# Patient Record
Sex: FEMALE | Race: WHITE | Employment: OTHER | URBAN - METROPOLITAN AREA
[De-identification: names, ages, dates, MRNs, and addresses within clinical notes are randomized per-mention and may not be internally consistent; named-entity substitution may affect disease eponyms.]

---

## 2017-05-08 ENCOUNTER — EMPLOYEE WELLNESS (OUTPATIENT)
Dept: OTHER | Age: 62
End: 2017-05-08

## 2017-05-08 LAB
CHOLESTEROL, TOTAL: 219 MG/DL (ref 0–199)
GLUCOSE BLD-MCNC: 123 MG/DL (ref 70–99)
HDLC SERPL-MCNC: 57 MG/DL (ref 40–60)
LDL CHOLESTEROL CALCULATED: 133 MG/DL
TRIGL SERPL-MCNC: 144 MG/DL (ref 0–150)

## 2017-05-31 ENCOUNTER — HOSPITAL ENCOUNTER (OUTPATIENT)
Dept: OTHER | Age: 62
Discharge: HOME | End: 2017-05-31
Attending: NURSE PRACTITIONER | Admitting: NURSE PRACTITIONER

## 2017-05-31 LAB
ALBUMIN SERPL-MCNC: 4 G/DL (ref 3.4–5)
ALP BLD-CCNC: 64 U/L (ref 40–129)
ALT SERPL-CCNC: 19 U/L (ref 10–40)
ANION GAP SERPL CALCULATED.3IONS-SCNC: 11 MMOL/L (ref 3–16)
AST SERPL-CCNC: 16 U/L (ref 15–37)
BASOPHILS ABSOLUTE: 0.1 K/UL (ref 0–0.2)
BASOPHILS RELATIVE PERCENT: 0.8 %
BILIRUB SERPL-MCNC: 0.4 MG/DL (ref 0–1)
BILIRUBIN DIRECT: <0.2 MG/DL (ref 0–0.3)
BILIRUBIN, INDIRECT: NORMAL MG/DL (ref 0–1)
BUN BLDV-MCNC: 14 MG/DL (ref 7–20)
CALCIUM SERPL-MCNC: 8.9 MG/DL (ref 8.3–10.6)
CHLORIDE BLD-SCNC: 102 MMOL/L (ref 99–110)
CHOLESTEROL, TOTAL: 199 MG/DL (ref 0–199)
CO2: 26 MMOL/L (ref 21–32)
CREAT SERPL-MCNC: <0.5 MG/DL (ref 0.6–1.2)
EOSINOPHILS ABSOLUTE: 0.2 K/UL (ref 0–0.6)
EOSINOPHILS RELATIVE PERCENT: 2.6 %
GFR AFRICAN AMERICAN: >60
GFR NON-AFRICAN AMERICAN: >60
GLUCOSE BLD-MCNC: 108 MG/DL (ref 70–99)
HCT VFR BLD CALC: 40.6 % (ref 36–48)
HDLC SERPL-MCNC: 58 MG/DL (ref 40–60)
HEMOGLOBIN: 13.5 G/DL (ref 12–16)
LDL CHOLESTEROL CALCULATED: 120 MG/DL
LYMPHOCYTES ABSOLUTE: 1.7 K/UL (ref 1–5.1)
LYMPHOCYTES RELATIVE PERCENT: 22.9 %
MCH RBC QN AUTO: 30.9 PG (ref 26–34)
MCHC RBC AUTO-ENTMCNC: 33.4 G/DL (ref 31–36)
MCV RBC AUTO: 92.5 FL (ref 80–100)
MONOCYTES ABSOLUTE: 0.6 K/UL (ref 0–1.3)
MONOCYTES RELATIVE PERCENT: 8.2 %
NEUTROPHILS ABSOLUTE: 4.9 K/UL (ref 1.7–7.7)
NEUTROPHILS RELATIVE PERCENT: 65.5 %
PDW BLD-RTO: 13.5 % (ref 12.4–15.4)
PLATELET # BLD: 266 K/UL (ref 135–450)
PMV BLD AUTO: 8.4 FL (ref 5–10.5)
POTASSIUM SERPL-SCNC: 4.3 MMOL/L (ref 3.5–5.1)
RBC # BLD: 4.39 M/UL (ref 4–5.2)
SODIUM BLD-SCNC: 139 MMOL/L (ref 136–145)
T4 FREE: 1.1 NG/DL (ref 0.9–1.8)
TOTAL PROTEIN: 7.1 G/DL (ref 6.4–8.2)
TRIGL SERPL-MCNC: 103 MG/DL (ref 0–150)
TSH SERPL DL<=0.05 MIU/L-ACNC: 1.67 UIU/ML (ref 0.27–4.2)
VLDLC SERPL CALC-MCNC: 21 MG/DL
WBC # BLD: 7.5 K/UL (ref 4–11)

## 2017-06-01 LAB
ESTIMATED AVERAGE GLUCOSE: 125.5 MG/DL
HBA1C MFR BLD: 6 %

## 2018-02-14 ENCOUNTER — OFFICE VISIT (OUTPATIENT)
Dept: ORTHOPEDIC SURGERY | Age: 63
End: 2018-02-14

## 2018-02-14 VITALS
WEIGHT: 280.8 LBS | HEART RATE: 88 BPM | SYSTOLIC BLOOD PRESSURE: 148 MMHG | BODY MASS INDEX: 46.78 KG/M2 | DIASTOLIC BLOOD PRESSURE: 89 MMHG | HEIGHT: 65 IN

## 2018-02-14 DIAGNOSIS — M25.551 RIGHT HIP PAIN: Primary | ICD-10-CM

## 2018-02-14 DIAGNOSIS — M25.851 RIGHT HIP IMPINGEMENT SYNDROME: ICD-10-CM

## 2018-02-14 PROCEDURE — 99203 OFFICE O/P NEW LOW 30 MIN: CPT | Performed by: ORTHOPAEDIC SURGERY

## 2018-02-14 RX ORDER — CELECOXIB 200 MG/1
200 CAPSULE ORAL DAILY
Qty: 60 CAPSULE | Refills: 0 | Status: SHIPPED | OUTPATIENT
Start: 2018-02-14 | End: 2018-02-15 | Stop reason: SDUPTHER

## 2018-02-14 NOTE — PROGRESS NOTES
ABLATION      JOINT REPLACEMENT      DAVID KNEE    MANDIBLE RECONSTRUCTION      SHOULDER SURGERY      RIGHT ROTATOR CUFF     allergies, social and family histories were reviewed and updated as appropriate. Review of Systems:  Relevant review of systems reviewed and available in the patient's chart    Vital Signs:  BP (!) 148/89   Pulse 88   Ht 5' 5\" (1.651 m)   Wt 280 lb 12.8 oz (127.4 kg)   BMI 46.73 kg/m²     General Exam:   Constitutional: Patient is adequately groomed with no evidence of malnutrition, class III obesity noted  Mental Status: The patient is oriented to time, place and person. The patient's mood and affect are appropriate. Lymphatic: The lymphatic examination bilaterally reveals all areas to be without enlargement or induration. Vascular: Examination reveals no swelling or calf tenderness. Peripheral pulses are palpable and 2+. Neurological: The patient has good coordination. There is no focal weakness or sensory deficit. Right Hip Examination:    Inspection:  Normal muscle contours and no significant limb length discrepancy. No gross atrophy in any particular myotome. Palpation:  Slight tenderness to the abductor region. No tenderness over the SI joint or ASIS. No tenderness to palpation at the knee joint. Range of Motion: hip flexion 90,  Hip abduction 35,  Hip internal rotation at 90° of flexion 15 with moderate discomfort, hip external rotation at 90° of flexion 35 with moderate discomfort. Knee range of motion shows functional range without pain. Ankle dorsiflexion and plantarflexion show functional range of motion. Strength: Hip flexion  4+/5, hip abduction 4+ / 5, hip adduction 5 minus/5. Knee flexion and extension 4+-5/5 without pain. DF/ PF ankle 4+-5/5    Special Tests: Log roll negative, Stinchfield's positive, Straight leg raise negative for radicular signs    Sensation to light touch grossly present and capillary refill less than 2 seconds.     Skin: understands and accepts this course of care.

## 2018-02-15 RX ORDER — CELECOXIB 200 MG/1
200 CAPSULE ORAL DAILY
Qty: 60 CAPSULE | Refills: 0 | Status: SHIPPED | OUTPATIENT
Start: 2018-02-15 | End: 2019-05-01 | Stop reason: SDUPTHER

## 2018-02-20 ENCOUNTER — HOSPITAL ENCOUNTER (OUTPATIENT)
Dept: PHYSICAL THERAPY | Age: 63
Discharge: HOME | End: 2018-02-28
Admitting: ORTHOPAEDIC SURGERY

## 2018-02-21 NOTE — PROGRESS NOTES
Physical Therapy  Initial Assessment  Date: 2018  Patient Name: Rupinder Yu  MRN: [id number]  : 1955     Treatment Diagnosis: pain, decreased R hip jt mobility, decreased R hip ROM, decreased R LE strength, impaired balance, abnormal gait    Outpatient fall risk assessment completed asking screening question if patient has fallen in the past 30 days:  [x] Yes  [] No    Based on screen for falls, patient demonstrates fall risk:  [] Yes  [x] No    Interventions based on fall risk status:  Updated Problem List within Medical History  [] Yes   [x] N/A    Asked family to assist with increased observation of the patient  [] Yes   [x] N/A    Patient kept in visible area when not closely supervised by therapist  [] Yes   [x] N/A    Repeatedly reinforce activity limits and safety needs with patient/family  [] Yes   [x] N/A    Increase frequency of rounding/monitoring patient  [] Yes   [x] N/A      Restrictions     Subjective   General  Chart Reviewed: Yes  Referring Practitioner: Kirby Loco  Referral Date : 18  Diagnosis: R hip pain (R hip impingement syndrome)  PT Visit Information  Onset Date: 18  PT Insurance Information: [employer]  Total # of Visits Approved: 12  Total # of Visits to Date: 1    Subjective: PMHx: OA foot, hyperlipidemia. Pt reports she started having R hip pain about month ago. Over the past month symptoms have worsened. She had an x-ray. The doctor thinks the labrum is inflamed. She will follow up with Dr. Pilo Rios if continues to have symptoms after PT. Will be starting Celebrex when it arrives in the mail. PLOF: difficulty with stairs, no limitations in standing or walking. CLOF: pain and difficulty with walking, grocery shopping, not confident on the stairs, standing after prolonged sitting, and sleeping. Symptoms are located at the anterior hip and travel back around the crest of her hip to her R glute.  Symptoms are described as constant dull ache and sharp pain with standing/walking. Pt denies N/T. Symptoms are exacerbated with standing and walking. Symptoms are alleviated with seated rest and a heating pad. Her primary goal is to be pain free and walk normally. Pain Screening  Patient Currently in Pain: Yes  Location: anterior to posterior R hip  Current: 2-3/10. Worst: 8/10. Least: 2/10.        Social/Functional History  Social/Functional History  Lives With: Spouse  Type of Home: House  Home Layout: One level  Home Access: Stairs to enter without rails  Entrance Stairs - Number of Steps: 3  Bathroom Shower/Tub: Tub/Shower unit  Bathroom Toilet: Standard  Homemaking Assistance: Needs assistance  Active : Yes  Type of occupation: RN  Leisure & Hobbies: golf, spend time with grandchildren  Objective     Observation/Palpation  Posture: Good (neutral hip in standing)  Palpation: Decreased STM and tenderness with palpation of TFL, glute med, piriformis  Body Mechanics: Decreased WB through R LE with sit to stand transfer, antalgic sit to stand transfer    PROM RLE (degrees)  RLE PROM: Exceptions  RLE General PROM: Decreased IR and ER  R Hip Flexion 0-125: 90  R Hip Extension 0-10: 5  PROM LLE (degrees)  LLE PROM: Exceptions  LLE General PROM: L hip ER and IR WNL  L Hip Flexion 0-125: 95  L Hip Extension 0-10: 10    Strength RLE  R Hip Flexion: 4/5  R Hip Extension: 4-/5  R Hip ABduction: 4-/5  R Knee Flexion: 4+/5  R Knee Extension: 4+/5  Strength LLE  Strength LLE: Exception  L Hip Flexion: 4+/5  L Hip Extension: 4+/5  L Hip ABduction: 4+/5  L Knee Flexion: 5/5  L Knee Extension: 5/5     Additional Measures  Flexibility: Decreased R hip flexor length  Special Tests: ANIKA R (+), FADIR R (+), Scour R (-)  Other: Lumbar AROM does not reproduce symptoms  Jt Mobility: hypomobile R hip jt distraction     Bed mobility  Comment: Pt independent with bed mobility, however requires extra time to complete     Ambulation  Ambulation?: Yes  Ambulation 1  Surface: level

## 2018-02-21 NOTE — PLAN OF CARE
Outpatient Physical Therapy     Phone: [phone] Fax: [phone]     To: Referring Practitioner: Madhavi Ho      Patient: Misa Velazquez   : 1955   MRN: [id number]  Evaluation Date: 2018      Diagnosis Information:  · Diagnosis: R hip pain (R hip impingement syndrome)   · Treatment Diagnosis: pain, decreased R hip jt mobility, decreased R hip ROM, decreased R LE strength, impaired balance, abnormal gait     Physical Therapy Certification/Re-Certification Form  Dear Dr. Violet Gutierrez  The following patient has been evaluated for physical therapy services. Please review the attached evaluation and/or summary of the patient's plan of care, and verify that you agree therapy should continue by signing the attached document and sending it back to our office. Plan of Care/Treatment to date:  [x] Therapeutic Exercise      [x] Modalities:  [x] Therapeutic Activity        [x] Ultrasound    [] Gait Training        [] Cervical Traction   [x] Neuromuscular Re-education      [x] Cold/hotpack    [x] Instruction in HEP        [] Lumbar Traction  [x] Manual Therapy        [x] Electrical Stimulation            [x] Aquatic Therapy- if needed        [] Iontophoresis        ? [] Lymphedema management  [] Women's Health     Other:  [] Vestibular Rehab        []    []  Needed     Frequency/Duration:  # Days per week: [] 1 day # Weeks: [] 1 week [] 5 weeks     [x] 2 days? [] 2 weeks [x] 6 weeks     [] 3 days   [] 3 weeks [] 7 weeks     [] 4 days   [] 4 weeks [] 8 weeks    Rehab Potential: [] Excellent [x] Good [] Fair  [] Poor     Electronically signed by:  Quique Interiano, PT, DPT [id number]    If you have any questions or concerns, please don't hesitate to call.   Thank you for your referral.      Physician Signature:________________________________Date:__________________  By signing above, therapists plan is approved by physician

## 2018-03-01 ENCOUNTER — HOSPITAL ENCOUNTER (OUTPATIENT)
Dept: OTHER | Age: 63
Discharge: HOME | End: 2018-03-31
Attending: ORTHOPAEDIC SURGERY | Admitting: ORTHOPAEDIC SURGERY

## 2018-03-20 VITALS — WEIGHT: 264 LBS | BODY MASS INDEX: 43.93 KG/M2

## 2018-04-01 ENCOUNTER — HOSPITAL ENCOUNTER (OUTPATIENT)
Dept: OTHER | Age: 63
Discharge: HOME | End: 2018-04-30
Attending: ORTHOPAEDIC SURGERY | Admitting: ORTHOPAEDIC SURGERY

## 2018-06-20 ENCOUNTER — HOSPITAL ENCOUNTER (OUTPATIENT)
Dept: OTHER | Age: 63
Discharge: HOME | End: 2018-06-20
Attending: NURSE PRACTITIONER | Admitting: NURSE PRACTITIONER

## 2018-06-20 LAB
ALBUMIN SERPL-MCNC: 3.8 G/DL (ref 3.4–5)
ALP BLD-CCNC: 79 U/L (ref 40–129)
ALT SERPL-CCNC: 19 U/L (ref 10–40)
ANION GAP SERPL CALCULATED.3IONS-SCNC: 13 MMOL/L (ref 3–16)
AST SERPL-CCNC: 13 U/L (ref 15–37)
BASOPHILS ABSOLUTE: 0 K/UL (ref 0–0.2)
BASOPHILS RELATIVE PERCENT: 0.5 %
BILIRUB SERPL-MCNC: 0.3 MG/DL (ref 0–1)
BILIRUBIN DIRECT: <0.2 MG/DL (ref 0–0.3)
BILIRUBIN, INDIRECT: ABNORMAL MG/DL (ref 0–1)
BUN BLDV-MCNC: 10 MG/DL (ref 7–20)
CALCIUM SERPL-MCNC: 9.1 MG/DL (ref 8.3–10.6)
CHLORIDE BLD-SCNC: 102 MMOL/L (ref 99–110)
CHOLESTEROL, TOTAL: 198 MG/DL (ref 0–199)
CO2: 25 MMOL/L (ref 21–32)
CREAT SERPL-MCNC: 0.6 MG/DL (ref 0.6–1.2)
EOSINOPHILS ABSOLUTE: 0.2 K/UL (ref 0–0.6)
EOSINOPHILS RELATIVE PERCENT: 2 %
ESTIMATED AVERAGE GLUCOSE: 131.2 MG/DL
GFR AFRICAN AMERICAN: >60
GFR NON-AFRICAN AMERICAN: >60
GLUCOSE BLD-MCNC: 113 MG/DL (ref 70–99)
HBA1C MFR BLD: 6.2 %
HCT VFR BLD CALC: 40.6 % (ref 36–48)
HDLC SERPL-MCNC: 53 MG/DL (ref 40–60)
HEMOGLOBIN: 14 G/DL (ref 12–16)
LDL CHOLESTEROL CALCULATED: 126 MG/DL
LYMPHOCYTES ABSOLUTE: 2.2 K/UL (ref 1–5.1)
LYMPHOCYTES RELATIVE PERCENT: 25 %
MCH RBC QN AUTO: 31.6 PG (ref 26–34)
MCHC RBC AUTO-ENTMCNC: 34.4 G/DL (ref 31–36)
MCV RBC AUTO: 92.1 FL (ref 80–100)
MONOCYTES ABSOLUTE: 0.7 K/UL (ref 0–1.3)
MONOCYTES RELATIVE PERCENT: 8.1 %
NEUTROPHILS ABSOLUTE: 5.7 K/UL (ref 1.7–7.7)
NEUTROPHILS RELATIVE PERCENT: 64.4 %
PDW BLD-RTO: 13.4 % (ref 12.4–15.4)
PLATELET # BLD: 271 K/UL (ref 135–450)
PMV BLD AUTO: 8.5 FL (ref 5–10.5)
POTASSIUM SERPL-SCNC: 4.4 MMOL/L (ref 3.5–5.1)
RBC # BLD: 4.41 M/UL (ref 4–5.2)
SODIUM BLD-SCNC: 140 MMOL/L (ref 136–145)
TOTAL PROTEIN: 6.8 G/DL (ref 6.4–8.2)
TRIGL SERPL-MCNC: 96 MG/DL (ref 0–150)
VLDLC SERPL CALC-MCNC: 19 MG/DL
WBC # BLD: 8.9 K/UL (ref 4–11)

## 2018-11-30 ENCOUNTER — HOSPITAL ENCOUNTER (OUTPATIENT)
Dept: WOMENS IMAGING | Age: 63
Discharge: HOME | End: 2018-11-30
Payer: COMMERCIAL

## 2018-11-30 DIAGNOSIS — Z12.31 ENCOUNTER FOR SCREENING MAMMOGRAM FOR BREAST CANCER: ICD-10-CM

## 2018-11-30 PROCEDURE — 77063 BREAST TOMOSYNTHESIS BI: CPT

## 2018-12-27 ENCOUNTER — HOSPITAL ENCOUNTER (OUTPATIENT)
Age: 63
Discharge: HOME | End: 2018-12-27
Payer: COMMERCIAL

## 2018-12-27 ENCOUNTER — HOSPITAL ENCOUNTER (OUTPATIENT)
Dept: GENERAL RADIOLOGY | Age: 63
Discharge: HOME | End: 2018-12-27
Payer: COMMERCIAL

## 2018-12-27 DIAGNOSIS — J20.8 ACUTE BRONCHITIS DUE TO OTHER SPECIFIED ORGANISMS: ICD-10-CM

## 2018-12-27 LAB
ALBUMIN SERPL-MCNC: 4.1 G/DL (ref 3.4–5)
ALP BLD-CCNC: 72 U/L (ref 40–129)
ALT SERPL-CCNC: 21 U/L (ref 10–40)
ANION GAP SERPL CALCULATED.3IONS-SCNC: 14 MMOL/L (ref 3–16)
AST SERPL-CCNC: 13 U/L (ref 15–37)
BASOPHILS ABSOLUTE: 0 K/UL (ref 0–0.2)
BASOPHILS RELATIVE PERCENT: 0.4 %
BILIRUB SERPL-MCNC: <0.2 MG/DL (ref 0–1)
BILIRUBIN DIRECT: <0.2 MG/DL (ref 0–0.3)
BILIRUBIN, INDIRECT: ABNORMAL MG/DL (ref 0–1)
BUN BLDV-MCNC: 14 MG/DL (ref 7–20)
CALCIUM SERPL-MCNC: 9.1 MG/DL (ref 8.3–10.6)
CHLORIDE BLD-SCNC: 101 MMOL/L (ref 99–110)
CO2: 25 MMOL/L (ref 21–32)
CREAT SERPL-MCNC: 0.6 MG/DL (ref 0.6–1.2)
EOSINOPHILS ABSOLUTE: 0.1 K/UL (ref 0–0.6)
EOSINOPHILS RELATIVE PERCENT: 0.7 %
GFR AFRICAN AMERICAN: >60
GFR NON-AFRICAN AMERICAN: >60
GLUCOSE BLD-MCNC: 113 MG/DL (ref 70–99)
HCT VFR BLD CALC: 41.3 % (ref 36–48)
HEMOGLOBIN: 14 G/DL (ref 12–16)
LYMPHOCYTES ABSOLUTE: 3.5 K/UL (ref 1–5.1)
LYMPHOCYTES RELATIVE PERCENT: 29.6 %
MCH RBC QN AUTO: 31.2 PG (ref 26–34)
MCHC RBC AUTO-ENTMCNC: 33.8 G/DL (ref 31–36)
MCV RBC AUTO: 92.2 FL (ref 80–100)
MONOCYTES ABSOLUTE: 0.9 K/UL (ref 0–1.3)
MONOCYTES RELATIVE PERCENT: 7.4 %
NEUTROPHILS ABSOLUTE: 7.3 K/UL (ref 1.7–7.7)
NEUTROPHILS RELATIVE PERCENT: 61.9 %
PDW BLD-RTO: 13.2 % (ref 12.4–15.4)
PLATELET # BLD: 310 K/UL (ref 135–450)
PMV BLD AUTO: 8.1 FL (ref 5–10.5)
POTASSIUM SERPL-SCNC: 3.7 MMOL/L (ref 3.5–5.1)
RBC # BLD: 4.48 M/UL (ref 4–5.2)
SODIUM BLD-SCNC: 140 MMOL/L (ref 136–145)
TOTAL PROTEIN: 7 G/DL (ref 6.4–8.2)
WBC # BLD: 11.7 K/UL (ref 4–11)

## 2018-12-27 PROCEDURE — 86738 MYCOPLASMA ANTIBODY: CPT

## 2018-12-27 PROCEDURE — 86632 CHLAMYDIA IGM ANTIBODY: CPT

## 2018-12-27 PROCEDURE — 36415 COLL VENOUS BLD VENIPUNCTURE: CPT

## 2018-12-27 PROCEDURE — 86713 LEGIONELLA ANTIBODY: CPT

## 2018-12-27 PROCEDURE — 86631 CHLAMYDIA ANTIBODY: CPT

## 2018-12-27 PROCEDURE — 80048 BASIC METABOLIC PNL TOTAL CA: CPT

## 2018-12-27 PROCEDURE — 80076 HEPATIC FUNCTION PANEL: CPT

## 2018-12-27 PROCEDURE — 85025 COMPLETE CBC W/AUTO DIFF WBC: CPT

## 2018-12-27 PROCEDURE — 71046 X-RAY EXAM CHEST 2 VIEWS: CPT

## 2018-12-28 LAB
CHLAMYDIA PNEUMONIAE IGG ANTIBODY: ABNORMAL
CHLAMYDIA PNEUMONIAE IGM ANTIBODY: ABNORMAL
CHLAMYDIA PSITTACI IGG ANTIBODY: ABNORMAL
CHLAMYDIA PSITTACI IGM ANTIBODY: ABNORMAL
CHLAMYDIA TRACHOMATIS IGG ANTIBODY: ABNORMAL
CHLAMYDIA TRACHOMATIS IGM ANTIBODY: ABNORMAL

## 2018-12-29 LAB
Lab: NORMAL
MISCELLANEOUS LAB TEST ORDER: NORMAL
MISCELLANEOUS LAB TEST RESULT: ABNORMAL

## 2019-01-23 ENCOUNTER — OFFICE VISIT (OUTPATIENT)
Dept: PULMONOLOGY | Age: 64
End: 2019-01-23
Payer: COMMERCIAL

## 2019-01-23 VITALS — DIASTOLIC BLOOD PRESSURE: 102 MMHG | HEART RATE: 84 BPM | SYSTOLIC BLOOD PRESSURE: 197 MMHG

## 2019-01-23 DIAGNOSIS — R06.02 SOB (SHORTNESS OF BREATH): ICD-10-CM

## 2019-01-23 DIAGNOSIS — J44.9 COPD, SEVERITY TO BE DETERMINED (HCC): ICD-10-CM

## 2019-01-23 DIAGNOSIS — J45.40 MODERATE PERSISTENT ASTHMA, UNCOMPLICATED: ICD-10-CM

## 2019-01-23 DIAGNOSIS — Z91.09 ENVIRONMENTAL ALLERGIES: ICD-10-CM

## 2019-01-23 DIAGNOSIS — Z87.891 FORMER CIGARETTE SMOKER: Primary | ICD-10-CM

## 2019-01-23 PROCEDURE — 99204 OFFICE O/P NEW MOD 45 MIN: CPT | Performed by: INTERNAL MEDICINE

## 2019-01-23 RX ORDER — FLUTICASONE FUROATE AND VILANTEROL 200; 25 UG/1; UG/1
1 POWDER RESPIRATORY (INHALATION) DAILY
Qty: 1 EACH | Refills: 5 | Status: SHIPPED | OUTPATIENT
Start: 2019-01-23 | End: 2019-05-15 | Stop reason: SDUPTHER

## 2019-01-23 ASSESSMENT — ENCOUNTER SYMPTOMS
NAUSEA: 0
ABDOMINAL PAIN: 0
DIARRHEA: 0
SINUS PRESSURE: 0
CONSTIPATION: 0

## 2019-02-05 ENCOUNTER — HOSPITAL ENCOUNTER (OUTPATIENT)
Dept: CT IMAGING | Age: 64
Discharge: HOME | End: 2019-02-05
Payer: COMMERCIAL

## 2019-02-05 DIAGNOSIS — Z87.891 FORMER CIGARETTE SMOKER: ICD-10-CM

## 2019-02-05 PROCEDURE — G0297 LDCT FOR LUNG CA SCREEN: HCPCS

## 2019-02-06 ENCOUNTER — HOSPITAL ENCOUNTER (OUTPATIENT)
Dept: PULMONOLOGY | Age: 64
Discharge: HOME | End: 2019-02-06
Payer: COMMERCIAL

## 2019-02-06 ENCOUNTER — TELEPHONE (OUTPATIENT)
Dept: CASE MANAGEMENT | Age: 64
End: 2019-02-06

## 2019-02-06 VITALS — OXYGEN SATURATION: 94 %

## 2019-02-06 DIAGNOSIS — J45.40 MODERATE PERSISTENT ASTHMA, UNCOMPLICATED: ICD-10-CM

## 2019-02-06 DIAGNOSIS — J44.9 COPD, SEVERITY TO BE DETERMINED (HCC): ICD-10-CM

## 2019-02-06 PROCEDURE — 94010 BREATHING CAPACITY TEST: CPT

## 2019-02-06 PROCEDURE — 94729 DIFFUSING CAPACITY: CPT

## 2019-02-06 PROCEDURE — 6370000000 HC RX 637 (ALT 250 FOR IP): Performed by: INTERNAL MEDICINE

## 2019-02-06 PROCEDURE — 94060 EVALUATION OF WHEEZING: CPT

## 2019-02-06 PROCEDURE — 94726 PLETHYSMOGRAPHY LUNG VOLUMES: CPT

## 2019-02-06 PROCEDURE — 94760 N-INVAS EAR/PLS OXIMETRY 1: CPT

## 2019-02-06 PROCEDURE — 94664 DEMO&/EVAL PT USE INHALER: CPT

## 2019-02-06 RX ORDER — ALBUTEROL SULFATE 90 UG/1
4 AEROSOL, METERED RESPIRATORY (INHALATION) ONCE
Status: COMPLETED | OUTPATIENT
Start: 2019-02-06 | End: 2019-02-06

## 2019-02-06 RX ADMIN — Medication 4 PUFF: at 08:42

## 2019-02-08 ENCOUNTER — TELEPHONE (OUTPATIENT)
Dept: PULMONOLOGY | Age: 64
End: 2019-02-08

## 2019-02-08 NOTE — PROCEDURES
Pulmonary Function Testing      Patient name:  Genet Fallon     [address] [place] [address] Unit #:   [id number]   Date of test:  2/6/2019  Date of interpretation:   2/7/2019    Ms. Genet Fallon is a 58y.o. year-old former smoker. The spirometry data were acceptable and reproducible. Spirometry:  Flow volume loops were obstructed. The FEV-1/FVC ratio was decreased. The FEV-1 was 1.5 liters (59% of predicted), which was moderately decreased. The FVC was 2.15 liters (65% of predicted), which was decreased. Response to inhaled bronchodilators (albuterol) was not significant. Lung volumes:  Lung volumes were tested by plethysmography. The total lung capacity was 4.45 liters (88% of predicted), which was normal. The residual volume was 2.24 liters (114% of predicted), which was increased. The ratio of residual volume to total lung capacity (RV/TLC) was 50, which was incraesed. Diffusion capacity was found to be mildly decreased. Interpretation:  Moderate obstructive airway disease.     Comments:

## 2019-02-11 ENCOUNTER — TELEPHONE (OUTPATIENT)
Dept: PULMONOLOGY | Age: 64
End: 2019-02-11

## 2019-02-12 ENCOUNTER — TELEPHONE (OUTPATIENT)
Dept: PULMONOLOGY | Age: 64
End: 2019-02-12

## 2019-02-25 ENCOUNTER — OFFICE VISIT (OUTPATIENT)
Dept: PULMONOLOGY | Age: 64
End: 2019-02-25
Payer: COMMERCIAL

## 2019-02-25 ENCOUNTER — HOSPITAL ENCOUNTER (OUTPATIENT)
Age: 64
Discharge: HOME | End: 2019-02-25
Payer: COMMERCIAL

## 2019-02-25 VITALS — DIASTOLIC BLOOD PRESSURE: 94 MMHG | SYSTOLIC BLOOD PRESSURE: 168 MMHG | HEART RATE: 99 BPM

## 2019-02-25 DIAGNOSIS — R06.02 SOB (SHORTNESS OF BREATH): Primary | ICD-10-CM

## 2019-02-25 DIAGNOSIS — Z91.09 ENVIRONMENTAL ALLERGIES: ICD-10-CM

## 2019-02-25 DIAGNOSIS — J44.9 COPD, MODERATE (HCC): ICD-10-CM

## 2019-02-25 LAB
ANION GAP SERPL CALCULATED.3IONS-SCNC: 12 MMOL/L (ref 3–16)
BASOPHILS ABSOLUTE: 0.1 K/UL (ref 0–0.2)
BASOPHILS RELATIVE PERCENT: 0.7 %
BUN BLDV-MCNC: 10 MG/DL (ref 7–20)
CALCIUM SERPL-MCNC: 9.1 MG/DL (ref 8.3–10.6)
CHLORIDE BLD-SCNC: 105 MMOL/L (ref 99–110)
CO2: 27 MMOL/L (ref 21–32)
CREAT SERPL-MCNC: 0.7 MG/DL (ref 0.6–1.2)
EOSINOPHILS ABSOLUTE: 0.2 K/UL (ref 0–0.6)
EOSINOPHILS RELATIVE PERCENT: 1.8 %
GFR AFRICAN AMERICAN: >60
GFR NON-AFRICAN AMERICAN: >60
GLUCOSE BLD-MCNC: 121 MG/DL (ref 70–99)
HCT VFR BLD CALC: 41.3 % (ref 36–48)
HEMOGLOBIN: 13.8 G/DL (ref 12–16)
LYMPHOCYTES ABSOLUTE: 2.1 K/UL (ref 1–5.1)
LYMPHOCYTES RELATIVE PERCENT: 20.8 %
MCH RBC QN AUTO: 31 PG (ref 26–34)
MCHC RBC AUTO-ENTMCNC: 33.3 G/DL (ref 31–36)
MCV RBC AUTO: 93 FL (ref 80–100)
MONOCYTES ABSOLUTE: 1 K/UL (ref 0–1.3)
MONOCYTES RELATIVE PERCENT: 10.3 %
NEUTROPHILS ABSOLUTE: 6.6 K/UL (ref 1.7–7.7)
NEUTROPHILS RELATIVE PERCENT: 66.4 %
PDW BLD-RTO: 13.3 % (ref 12.4–15.4)
PLATELET # BLD: 275 K/UL (ref 135–450)
PMV BLD AUTO: 8.2 FL (ref 5–10.5)
POTASSIUM SERPL-SCNC: 4.3 MMOL/L (ref 3.5–5.1)
PRO-BNP: 144 PG/ML (ref 0–124)
RBC # BLD: 4.44 M/UL (ref 4–5.2)
SODIUM BLD-SCNC: 144 MMOL/L (ref 136–145)
WBC # BLD: 10 K/UL (ref 4–11)

## 2019-02-25 PROCEDURE — 80048 BASIC METABOLIC PNL TOTAL CA: CPT

## 2019-02-25 PROCEDURE — 99213 OFFICE O/P EST LOW 20 MIN: CPT | Performed by: INTERNAL MEDICINE

## 2019-02-25 PROCEDURE — 85025 COMPLETE CBC W/AUTO DIFF WBC: CPT

## 2019-02-25 PROCEDURE — 83880 ASSAY OF NATRIURETIC PEPTIDE: CPT

## 2019-02-25 PROCEDURE — 36415 COLL VENOUS BLD VENIPUNCTURE: CPT

## 2019-02-25 RX ORDER — LISINOPRIL 10 MG/1
10 TABLET ORAL DAILY
Status: ON HOLD | COMMUNITY
End: 2019-12-11 | Stop reason: ALTCHOICE

## 2019-02-25 RX ORDER — ALBUTEROL SULFATE 90 UG/1
2 AEROSOL, METERED RESPIRATORY (INHALATION) EVERY 6 HOURS PRN
Qty: 1 INHALER | Refills: 11 | Status: SHIPPED | OUTPATIENT
Start: 2019-02-25 | End: 2022-04-11

## 2019-02-25 ASSESSMENT — ENCOUNTER SYMPTOMS
NAUSEA: 0
CONSTIPATION: 0
SINUS PRESSURE: 0
ABDOMINAL PAIN: 0
DIARRHEA: 0

## 2019-03-13 ENCOUNTER — OFFICE VISIT (OUTPATIENT)
Dept: CARDIOLOGY CLINIC | Age: 64
End: 2019-03-13
Payer: COMMERCIAL

## 2019-03-13 VITALS
DIASTOLIC BLOOD PRESSURE: 80 MMHG | SYSTOLIC BLOOD PRESSURE: 126 MMHG | WEIGHT: 287 LBS | OXYGEN SATURATION: 98 % | HEART RATE: 103 BPM | BODY MASS INDEX: 47.82 KG/M2 | HEIGHT: 65 IN

## 2019-03-13 DIAGNOSIS — I10 ESSENTIAL HYPERTENSION: ICD-10-CM

## 2019-03-13 DIAGNOSIS — E78.5 DYSLIPIDEMIA: ICD-10-CM

## 2019-03-13 DIAGNOSIS — I25.10 CORONARY ARTERY CALCIFICATION SEEN ON CT SCAN: ICD-10-CM

## 2019-03-13 DIAGNOSIS — R06.02 SOB (SHORTNESS OF BREATH): Primary | ICD-10-CM

## 2019-03-13 PROCEDURE — 93000 ELECTROCARDIOGRAM COMPLETE: CPT | Performed by: INTERNAL MEDICINE

## 2019-03-13 PROCEDURE — 99203 OFFICE O/P NEW LOW 30 MIN: CPT | Performed by: INTERNAL MEDICINE

## 2019-03-13 RX ORDER — HYDROCHLOROTHIAZIDE 25 MG/1
25 TABLET ORAL DAILY PRN
COMMUNITY

## 2019-03-13 RX ORDER — ATORVASTATIN CALCIUM 20 MG/1
20 TABLET, FILM COATED ORAL DAILY
Qty: 90 TABLET | Refills: 3 | Status: SHIPPED | OUTPATIENT
Start: 2019-03-13 | End: 2020-10-30 | Stop reason: SDUPTHER

## 2019-03-20 ENCOUNTER — TELEPHONE (OUTPATIENT)
Dept: ORTHOPEDIC SURGERY | Age: 64
End: 2019-03-20

## 2019-03-20 ENCOUNTER — OFFICE VISIT (OUTPATIENT)
Dept: ORTHOPEDIC SURGERY | Age: 64
End: 2019-03-20
Payer: COMMERCIAL

## 2019-03-20 VITALS
HEIGHT: 65 IN | HEART RATE: 80 BPM | DIASTOLIC BLOOD PRESSURE: 91 MMHG | BODY MASS INDEX: 47.98 KG/M2 | WEIGHT: 288 LBS | SYSTOLIC BLOOD PRESSURE: 150 MMHG

## 2019-03-20 DIAGNOSIS — M25.851 RIGHT HIP IMPINGEMENT SYNDROME: ICD-10-CM

## 2019-03-20 DIAGNOSIS — M25.852 LEFT HIP IMPINGEMENT SYNDROME: ICD-10-CM

## 2019-03-20 DIAGNOSIS — M25.552 HIP PAIN, LEFT: Primary | ICD-10-CM

## 2019-03-20 PROCEDURE — 99213 OFFICE O/P EST LOW 20 MIN: CPT | Performed by: ORTHOPAEDIC SURGERY

## 2019-03-20 RX ORDER — IBUPROFEN 200 MG
200 TABLET ORAL EVERY 6 HOURS PRN
COMMUNITY
End: 2022-08-08

## 2019-04-03 ENCOUNTER — HOSPITAL ENCOUNTER (OUTPATIENT)
Dept: NON INVASIVE DIAGNOSTICS | Age: 64
Discharge: HOME | End: 2019-04-03
Payer: COMMERCIAL

## 2019-04-03 ENCOUNTER — TELEPHONE (OUTPATIENT)
Dept: CARDIOLOGY CLINIC | Age: 64
End: 2019-04-03

## 2019-04-03 DIAGNOSIS — R06.02 SOB (SHORTNESS OF BREATH): ICD-10-CM

## 2019-04-03 DIAGNOSIS — I25.10 CORONARY ARTERY CALCIFICATION SEEN ON CT SCAN: ICD-10-CM

## 2019-04-03 LAB
LEFT VENTRICULAR EJECTION FRACTION HIGH VALUE: 55 %
LEFT VENTRICULAR EJECTION FRACTION MODE: NORMAL
LV EF: 55 %
LV EF: 56 %
LVEF MODALITY: NORMAL
LVEF MODALITY: NORMAL

## 2019-04-03 PROCEDURE — A9502 TC99M TETROFOSMIN: HCPCS | Performed by: INTERNAL MEDICINE

## 2019-04-03 PROCEDURE — 6360000002 HC RX W HCPCS: Performed by: INTERNAL MEDICINE

## 2019-04-03 PROCEDURE — 3430000000 HC RX DIAGNOSTIC RADIOPHARMACEUTICAL: Performed by: INTERNAL MEDICINE

## 2019-04-03 PROCEDURE — 78452 HT MUSCLE IMAGE SPECT MULT: CPT | Performed by: INTERNAL MEDICINE

## 2019-04-03 PROCEDURE — 93306 TTE W/DOPPLER COMPLETE: CPT

## 2019-04-03 PROCEDURE — 93017 CV STRESS TEST TRACING ONLY: CPT | Performed by: INTERNAL MEDICINE

## 2019-04-03 RX ADMIN — TETROFOSMIN 30 MILLICURIE: 0.23 INJECTION, POWDER, LYOPHILIZED, FOR SOLUTION INTRAVENOUS at 10:06

## 2019-04-03 RX ADMIN — TETROFOSMIN 10 MILLICURIE: 0.23 INJECTION, POWDER, LYOPHILIZED, FOR SOLUTION INTRAVENOUS at 08:15

## 2019-04-03 RX ADMIN — REGADENOSON 0.4 MG: 0.08 INJECTION, SOLUTION INTRAVENOUS at 09:25

## 2019-04-03 NOTE — TELEPHONE ENCOUNTER
Sangeetha Banks MD  [facility]             Strength of the heart muscle is normal. Olivia Peaches is a little thickening as well as dilation/enlargement.  This is often seen with high blood pressure over time.  No significant valvular abnormalities noted.  We need to focus on blood pressure control. Replied to patient via [email or web address]. Notified through [address]

## 2019-04-03 NOTE — PROGRESS NOTES
Instructed on Lexiscan Stress Test Procedure including possible side effects/ adverse reactions. Patient verbalizes  understanding and denies having any questions. See [address] [place] [address] Cardiology.   Ping Wayne RN

## 2019-04-18 ENCOUNTER — TELEPHONE (OUTPATIENT)
Dept: ORTHOPEDIC SURGERY | Age: 64
End: 2019-04-18

## 2019-04-18 NOTE — TELEPHONE ENCOUNTER
Patient states that ZANA wanted her to have a MRI done of her hip- she states she was tod she would recive a call to schedule but has not received any casll as of yet    She states that ZANA also reccommended PT, she wants to know at this point if she can just forget the MRI and just focus on PT, wants to know if ZANA thinks this is ok    Pls call patient at [phone]

## 2019-04-21 PROBLEM — E78.5 DYSLIPIDEMIA: Status: ACTIVE | Noted: 2019-04-21

## 2019-04-21 PROBLEM — I10 ESSENTIAL HYPERTENSION: Status: ACTIVE | Noted: 2019-04-21

## 2019-04-22 NOTE — PROGRESS NOTES
[address]    2019    Marisabel Weems (:  1955) is a 58 y.o. female who is here for follow up on her history of coronary artery calcification seen on CT scan, shortness of breath, and to review recent diagnostic testing. Referring Provider: Saima Romero MD    HISTORY: Ms. Laurel Gann has a history of shortness of breath, started about one year ago, occurs with heavier exertion or walking longer distances, resolves with rest.  She was seen by Dr. Santy White in 2019 for chronic cough and shortness of breath. Recent PFT showed moderate obstructive airway disease. CT lung screening showed no suspicious nodule or mass; she also had mild multivessel coronary calcification. She smoked off and on up to 1 PPD for 40 years, but quit smoking in 2018. 4/3/19 MPI showed normal perfusion. 4/3/19 Echo showed normal EF, mild CLVH, no significant valvular abnormalities. Today, she states she has palpitations (skipped beats) on occasion. PVCs were noted on her recent stress test.  She still has shortness of breath and is now on inhalers which has helped her breathing \"a little bit. \"  She denies exertional chest pain or dizziness. She has mild ankle edema. Her BP is under better control. She has not been able to exercise due to pain in her hip. She is scheduled for an MRI tomorrow. She questions whether she should start low dose ASA. Patient is compliant with medications for SOB and CAD and is tolerating them well without side effects. REVIEW OF SYSTEMS:  A complete review of systems was reviewed and is negative except as noted in the history of present illness. Prior to Visit Medications    Medication Sig Taking?  Authorizing Provider   aspirin 81 MG tablet Take 81 mg by mouth daily Yes Historical Provider, MD   ibuprofen (ADVIL;MOTRIN) 200 MG tablet Take 200 mg by mouth every 6 hours as needed for Pain Yes Historical Provider, MD   hydrochlorothiazide (HYDRODIURIL) 25 MG tablet Take 25 mg by mouth daily Yes Historical Provider, MD   atorvastatin (LIPITOR) 20 MG tablet Take 1 tablet by mouth daily Yes Jeanette Rubinstein, MD   lisinopril (PRINIVIL;ZESTRIL) 10 MG tablet Take 10 mg by mouth daily Yes Historical Provider, MD   albuterol sulfate  (90 Base) MCG/ACT inhaler Inhale 2 puffs into the lungs every 6 hours as needed for Wheezing Yes Christian Persaud MD   Fluticasone Furoate-Vilanterol (BREO ELLIPTA) 200-25 MCG/INH AEPB Inhale 1 puff into the lungs daily Yes Christian Persaud MD   celecoxib (CELEBREX) 200 MG capsule Take 1 capsule by mouth daily  Patient taking differently: Take 200 mg by mouth as needed  Yes Kellie Painting MD   cetirizine (ZYRTEC) 10 MG tablet Take 10 mg by mouth daily.  Yes Historical Provider, MD        Allergies   Allergen Reactions    Arthrotec [Diclofenac-Misoprostol]      HANDS ITCH       Past Medical History:   Diagnosis Date    HTN (hypertension)     Hyperlipidemia     Osteoarthritis, foot, localized 2014       Past Surgical History:   Procedure Laterality Date    ANKLE FRACTURE SURGERY      ENDOMETRIAL ABLATION      MANDIBLE RECONSTRUCTION      SHOULDER SURGERY      RIGHT ROTATOR CUFF    TOTAL KNEE ARTHROPLASTY Bilateral        Social History     Tobacco Use    Smoking status: Former Smoker     Packs/day: 1.00     Years: 40.00     Pack years: 40.00     Types: Cigarettes     Last attempt to quit: 10/1/2018     Years since quittin.5    Smokeless tobacco: Never Used   Substance Use Topics    Alcohol use: Yes     Comment: SOCIAL        Family History   Problem Relation Age of Onset    Hypertension Mother     Cancer Father     Heart Failure Paternal Grandmother        PHYSICAL EXAMINATION:  Vitals:    19 1516 19 1523 19 1536   BP: 94/62 104/62 118/72   Site: Right Upper Arm Left Upper Arm    Position: Sitting Standing    Cuff Size: Large Adult Large Adult    Pulse: 83 94    SpO2: 96% a estimated ejection fraction of 55%. No regional wall   motion abnormalities are noted.   - E/e\"=7.6   -Mild mitral and tricuspid regurgitation.   -Estimated pulmonary artery systolic pressure is normal at 28 mmHg assuming   a right atrial pressure of 3 mmHg. Lexiscan myoview 4/3/19:   Summary    Normal myocardial perfusion.    Normal LV size and systolic function.    PVCs       ECG 3/12/19: SR, 94 bpm (normal)     PFT 2/7/19:  Spirometry:  Flow volume loops were obstructed. The FEV-1/FVC ratio was   decreased. The FEV-1 was 1.5 liters (59% of predicted), which was   moderately decreased. The FVC was 2.15 liters (65% of predicted),   which was decreased. Response to inhaled bronchodilators   (albuterol) was not significant. Lung volumes:  Lung volumes were tested by plethysmography. The total lung   capacity was 4.45 liters (88% of predicted), which was normal.   The residual volume was 2.24 liters (114% of predicted), which   was increased. The ratio of residual volume to total lung   capacity (RV/TLC) was 50, which was incraesed. Diffusion capacity was found to be mildly decreased.       Interpretation:  Moderate obstructive airway disease. CT lung screening 2/5/19:  No suspicious nodule or mass.       Sub 5 mm middle lobe nodule is most consistent with a benign parenchymal   lymph node given morphology and location.       Also had --Mild multivessel coronary calcifications on screening CT. Labs:   2/25/19: pro-  6/20/18 TC- 198, TG- 96, HDL- 53, LDL- 126.   Hgb A1C 6.2      ASSESSMENT/PLAN:   1. SOB (shortness of breath)  ~ short of breath with exertion for the past year  ~ PFT showed moderate obstructive air way disease, now taking Breo and prn inhaler, but no significant improvement in shortness of breath  ~ ECG 3/12/19 -- SR without ischemic changes.    ~ Echo 4/3/19 -- normal EF, no significant valve abnormalities  ~ MPI 4/3/19 -- normal perfusion  ~ shortness of breath is only mildly improved with use of inhaler. ~ etiology is likely a combination of deconditioning and lung disease. Plan > no further testing needed. Encourage regular exercising/walking when pain in hip improves    2. Coronary artery calcification seen on CT scan  ~ mild multivessel coronary calcification on screening lung CT scan  ~ no exertional chest pain but has BOSCH  ~ MPI 4/3/19 -- normal perfusion    Plan > risk factor modification including BP and lipid management, increase activity, healthy diet. 3. Essential hypertension  ~ started on Lisinopril 10 mg daily by PCP  ~ Blood pressure 118/72, pulse 94, height 5' 5\" (1.651 m), weight 289 lb 3.2 oz (131.2 kg), SpO2 97 %, not currently breastfeeding. Plan > stable, no change in medications. 4. Dyslipidemia  ~ 6/2018 lipids showed LDL of 126. Goal LDL < 100 given coronary artery calcification  ~ started on Atorvastatin 20 mg 3/13/19. Plan >  Check fasting lipids and liver enzymes in two months, follow heart healthy diet. Plan :  1.  Okay to add low dose aspirin  2. LDL < 100. Recheck fasting lipids in mid May and call our office with resuls. 3.  Try to exercise regularly when able to  4. Good BP, lipid, blood sugar control  5. Follow up in one year or sooner if needed     Scribe's attestation: This note was scribed in the presence of Thomas Lamas M.D. by Erna Dominguez RN    Physician Attestation: The scribe's documentation has been prepared under my direction and personally reviewed by me in its entirety. I confirm that the note above accurately reflects all work, treatment, procedures, and medical decision making performed by me. An  electronic signature was used to authenticate this note. Salena Tim MD, [facility] [place], [address]

## 2019-04-24 ENCOUNTER — OFFICE VISIT (OUTPATIENT)
Dept: CARDIOLOGY CLINIC | Age: 64
End: 2019-04-24
Payer: COMMERCIAL

## 2019-04-24 VITALS
HEIGHT: 65 IN | DIASTOLIC BLOOD PRESSURE: 72 MMHG | HEART RATE: 94 BPM | BODY MASS INDEX: 48.18 KG/M2 | SYSTOLIC BLOOD PRESSURE: 118 MMHG | WEIGHT: 289.2 LBS | OXYGEN SATURATION: 97 %

## 2019-04-24 DIAGNOSIS — R06.02 SOB (SHORTNESS OF BREATH): Primary | ICD-10-CM

## 2019-04-24 DIAGNOSIS — I10 ESSENTIAL HYPERTENSION: ICD-10-CM

## 2019-04-24 DIAGNOSIS — I25.10 CORONARY ARTERY CALCIFICATION SEEN ON CT SCAN: ICD-10-CM

## 2019-04-24 DIAGNOSIS — E78.5 DYSLIPIDEMIA: ICD-10-CM

## 2019-04-24 PROCEDURE — 99214 OFFICE O/P EST MOD 30 MIN: CPT | Performed by: INTERNAL MEDICINE

## 2019-04-24 NOTE — PATIENT INSTRUCTIONS
1.  Okay to add low dose aspirin  2. LDL < 100. Recheck fasting lipids in mid May and call our office with resuls. 3.  Try to exercise regularly when able to  4. Good BP, lipid, blood sugar control  5.   Follow up in one year or sooner if needed

## 2019-04-24 NOTE — LETTER
[facility]  [address]  [address] [address]  Phone: [phone]  Fax: [phone]    Miguel Gallagher MD        2019     Ana Hendrix, [address]    Patient: Yaz Gallo  MR Number: [id number]  YOB: 1955  Date of Visit: 2019    Dear Dr. Tito Torres Diatte:    Below are the relevant portions of my assessment and plan of care. [address]    2019    Yaz Gallo (:  1955) is a 58 y.o. female who is here for follow up on her history of coronary artery calcification seen on CT scan, shortness of breath, and to review recent diagnostic testing. Referring Provider: Ana Hendrix MD    HISTORY: Ms. Reggie Graham has a history of shortness of breath, started about one year ago, occurs with heavier exertion or walking longer distances, resolves with rest.  She was seen by Dr. Anthony Caballero in 2019 for chronic cough and shortness of breath. Recent PFT showed moderate obstructive airway disease. CT lung screening showed no suspicious nodule or mass; she also had mild multivessel coronary calcification. She smoked off and on up to 1 PPD for 40 years, but quit smoking in 2018. 4/3/19 MPI showed normal perfusion. 4/3/19 Echo showed normal EF, mild CLVH, no significant valvular abnormalities. Today, she states she has palpitations (skipped beats) on occasion. PVCs were noted on her recent stress test.  She still has shortness of breath and is now on inhalers which has helped her breathing \"a little bit. \"  She denies exertional chest pain or dizziness. She has mild ankle edema. Her BP is under better control. She has not been able to exercise due to pain in her hip. She is scheduled for an MRI tomorrow. She questions whether she should start low dose ASA.   Patient is compliant with medications for SOB and CAD and is tolerating them well without side effects. REVIEW OF SYSTEMS:  A complete review of systems was reviewed and is negative except as noted in the history of present illness. Prior to Visit Medications    Medication Sig Taking? Authorizing Provider   aspirin 81 MG tablet Take 81 mg by mouth daily Yes Historical Provider, MD   ibuprofen (ADVIL;MOTRIN) 200 MG tablet Take 200 mg by mouth every 6 hours as needed for Pain Yes Historical Provider, MD   hydrochlorothiazide (HYDRODIURIL) 25 MG tablet Take 25 mg by mouth daily Yes Historical Provider, MD   atorvastatin (LIPITOR) 20 MG tablet Take 1 tablet by mouth daily Yes Tereza Enriquez MD   lisinopril (PRINIVIL;ZESTRIL) 10 MG tablet Take 10 mg by mouth daily Yes Historical Provider, MD   albuterol sulfate  (90 Base) MCG/ACT inhaler Inhale 2 puffs into the lungs every 6 hours as needed for Wheezing Yes Jimmy Martinez MD   Fluticasone Furoate-Vilanterol (BREO ELLIPTA) 200-25 MCG/INH AEPB Inhale 1 puff into the lungs daily Yes Jimmy Martinez MD   celecoxib (CELEBREX) 200 MG capsule Take 1 capsule by mouth daily  Patient taking differently: Take 200 mg by mouth as needed  Yes Ariadna Paris MD   cetirizine (ZYRTEC) 10 MG tablet Take 10 mg by mouth daily.  Yes Historical Provider, MD        Allergies   Allergen Reactions    Arthrotec [Diclofenac-Misoprostol]      HANDS ITCH       Past Medical History:   Diagnosis Date    HTN (hypertension)     Hyperlipidemia     Osteoarthritis, foot, localized 8/25/2014       Past Surgical History:   Procedure Laterality Date    ANKLE FRACTURE SURGERY      ENDOMETRIAL ABLATION      MANDIBLE RECONSTRUCTION      SHOULDER SURGERY      RIGHT ROTATOR CUFF    TOTAL KNEE ARTHROPLASTY Bilateral        Social History     Tobacco Use    Smoking status: Former Smoker     Packs/day: 1.00     Years: 40.00     Pack years: 40.00     Types: Cigarettes     Last attempt to quit: 10/1/2018 Years since quittin.5    Smokeless tobacco: Never Used   Substance Use Topics    Alcohol use: Yes     Comment: SOCIAL        Family History   Problem Relation Age of Onset    Hypertension Mother     Cancer Father     Heart Failure Paternal Grandmother        PHYSICAL EXAMINATION:  Vitals:    19 1516 19 1523 19 1536   BP: 94/62 104/62 118/72   Site: Right Upper Arm Left Upper Arm    Position: Sitting Standing    Cuff Size: Large Adult Large Adult    Pulse: 83 94    SpO2: 96% 97%    Weight: 289 lb 3.2 oz (131.2 kg)     Height: 5' 5\" (1.651 m)       Estimated body mass index is 48.13 kg/m² as calculated from the following:    Height as of this encounter: 5' 5\" (1.651 m). Weight as of this encounter: 289 lb 3.2 oz (131.2 kg). General Appearance: No apparent distress, obese  Eyes:  · Conjunctiva clear  · Pupils equal, round, reactive to light  ENT:  · External Ears and Nose unremarkable  · Oral mucosa is moist  Respiratory:  · Normal excursion and expansion without use of accessory muscles  · Resp Auscultation: Normal breath sounds without dullness  Cardiovascular:  · JVD is normal  · The carotid upstroke is normal in amplitude and contour without delay or bruit  · Apical impulse is not displaced  · Normal S1, S2. No S3. No Murmur  · Trace lower leg and ankle edema  · Pedal Pulses: 2+ and equal   Abdomen:  · No masses or tenderness  · Liver/Spleen: No Abnormalities Noted  Musculoskeletal:  · Fingers without clubbing or cyanosis  · Normal Gait  Skin:  · No rash  · Normal skin turgor   Neurologic/Psychiatric:  · Alert and oriented in all spheres  · Normal mood and affect  · Memory and mentation intact    I have reviewed all pertinent lab results and diagnostic testing.         Lab Results   Component Value Date    CHOL 198 2018    TRIG 96 2018    HDL 53 2018    HDL 51 2011    LDLCALC 126 2018     Lab Results   Component Value Date     2019 K 4.3 02/25/2019     02/25/2019    CO2 27 02/25/2019    GLUCOSE 121 02/25/2019    BUN 10 02/25/2019    CREATININE 0.7 02/25/2019    CALCIUM 9.1 02/25/2019    GFR >60 05/20/2013    GFRAA >60 02/25/2019    GFRAA >60 05/20/2013     Lab Results   Component Value Date    ALT 21 12/27/2018    AST 13 12/27/2018     Echo 4/3/19:   Summary   -Left ventricular cavity size is mildly dilated. There is mild concentric   left ventricular hypertrophy. Overall left ventricular systolic function   appears normal with a estimated ejection fraction of 55%. No regional wall   motion abnormalities are noted.   - E/e\"=7.6   -Mild mitral and tricuspid regurgitation.   -Estimated pulmonary artery systolic pressure is normal at 28 mmHg assuming   a right atrial pressure of 3 mmHg. Lexiscan myoview 4/3/19:   Summary    Normal myocardial perfusion.    Normal LV size and systolic function.    PVCs       ECG 3/12/19: SR, 94 bpm (normal)     PFT 2/7/19:  Spirometry:  Flow volume loops were obstructed. The FEV-1/FVC ratio was   decreased. The FEV-1 was 1.5 liters (59% of predicted), which was   moderately decreased. The FVC was 2.15 liters (65% of predicted),   which was decreased. Response to inhaled bronchodilators   (albuterol) was not significant. Lung volumes:  Lung volumes were tested by plethysmography. The total lung   capacity was 4.45 liters (88% of predicted), which was normal.   The residual volume was 2.24 liters (114% of predicted), which   was increased. The ratio of residual volume to total lung   capacity (RV/TLC) was 50, which was incraesed. Diffusion capacity was found to be mildly decreased.       Interpretation:  Moderate obstructive airway disease. CT lung screening 2/5/19:  No suspicious nodule or mass.       Sub 5 mm middle lobe nodule is most consistent with a benign parenchymal   lymph node given morphology and location.       Also had --Mild multivessel coronary calcifications on screening CT. Labs:   2/25/19: pro-  6/20/18 TC- 198, TG- 96, HDL- 53, LDL- 126. Hgb A1C 6.2      ASSESSMENT/PLAN:   1. SOB (shortness of breath)  ~ short of breath with exertion for the past year  ~ PFT showed moderate obstructive air way disease, now taking Breo and prn inhaler, but no significant improvement in shortness of breath  ~ ECG 3/12/19 -- SR without ischemic changes.    ~ Echo 4/3/19 -- normal EF, no significant valve abnormalities  ~ MPI 4/3/19 -- normal perfusion  ~ shortness of breath is only mildly improved with use of inhaler. ~ etiology is likely a combination of deconditioning and lung disease. Plan > no further testing needed. Encourage regular exercising/walking when pain in hip improves    2. Coronary artery calcification seen on CT scan  ~ mild multivessel coronary calcification on screening lung CT scan  ~ no exertional chest pain but has BOSCH  ~ MPI 4/3/19 -- normal perfusion    Plan > risk factor modification including BP and lipid management, increase activity, healthy diet. 3. Essential hypertension  ~ started on Lisinopril 10 mg daily by PCP  ~ Blood pressure 118/72, pulse 94, height 5' 5\" (1.651 m), weight 289 lb 3.2 oz (131.2 kg), SpO2 97 %, not currently breastfeeding. Plan > stable, no change in medications. 4. Dyslipidemia  ~ 6/2018 lipids showed LDL of 126. Goal LDL < 100 given coronary artery calcification  ~ started on Atorvastatin 20 mg 3/13/19. Plan >  Check fasting lipids and liver enzymes in two months, follow heart healthy diet. Plan :  1.  Okay to add low dose aspirin  2. LDL < 100. Recheck fasting lipids in mid May and call our office with resuls. 3.  Try to exercise regularly when able to  4. Good BP, lipid, blood sugar control  5. Follow up in one year or sooner if needed     Scribe's attestation:   This note was scribed in the presence of Rosa Syed M.D. by Marlene Dodson RN

## 2019-04-26 ENCOUNTER — HOSPITAL ENCOUNTER (OUTPATIENT)
Dept: MRI IMAGING | Age: 64
Discharge: HOME | End: 2019-04-26
Payer: COMMERCIAL

## 2019-04-26 ENCOUNTER — TELEPHONE (OUTPATIENT)
Dept: ORTHOPEDIC SURGERY | Age: 64
End: 2019-04-26

## 2019-04-26 DIAGNOSIS — M25.852 LEFT HIP IMPINGEMENT SYNDROME: Primary | ICD-10-CM

## 2019-04-26 DIAGNOSIS — M25.852 LEFT HIP IMPINGEMENT SYNDROME: ICD-10-CM

## 2019-04-26 DIAGNOSIS — S76.312A PARTIAL HAMSTRING TEAR, LEFT, INITIAL ENCOUNTER: ICD-10-CM

## 2019-04-26 PROCEDURE — 73721 MRI JNT OF LWR EXTRE W/O DYE: CPT

## 2019-04-26 NOTE — TELEPHONE ENCOUNTER
MRI shows no labral tear or significant hip joint impingement or damage. There is some partial tearing of her hamstring muscle at its attachment near the hip which could cause some of her symptoms. I would recommend referral to Dr. Jennifer Morales to consider PRP or other injection treatments which may help with healing and reduce her pain.

## 2019-05-01 ENCOUNTER — OFFICE VISIT (OUTPATIENT)
Dept: ORTHOPEDIC SURGERY | Age: 64
End: 2019-05-01
Payer: COMMERCIAL

## 2019-05-01 DIAGNOSIS — M54.16 LEFT LUMBAR RADICULITIS: Primary | ICD-10-CM

## 2019-05-01 DIAGNOSIS — M51.36 DDD (DEGENERATIVE DISC DISEASE), LUMBAR: ICD-10-CM

## 2019-05-01 DIAGNOSIS — M47.817 LUMBOSACRAL SPONDYLOSIS WITHOUT MYELOPATHY: ICD-10-CM

## 2019-05-01 PROCEDURE — 99243 OFF/OP CNSLTJ NEW/EST LOW 30: CPT | Performed by: INTERNAL MEDICINE

## 2019-05-01 RX ORDER — CELECOXIB 200 MG/1
200 CAPSULE ORAL DAILY
Qty: 30 CAPSULE | Refills: 1 | Status: SHIPPED | OUTPATIENT
Start: 2019-05-01 | End: 2019-11-25 | Stop reason: SDUPTHER

## 2019-05-01 NOTE — PROGRESS NOTES
Chief Complaint:   Chief Complaint   Patient presents with    Hip Pain     NEW REFERRAL L HIP PAIN          History of Present Illness:       Patient is a 58 y.o. female presents with the above complaint. The symptoms began 1 monthsago and started without an injury. The patient describes a sharp, stabbing pain that does not radiate. The symptoms are intermittent  and are are worsening since the onset. She is seen in consultation at the request of Dr. Antolin White for consideration of biologic orthopedic injection. Her workup to date has included MRI evaluation which is outlined below in detail. She has a chiropractic appointment scheduled for next week and x-rays of her lumbar spine were performed within the past few days which are unavailable for review. Majority of her pain localizes to the left groin and she does have associated back pain over the same timeframe    The symptoms of back pain  do show a neurogenic claudication pattern and is worsened by standing and improved with sitting. There is not new onset weakness or progressive weakness of the lower extremities that has developed. The patient denies new onset bowel or bladder dysfunction. There is not a history of previous spinal trauma. There is no  lower limb pain. Pain levels 2-10  She is using Motrin intermittently for symptom control with mild improvement. Despite this symptoms remain problematic    The patient has no history or autoimmune disease, inflammatory arthropathy or crystal arthropathy.      Past Medical History:        Past Medical History:   Diagnosis Date    HTN (hypertension)     Hyperlipidemia     Osteoarthritis, foot, localized 8/25/2014         Past Surgical History:   Procedure Laterality Date    ANKLE FRACTURE SURGERY      ENDOMETRIAL ABLATION      MANDIBLE RECONSTRUCTION      SHOULDER SURGERY      RIGHT ROTATOR CUFF    TOTAL KNEE ARTHROPLASTY Bilateral          Present Medications:         Current Outpatient Medications   Medication Sig Dispense Refill    celecoxib (CELEBREX) 200 MG capsule Take 1 capsule by mouth daily 30 capsule 1    aspirin 81 MG tablet Take 81 mg by mouth daily      ibuprofen (ADVIL;MOTRIN) 200 MG tablet Take 200 mg by mouth every 6 hours as needed for Pain      hydrochlorothiazide (HYDRODIURIL) 25 MG tablet Take 25 mg by mouth daily      atorvastatin (LIPITOR) 20 MG tablet Take 1 tablet by mouth daily 90 tablet 3    lisinopril (PRINIVIL;ZESTRIL) 10 MG tablet Take 10 mg by mouth daily      albuterol sulfate  (90 Base) MCG/ACT inhaler Inhale 2 puffs into the lungs every 6 hours as needed for Wheezing 1 Inhaler 11    Fluticasone Furoate-Vilanterol (BREO ELLIPTA) 200-25 MCG/INH AEPB Inhale 1 puff into the lungs daily 1 each 5    cetirizine (ZYRTEC) 10 MG tablet Take 10 mg by mouth daily. No current facility-administered medications for this visit. Allergies:         Allergies   Allergen Reactions    Arthrotec [Diclofenac-Misoprostol]      HANDS ITCH        Social History:         Social History     Socioeconomic History    Marital status:      Spouse name: Not on file    Number of children: Not on file    Years of education: Not on file    Highest education level: Not on file   Occupational History    Occupation: RN   Social Needs    Financial resource strain: Not on file    Food insecurity:     Worry: Not on file     Inability: Not on file   [employer] needs:     Medical: Not on file     Non-medical: Not on file   Tobacco Use    Smoking status: Former Smoker     Packs/day: 1.00     Years: 40.00     Pack years: 40.00     Types: Cigarettes     Last attempt to quit: 10/1/2018     Years since quittin.5    Smokeless tobacco: Never Used   Substance and Sexual Activity    Alcohol use: Yes     Comment: SOCIAL    Drug use: No    Sexual activity: Yes     Partners: Male   Lifestyle    Physical activity:     Days per week: Not on file Minutes per session: Not on file    Stress: Not on file   Relationships    Social connections:     Talks on phone: Not on file     Gets together: Not on file     Attends Buddhism service: Not on file     Active member of club or organization: Not on file     Attends meetings of clubs or organizations: Not on file     Relationship status: Not on file    Intimate partner violence:     Fear of current or ex partner: Not on file     Emotionally abused: Not on file     Physically abused: Not on file     Forced sexual activity: Not on file   Other Topics Concern    Not on file   Social History Narrative    Not on file        Review of Symptoms:    Pertinent items are noted in HPI    Review of systems reviewed from Patient History Form dated on today's date and   available in the patient's chart under the Media tab. Vital Signs: There were no vitals filed for this visit. General Exam:     Constitutional: Patient is adequately groomed with no evidence of malnutrition  Mental Status: The patient is oriented to time, place and person. The patient's mood and affect are appropriate. Vascular: Examination reveals no swelling or calf tenderness. Peripheral pulses are palpable and 2+. Lymphatics: no lymphadenopathy of the inguinal region or lower extremity      Physical Exam: lower back   : Obese body habitus   Primary Exam:    Inspection:  No deformity atrophy or patient will curvature      Palpation:  No focal tenderness or trigger point tenderness      Range of Motion:  80/5      Strength:  Normal lower extremity      Special Tests:  Negative SLR      Skin: There are no rashes, ulcerations or lesions. Gait: Nonantalgic      Reflex intact lower     Additional Comments:        Additional Examinations:           Right Lower Extremity: Examination of the right hip does not show any tenderness, deformity or injury. Range of motion is unremarkable. There is no gross instability.   There are no rashes, ulcerations or lesions. Strength and tone are normal.    Left Lower Extremity: Examination of the left hip does not show any tenderness, deformity or injury. Range of motion is unremarkable. There is no gross instability. There are no rashes, ulcerations or lesions. Strength and tone are normal.    Left Hamstring flexibility normal range 90/90 position without pain; there is no pain with resisted isometric knee flexion, negligible tenderness over the ischial tuberosity dissimilar to her pain complaint      Office Imaging Results/Procedures PerformedToday:           Office Procedures:   No orders of the defined types were placed in this encounter. Other Outside Imaging and Testing Personally Reviewed:    Mri Hip Left Wo Contrast    Result Date: 4/26/2019  EXAMINATION: MRI OF THE LEFT HIP WITHOUT CONTRAST, 4/26/2019 7:53 am TECHNIQUE: Multiplanar multisequence MRI of the hip was performed without the administration of intravenous contrast. COMPARISON: None. HISTORY: ORDERING SYSTEM PROVIDED HISTORY: Left hip impingement syndrome TECHNOLOGIST PROVIDED HISTORY: Ordering Physician Provided Reason for Exam: Left hip pain x 1 month. No known injury. FINDINGS: Examination is limited by patient body habitus. BONE MARROW:  Bone marrow is normal in signal without evidence of fracture, marrow contusion or marrow occupying lesion. HIP JOINT:  The cartilage overlying the acetabulum and femoral head is intact. There is no evidence for femoral-acetabular impingement syndrome. There is no joint effusion. No intraarticular loose body is evident. LABRUM:  To the extent that it is visualized, the acetabular labrum is normal.  There is no evidence for a labral tear, chondrolabral separation or paralabral cyst. BURSAE: No evidence for iliopsoas or trochanteric bursitis. SCIATIC NERVE:  The course of the sciatic nerve is normal and there is no abnormal mass seen impinging on it.  MUSCLES / TENDONS: Partial thickness tears noted within the left hamstring origin. Otherwise, muscles and tendons of the left hip are unremarkable. INTRAPELVIC CONTENTS / SOFT TISSUES: Limited images of the intrapelvic contents demonstrate no acute abnormality. No convincing evidence for labral tear, chondromalacia, or cortical marrow edema. Specifically, there is no abnormal marrow signal within the acetabulum or femoral head/neck junction. Partial-thickness tearing of the hamstring origin. Assessment   Impression: . Encounter Diagnoses   Name Primary?  Left lumbar radiculitis Yes    DDD (degenerative disc disease), lumbar     Lumbosacral spondylosis without myelopathy               Plan: Activity modification-lumbar disc protocol  MRI evaluation lumbar spine if symptoms show no appreciable change or improvement with brief trial of chiropractic care  Review x-rays lumbar spine once available to us  Resume Celebrex discontinue Motrin GI precaution    Overall believe her presentation is consistent with radicular pain proceed as outlined above    Review plain x-rays that were ordered at outside institution she will provide for us to review  The nature of the finding, probable diagnosis and likely treatment was thoroughly discussed with the patient. The options, risks, complications, alternative treatment as well as some of the differential diagnosis was discussed. The patient was thoroughly informed and all questions were answered. the patient indicated understanding and satisfaction with the discussion. Orders:      No orders of the defined types were placed in this encounter. Disclaimer: \"This note was dictated with voice recognition software. Though review and correction are routine, we apologize for any errors. \"

## 2019-05-15 ENCOUNTER — OFFICE VISIT (OUTPATIENT)
Dept: PULMONOLOGY | Age: 64
End: 2019-05-15
Payer: COMMERCIAL

## 2019-05-15 VITALS
DIASTOLIC BLOOD PRESSURE: 78 MMHG | BODY MASS INDEX: 48.09 KG/M2 | SYSTOLIC BLOOD PRESSURE: 131 MMHG | HEART RATE: 87 BPM | WEIGHT: 289 LBS

## 2019-05-15 DIAGNOSIS — J44.9 COPD, MODERATE (HCC): ICD-10-CM

## 2019-05-15 DIAGNOSIS — Z91.09 ENVIRONMENTAL ALLERGIES: ICD-10-CM

## 2019-05-15 DIAGNOSIS — R05.8 COUGH PRESENT FOR GREATER THAN 3 WEEKS: Primary | ICD-10-CM

## 2019-05-15 PROCEDURE — 99214 OFFICE O/P EST MOD 30 MIN: CPT | Performed by: NURSE PRACTITIONER

## 2019-05-15 RX ORDER — FLUTICASONE FUROATE AND VILANTEROL 200; 25 UG/1; UG/1
1 POWDER RESPIRATORY (INHALATION) DAILY
Qty: 1 EACH | Refills: 5 | Status: SHIPPED | OUTPATIENT
Start: 2019-05-15 | End: 2019-06-26 | Stop reason: ALTCHOICE

## 2019-05-15 RX ORDER — PREDNISONE 10 MG/1
TABLET ORAL
Qty: 30 TABLET | Refills: 0 | Status: SHIPPED | OUTPATIENT
Start: 2019-05-15 | End: 2019-06-26 | Stop reason: SDUPTHER

## 2019-05-15 ASSESSMENT — ENCOUNTER SYMPTOMS
ABDOMINAL PAIN: 0
CONSTIPATION: 0
SHORTNESS OF BREATH: 1
COUGH: 1
COLOR CHANGE: 0

## 2019-05-15 NOTE — PROGRESS NOTES
[facility]    Subjective:   CHIEF COMPLAINT / HPI: Cough, moderate COPD   The patient is 58 y.o. female who presents today for a routine follow up visit related to the above mentioned issues. There is a PMH significant for COPD/asthma, obesity and environmental allergies. She was last evaluated in the office in February of this year. At that time symptoms were relatively stable. Presently she feels her cough is about the same to worse. Cough is nonproductive. She does not feel congestion in her chest but occasionally in her head. It does not seem to be exacerbated with the environment or with eating. She is on Lisinopril but cough predates this medication. She has been compliant with Breo, Zyrtec, Albuterol but doesn't use LUANNE much. She isn't sure the [facility] helps with breathlessness which she notices when she walks to her car. She denies fevers or chills. She does not require supplemental O2.         Past Medical History:   Diagnosis Date    HTN (hypertension)     Hyperlipidemia     Osteoarthritis, foot, localized 2014     Social History:    Social History     Tobacco Use   Smoking Status Former Smoker    Packs/day: 1.00    Years: 40.00    Pack years: 40.00    Types: Cigarettes    Last attempt to quit: 10/1/2018    Years since quittin.6   Smokeless Tobacco Never Used     Current Medications:     Current Outpatient Medications on File Prior to Visit   Medication Sig Dispense Refill    celecoxib (CELEBREX) 200 MG capsule Take 1 capsule by mouth daily 30 capsule 1    aspirin 81 MG tablet Take 81 mg by mouth daily      ibuprofen (ADVIL;MOTRIN) 200 MG tablet Take 200 mg by mouth every 6 hours as needed for Pain      hydrochlorothiazide (HYDRODIURIL) 25 MG tablet Take 25 mg by mouth daily      atorvastatin (LIPITOR) 20 MG tablet Take 1 tablet by mouth daily 90 tablet 3    lisinopril (PRINIVIL;ZESTRIL) 10 MG tablet Take 10 mg by mouth daily      albuterol sulfate place, and time. Skin: Skin is warm and dry. No erythema. Psychiatric: She has a normal mood and affect. Thought content normal.   Vitals reviewed. DATA:      Radiology Review:  Pertinent images / reports were reviewed as a part of this visit. LDCT done 2019 reveals the following:  No suspicious nodule or mass. Sub 5 mm middle lobe nodule is most consistent with a benign parenchymal lymph node given morphology and location. Last PFTs done 2019: Moderate obstructive airway disease. Assessment / Plan:   1. Cough present for greater than 3 weeks  - Cough persists despite treatment for asthma / COPD  - She is concerned because her father  of lung cancer  - CT chest with small nodule RML, given persistent symptoms will check CT Chest W Contrast; Future  - RESPIRATORY ALLERGEN PROFILE; Future  - BASIC METABOLIC PANEL; to assess kidney function prior to contrast exposure    2. COPD, moderate ([address]  - Fixed airway defect on PFT done in February  - Feels Steve Guzmán helps some but hasn't taken away breathlessness, refill  - Increase LUANNE  - She is diminished on exam, add Prednisone taper  - I think symptoms are somewhat multifactorial, will assess for triggers as noted above  - Denies s/s of GERD    3. Environmental allergies  - Normal CBC at last check    Return in about 1 month (around 2019). RTC sooner if symptoms worsen acutely.     Adriana Galeas MSN APRN-ACNP CCRN

## 2019-05-20 ENCOUNTER — HOSPITAL ENCOUNTER (OUTPATIENT)
Age: 64
Discharge: HOME | End: 2019-05-20
Payer: COMMERCIAL

## 2019-05-20 DIAGNOSIS — R05.8 COUGH PRESENT FOR GREATER THAN 3 WEEKS: ICD-10-CM

## 2019-05-20 LAB
ANION GAP SERPL CALCULATED.3IONS-SCNC: 12 MMOL/L (ref 3–16)
BUN BLDV-MCNC: 16 MG/DL (ref 7–20)
CALCIUM SERPL-MCNC: 9 MG/DL (ref 8.3–10.6)
CHLORIDE BLD-SCNC: 103 MMOL/L (ref 99–110)
CO2: 25 MMOL/L (ref 21–32)
CREAT SERPL-MCNC: 0.7 MG/DL (ref 0.6–1.2)
GFR AFRICAN AMERICAN: >60
GFR NON-AFRICAN AMERICAN: >60
GLUCOSE BLD-MCNC: 145 MG/DL (ref 70–99)
POTASSIUM SERPL-SCNC: 4.1 MMOL/L (ref 3.5–5.1)
SODIUM BLD-SCNC: 140 MMOL/L (ref 136–145)

## 2019-05-20 PROCEDURE — 80048 BASIC METABOLIC PNL TOTAL CA: CPT

## 2019-05-20 PROCEDURE — 82785 ASSAY OF IGE: CPT

## 2019-05-20 PROCEDURE — 86003 ALLG SPEC IGE CRUDE XTRC EA: CPT

## 2019-05-20 PROCEDURE — 36415 COLL VENOUS BLD VENIPUNCTURE: CPT

## 2019-05-21 LAB
2000687N OAK TREE IGE: <0.1 KU/L
ALLERGEN ASPERGILLUS ALTERNATA IGE: <0.1 KU/L
ALLERGEN ASPERGILLUS FUMIGATUS IGE: <0.1 KU/L
ALLERGEN BERMUDA GRASS IGE: <0.1 KU/L
ALLERGEN BIRCH IGE: <0.1 KU/L
ALLERGEN CAT DANDER IGE: <0.1 KU/L
ALLERGEN COMMON SHORT RAGWEED IGE: <0.1 KU/L
ALLERGEN COTTONWOOD: <0.1 KU/L
ALLERGEN COW MILK IGE: <0.1 KU/L
ALLERGEN DOG DANDER IGE: <0.1 KU/L
ALLERGEN ELM IGE: <0.1 KU/L
ALLERGEN FUNGI/MOLD M.RACEMOSUS IGE: <0.1 KU/L
ALLERGEN GERMAN COCKROACH IGE: <0.1 KU/L
ALLERGEN HORMODENDRUM HORDEI IGE: <0.1 KU/L
ALLERGEN MAPLE/BOX ELDER IGE: <0.1 KU/L
ALLERGEN MITE DUST FARINAE IGE: <0.1 KU/L
ALLERGEN MITE DUST PTERONYSSINUS IGE: <0.1 KU/L
ALLERGEN MOUNTAIN CEDAR: <0.1 KU/L
ALLERGEN MOUSE EPITHELIA IGE: <0.1 KU/L
ALLERGEN PEANUT (F13) IGE: <0.1 KU/L
ALLERGEN PECAN TREE IGE: <0.1 KU/L
ALLERGEN PENICILLIUM NOTATUM: <0.1 KU/L
ALLERGEN ROUGH PIGWEED (W14) IGE: <0.1 KU/L
ALLERGEN RUSSIAN THISTLE IGE: <0.1 KU/L
ALLERGEN SEE NOTE: NORMAL
ALLERGEN SHEEP SORREL (W18) IGE: <0.1 KU/L
ALLERGEN TIMOTHY GRASS: <0.1 KU/L
ALLERGEN TREE SYCAMORE: <0.1 KU/L
ALLERGEN WALNUT TREE IGE: <0.1 KU/L
ALLERGEN WHITE MULBERRY TREE, IGE: <0.1 KU/L
ALLERGEN, TREE, WHITE ASH IGE: <0.1 KU/L
IGE: 38 KU/L

## 2019-05-22 ENCOUNTER — TELEPHONE (OUTPATIENT)
Dept: PULMONOLOGY | Age: 64
End: 2019-05-22

## 2019-05-22 ENCOUNTER — EMPLOYEE WELLNESS (OUTPATIENT)
Dept: OTHER | Age: 64
End: 2019-05-22

## 2019-05-22 LAB
CHOLESTEROL, TOTAL: 162 MG/DL (ref 0–199)
GLUCOSE BLD-MCNC: 103 MG/DL (ref 70–99)
HDLC SERPL-MCNC: 60 MG/DL (ref 40–60)
LDL CHOLESTEROL CALCULATED: 82 MG/DL
TRIGL SERPL-MCNC: 98 MG/DL (ref 0–150)

## 2019-05-28 VITALS — BODY MASS INDEX: 48.59 KG/M2 | WEIGHT: 292 LBS

## 2019-05-30 ENCOUNTER — HOSPITAL ENCOUNTER (OUTPATIENT)
Dept: CT IMAGING | Age: 64
Discharge: HOME | End: 2019-05-30
Payer: COMMERCIAL

## 2019-05-30 DIAGNOSIS — R05.8 COUGH PRESENT FOR GREATER THAN 3 WEEKS: ICD-10-CM

## 2019-05-30 PROCEDURE — 71260 CT THORAX DX C+: CPT

## 2019-05-30 PROCEDURE — 6360000004 HC RX CONTRAST MEDICATION: Performed by: NURSE PRACTITIONER

## 2019-05-30 RX ADMIN — IOPAMIDOL 75 ML: 755 INJECTION, SOLUTION INTRAVENOUS at 07:00

## 2019-05-30 NOTE — RESULT ENCOUNTER NOTE
Argenis - She has a small area of inflammation to the right lower lobe. Call and check on her. If cough no better could try a burst of Doxycycline if not allergic (not according to EMR). Will plan f/u CT in 6 months. She is seeing Constantine Hong on 6/18.

## 2019-06-03 ENCOUNTER — TELEPHONE (OUTPATIENT)
Dept: PULMONOLOGY | Age: 64
End: 2019-06-03

## 2019-06-03 NOTE — TELEPHONE ENCOUNTER
Pt called in stating that she was feeling much better while taking the prednisone since her last office visit 5/15/19, but her cough has since returned. Pt wanted Bridgette Waters to know.      Pt # [phone]

## 2019-06-11 ENCOUNTER — TELEPHONE (OUTPATIENT)
Dept: PULMONOLOGY | Age: 64
End: 2019-06-11

## 2019-06-11 ENCOUNTER — TELEPHONE (OUTPATIENT)
Dept: ORTHOPEDIC SURGERY | Age: 64
End: 2019-06-11

## 2019-06-11 DIAGNOSIS — R05.8 COUGH PRESENT FOR GREATER THAN 3 WEEKS: Primary | ICD-10-CM

## 2019-06-11 DIAGNOSIS — M54.16 LEFT LUMBAR RADICULITIS: Primary | ICD-10-CM

## 2019-06-11 DIAGNOSIS — R06.02 SHORTNESS OF BREATH: ICD-10-CM

## 2019-06-11 NOTE — TELEPHONE ENCOUNTER
Pt informed, called in RX, order placed for CXR and appt made for pt to see Soham Villa. Pt will obtain CXR day of her appt.  Will arrive 1 hour prior to appointment time to get this done

## 2019-06-11 NOTE — TELEPHONE ENCOUNTER
Pt did not hear back from anyone when she called 6-3-19. States she is now coughing during the day which she was doing prior to steroids and now notices she is more short of breath just even sitting. You mentioned she should come back in but no appts are available this week. Pt feels the Albuterol inhaler is no help with her shortness of breath.

## 2019-06-20 ENCOUNTER — HOSPITAL ENCOUNTER (OUTPATIENT)
Age: 64
Discharge: HOME | End: 2019-06-20
Payer: COMMERCIAL

## 2019-06-20 ENCOUNTER — HOSPITAL ENCOUNTER (OUTPATIENT)
Dept: GENERAL RADIOLOGY | Age: 64
Discharge: HOME | End: 2019-06-20
Payer: COMMERCIAL

## 2019-06-20 DIAGNOSIS — R06.02 SHORTNESS OF BREATH: ICD-10-CM

## 2019-06-20 DIAGNOSIS — R05.8 COUGH PRESENT FOR GREATER THAN 3 WEEKS: ICD-10-CM

## 2019-06-20 PROCEDURE — 71046 X-RAY EXAM CHEST 2 VIEWS: CPT

## 2019-06-26 ENCOUNTER — OFFICE VISIT (OUTPATIENT)
Dept: PULMONOLOGY | Age: 64
End: 2019-06-26
Payer: COMMERCIAL

## 2019-06-26 ENCOUNTER — HOSPITAL ENCOUNTER (OUTPATIENT)
Dept: MRI IMAGING | Age: 64
Discharge: HOME | End: 2019-06-26
Payer: COMMERCIAL

## 2019-06-26 VITALS
DIASTOLIC BLOOD PRESSURE: 90 MMHG | OXYGEN SATURATION: 96 % | HEIGHT: 65 IN | WEIGHT: 293 LBS | BODY MASS INDEX: 48.82 KG/M2 | HEART RATE: 93 BPM | SYSTOLIC BLOOD PRESSURE: 143 MMHG | RESPIRATION RATE: 18 BRPM

## 2019-06-26 DIAGNOSIS — R06.02 SHORTNESS OF BREATH: Primary | ICD-10-CM

## 2019-06-26 DIAGNOSIS — M54.16 LEFT LUMBAR RADICULITIS: ICD-10-CM

## 2019-06-26 DIAGNOSIS — J44.9 COPD, MODERATE (HCC): ICD-10-CM

## 2019-06-26 DIAGNOSIS — R05.9 COUGH: ICD-10-CM

## 2019-06-26 PROCEDURE — 99214 OFFICE O/P EST MOD 30 MIN: CPT | Performed by: NURSE PRACTITIONER

## 2019-06-26 PROCEDURE — 72148 MRI LUMBAR SPINE W/O DYE: CPT

## 2019-06-26 RX ORDER — ARFORMOTEROL TARTRATE 15 UG/2ML
1 SOLUTION RESPIRATORY (INHALATION) 2 TIMES DAILY
Qty: 120 ML | Refills: 3 | Status: SHIPPED | OUTPATIENT
Start: 2019-06-26 | End: 2019-06-27

## 2019-06-26 RX ORDER — BUDESONIDE 0.5 MG/2ML
500 INHALANT ORAL 2 TIMES DAILY
Qty: 60 AMPULE | Refills: 3 | Status: SHIPPED | OUTPATIENT
Start: 2019-06-26 | End: 2019-06-27

## 2019-06-26 RX ORDER — DOXYCYCLINE HYCLATE 100 MG
100 TABLET ORAL 2 TIMES DAILY
Qty: 14 TABLET | Refills: 1 | Status: SHIPPED | OUTPATIENT
Start: 2019-06-26 | End: 2019-07-03

## 2019-06-26 RX ORDER — PREDNISONE 10 MG/1
TABLET ORAL
Qty: 30 TABLET | Refills: 1 | Status: SHIPPED | OUTPATIENT
Start: 2019-06-26 | End: 2019-09-10 | Stop reason: ALTCHOICE

## 2019-06-26 ASSESSMENT — ENCOUNTER SYMPTOMS
COLOR CHANGE: 0
ABDOMINAL PAIN: 0
CONSTIPATION: 0
BACK PAIN: 1
COUGH: 1
SHORTNESS OF BREATH: 1

## 2019-06-26 NOTE — PROGRESS NOTES
[facility]    Subjective:   CHIEF COMPLAINT / HPI: Cough, moderate COPD   The patient is 58 y.o. female who presents today for a routine follow up visit related to the above mentioned issues. There is a PMH significant for COPD/asthma, obesity and environmental allergies. Since her last office visit with me on 5/15 she has required repeat abx and Prednisone burst. CT chest imaging obtained in the interval did show right lower lobe GGO. She had f/u CXR on her way down to her appointment today   Presently she feels about the same as her last visit. She notes good days and bad days but feels like the bad days outweigh the good. She reports Prednisone controls the cough but this returns when she stops Prednisone. She is on Lisinopril but cough predates this medication. She has been compliant with Breo and Zyrtec. She is using Albuterol BID and PRN. She doesn't know if her inhalers are helping her. She feels like the struggles to do her job because of breathing. She has also developed back pain with MRI planned for this evening. She denies fevers or chills. She does not require supplemental O2.         Past Medical History:   Diagnosis Date    HTN (hypertension)     Hyperlipidemia     Osteoarthritis, foot, localized 2014     Social History:    Social History     Tobacco Use   Smoking Status Former Smoker    Packs/day: 1.00    Years: 40.00    Pack years: 40.00    Types: Cigarettes    Last attempt to quit: 10/1/2018    Years since quittin.7   Smokeless Tobacco Never Used     Current Medications:     Current Outpatient Medications on File Prior to Visit   Medication Sig Dispense Refill    celecoxib (CELEBREX) 200 MG capsule Take 1 capsule by mouth daily 30 capsule 1    aspirin 81 MG tablet Take 81 mg by mouth daily      ibuprofen (ADVIL;MOTRIN) 200 MG tablet Take 200 mg by mouth every 6 hours as needed for Pain      hydrochlorothiazide (HYDRODIURIL) 25 MG tablet Take 25 mg by mouth daily      atorvastatin (LIPITOR) 20 MG tablet Take 1 tablet by mouth daily 90 tablet 3    lisinopril (PRINIVIL;ZESTRIL) 10 MG tablet Take 10 mg by mouth daily      albuterol sulfate  (90 Base) MCG/ACT inhaler Inhale 2 puffs into the lungs every 6 hours as needed for Wheezing 1 Inhaler 11    cetirizine (ZYRTEC) 10 MG tablet Take 10 mg by mouth daily. No current facility-administered medications on file prior to visit. Review of Systems   Constitutional: Negative for chills and fever. HENT: Negative for congestion and postnasal drip. Respiratory: Positive for cough and shortness of breath. Cardiovascular: Negative for chest pain and leg swelling. Gastrointestinal: Negative for abdominal pain and constipation. Musculoskeletal: Positive for back pain. Negative for arthralgias and joint swelling. Skin: Negative for color change and pallor. Allergic/Immunologic: Negative for environmental allergies and food allergies. Psychiatric/Behavioral: Negative for agitation and confusion. Objective:       VITALS:  BP (!) 143/90   Pulse 93   Resp 18   Ht 5' 5\" (1.651 m)   Wt 296 lb (134.3 kg)   SpO2 96%   BMI 49.26 kg/m²      Physical Exam   Constitutional: She is oriented to person, place, and time. She appears well-developed and well-nourished. No distress. HENT:   Head: Normocephalic. Mouth/Throat: No oropharyngeal exudate. Eyes: Pupils are equal, round, and reactive to light. Conjunctivae are normal. Right eye exhibits no discharge. Left eye exhibits no discharge. Neck: Normal range of motion. Neck supple. No tracheal deviation present. Cardiovascular: Normal rate, regular rhythm and intact distal pulses. Exam reveals no friction rub. Pulmonary/Chest: Effort normal. No accessory muscle usage or stridor. No tachypnea. No respiratory distress. She has decreased breath sounds. She has no wheezes. She has no rales.  She exhibits no tenderness and no crepitus. Abdominal: Soft. Bowel sounds are normal. She exhibits no distension. There is no tenderness. Musculoskeletal: Normal range of motion. She exhibits no edema. Lymphadenopathy:     She has no cervical adenopathy. Neurological: She is alert and oriented to person, place, and time. Skin: Skin is warm and dry. No erythema. Psychiatric: She has a normal mood and affect. Thought content normal.   Vitals reviewed. DATA:      Radiology Review:  Pertinent images / reports were reviewed as a part of this visit. CT chest done May 2019:  Solitary ground-glass nodule in the right lower lobe, average diameter of 8 mm. Follow-up CT chest in 6-12 months is recommended to evaluate for persistence. This lesion may be infectious/inflammatory. LDCT done Feb 2019 reveals the following:  No suspicious nodule or mass. Sub 5 mm middle lobe nodule is most consistent with a benign parenchymal lymph node given morphology and location. Last PFTs done Feb 2019: Moderate obstructive airway disease. Assessment / Plan:   1. Shortness of breath  - This is multifactorial considering OLD, obesity, deconditioning and back pain    2. Cough  - This is chronic  - Respiratory allergen profile and recent CBC were OK, there was a mild chronic stable anemia  - She is clear on exam, I would like to avoid chronic steroids but did provide her an RX to have on had in case of another flare  - If symptoms remain worse, could consider short term f/u CT imaging CXR done on her way down to her appointment today is clear    3. COPD, moderate ([facility] [address]  - I think this contributes to breathlessness  - Breo doesn't seem to be helping as much, stop in favor of nebs listed below  - Arformoterol Tartrate (BROVANA) 15 MCG/2ML NEBU; Take 1 ampule by nebulization 2 times daily  Dispense: 120 mL; Refill: 3  - budesonide (PULMICORT) 0.5 MG/2ML nebulizer suspension; Take 2 mLs by nebulization 2 times daily  Dispense: 60 ampule;  Refill: 3  - Nebulizers (COMPRESSOR/NEBULIZER) MISC; 1 Device by Does not apply route 2 times daily  Dispense: 1 each; Refill: 3  - She could also benefit from improved activity tolerance, will consider pulmonary rehab once she gets clearance from Ortho  - She is struggling to pass CPR certification because she cannot do chest compressions because of breathlessness, note provided to employer    Return in about 3 months (around 9/26/2019). RTC sooner if symptoms worsen acutely.     Kelly iMner MSN APRN-ACNP CCRN

## 2019-06-27 ENCOUNTER — TELEPHONE (OUTPATIENT)
Dept: PULMONOLOGY | Age: 64
End: 2019-06-27

## 2019-06-27 DIAGNOSIS — J44.9 COPD, MODERATE (HCC): Primary | ICD-10-CM

## 2019-06-27 RX ORDER — FORMOTEROL FUMARATE 20 UG/2ML
20 SOLUTION RESPIRATORY (INHALATION) 2 TIMES DAILY
Qty: 360 ML | Refills: 1 | Status: SHIPPED | OUTPATIENT
Start: 2019-06-27 | End: 2019-06-27

## 2019-06-27 RX ORDER — BUDESONIDE AND FORMOTEROL FUMARATE DIHYDRATE 160; 4.5 UG/1; UG/1
2 AEROSOL RESPIRATORY (INHALATION) 2 TIMES DAILY
Qty: 3 INHALER | Refills: 1 | OUTPATIENT
Start: 2019-06-27 | End: 2019-09-10 | Stop reason: SDUPTHER

## 2019-06-27 RX ORDER — ALBUTEROL SULFATE 2.5 MG/3ML
2.5 SOLUTION RESPIRATORY (INHALATION) EVERY 6 HOURS PRN
Qty: 120 EACH | Refills: 3 | Status: ON HOLD | OUTPATIENT
Start: 2019-06-27 | End: 2019-12-11

## 2019-06-27 NOTE — TELEPHONE ENCOUNTER
Patient called stated the pharmacy informed her that they can't do nebulizer's there and wants to know if we can print a prescription for her so she can take elsewhere.   [phone]

## 2019-06-27 NOTE — TELEPHONE ENCOUNTER
I spoke with Dana Petersen from [facility] to instruct her to cancel the budesonide / Lois Massey / Annie Thao due to cost - Jared changed the nebulizer medication to Symbicort 160-4.5 and albuterol in the nebulizer - pt informed

## 2019-06-27 NOTE — TELEPHONE ENCOUNTER
Manan Bustillos with [facility] called in stating that the perforomist is also too expensive for Pt, is there something else she can try?     Pharm # [phone]

## 2019-06-27 NOTE — TELEPHONE ENCOUNTER
Tasneem with [address] called in stating the Koki Ziggy is too expensive for Pt, Pt requesting a different script.       Pharm [id number]  Pt # [phone]

## 2019-07-02 ENCOUNTER — OFFICE VISIT (OUTPATIENT)
Dept: ORTHOPEDIC SURGERY | Age: 64
End: 2019-07-02
Payer: COMMERCIAL

## 2019-07-02 DIAGNOSIS — M54.17 LUMBOSACRAL RADICULITIS: ICD-10-CM

## 2019-07-02 DIAGNOSIS — M51.26 DISC DISPLACEMENT, LUMBAR: ICD-10-CM

## 2019-07-02 DIAGNOSIS — M51.36 DDD (DEGENERATIVE DISC DISEASE), LUMBAR: ICD-10-CM

## 2019-07-02 DIAGNOSIS — M48.061 DEGENERATIVE LUMBAR SPINAL STENOSIS: Primary | ICD-10-CM

## 2019-07-02 PROCEDURE — 99214 OFFICE O/P EST MOD 30 MIN: CPT | Performed by: INTERNAL MEDICINE

## 2019-07-03 ENCOUNTER — TELEPHONE (OUTPATIENT)
Dept: ORTHOPEDIC SURGERY | Age: 64
End: 2019-07-03

## 2019-07-05 ENCOUNTER — APPOINTMENT (OUTPATIENT)
Dept: GENERAL RADIOLOGY | Age: 64
End: 2019-07-05
Payer: COMMERCIAL

## 2019-07-05 ENCOUNTER — HOSPITAL ENCOUNTER (EMERGENCY)
Age: 64
Discharge: HOME | End: 2019-07-05
Payer: COMMERCIAL

## 2019-07-05 ENCOUNTER — APPOINTMENT (OUTPATIENT)
Dept: CT IMAGING | Age: 64
End: 2019-07-05
Payer: COMMERCIAL

## 2019-07-05 ENCOUNTER — NURSE TRIAGE (OUTPATIENT)
Dept: OTHER | Facility: CLINIC | Age: 64
End: 2019-07-05

## 2019-07-05 VITALS
TEMPERATURE: 97.7 F | DIASTOLIC BLOOD PRESSURE: 94 MMHG | SYSTOLIC BLOOD PRESSURE: 164 MMHG | HEART RATE: 80 BPM | WEIGHT: 293 LBS | HEIGHT: 65 IN | OXYGEN SATURATION: 98 % | RESPIRATION RATE: 18 BRPM | BODY MASS INDEX: 48.82 KG/M2

## 2019-07-05 DIAGNOSIS — S09.90XA CLOSED HEAD INJURY, INITIAL ENCOUNTER: Primary | ICD-10-CM

## 2019-07-05 DIAGNOSIS — S20.229A CONTUSION OF BACK, UNSPECIFIED LATERALITY, INITIAL ENCOUNTER: ICD-10-CM

## 2019-07-05 DIAGNOSIS — S46.911A STRAIN OF RIGHT SHOULDER, INITIAL ENCOUNTER: ICD-10-CM
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
## 2019-07-05 DIAGNOSIS — S16.1XXA STRAIN OF NECK MUSCLE, INITIAL ENCOUNTER: ICD-10-CM

## 2019-07-05 PROCEDURE — 6370000000 HC RX 637 (ALT 250 FOR IP): Performed by: PHYSICIAN ASSISTANT

## 2019-07-05 PROCEDURE — 99284 EMERGENCY DEPT VISIT MOD MDM: CPT

## 2019-07-05 PROCEDURE — 73030 X-RAY EXAM OF SHOULDER: CPT

## 2019-07-05 PROCEDURE — 72125 CT NECK SPINE W/O DYE: CPT

## 2019-07-05 PROCEDURE — 72128 CT CHEST SPINE W/O DYE: CPT

## 2019-07-05 PROCEDURE — 72131 CT LUMBAR SPINE W/O DYE: CPT

## 2019-07-05 PROCEDURE — 73502 X-RAY EXAM HIP UNI 2-3 VIEWS: CPT

## 2019-07-05 PROCEDURE — 70450 CT HEAD/BRAIN W/O DYE: CPT

## 2019-07-05 RX ORDER — HYDROCODONE BITARTRATE AND ACETAMINOPHEN 5; 325 MG/1; MG/1
1 TABLET ORAL EVERY 6 HOURS PRN
Qty: 7 TABLET | Refills: 0 | Status: SHIPPED | OUTPATIENT
Start: 2019-07-05 | End: 2019-07-08

## 2019-07-05 RX ORDER — ACETAMINOPHEN 500 MG
500 TABLET ORAL ONCE
Status: COMPLETED | OUTPATIENT
Start: 2019-07-05 | End: 2019-07-05

## 2019-07-05 RX ORDER — CYCLOBENZAPRINE HCL 10 MG
10 TABLET ORAL 3 TIMES DAILY PRN
Qty: 20 TABLET | Refills: 0 | Status: SHIPPED | OUTPATIENT
Start: 2019-07-05 | End: 2019-11-25 | Stop reason: ALTCHOICE

## 2019-07-05 RX ADMIN — ACETAMINOPHEN 500 MG: 500 TABLET ORAL at 18:33

## 2019-07-05 ASSESSMENT — ENCOUNTER SYMPTOMS
STRIDOR: 0
DIARRHEA: 0
ABDOMINAL PAIN: 0
COUGH: 0
SHORTNESS OF BREATH: 0
WHEEZING: 0
CONSTIPATION: 0
ABDOMINAL DISTENTION: 0
BACK PAIN: 1
NAUSEA: 0
COLOR CHANGE: 0
VOMITING: 0

## 2019-07-05 ASSESSMENT — PAIN SCALES - GENERAL
PAINLEVEL_OUTOF10: 5
PAINLEVEL_OUTOF10: 6

## 2019-07-05 NOTE — ED PROVIDER NOTES
MCG/ACT AERO Inhale 2 puffs into the lungs 2 times daily, Disp-3 Inhaler, R-1Phone In      albuterol (PROVENTIL) (2.5 MG/3ML) 0.083% nebulizer solution Take 3 mLs by nebulization every 6 hours as needed for Wheezing Dx: COPD   ICD-10: J44.9, Disp-120 each, R-3Phone In      predniSONE (DELTASONE) 10 MG tablet Take 4 tabs by mouth for 3 days, then 3 tabs for 3 days, then 2 tabs for 3 days, then 1 tab for 3 days, then stop., Disp-30 tablet, R-1Normal      Nebulizers (COMPRESSOR/NEBULIZER) MISC 2 TIMES DAILY Starting Wed 6/26/2019, Disp-1 each, R-3, Normal      celecoxib (CELEBREX) 200 MG capsule Take 1 capsule by mouth daily, Disp-30 capsule, R-1Normal      aspirin 81 MG tablet Take 81 mg by mouth dailyHistorical Med      ibuprofen (ADVIL;MOTRIN) 200 MG tablet Take 200 mg by mouth every 6 hours as needed for PainHistorical Med      hydrochlorothiazide (HYDRODIURIL) 25 MG tablet Take 25 mg by mouth dailyHistorical Med      atorvastatin (LIPITOR) 20 MG tablet Take 1 tablet by mouth daily, Disp-90 tablet, R-3Normal      lisinopril (PRINIVIL;ZESTRIL) 10 MG tablet Take 10 mg by mouth dailyHistorical Med      albuterol sulfate  (90 Base) MCG/ACT inhaler Inhale 2 puffs into the lungs every 6 hours as needed for Wheezing, Disp-1 Inhaler, R-11Normal      cetirizine (ZYRTEC) 10 MG tablet Take 10 mg by mouth daily. Historical Med               Review of Systems:  Review of Systems   Constitutional: Negative for chills and fever. HENT: Negative. Eyes: Negative for visual disturbance. Respiratory: Negative for cough, shortness of breath, wheezing and stridor. Cardiovascular: Negative for chest pain, palpitations and leg swelling. Gastrointestinal: Negative for abdominal distention, abdominal pain, constipation, diarrhea, nausea and vomiting. Genitourinary: Negative. Musculoskeletal: Positive for back pain, myalgias and neck pain. Negative for neck stiffness.    Skin: Negative for color change, pallor, rash and Guera Maldonado PA-C  07/05/19 1950

## 2019-07-05 NOTE — ED NOTES
Pt reports that she would like to hold on taking Tylenol for now to await return call from family. Call light reinforced.       Cielo Canas RN  07/05/19 3147

## 2019-07-05 NOTE — TELEPHONE ENCOUNTER
Reason for Disposition  Jennifer Chiang Caller has already spoken with another triager or PCP AND has further questions AND triager able to answer questions. Protocols used: NO CONTACT OR DUPLICATE CONTACT CALL-ADULT-    Patient's location of employment: [employer]  Location of injury: back and posterior head  Time of injury: 1630  Last 4 of patient's SSN: [id number]  Location recommended for treatment: Patient currently in ED now, recommended follow-up care at [facility], or [address]    Patient reports she was at work today about [phone] and fell because of water on the floor, with injury to back and posterior head. Patient reports she is currently in the ED being evaluated for head and back injury. Writer instructed patient to continue all follow-up care based on the ED physician's recommendation at the occupational health facility in [place] or at the [facility].

## 2019-07-16 NOTE — PROGRESS NOTES
Results for the following test have been finalized and entered into the patients chart
1 capsule by mouth daily 30 capsule 1    aspirin 81 MG tablet Take 81 mg by mouth daily      ibuprofen (ADVIL;MOTRIN) 200 MG tablet Take 200 mg by mouth every 6 hours as needed for Pain      hydrochlorothiazide (HYDRODIURIL) 25 MG tablet Take 25 mg by mouth daily      atorvastatin (LIPITOR) 20 MG tablet Take 1 tablet by mouth daily 90 tablet 3    lisinopril (PRINIVIL;ZESTRIL) 10 MG tablet Take 10 mg by mouth daily      albuterol sulfate  (90 Base) MCG/ACT inhaler Inhale 2 puffs into the lungs every 6 hours as needed for Wheezing 1 Inhaler 11    cetirizine (ZYRTEC) 10 MG tablet Take 10 mg by mouth daily. No current facility-administered medications for this visit. Allergies: Allergies   Allergen Reactions    Arthrotec [Diclofenac-Misoprostol]      HANDS ITCH           Review of Systems:    Pertinent items are noted in HPI        Vital Signs: There were no vitals filed for this visit. General Exam:         Physical Exam: lower back      Primary Exam:    Inspection: No deformity atrophy appreciable effusion      Palpation: No focal tenderness      Range of Motion: 85/7      Strength: Normal lower extremity      Special Tests: Negative SLR      Skin: There are no rashes, ulcerations or lesions. Gait: Nonantalgic     Neurovascular - non focal and intact       Additional Comments:        Additional Examinations:                   Office Imaging Results/Procedures PerformedToday:           Office Procedures:   No orders of the defined types were placed in this encounter. Other Outside Imaging and Testing Personally Reviewed:    Mri Lumbar Spine Wo Contrast    Result Date: 6/27/2019  EXAMINATION: MRI OF THE LUMBAR SPINE WITHOUT CONTRAST, 6/26/2019 8:21 pm TECHNIQUE: Multiplanar multisequence MRI of the lumbar spine was performed without the administration of intravenous contrast. COMPARISON: None HISTORY: ORDERING SYSTEM PROVIDED HISTORY: Left lumbar radiculitis.

## 2019-07-19 ENCOUNTER — TELEPHONE (OUTPATIENT)
Dept: ORTHOPEDIC SURGERY | Age: 64
End: 2019-07-19

## 2019-07-25 ENCOUNTER — HOSPITAL ENCOUNTER (OUTPATIENT)
Dept: PHYSICAL THERAPY | Age: 64
Setting detail: THERAPIES SERIES
Discharge: HOME | End: 2019-07-25
Payer: COMMERCIAL

## 2019-07-25 PROCEDURE — 97110 THERAPEUTIC EXERCISES: CPT

## 2019-07-25 PROCEDURE — 97530 THERAPEUTIC ACTIVITIES: CPT

## 2019-07-25 PROCEDURE — 97161 PT EVAL LOW COMPLEX 20 MIN: CPT

## 2019-07-25 PROCEDURE — 97140 MANUAL THERAPY 1/> REGIONS: CPT

## 2019-07-25 ASSESSMENT — PAIN DESCRIPTION - LOCATION: LOCATION: BACK;NECK;SHOULDER

## 2019-07-25 ASSESSMENT — PAIN SCALES - GENERAL: PAINLEVEL_OUTOF10: 8

## 2019-07-25 ASSESSMENT — PAIN DESCRIPTION - PAIN TYPE: TYPE: ACUTE PAIN

## 2019-07-25 ASSESSMENT — PAIN DESCRIPTION - FREQUENCY: FREQUENCY: CONTINUOUS

## 2019-07-25 ASSESSMENT — PAIN DESCRIPTION - DESCRIPTORS: DESCRIPTORS: ACHING;CONSTANT

## 2019-07-25 NOTE — FLOWSHEET NOTE
normal function with   [] LE / lumbar: self care, mobility, lifting and ambulation. [] UE / Cervical: self care, reaching, carrying, lifting, house/yardwork, driving, computer work. Modalities:  [x] (20245) Vasopneumatic compression: Utilized vasopneumatic compression to decrease edema / swelling for the purpose of improving mobility and quad tone / recruitment which will allow for increased overall function including but not limited to self-care, transfers, ambulation, and ascending / descending stairs. Modalities:    Start time:  1800  End time:  2256 7/25. CP in supine with grey bolster to neck x 10 mins     Charges:  Timed Code Treatment Minutes: 15   Total Treatment Minutes: 65     [x] EVAL - LOW (75416) x 1  [] EVAL - MOD (46914)  [] EVAL - HIGH (72605)  [] RE-EVAL (20584)  [x] TE (36163) x  1    [] Ionto  [] NMR (27700) x      [] Vaso  [x] Manual (20301) x 1      [] Ultrasound  [x] TA x  1    [] Mech Traction (13825)  [] Gait Training x     [] ES (un) (15025):   [] Aquatic therapy x   [] Other:   [] Group:     GOALS: Long term goals  Time Frame for Long term goals : upon discharge  Long term goal 1: Pt will be IND with cervical/shoulder debility protocol with reference to written instructions, to achieve maximal level of functional mobility and independence  Long term goal 2: Pt will reduce pain to 2-3/10 at worst in neck/thoracic spine in order to help restore ability to perform all basic ADL's without limitations and 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     []

## 2019-07-25 NOTE — PROGRESS NOTES
Physical Therapy  Initial Assessment  Date: 2019  Patient Name: Marquita Peterson  MRN: [id number]  : 1955     Treatment Diagnosis: Impaired cervical ROM, strength, stability, decreased posture, increased muscle guarding     Restrictions    Outpatient fall risk assessment completed asking screening question if patient has fallen in the past 30 days:  [x] Yes  [] No    Based on screen for falls, patient demonstrates fall risk:  [] Yes  [x] No    Interventions based on fall risk status:  Updated Problem List within Medical History  [] Yes   [x] N/A    Asked family to assist with increased observation of the patient  [] Yes   [x] N/A    Patient kept in visible area when not closely supervised by therapist  [] Yes   [x] N/A    Repeatedly reinforce activity limits and safety needs with patient/family  [] Yes   [x] N/A    Increase frequency of rounding/monitoring patient  [] Yes   [x] N/A          Subjective  General  Chart Reviewed: Yes  Patient assessed for rehabilitation services?: Yes  Additional Pertinent Hx: Degenerative lumbar stenosis, Lumbosacral radiculopathy, HTN, Hyperlipidemia, Lumbar DDD, bilateral foot OA, COPD, Asthma, Obesity, B TKR   Family / Caregiver Present: Yes  Referring Practitioner: KIRA Horowitz  Referral Date : 19  Diagnosis: Neck/right arm/thorax strain S16.1xxA, S46.911A, S20.229A  PT Visit Information  Onset Date: 19  PT Insurance Information: [facility]  Total # of Visits Approved: 9  Plan of Care/Certification Expiration Date: 19  Subjective  Subjective: CLOF: Pt referred to PT after suffering a fall at work, slipped and fell on water on 19 and hit her head, no loss of consciousness. Immediately c/o acute exacerbation of low back, mid back pain, with acute right shoulder, right posterior low back/hip, occipital head, and slight neck pain. Pt works at [employer]. Prior to fall, pt was seen by orthopedist for lumbar pain of chronic nature.  Was having

## 2019-07-30 ENCOUNTER — HOSPITAL ENCOUNTER (OUTPATIENT)
Dept: PHYSICAL THERAPY | Age: 64
Setting detail: THERAPIES SERIES
Discharge: HOME | End: 2019-07-30
Payer: COMMERCIAL

## 2019-07-30 PROCEDURE — 97110 THERAPEUTIC EXERCISES: CPT

## 2019-07-30 PROCEDURE — 97140 MANUAL THERAPY 1/> REGIONS: CPT

## 2019-07-31 ENCOUNTER — HOSPITAL ENCOUNTER (OUTPATIENT)
Dept: INTERVENTIONAL RADIOLOGY/VASCULAR | Age: 64
Discharge: HOME | End: 2019-07-31
Payer: COMMERCIAL

## 2019-07-31 DIAGNOSIS — M48.061 BILATERAL STENOSIS OF LATERAL RECESS OF LUMBAR SPINE: ICD-10-CM

## 2019-07-31 PROCEDURE — 62323 NJX INTERLAMINAR LMBR/SAC: CPT

## 2019-07-31 PROCEDURE — 6360000004 HC RX CONTRAST MEDICATION: Performed by: RADIOLOGY

## 2019-07-31 PROCEDURE — 6360000002 HC RX W HCPCS

## 2019-07-31 PROCEDURE — 2709999900 HC NON-CHARGEABLE SUPPLY

## 2019-07-31 PROCEDURE — 2500000003 HC RX 250 WO HCPCS

## 2019-07-31 RX ADMIN — IOHEXOL 2 ML: 180 INJECTION INTRAVENOUS at 13:16

## 2019-08-01 ENCOUNTER — HOSPITAL ENCOUNTER (OUTPATIENT)
Dept: PHYSICAL THERAPY | Age: 64
Setting detail: THERAPIES SERIES
Discharge: HOME | End: 2019-08-01
Payer: COMMERCIAL

## 2019-08-01 PROCEDURE — 97140 MANUAL THERAPY 1/> REGIONS: CPT

## 2019-08-01 PROCEDURE — 97110 THERAPEUTIC EXERCISES: CPT

## 2019-08-01 NOTE — FLOWSHEET NOTE
Manual Intervention ([phone])  Start time: 1245  End time:  1300       Gentle cervical distraction, SOR, ROM, STM cervical paraspinals  15'                                             Pt. Education:  -patient educated on diagnosis, prognosis and expectations for rehab  -all patient questions were answered    HEP instruction:  -pt provided with written and illustrated instructions for HEP (see scanned image in )  7/25  Cervical AROM flex/ext, rotation B, gentle supine chin tucks, scap squeezes/shoulder rolls     Therapeutic Exercise and NMR EXR  [] (62940) Provided verbal/tactile cueing for activities related to strengthening, flexibility, endurance, ROM for improvements in  [] LE / Lumbar: LE, proximal hip, and core control with self care, mobility, lifting, ambulation. [] UE / Cervical: cervical, postural, scapular, scapulothoracic and UE control with self care, reaching, carrying, lifting, house/yardwork, driving, computer work.  [] (40588) Provided verbal/tactile cueing for activities related to improving balance, coordination, kinesthetic sense, posture, motor skill, proprioception to assist with   [] LE / lumbar: LE, proximal hip, and core control in self care, mobility, lifting, ambulation and eccentric single leg control. [] UE / cervical: cervical, scapular, scapulothoracic and UE control with self care, reaching, carrying, lifting, house/yardwork, driving, computer work.   [] (88582) Therapist is in constant attendance of 2 or more patients providing skilled therapy interventions, but not providing any significant amount of measurable one-on-one time to either patient, for improvements in  [] LE / lumbar: LE, proximal hip, and core control in self care, mobility, lifting, ambulation and eccentric single leg control.    [] UE / cervical: cervical, scapular, scapulothoracic and UE control with self care, reaching, carrying, lifting, house/yardwork,

## 2019-08-02 ENCOUNTER — HOSPITAL ENCOUNTER (OUTPATIENT)
Dept: PHYSICAL THERAPY | Age: 64
Setting detail: THERAPIES SERIES
Discharge: HOME | End: 2019-08-02
Payer: COMMERCIAL

## 2019-08-02 PROCEDURE — 97110 THERAPEUTIC EXERCISES: CPT

## 2019-08-02 PROCEDURE — 97140 MANUAL THERAPY 1/> REGIONS: CPT

## 2019-08-02 NOTE — FLOWSHEET NOTE
[facility] - Outpatient Physical Therapy    Physical Therapy Daily Treatment/Progress Note  Date:  2019    Patient Name:  Mack Vasquez    :  1955  MRN: [id number]  Medical/Treatment Diagnosis Information:  · Diagnosis: Neck/right arm/thorax strain S16.1xxA, S46.911A, S20.229A  · Treatment Diagnosis: Impaired cervical ROM, strength, stability, decreased posture, increased muscle guarding   Insurance/Certification information:  PT Insurance Information: [facility]  Physician Information:  Referring Practitioner: KIRA Peña  Plan of care signed (Y/N):     Date of Patient follow up with Physician:     Progress Note: [x]  Yes  []  No  Next due by: WEEKLY ([facility] -[phone])      Latex Allergy:  [x]NO      []YES  Preferred Language for Healthcare:   [x]English       []other:    Visit # Insurance Allowable Date Range (if applicable)    9   to 19  Case Wash Harris  Case # [id number]     Pain level: 2-3/10 NECK/RIGHT SHOULDER, CONSTANT      SUBJECTIVE:   No new c/o from [address] [place] [address] Avenue:    Cervical Rotation AROM    R: 46*   L:  40*    RESTRICTIONS/PRECAUTIONS:  Also has history of B TKR, hip issues, and  chronic low back pain (set to have epidural)     Exercises/Interventions:     Therapeutic Exercises (83947)  Start time:  2:35   End time:  2:52 Resistance / level Sets/sec Reps Notes   UBE 2 mins fwd    When tolerated    Pulleys   Flex 2 mins      RTC TB, Mid Row   High Row Green  X 10  X 10  With cues for form   Cervical AROM Rotation        Supine Chin Tucks with towel  CT with rotation   3\"  10  X 10     HEP discussion        W wall slides  Chin Tucks with towel on wall     5\"  X 10   X 10                   Therapeutic Activities (60911)  Start time:   End time:                                    Neuromuscular Re-ed (30838)  Start time:  End time:                                                 Manual Intervention (.97.60)  Start time: without difficulty----GOAL MET   Long term goal 4: Pt will improve neck disability index score to <=25% disability in order to resume all normal daily functional and work-related activities ---PROGRESSING      Progression Towards Functional goals:  [x] Patient is progressing as expected towards functional goals listed. [] Progression is slowed due to complexities listed. [] Progression has been slowed due to co-morbidities. [] Plan just implemented, too soon to assess goals progression  [] Other:     Persisting Functional Limitations/Impairments:  [x]Sleeping []Sitting               []Standing []Transfers        []Walking []Kneeling               []Stairs []Squatting / bending   []ADLs [x]Reaching  [x]Lifting  [x]Housework  [x]Driving [x]Job related tasks  []Sports/Recreation []Other:        ASSESSMENT:  Pt is showing steady progress with skilled PT interventions. Has only received 3 skilled PT treatments, but has shown a subjective improvement in ROM and a reduction in pain. Patient is progressing as expected, and will benefit from continuing with POC in order to help optimize return to PLOF      Treatment/Activity Tolerance:  [] Patient able to complete tx [] Patient limited by fatigue  [x] Patient limited by pain  [] Patient limited by other medical complications  [] Other:     Prognosis: [x] Good [] Fair  [] Poor    Patient Requires Follow-up: [x] Yes  [] No    PLAN: See eval. PT 3x / week for 3 weeks.    [x] Continue per plan of care [] Alter current plan (see comments)  [] Plan of care initiated [] Hold pending MD visit [] Discharge    Electronically signed by: Latricia Johnson PT

## 2019-08-05 ENCOUNTER — HOSPITAL ENCOUNTER (OUTPATIENT)
Dept: PHYSICAL THERAPY | Age: 64
Setting detail: THERAPIES SERIES
Discharge: HOME | End: 2019-08-05
Payer: COMMERCIAL

## 2019-08-05 PROCEDURE — 97112 NEUROMUSCULAR REEDUCATION: CPT

## 2019-08-05 PROCEDURE — 97110 THERAPEUTIC EXERCISES: CPT

## 2019-08-05 NOTE — FLOWSHEET NOTE
[facility] - Outpatient Physical Therapy    Physical Therapy Daily Treatment/Progress Note  Date:  2019    Patient Name:  Padma Bonilla    :  1955  MRN: [id number]  Medical/Treatment Diagnosis Information:  · Diagnosis: Neck/right arm/thorax strain S16.1xxA, S41.647I, S20.229A  · Treatment Diagnosis: Impaired cervical ROM, strength, stability, decreased posture, increased muscle guarding   Insurance/Certification information:  PT Insurance Information: [facility]  Physician Information:  Referring Practitioner: KIRA Cruz  Plan of care signed (Y/N):      Date of Patient follow up with Physician: 19    Progress Note: [x]  Yes  []  No  Next due by: WEEKLY (FAX TO [facility] -[phone])      Latex Allergy:  [x]NO      []YES  Preferred Language for Healthcare:   [x]English       []other:    Visit # Insurance Allowable Date Range (if applicable)    9   to 19  Case Poncho Puri  Case # [id number]     Pain level:  3-4/10 NECK/RIGHT SHOULDER, CONSTANT      SUBJECTIVE:       Pt reports she just came from work. Has been working about 4 hours, 3 days a week. 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 Low back is still bothering her as well - going to see another MD next week.        OBJECTIVE:    Cervical Rotation AROM    R: 46*   L:  40*    RESTRICTIONS/PRECAUTIONS:  Also has history of B TKR, hip issues, and  chronic low back pain (set to have epidural)     Exercises/Interventions:     Therapeutic Exercises (60369)  Start time:  4:32   End time: 4:40  Resistance / level Sets/sec Reps Notes   UBE 3 mins fwd    When tolerated    Pulleys        Mid Row   High Row  LPD Green  Green  Green  X 10  X 10 With cues for form   Cervical AROM Rotation        Supine Chin Tucks with towel  CT with rotation      HEP discussion        W wall slides  Chin Tucks with towel on wall     5\"    X 10 Therapeutic Activities (03418)  Start time:   End time:                                    Neuromuscular Re-ed (55135)  Start time: 4:41  End time: 4:50       UT and LS stretches  20 sec x2 each     1/2 foam roll along spine with PPT, scap retract and alt GHJ raises  x1 x10    1/2 foam roll along spine with PPT, scap retract and GHJ abduction  x1 x10                         Manual Intervention (75335)  Start time: 4:54  End time:  5:01       Gentle cervical distraction, SOR, ROM, STM cervical paraspinals    10'                                             Pt. Education:  -patient educated on diagnosis, prognosis and expectations for rehab  -all patient questions were answered    HEP instruction:  -pt provided with written and illustrated instructions for HEP (see scanned image in )  7/25  Cervical AROM flex/ext, rotation B, gentle supine chin tucks, scap squeezes/shoulder rolls     Therapeutic Exercise and NMR EXR  [x] (01374) Provided verbal/tactile cueing for activities related to strengthening, flexibility, endurance, ROM for improvements in  [] LE / Lumbar: LE, proximal hip, and core control with self care, mobility, lifting, ambulation. [x] UE / Cervical: cervical, postural, scapular, scapulothoracic and UE control with self care, reaching, carrying, lifting, house/yardwork, driving, computer work.  [] (14009) Provided verbal/tactile cueing for activities related to improving balance, coordination, kinesthetic sense, posture, motor skill, proprioception to assist with   [] LE / lumbar: LE, proximal hip, and core control in self care, mobility, lifting, ambulation and eccentric single leg control.    [] UE / cervical: cervical, scapular, scapulothoracic and UE control with self care, reaching, carrying, lifting, house/yardwork, driving, computer work.   [] (14287) Therapist is in constant attendance of 2 or more patients providing skilled therapy interventions, but not providing any significant

## 2019-08-07 ENCOUNTER — APPOINTMENT (OUTPATIENT)
Dept: PHYSICAL THERAPY | Age: 64
End: 2019-08-07
Payer: COMMERCIAL

## 2019-08-08 ENCOUNTER — HOSPITAL ENCOUNTER (OUTPATIENT)
Dept: PHYSICAL THERAPY | Age: 64
Setting detail: THERAPIES SERIES
Discharge: HOME | End: 2019-08-08
Payer: COMMERCIAL

## 2019-08-08 ENCOUNTER — OFFICE VISIT (OUTPATIENT)
Dept: ORTHOPEDIC SURGERY | Age: 64
End: 2019-08-08
Payer: COMMERCIAL

## 2019-08-08 DIAGNOSIS — M25.551 CHRONIC HIP PAIN, BILATERAL: ICD-10-CM

## 2019-08-08 DIAGNOSIS — I73.9 CLAUDICATION IN PERIPHERAL VASCULAR DISEASE (HCC): Primary | ICD-10-CM

## 2019-08-08 DIAGNOSIS — M25.552 CHRONIC HIP PAIN, BILATERAL: ICD-10-CM

## 2019-08-08 DIAGNOSIS — G89.29 CHRONIC HIP PAIN, BILATERAL: ICD-10-CM

## 2019-08-08 PROCEDURE — 97110 THERAPEUTIC EXERCISES: CPT

## 2019-08-08 PROCEDURE — 99214 OFFICE O/P EST MOD 30 MIN: CPT | Performed by: INTERNAL MEDICINE

## 2019-08-08 PROCEDURE — 97140 MANUAL THERAPY 1/> REGIONS: CPT

## 2019-08-08 NOTE — PROGRESS NOTES
Take 1 capsule by mouth daily 30 capsule 1    aspirin 81 MG tablet Take 81 mg by mouth daily      ibuprofen (ADVIL;MOTRIN) 200 MG tablet Take 200 mg by mouth every 6 hours as needed for Pain      hydrochlorothiazide (HYDRODIURIL) 25 MG tablet Take 25 mg by mouth daily      atorvastatin (LIPITOR) 20 MG tablet Take 1 tablet by mouth daily 90 tablet 3    lisinopril (PRINIVIL;ZESTRIL) 10 MG tablet Take 10 mg by mouth daily      albuterol sulfate  (90 Base) MCG/ACT inhaler Inhale 2 puffs into the lungs every 6 hours as needed for Wheezing 1 Inhaler 11    cetirizine (ZYRTEC) 10 MG tablet Take 10 mg by mouth daily. No current facility-administered medications for this visit. Allergies: Allergies   Allergen Reactions    Arthrotec [Diclofenac-Misoprostol]      HANDS ITCH           Review of Systems:    Pertinent items are noted in HPI      Vital Signs: There were no vitals filed for this visit. General Exam:         Physical Exam: bilateral hip      Primary Exam:    Inspection: No deformity atrophy or effusion      Palpation: No focal tenderness      Range of Motion: Full range and symmetric without pain      Strength: Normal with SLR      Special Tests: Negative      Skin: There are no rashes, ulcerations or lesions. Gait: Nonantalgic      Neurovascular - non focal and intact       Additional Comments:        Additional Examinations:           Lower Back: Examination of the lower back does not show any tenderness, deformity or injury. Range of motion is unremarkable. There is no gross instability. There are no rashes, ulcerations or lesions.   Strength and tone are normal.         Office Imaging Results/Procedures PerformedToday:        EXAMINATION:   MRI OF THE LUMBAR SPINE WITHOUT CONTRAST, 6/26/2019 8:21 pm       TECHNIQUE:   Multiplanar multisequence MRI of the lumbar spine was performed without the   administration of intravenous contrast.       COMPARISON:   None

## 2019-08-08 NOTE — FLOWSHEET NOTE
cervical, scapular, scapulothoracic and UE control with self care, reaching, carrying, lifting, house/yardwork, driving, computer work.   [] (24353) Therapist is in constant attendance of 2 or more patients providing skilled therapy interventions, but not providing any significant amount of measurable one-on-one time to either patient, for improvements in  [] LE / lumbar: LE, proximal hip, and core control in self care, mobility, lifting, ambulation and eccentric single leg control. [] UE / cervical: cervical, scapular, scapulothoracic and UE control with self care, reaching, carrying, lifting, house/yardwork, driving, computer work. NMR and Therapeutic Activities:    [] (92702 or 82662) Provided verbal/tactile cueing for activities related to improving balance, coordination, kinesthetic sense, posture, motor skill, proprioception and motor activation to allow for proper function of   [] LE: / Lumbar core, proximal hip and LE with self care and ADLs  [x] UE / Cervical: cervical, postural, scapular, scapulothoracic and UE control with self care, carrying, lifting, driving, computer work.   [] (72707) Gait Re-education- Provided training and instruction to the patient for proper LE, core and proximal hip recruitment and positioning and eccentric body weight control with ambulation re-education including up and down stairs     Home Management Training / Self Care:  [] (69380) Home Management Training / Self-care: ADLs and compensatory training, meal preparation, safety procedures and instruction in use of adaptive equipment, including bathing, grooming, dressing, personal hygiene, basic household cleaning and chores.      Home Exercise Program:    [] (75738) Reviewed/Progressed HEP activities related to strengthening, flexibility, endurance, ROM of   [] LE / Lumbar: core, proximal hip and LE for functional self-care, mobility, lifting and ambulation/stair navigation   [] UE / Cervical: cervical, postural,

## 2019-08-09 ENCOUNTER — NURSE TRIAGE (OUTPATIENT)
Dept: OTHER | Facility: CLINIC | Age: 64
End: 2019-08-09

## 2019-08-09 ENCOUNTER — HOSPITAL ENCOUNTER (OUTPATIENT)
Dept: PHYSICAL THERAPY | Age: 64
Setting detail: THERAPIES SERIES
Discharge: HOME | End: 2019-08-09
Payer: COMMERCIAL

## 2019-08-09 PROCEDURE — 97140 MANUAL THERAPY 1/> REGIONS: CPT

## 2019-08-09 PROCEDURE — 97110 THERAPEUTIC EXERCISES: CPT

## 2019-08-12 ENCOUNTER — HOSPITAL ENCOUNTER (OUTPATIENT)
Dept: PHYSICAL THERAPY | Age: 64
Setting detail: THERAPIES SERIES
Discharge: HOME | End: 2019-08-12
Payer: COMMERCIAL

## 2019-08-12 PROCEDURE — 97112 NEUROMUSCULAR REEDUCATION: CPT

## 2019-08-12 PROCEDURE — 97110 THERAPEUTIC EXERCISES: CPT

## 2019-08-12 NOTE — FLOWSHEET NOTE
[facility] - Outpatient Physical Therapy    Physical Therapy Daily Treatment/Progress Note  Date:  2019    Patient Name:  Conchita Mariano  \"Ana Luisa\"  :  1955  MRN: [id number]  Medical/Treatment Diagnosis Information:  · Diagnosis: Neck/right arm/thorax strain S16.1xxA, F32.178W, S20.229A  · Treatment Diagnosis: Impaired cervical ROM, strength, stability, decreased posture, increased muscle guarding   Insurance/Certification information:  PT Insurance Information: [facility]  Physician Information:  Referring Practitioner: KIRA Griffin   Plan of care signed (Y/N):      Date of Patient follow up with Physician: 19    Progress Note: []  Yes  [x]  No  Next due by: 19 WEEKLY (FAX TO [facility] -[phone])      Latex Allergy:  [x]NO      []YES  Preferred Language for Healthcare:   [x]English       []other:    Visit # Insurance Allowable Date Range (if applicable)    9   to 19  Case Saúl Holly  Case # [id number]     Pain level: 4 /10 NECK/RIGHT SHOULDER, CONSTANT  - 5/10 low back     SUBJECTIVE:  Pt states all she's done this morning is shower. Fatigue in her R arm due to using hair dryer. Seeing independent MD for her low back on Wednesday and her neck MD tomorrow. Has been moving her head more to help loosen her neck.  States she is still having 6-7/10 pain at the worst.     OBJECTIVE:    :   STM: trigger points in R UT near insertion  cerv AROM: flexion 30 deg, ext 25 deg, SBing 30 deg, rotation R 30, rotation L 45 deg    GHJ strength: 5/5 B  : Cervical Rotation AROM    R: 46*   L:  40*    RESTRICTIONS/PRECAUTIONS:  Also has history of B TKR, hip issues, and  chronic low back pain (set to have epidural)     Exercises/Interventions:     Therapeutic Exercises (96677)  Start time: 10:04 am   End time: 10:12 am Resistance / level Sets/sec Reps Notes   UBE Forward and retro  2 mins each way  When tolerated    Pulleys        Mid Row   High

## 2019-08-15 ENCOUNTER — APPOINTMENT (OUTPATIENT)
Dept: PHYSICAL THERAPY | Age: 64
End: 2019-08-15
Payer: COMMERCIAL

## 2019-08-16 ENCOUNTER — HOSPITAL ENCOUNTER (OUTPATIENT)
Dept: PHYSICAL THERAPY | Age: 64
Setting detail: THERAPIES SERIES
Discharge: HOME | End: 2019-08-16
Payer: COMMERCIAL

## 2019-08-16 PROCEDURE — 97110 THERAPEUTIC EXERCISES: CPT

## 2019-08-16 PROCEDURE — 97140 MANUAL THERAPY 1/> REGIONS: CPT

## 2019-08-19 ENCOUNTER — TELEPHONE (OUTPATIENT)
Dept: ORTHOPEDIC SURGERY | Age: 64
End: 2019-08-19

## 2019-08-19 DIAGNOSIS — I73.9 CLAUDICATION IN PERIPHERAL VASCULAR DISEASE (HCC): Primary | ICD-10-CM

## 2019-08-20 ENCOUNTER — HOSPITAL ENCOUNTER (OUTPATIENT)
Age: 64
Discharge: HOME | End: 2019-08-20
Payer: COMMERCIAL

## 2019-08-20 DIAGNOSIS — I73.9 CLAUDICATION IN PERIPHERAL VASCULAR DISEASE (HCC): ICD-10-CM

## 2019-08-20 LAB
ANION GAP SERPL CALCULATED.3IONS-SCNC: 16 MMOL/L (ref 3–16)
BUN BLDV-MCNC: 24 MG/DL (ref 7–20)
CALCIUM SERPL-MCNC: 9.9 MG/DL (ref 8.3–10.6)
CHLORIDE BLD-SCNC: 102 MMOL/L (ref 99–110)
CO2: 25 MMOL/L (ref 21–32)
CREAT SERPL-MCNC: 1.1 MG/DL (ref 0.6–1.2)
GFR AFRICAN AMERICAN: >60
GFR NON-AFRICAN AMERICAN: 50
GLUCOSE BLD-MCNC: 178 MG/DL (ref 70–99)
POTASSIUM SERPL-SCNC: 3.4 MMOL/L (ref 3.5–5.1)
SODIUM BLD-SCNC: 143 MMOL/L (ref 136–145)

## 2019-08-20 PROCEDURE — 80048 BASIC METABOLIC PNL TOTAL CA: CPT

## 2019-08-20 PROCEDURE — 36415 COLL VENOUS BLD VENIPUNCTURE: CPT

## 2019-08-21 ENCOUNTER — HOSPITAL ENCOUNTER (OUTPATIENT)
Dept: CT IMAGING | Age: 64
Discharge: HOME | End: 2019-08-21
Payer: COMMERCIAL

## 2019-08-21 DIAGNOSIS — M25.551 CHRONIC HIP PAIN, BILATERAL: ICD-10-CM

## 2019-08-21 DIAGNOSIS — I73.9 CLAUDICATION IN PERIPHERAL VASCULAR DISEASE (HCC): ICD-10-CM

## 2019-08-21 DIAGNOSIS — G89.29 CHRONIC HIP PAIN, BILATERAL: ICD-10-CM

## 2019-08-21 DIAGNOSIS — M25.552 CHRONIC HIP PAIN, BILATERAL: ICD-10-CM

## 2019-08-21 PROCEDURE — 75635 CT ANGIO ABDOMINAL ARTERIES: CPT

## 2019-08-21 PROCEDURE — 6360000004 HC RX CONTRAST MEDICATION: Performed by: INTERNAL MEDICINE

## 2019-08-21 RX ADMIN — IOPAMIDOL 100 ML: 755 INJECTION, SOLUTION INTRAVENOUS at 09:06

## 2019-08-29 ENCOUNTER — OFFICE VISIT (OUTPATIENT)
Dept: ORTHOPEDIC SURGERY | Age: 64
End: 2019-08-29
Payer: COMMERCIAL

## 2019-08-29 DIAGNOSIS — M54.17 LUMBOSACRAL RADICULITIS: ICD-10-CM

## 2019-08-29 DIAGNOSIS — M51.26 DISC DISPLACEMENT, LUMBAR: ICD-10-CM

## 2019-08-29 DIAGNOSIS — M47.817 LUMBOSACRAL SPONDYLOSIS WITHOUT MYELOPATHY: ICD-10-CM

## 2019-08-29 DIAGNOSIS — M48.061 LUMBAR FORAMINAL STENOSIS: Primary | ICD-10-CM

## 2019-08-29 PROCEDURE — 99214 OFFICE O/P EST MOD 30 MIN: CPT | Performed by: INTERNAL MEDICINE

## 2019-08-29 RX ORDER — GABAPENTIN 300 MG/1
CAPSULE ORAL
Qty: 60 CAPSULE | Refills: 0 | Status: SHIPPED | OUTPATIENT
Start: 2019-08-29 | End: 2019-11-25 | Stop reason: SINTOL

## 2019-08-29 NOTE — LETTER
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 If you have any questions or concerns, please don't hesitate to call.     Sincerely,      Aguila Polk MD.    Radha Lopez MD

## 2019-08-29 NOTE — PROGRESS NOTES
Does not apply route 2 times daily 1 each 3    celecoxib (CELEBREX) 200 MG capsule Take 1 capsule by mouth daily 30 capsule 1    aspirin 81 MG tablet Take 81 mg by mouth daily      ibuprofen (ADVIL;MOTRIN) 200 MG tablet Take 200 mg by mouth every 6 hours as needed for Pain      hydrochlorothiazide (HYDRODIURIL) 25 MG tablet Take 25 mg by mouth daily      atorvastatin (LIPITOR) 20 MG tablet Take 1 tablet by mouth daily 90 tablet 3    lisinopril (PRINIVIL;ZESTRIL) 10 MG tablet Take 10 mg by mouth daily      albuterol sulfate  (90 Base) MCG/ACT inhaler Inhale 2 puffs into the lungs every 6 hours as needed for Wheezing 1 Inhaler 11    cetirizine (ZYRTEC) 10 MG tablet Take 10 mg by mouth daily. No current facility-administered medications for this visit. Allergies: Allergies   Allergen Reactions    Arthrotec [Diclofenac-Misoprostol]      HANDS ITCH           Review of Systems:    Pertinent items are noted in HPI        Vital Signs: There were no vitals filed for this visit. General Exam:     Constitutional: Patient is adequately groomed with no evidence of malnutrition    Physical Exam: lower back      Primary Exam:    Inspection: No deformity atrophy appreciable curvature      Palpation: No focal trigger point tenderness      Range of Motion: 80-80 5/7      Strength: Normal lower extremity      Special Tests: Negative SLR      Skin: There are no rashes, ulcerations or lesions. Gait: Antalgic    Neurovascular - non focal and intact       Additional Comments:        Additional Examinations:           Bilateral hips: notenderness, deformity or injury. Range of motion is unremarkable without pain there is no gross instability. There are no rashes, ulcerations or lesions. Stinchfield test negative for reproduction of her typical pain.            Office Imaging Results/Procedures PerformedToday:         EXAMINATION:   CTA OF THE AORTA WITH LOWER EXTREMITY RUNOFF 8/21/2019 8:44 am       TECHNIQUE:   CTA of the abdomen and pelvis and bilateral lower extremities was performed   after the administration of intravenous contrast.   Multiplanar reformatted   images are provided for review.  MIP images are provided for review. Dose   modulation, iterative reconstruction, and/or weight based adjustment of the   mA/kV was utilized to reduce the radiation dose to as low as reasonably   achievable. 3D reconstructed images were performed on a separate workstation   and provided for review.       COMPARISON:   None.       HISTORY:   ORDERING SYSTEM PROVIDED HISTORY: Claudication in peripheral vascular disease   ([facility] [address]   TECHNOLOGIST PROVIDED HISTORY:   Reason for Exam: Claudication in peripheral vascular disease; Chronic hip   pain, bilateral   Acuity: Unknown   Type of Exam: Unknown   Relevant Medical/Surgical History: htn       FINDINGS:       Nonvascular       Lower Chest:  The lung bases are clear.  The base of the heart is normal.       Organs: Hepatic steatosis with no focal abnormality.  The gallbladder is   absent.  The biliary ducts, pancreas and spleen are normal.  1.8 cm left   adrenal adenoma.  The right adrenal gland and bilateral kidneys are normal.     Organs: Hepatic steatosis with no focal abnormality.  The gallbladder is   absent.  The biliary ducts, pancreas and spleen are normal.  1.8 cm left   adrenal adenoma.  The right adrenal gland and bilateral kidneys are normal.       GI/Bowel: The stomach, duodenum and small bowel are normal. A normal appendix   is visualized. The colon is normal.       Pelvis: The bladder is unremarkable.  The uterus and adnexa are normal.       Peritoneum/Retroperitoneum: No free air or fluid.  No lymphadenopathy.       Bones/Soft Tissues: Degenerative disc disease is prominent at L1-thru L3-4.           VASCULAR       The abdominal aorta is normal in caliber.  Minimal atherosclerotic   calcification distally.  No aneurysm or dissection.  No significant stenosis. The celiac artery, SMA and branching vessels are patent.  Renal arteries are   patent bilaterally.  The CONSTANTIN is patent.       On the right, the common, internal and external iliac arteries are patent. The femoral, profunda femoral and superficial femoral arteries are patent. Limited evaluation of the popliteal artery due to beam hardening artifact   from knee prosthesis.  Below the level of artifact, a normal appearing   trifurcation is demonstrated with a normal three-vessel runoff through the   foot.       On the left, common, internal and external iliac arteries are patent.  The   femoral, profunda femoral and superficial femoral arteries are patent. Again, left knee prosthesis contributes to significant artifact, and the   popliteal artery is not well evaluated.  Below the level of artifact, there   is a normal trifurcation and three-vessel runoff to the foot.           Impression   1. Normal CTA of the aorta and bilateral lower extremity runoff. 2. Limited evaluation of the popliteal arteries bilaterally due to beam   hardening artifact from knee prostheses. 3. Incidental CT findings include steatosis of the liver, a left adrenal   adenoma and degenerative changes in the lumbar spine.         EXAMINATION:   MRI OF THE LUMBAR SPINE WITHOUT CONTRAST, 6/26/2019 8:21 pm       TECHNIQUE:   Multiplanar multisequence MRI of the lumbar spine was performed without the   administration of intravenous contrast.       COMPARISON:   None       HISTORY:   ORDERING SYSTEM PROVIDED HISTORY: Left lumbar radiculitis. TECHNOLOGIST PROVIDED HISTORY:   Reason for exam:->Pain   Ordering Physician Provided Reason for Exam: R/O herniated disc  Stenosis   Lt. Radicular groin pain low back pain.    Acuity: Acute   Type of Exam: Unknown       FINDINGS:   BONES/ALIGNMENT: There is normal alignment of the lumbar spine.  There is no   acute fracture or listhesis.  Bone marrow signal intensity is normal. Tatiana Reina   is

## 2019-08-30 ENCOUNTER — TELEPHONE (OUTPATIENT)
Dept: ORTHOPEDIC SURGERY | Age: 64
End: 2019-08-30

## 2019-09-09 ENCOUNTER — HOSPITAL ENCOUNTER (OUTPATIENT)
Dept: PHYSICAL THERAPY | Age: 64
Setting detail: THERAPIES SERIES
Discharge: HOME | End: 2019-09-09
Payer: COMMERCIAL

## 2019-09-09 PROCEDURE — 97110 THERAPEUTIC EXERCISES: CPT

## 2019-09-09 PROCEDURE — 97112 NEUROMUSCULAR REEDUCATION: CPT

## 2019-09-09 NOTE — FLOWSHEET NOTE
[facility] - Outpatient Physical Therapy    Physical Therapy Daily Treatment/Progress Note  Date:  2019    Patient Name:  Fina Correia  \"Ana Luisa\"  :  1955  MRN: [id number]  Medical/Treatment Diagnosis Information:  · Diagnosis: Neck/right arm/thorax strain S16.1xxA, S46.911A, S20.229A  · Treatment Diagnosis: Impaired cervical ROM, strength, stability, decreased posture, increased muscle guarding   Insurance/Certification information:  PT Insurance Information: [facility]  Physician Information:  Referring Practitioner: KIRA Diaz/ Shimon Damico MD  Plan of care signed (Y/N):  Y, , N sent     Date of Patient follow up with Physician: 19    Progress Note: [x]  Yes  []  No  Next due by:  WEEKLY (FAX TO [facility] -[phone])      Latex Allergy:  [x]NO      []YES  Preferred Language for Healthcare:   [x]English       []other:    Visit # Insurance Allowable Date Range (if applicable)    +  9 + 8  to 19  : Neftaly Salamanca  Case # [id number]    New C9 dates: 8/10/19-10/5/19     Pain level: 3/10 NECK/RIGHT SHOULDER, CONSTANT      SUBJECTIVE:  Pt states she is still having the same pain as before. Turning her head and looking down are still difficult. Reaching behind her back is limited. Still working 8 hours shifts x 3 per week.        OBJECTIVE:    : NDI score 44  cerv AROM: flexion 40 deg, ext 30 deg, SBing R 20 deg, SBing L 30 deg, R rot 30 deg, L rot 50 deg    : patient 8 mins late   :   STM: trigger points in R UT near insertion  cerv AROM: flexion 30 deg, ext 25 deg, SBing 30 deg B, rotation R 30, rotation L 45 deg    GHJ strength: 5/5 B  : Cervical Rotation AROM    R: 46*   L:  40*    RESTRICTIONS/PRECAUTIONS:  Also has history of B TKR, hip issues, and  chronic low back pain (set to have epidural)     Exercises/Interventions:     Therapeutic Exercises (06879)  Start time: 7:33 am   End time: 7:41 am Resistance / level Sets/sec extensibility and allowing for proper ROM for normal function with   [] LE / lumbar: self care, mobility, lifting and ambulation. [] UE / Cervical: self care, reaching, carrying, lifting, house/yardwork, driving, computer work. Modalities:  [] (26860) Vasopneumatic compression: Utilized vasopneumatic compression to decrease edema / swelling for the purpose of improving mobility and quad tone / recruitment which will allow for increased overall function including but not limited to self-care, transfers, ambulation, and ascending / descending stairs. Modalities:    Start time: 8:03 am    End time: 8:13 am  9/9: CP to cerv and R shoulder  8/12: MPH to R UT in sitting x 10 mins   8/9, 8/8, 8/2, 7/30, 7/25.   CP in supine to neck and right scapula with grey bolster to neck x 10 mins     Charges:  Timed Code Treatment Minutes: 30   Total Treatment Minutes: 30     [] EVAL - LOW (49803) x  [] EVAL - MOD (85581)  [] EVAL - HIGH (26137)  [] RE-EVAL (64651)  [x] TE (28201) x  1    [] Ionto  [x] NMR (91347) x  1               [] Vaso  [] Manual (51318) x              [] Ultrasound  [] TA x      [] Mech Traction (82229)  [] Gait Training x     [] ES (un) ([id number]):   [] Aquatic therapy x   [] Other:   [] Group:     GOALS: Long term goals  Time Frame for Long term goals : upon discharge  Long term goal 1: Pt will be IND with cervical/shoulder debility protocol with reference to written instructions, to achieve maximal level of functional mobility and independence--MET  Long term goal 2: Pt will reduce pain to 2-3/10 at worst in neck/thoracic spine in order to help restore ability to perform all basic ADL's without limitations and 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 [] Progression is slowed due to complexities listed. [x] Progression has been slowed due to co-morbidities. [] Plan just implemented, too soon to assess goals progression  [] Other:     Persisting Functional Limitations/Impairments:  [x]Sleeping []Sitting               []Standing []Transfers        []Walking []Kneeling               []Stairs []Squatting / bending   []ADLs [x]Reaching  [x]Lifting  [x]Housework  [x]Driving [x]Job related tasks  []Sports/Recreation []Other:        ASSESSMENT:  Pt is a 59 y/o female who presents for second episode of therapy following a fall at work. She continues to demo limited cerv AROM, hypomobility in lumb spine, increased pain with neck movement and impaired functional mobility. She has been working 8 hour shifts and continues to be sore afterwards. Since last visit, she has improved cerv AROM in some directions but lost about 5 deg into R side bending. Pt would benefit from continued skilled therapy to meet all goals, restore cerv and thor spine mobility and strength, and return to work full time without limitations.      Treatment/Activity Tolerance:  [x] Patient able to complete tx  [] Patient limited by fatigue  [] Patient limited by pain  [] Patient limited by other medical complications  [] Other:     Prognosis: [x] Good [] Fair  [] Poor    Patient Requires Follow-up: [x] Yes  [] No    PLAN: See eval. PT 2x / week for 4 weeks  [x] Continue per plan of care [] Alter current plan (see comments)  [] Plan of care initiated [] Hold pending MD visit [] Discharge    Electronically signed by: Laxmi Naidu, PT, DPT

## 2019-09-10 ENCOUNTER — OFFICE VISIT (OUTPATIENT)
Dept: PULMONOLOGY | Age: 64
End: 2019-09-10
Payer: COMMERCIAL

## 2019-09-10 VITALS
BODY MASS INDEX: 49.42 KG/M2 | WEIGHT: 293 LBS | DIASTOLIC BLOOD PRESSURE: 85 MMHG | HEART RATE: 51 BPM | SYSTOLIC BLOOD PRESSURE: 133 MMHG | OXYGEN SATURATION: 97 %

## 2019-09-10 DIAGNOSIS — K21.9 GASTROESOPHAGEAL REFLUX DISEASE WITHOUT ESOPHAGITIS: ICD-10-CM

## 2019-09-10 DIAGNOSIS — J44.9 COPD, MODERATE (HCC): ICD-10-CM

## 2019-09-10 DIAGNOSIS — R06.02 SOB (SHORTNESS OF BREATH): Primary | ICD-10-CM

## 2019-09-10 DIAGNOSIS — J45.40 MODERATE PERSISTENT ASTHMA, UNCOMPLICATED: ICD-10-CM

## 2019-09-10 PROCEDURE — 99214 OFFICE O/P EST MOD 30 MIN: CPT | Performed by: INTERNAL MEDICINE

## 2019-09-10 RX ORDER — BUDESONIDE AND FORMOTEROL FUMARATE DIHYDRATE 160; 4.5 UG/1; UG/1
2 AEROSOL RESPIRATORY (INHALATION) 2 TIMES DAILY
Qty: 3 INHALER | Refills: 5 | Status: SHIPPED | OUTPATIENT
Start: 2019-09-10 | End: 2020-03-03 | Stop reason: SDUPTHER

## 2019-09-10 ASSESSMENT — ENCOUNTER SYMPTOMS
ABDOMINAL PAIN: 0
CONSTIPATION: 0
DIARRHEA: 0
NAUSEA: 0
SINUS PRESSURE: 0

## 2019-09-10 NOTE — PROGRESS NOTES
profile in the past.    Review of Systems  Review of Systems   Constitutional: Negative for fatigue and fever. HENT: Negative for congestion and sinus pressure. Eyes: Negative for visual disturbance. Cardiovascular: Negative for chest pain and palpitations. Gastrointestinal: Negative for abdominal pain, constipation, diarrhea and nausea. Genitourinary: Negative for difficulty urinating. Musculoskeletal: Negative for arthralgias. Skin: Negative for rash. Neurological: Negative for dizziness and light-headedness. Hematological: Does not bruise/bleed easily. Psychiatric/Behavioral: Negative for behavioral problems. History  I have reviewed past medical, surgical, social and family history. This is documented elsewhere in themedical record. Physical Exam:  Physical Exam   Constitutional: She appears well-developed and well-nourished. No distress. HENT:   Head: Normocephalic and atraumatic. Eyes:   Nasal mucosa, teeth and gums and oropharynx are clear. Neck: Neck supple. No JVD present. No tracheal deviation present. No thyromegaly (There are no other neck masses noted.) present. Cardiovascular: Normal rate, regular rhythm, normal heart sounds and intact distal pulses. No murmur heard. Pulmonary/Chest: Effort normal and breath sounds normal. No respiratory distress. She has no wheezes. She has no rales. She exhibits no tenderness. The chest is symmetric in. There is no dullness to percussion or tactile fremitus noted. Abdominal: Soft. Bowel sounds are normal. She exhibits no distension. Mass: There is no hepatosplenomegaly. There is no tenderness. Musculoskeletal: She exhibits no edema (There is no clubbingor cyanosis of the digits) or tenderness (Muscle strength is normal and there is no obvious atrophy). Lymphadenopathy:     She has no cervical adenopathy. She has no axillary adenopathy. No inguinal adenopathy noted on the right or left side.    Skin: Skin is warm Pulse: 51   SpO2: 97%   Weight: 297 lb (134.7 kg)     Body mass index is 49.42 kg/m².      Wt Readings from Last 3 Encounters:   09/10/19 297 lb (134.7 kg)   19 296 lb (134.3 kg)   19 296 lb (134.3 kg)     BP Readings from Last 3 Encounters:   09/10/19 133/85   19 (!) 164/94   19 (!) 143/90        Social History     Tobacco Use   Smoking Status Former Smoker    Packs/day: 1.00    Years: 40.00    Pack years: 40.00    Types: Cigarettes    Last attempt to quit: 10/1/2018    Years since quittin.9   Smokeless Tobacco Never Used

## 2019-09-11 ENCOUNTER — NURSE TRIAGE (OUTPATIENT)
Dept: OTHER | Facility: CLINIC | Age: 64
End: 2019-09-11

## 2019-09-11 NOTE — TELEPHONE ENCOUNTER
Reason for Disposition   General information question, no triage required and triager able to answer question    Protocols used: INFORMATION ONLY CALL-ADULT-AH    Caller has health insurance questions. Number given, then call transferred to [facility].  ([phone])   Asking how to get a document to prove prescription medication coverage.

## 2019-09-12 ENCOUNTER — HOSPITAL ENCOUNTER (OUTPATIENT)
Dept: PHYSICAL THERAPY | Age: 64
Setting detail: THERAPIES SERIES
Discharge: HOME | End: 2019-09-12
Payer: COMMERCIAL

## 2019-09-12 PROCEDURE — 97110 THERAPEUTIC EXERCISES: CPT

## 2019-09-12 PROCEDURE — 97140 MANUAL THERAPY 1/> REGIONS: CPT

## 2019-09-12 NOTE — FLOWSHEET NOTE
[facility] - Outpatient Physical Therapy    Physical Therapy Daily Treatment/Progress Note  Date:  2019    Patient Name:  Diana Acevedo  \"Ana Luisa\"  :  1955  MRN: [id number]  Medical/Treatment Diagnosis Information:  · Diagnosis: Neck/right arm/thorax strain S16.1xxA, P83.585L, S20.229A  · Treatment Diagnosis: Impaired cervical ROM, strength, stability, decreased posture, increased muscle guarding   Insurance/Certification information:  PT Insurance Information: [facility]  Physician Information:  Referring Practitioner: KIRA Horowitz/ Amilcar Tejada MD  Plan of care signed (Y/N):  Y, , N sent     Date of Patient follow up with Physician: 19    Progress Note: []  Yes  [x]  No  Next due by:  WEEKLY (FAX TO [facility] [phone])      Latex Allergy:  [x]NO      []YES  Preferred Language for Healthcare:   [x]English       []other:    Visit # Insurance Allowable Date Range (if applicable)    +  9 + 8  to 19  : Radames Whitney  Case # [id number]    New C9 dates: 8/10/19-10/5/19     Pain level: 3/10 NECK/RIGHT SHOULDER, CONSTANT      SUBJECTIVE:  Pt states she had a bad night and didn't sleep well. 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 Pulmonary MD told her she may have GERD and started her on Prilosec.         OBJECTIVE:    : NDI score 44  cerv AROM: flexion 40 deg, ext 30 deg, SBing R 20 deg, SBing L 30 deg, R rot 30 deg, L rot 50 deg    : patient 8 mins late   :   STM: trigger points in R UT near insertion  cerv AROM: flexion 30 deg, ext 25 deg, SBing 30 deg B, rotation R 30, rotation L 45 deg    GHJ strength: 5/5 B  : Cervical Rotation AROM    R: 46*   L:  40*    RESTRICTIONS/PRECAUTIONS:  Also has history of B TKR, hip issues, and  chronic low back pain (set to have epidural)     Exercises/Interventions:     Therapeutic Exercises (43515)  Start time: 9:32 am   End time: 9:48

## 2019-09-16 ENCOUNTER — HOSPITAL ENCOUNTER (OUTPATIENT)
Dept: PHYSICAL THERAPY | Age: 64
Setting detail: THERAPIES SERIES
Discharge: HOME | End: 2019-09-16
Payer: COMMERCIAL

## 2019-09-16 PROCEDURE — 97110 THERAPEUTIC EXERCISES: CPT

## 2019-09-16 PROCEDURE — 97140 MANUAL THERAPY 1/> REGIONS: CPT

## 2019-09-16 PROCEDURE — 97161 PT EVAL LOW COMPLEX 20 MIN: CPT

## 2019-09-16 ASSESSMENT — PAIN SCALES - GENERAL: PAINLEVEL_OUTOF10: 5

## 2019-09-16 ASSESSMENT — PAIN DESCRIPTION - PAIN TYPE: TYPE: CHRONIC PAIN

## 2019-09-16 ASSESSMENT — PAIN DESCRIPTION - DESCRIPTORS: DESCRIPTORS: SHARP;BURNING;STABBING

## 2019-09-16 ASSESSMENT — PAIN DESCRIPTION - PROGRESSION: CLINICAL_PROGRESSION: GRADUALLY WORSENING

## 2019-09-16 ASSESSMENT — PAIN DESCRIPTION - ORIENTATION: ORIENTATION: LOWER;LEFT;RIGHT

## 2019-09-16 ASSESSMENT — PAIN DESCRIPTION - LOCATION: LOCATION: BACK

## 2019-09-16 ASSESSMENT — PAIN - FUNCTIONAL ASSESSMENT: PAIN_FUNCTIONAL_ASSESSMENT: PREVENTS OR INTERFERES SOME ACTIVE ACTIVITIES AND ADLS

## 2019-09-16 ASSESSMENT — PAIN DESCRIPTION - FREQUENCY: FREQUENCY: INTERMITTENT

## 2019-09-16 NOTE — FLOWSHEET NOTE
[facility] - Outpatient Physical Therapy    Physical Therapy Daily Treatment/Progress Note  Date:  2019    Patient Name:  Patsy Byers  \"Ana Luisa\"  :  1955  MRN: [id number]  Medical/Treatment Diagnosis Information:  · Diagnosis: Neck/right arm/thorax strain S16.1xxA, X70.520F, S20.229A  · Treatment Diagnosis: Impaired cervical ROM, strength, stability, decreased posture, increased muscle guarding   Insurance/Certification information:  PT Insurance Information: [facility]  Physician Information:  Referring Practitioner: KIRA Duncan/ Finn Ochoa MD  Plan of care signed (Y/N):  Y, , N sent     Date of Patient follow up with Physician: 19    Progress Note: []  Yes  [x]  No  Next due by:  WEEKLY (FAX TO [facility] [phone])      Latex Allergy:  [x]NO      []YES  Preferred Language for Healthcare:   [x]English       []other:    Visit # Insurance Allowable Date Range (if applicable)    + 3/8 9 + 8 7/9 to 19  : Enedelia Reid  Case # [id number]    New C9 dates: 8/10/19-10/5/19     Pain level: 2/10 NECK/RIGHT SHOULDER, CONSTANT      SUBJECTIVE:  Pt states her neck is about the same.         OBJECTIVE:    : NDI score 44  cerv AROM: flexion 40 deg, ext 30 deg, SBing R 20 deg, SBing L 30 deg, R rot 30 deg, L rot 50 deg  : patient 8 mins late   :   STM: trigger points in R UT near insertion  cerv AROM: flexion 30 deg, ext 25 deg, SBing 30 deg B, rotation R 30, rotation L 45 deg    GHJ strength: 5/5 B  : Cervical Rotation AROM    R: 46*   L:  40*    RESTRICTIONS/PRECAUTIONS:  Also has history of B TKR, hip issues, and  chronic low back pain (set to have epidural)     Exercises/Interventions:     Therapeutic Exercises (92836)  Start time: 3:35pm  End time: 3:43pm Resistance / level Sets/sec Reps Notes   UBE Forward and retro  2 mins each way  When tolerated    Pulleys        Mid Row   High Row  LPD  ER/IR  GHJ ext   B ER with scap retract With cues for form   Cervical AROM Rotation        Supine Chin Tucks with towel  CT with rotation      HEP discussion        W wall slides  Chin Tucks with nerf ball on wall     scap pull    Chin Tucks with shoulder flex/abd (on wall)     Mid rows     LPD    Thor ext sitting in chair    x10    Mid rows with 1/2 foam roll along spine and CT Blue tband   x10    Y lift off   x10           Therapeutic Activities (03629)  Start time:   End time:                                    Neuromuscular Re-ed (89178)  Start time: 3:43 pm  End time: 3:50 pm        UT and LS stretches     1/2 foam roll along spine with PPT, scap retract and alt GHJ raises     1/2 foam roll along spine with PPT, scap retract and GHJ abduction    IR stretch with SOS     ER stretch on the wall    Self mobs to upper thor spine with foam roll    Thor Ext over foam roll in chair    cerv AROM with nerf ball behind head     1/2 foam roll along spine with CT and scap retract    x10    Thor ext over foam roll on wall   10 sec x5           theracane to R UT        Manual Intervention (51694)  Start time: 3:50 pm  End time: 4:00 pm         SOR,  STM cervical paraspinals, PROM cerv spine all directions       Trigger point release to R UT and manual cerv traction      attempted prone PA mobs to cerv spine, but cannot tolerate the position for long periods       Sitting with arms over bolster - generalized PA mobs  through thor spine grade 2-3 and DTM to cerv/thor parapsinals    X 10 mins                       Pt. Education:  9/12: researched wedge pillows for pt - she plans to purchase one that is 4-5 inches tall to help with GERD and protect her spinal alignment   9/9: Reassessed goals, discussed progress made and areas remaining for improvement, issued outcome measure   8/16: Reassessed goals, discussed progress made and areas remaining for improvement, issued outcome measure  7/25:  -patient educated on diagnosis, prognosis and expectations for rehab  -all patient coordination, kinesthetic sense, posture, motor skill, proprioception and motor activation to allow for proper function of   [] LE: / Lumbar core, proximal hip and LE with self care and ADLs  [] UE / Cervical: cervical, postural, scapular, scapulothoracic and UE control with self care, carrying, lifting, driving, computer work.   [] (42767) Gait Re-education- Provided training and instruction to the patient for proper LE, core and proximal hip recruitment and positioning and eccentric body weight control with ambulation re-education including up and down stairs     Home Management Training / Self Care:  [] (14421) Home Management Training / Self-care: ADLs and compensatory training, meal preparation, safety procedures and instruction in use of adaptive equipment, including bathing, grooming, dressing, personal hygiene, basic household cleaning and chores.      Home Exercise Program:    [] (65003) Reviewed/Progressed HEP activities related to strengthening, flexibility, endurance, ROM of   [] LE / Lumbar: core, proximal hip and LE for functional self-care, mobility, lifting and ambulation/stair navigation   [] UE / Cervical: cervical, postural, scapular, scapulothoracic and UE control with self care, reaching, carrying, lifting, house/yardwork, driving, computer work  [] (10448)Reviewed/Progressed HEP activities related to improving balance, coordination, kinesthetic sense, posture, motor skill, proprioception of   [] LE: core, proximal hip and LE for self care, mobility, lifting, and ambulation/stair navigation    [] UE / Cervical: cervical, postural,  scapular, scapulothoracic and UE control with self care, reaching, carrying, lifting, house/yardwork, driving, computer work    Manual Treatments:  PROM / STM / Oscillations-Mobs:  G-I, II, III, IV (PA's, Inf., Post.)  [x] (37534) Provided manual therapy to mobilize LE, proximal hip and/or LS spine soft tissue/joints for the purpose of modulating pain, promoting

## 2019-09-16 NOTE — FLOWSHEET NOTE
Scapular Retraction   x    Marching   Push Downs       Cariocas   Punching x    Jog    Rowing     Multifidi walkouts with paddle   Elbow Flex/Ext       Shldr Flex/Ext x      Shldr aBd/aDd x   LE Exercises:  Shldr Horiz aBd/aDd x   HR/TR x Shldr IR/ER x   Marches x Arm Circles    Squats  x PNF Diagonals    Hamstring Curls x Wall Push Ups x   Hip Flexion (SLR) x Figure 8's    Hip aBduction (SLR) x Bilateral Pull Downs    Hip Extension (SLR) x      Hip aDduction (SLR)      Hip Circles x Functional:    Hip IR/Er  Step up forward    TrA Set   Step up lateral     Pelvic Tilts  x Step down     Fig 8's   Lunges Forward    LE PNF  Lunges Retro      Lunges Lateral     Balance:   Lower ab curl with noodle     SLS  x      Tandem Stance x      NBOS eyes open  Seated:     NBOS eyes closed x Ankle pumps     Hand to Opposite Knee  Ankle Circles     Fwd Step ups to SLS  Knee Flex/Ext    Lateral Step ups to SLS  Hip aBd/aDd    Stop/Go Gait   Bicycle       Ankle DF/PF      Ankle Inv/Ev    Stretching:       Gastroc/Soleus      Hamstring   Noodle:     Knee Flex Stretch  Leg Press    Piriformis   Noodle Hang at [facility]    Hip Flexor  Noodle Hang Deep Water    SKTC  Noodle Bicycle     DKTC       ITB      Quad  Deep Water:    Mid Back  x Jog    UT  Jumping Jacks    Post Shoulder  Heel to Toe    Ladder Pull  Hand to Opposite Knee    Pec Stretch  Rocking Horse      [employer] Skier          Cervical:   Other:     AROM Flexion      AROM Extension      AROM Side Bending      AROM Rotation      Chin Tucks      Chin Nods        Aquatic Abbreviation Key  B= Belt DB= Dumbells T= Theratube   H= Hydrotone N= Noodles W= Weights   P= Paddles S= Speedo equipment K= Kickboard       Pt. Education:  9/16: pt educated on diagnosis, prognosis and expectations for rehab, all pt questions were answered, Gave pt tour of pool, explained how to use lockers, what to wear, that it would be in group setting, and showed pt aquatic equipment.     HEP extensibility and allowing for proper ROM for normal function with   [x] LE / lumbar: self care, mobility, lifting and ambulation. [] UE / Cervical: self care, reaching, carrying, lifting, house/yardwork, driving, computer work. Modalities:  [] (07310) Vasopneumatic compression: Utilized vasopneumatic compression to decrease edema / swelling for the purpose of improving mobility and quad tone / recruitment which will allow for increased overall function including but not limited to self-care, transfers, ambulation, and ascending / descending stairs. Modalities:      Charges:  Timed Code Treatment Minutes: 40   Total Treatment Minutes: 55     [x] EVAL - LOW (43239) x1   [] EVAL - MOD (49562)  [] EVAL - HIGH (58625)  [] RE-EVAL (07580)  [x] TE (58263) x 2     [] Ionto  [] NMR (97954) x      [] Vaso  [x] Manual (05605) x 1     [] Ultrasound  [] TA x       [] Mech Traction (19414)  [] Gait Training x     [] ES (un) (48689):   [] Aquatic therapy x   [] Other:   [] Group:     GOALS:  Long term goals  Time Frame for Long term goals : 5 weeks:  Long term goal 1: Pt will improve 30 sec STS by 3 reps to demo improved LE endurance. Long term goal 2: Pt will increase R hip strength to 4+/5 to allow pt to walk and climb stairs without difficulty. Long term goal 3: Pt will increase lumb SBing by 5 deg B to improve ability to transfer in bed and in her car with less difficulty. Long term goal 4: Pt will report back pain at 2/10 or less over a 24 hour period to progress towards pain free ADLs.    Overall Progression Towards Functional goals/ Treatment Progress Update:  [] Patient is progressing as expected towards functional goals listed. [] Progression is slowed due to complexities/Impairments listed. [] Progression has been slowed due to co-morbidities.   [x] Plan just implemented, too soon to assess goals progression <30days   [] Goals require adjustment due to lack of progress  [] Patient is not progressing as expected and requires additional follow up with physician  [] Other    Persisting Functional Limitations/Impairments:  [x]Sleeping [x]Sitting               [x]Standing [x]Transfers        [x]Walking []Kneeling               [x]Stairs [x]Squatting / bending   [x]ADLs []Reaching  []Lifting  [x]Housework  []Driving [x]Job related tasks  []Sports/Recreation []Other:        ASSESSMENT:  See eval  Treatment/Activity Tolerance:  [x] Patient able to complete tx  [] Patient limited by fatique  [] Patient limited by pain  [] Patient limited by other medical complications  [] Other:     Prognosis: [x] Good [] Fair  [] Poor    Patient Requires Follow-up: [x] Yes  [] No         PLAN: See eval. PT 2x / week for 5 weeks. [] Continue per plan of care [] Alter current plan (see comments)  [x] Plan of care initiated [] Hold pending MD visit [] Discharge    Electronically signed by: Melanie Levine PT, DPT    Note: If patient does not return for scheduled/ recommended follow up visits, his note will serve as a discharge from care along with most recent update on progress.

## 2019-09-16 NOTE — PROGRESS NOTES
Physical Therapy  Initial Assessment  Date: 2019  Patient Name: Nicole Pond  MRN: [id number]  : 1955     Treatment Diagnosis: decreased lumbar AROM into SBing, decreased R hip strength, reduced LE endurance, pelvic malalignment, and increased pain with transfers and prolonged weight bearing    Restrictions   None    Subjective   General  Patient assessed for rehabilitation services?: Yes  Additional Pertinent Hx: HTN, hyperlidiedmia, OA foot , ankle fx sx, R RTC sx, B TKAs  Response To Previous Treatment: Not applicable  Referring Practitioner: Juli Santacruz MD  Referral Date : 19  Diagnosis: lumb stenosis, spondylosis, radiculitis, HNP  PT Visit Information  Onset Date: 19  PT Insurance Information: [employer] (30 v per year)  Subjective  Subjective: Pt started to feel hip pain in April, progressively got worse. She fell at work on  and back pain started to increase. CLOF: Back pain with standing transfers, walking any distance, getting in and out the car and bed are difficult. Painful with cooking, cleaning and laundry. Pt is retiring at the end of the month. Bending to  objects and reaching while bent over are very painful as well.   PLOF: back pain on and off and always got better, nothing significant Goals: decrease back pain, walk for recreation, back to golf  Pain Screening  Patient Currently in Pain: Yes  Pain Assessment  Pain Assessment: 0-10  Pain Level: 5  Pain Type: Chronic pain  Pain Location: Back  Pain Orientation: Lower;Left;Right  Pain Descriptors: Sharp;Burning;Stabbing  Pain Frequency: Intermittent  Clinical Progression: Gradually worsening  Functional Pain Assessment: Prevents or interferes some active activities and ADLs  Vital Signs  Patient Currently in Pain: Yes    Vision/Hearing  Vision  Vision: Within Functional Limits  Hearing  Hearing: Within functional limits    Orientation  Orientation  Overall Orientation Status: Within

## 2019-09-17 ENCOUNTER — TELEPHONE (OUTPATIENT)
Dept: ORTHOPEDIC SURGERY | Age: 64
End: 2019-09-17

## 2019-09-17 NOTE — TELEPHONE ENCOUNTER
If the medicine was helping her symptoms then continuing 1 tab/day would be reasonable if no side effects. If she'd rather discontinue she can take 1 tab qod for 3 doses then off.     Thanks

## 2019-09-17 NOTE — TELEPHONE ENCOUNTER
Patient called stating that she has been taking Neurontin x 2 weeks 1 a day. She increased her dose to BID today and is \"feeling funny. \"  She is also experiencing lower extremity swelling. Patient stated that she is going to wean herself off the medication. Please advise.

## 2019-09-19 LAB
HPV COMMENT: NORMAL
HPV TYPE 16: NOT DETECTED
HPV TYPE 18: NOT DETECTED
HPVOH (OTHER TYPES): NOT DETECTED

## 2019-09-20 ENCOUNTER — HOSPITAL ENCOUNTER (OUTPATIENT)
Dept: GENERAL RADIOLOGY | Age: 64
Discharge: HOME | End: 2019-09-20
Payer: COMMERCIAL

## 2019-09-20 ENCOUNTER — HOSPITAL ENCOUNTER (OUTPATIENT)
Dept: PHYSICAL THERAPY | Age: 64
Setting detail: THERAPIES SERIES
Discharge: HOME | End: 2019-09-20
Payer: COMMERCIAL

## 2019-09-20 DIAGNOSIS — M81.0 OSTEOPOROSIS WITHOUT CURRENT PATHOLOGICAL FRACTURE, UNSPECIFIED OSTEOPOROSIS TYPE: ICD-10-CM

## 2019-09-20 PROCEDURE — 97112 NEUROMUSCULAR REEDUCATION: CPT

## 2019-09-20 PROCEDURE — 77080 DXA BONE DENSITY AXIAL: CPT

## 2019-09-20 PROCEDURE — 97110 THERAPEUTIC EXERCISES: CPT

## 2019-09-20 PROCEDURE — 97140 MANUAL THERAPY 1/> REGIONS: CPT

## 2019-09-20 NOTE — FLOWSHEET NOTE
[facility] - Outpatient Physical Therapy    Physical Therapy Daily Treatment/Progress Note  Date:  2019    Patient Name:  Faraz Jackman  \"Ana Luisa\"  :  1955  MRN: [id number]  Medical/Treatment Diagnosis Information:  · Diagnosis: Neck/right arm/thorax strain S16.1xxA, S46.911A, S20.229A  · Treatment Diagnosis: Impaired cervical ROM, strength, stability, decreased posture, increased muscle guarding   Insurance/Certification information:  PT Insurance Information: [facility]  Physician Information:  Referring Practitioner: KIRA Peña/ Ryan Kennedy MD  Plan of care signed (Y/N):  Y, , N sent     Date of Patient follow up with Physician: 19    Progress Note: []  Yes  [x]  No  Next due by:  WEEKLY (FAX TO [facility] [phone])      Latex Allergy:  [x]NO      []YES  Preferred Language for Healthcare:   [x]English       []other:    Visit # Insurance Allowable Date Range (if applicable)    +  9 + 8  to 19  : Barbara Walker  Case # [id number]    New C9 dates: 8/10/19-10/5/19     Pain level: 4/10 NECK/RIGHT SHOULDER, CONSTANT      SUBJECTIVE:  Pt states she has been moving papers around today as she is retiring next week.      OBJECTIVE:    : NDI score 44  cerv AROM: flexion 40 deg, ext 30 deg, SBing R 20 deg, SBing L 30 deg, R rot 30 deg, L rot 50 deg  : patient 8 mins late   :   STM: trigger points in R UT near insertion  cerv AROM: flexion 30 deg, ext 25 deg, SBing 30 deg B, rotation R 30, rotation L 45 deg    GHJ strength: 5/5 B  : Cervical Rotation AROM    R: 46*   L:  40*    RESTRICTIONS/PRECAUTIONS:  Also has history of B TKR, hip issues, and  chronic low back pain (set to have epidural)     Exercises/Interventions:     Therapeutic Exercises (59289)  Start time: 3:12pm  End time: 3:17 pm Resistance / level Sets/sec Reps Notes   UBE Forward and retro  2 mins each way  When tolerated    Pulleys        Mid Row   High

## 2019-09-23 ENCOUNTER — HOSPITAL ENCOUNTER (OUTPATIENT)
Dept: PHYSICAL THERAPY | Age: 64
Setting detail: THERAPIES SERIES
Discharge: HOME | End: 2019-09-23
Payer: COMMERCIAL

## 2019-09-23 PROCEDURE — 97110 THERAPEUTIC EXERCISES: CPT

## 2019-09-23 PROCEDURE — 97012 MECHANICAL TRACTION THERAPY: CPT

## 2019-09-23 PROCEDURE — 97140 MANUAL THERAPY 1/> REGIONS: CPT

## 2019-09-23 NOTE — FLOWSHEET NOTE
[facility] - Outpatient Physical Therapy    Physical Therapy Daily Treatment/Progress Note  Date:  2019    Patient Name:  Jim Dumas  \"Ana Luisa\"  :  1955  MRN: [id number]  Medical/Treatment Diagnosis Information:  · Diagnosis: Neck/right arm/thorax strain S16.1xxA, J37.097Q, S20.229A  · Treatment Diagnosis: Impaired cervical ROM, strength, stability, decreased posture, increased muscle guarding   Insurance/Certification information:  PT Insurance Information: [facility]  Physician Information:  Referring Practitioner: KIRA Santos/ Ellen De La O MD  Plan of care signed (Y/N):  Y, , N sent     Date of Patient follow up with Physician: 19    Progress Note: [x]  Yes  []  No  Next due by:  WEEKLY (FAX TO [facility] [phone])      Latex Allergy:  [x]NO      []YES  Preferred Language for Healthcare:   [x]English       []other:    Visit # Insurance Allowable Date Range (if applicable)    +  9 + 8  to 19  : Kaleb Sandoval  Case # [id number]    New C9 dates: 8/10/19-10/5/19     Pain level: 3-4/10 NECK/RIGHT SHOULDER, CONSTANT      SUBJECTIVE:  Pt states she is still cleaning out her office and is using her arms a lot. Had a busy weekend. States she has improved about 60% since beginning therapy.      OBJECTIVE:    : NDI score 44  cerv AROM: flexion 40 deg, ext 30 deg, SBing R 20 deg, SBing L 30 deg, R rot 30 deg, L rot 50 deg  : patient 8 mins late   :   STM: trigger points in R UT near insertion  cerv AROM: flexion 30 deg, ext 25 deg, SBing 30 deg B, rotation R 30, rotation L 45 deg    GHJ strength: 5/5 B  : Cervical Rotation AROM    R: 46*   L:  40*    RESTRICTIONS/PRECAUTIONS:  Also has history of B TKR, hip issues, and  chronic low back pain (set to have epidural)     Exercises/Interventions:     Therapeutic Exercises (39614)  Start time: 3:03pm  End time: 3:15pm Resistance / level Sets/sec Reps Notes   UBE Forward and retro  2 mins improvement, issued outcome measure   9/12: researched wedge pillows for pt - she plans to purchase one that is 4-5 inches tall to help with GERD and protect her spinal alignment   9/9: Reassessed goals, discussed progress made and areas remaining for improvement, issued outcome measure   8/16: Reassessed goals, discussed progress made and areas remaining for improvement, issued outcome measure  7/25:  -patient educated on diagnosis, prognosis and expectations for rehab  -all patient questions were answered    HEP instruction:  8/8: added tband therex to HEP and issued green and blue bands, added IR and ER stretches, also issued general LE mobility exercises for the pool for her hip and back    -pt provided with written and illustrated instructions for HEP (see scanned image in )  7/25  Cervical AROM flex/ext, rotation B, gentle supine chin tucks, scap squeezes/shoulder rolls     Therapeutic Exercise and NMR EXR  [x] (30442) Provided verbal/tactile cueing for activities related to strengthening, flexibility, endurance, ROM for improvements in  [] LE / Lumbar: LE, proximal hip, and core control with self care, mobility, lifting, ambulation. [x] UE / Cervical: cervical, postural, scapular, scapulothoracic and UE control with self care, reaching, carrying, lifting, house/yardwork, driving, computer work.  [] (31211) Provided verbal/tactile cueing for activities related to improving balance, coordination, kinesthetic sense, posture, motor skill, proprioception to assist with   [] LE / lumbar: LE, proximal hip, and core control in self care, mobility, lifting, ambulation and eccentric single leg control.    [] UE / cervical: cervical, scapular, scapulothoracic and UE control with self care, reaching, carrying, lifting, house/yardwork, driving, computer work.   [] (84063) Therapist is in constant attendance of 2 or more patients providing skilled therapy interventions, but not providing any significant amount and increase spinal mobility and stability so she can perform her daily tasks without pain or limitations.    Treatment/Activity Tolerance:  [x] Patient able to complete tx  [] Patient limited by fatigue  [] Patient limited by pain  [] Patient limited by other medical complications  [x] Other: see PN    Prognosis: [x] Good [] Fair  [] Poor    Patient Requires Follow-up: [x] Yes  [] No    PLAN: See elia. PT 2x / week for 4 weeks  [x] Continue per plan of care [] Alter current plan (see comments)  [] Plan of care initiated [] Hold pending MD visit [] Discharge    Electronically signed by: Jose Eduardo Godwin, PT, DPT

## 2019-09-23 NOTE — FLOWSHEET NOTE
upslip       Ball/belt traction with LTR      Assessed pelvic alignment, R leg pull to correct R upslip                                AquaticTherapy Dates of Service:   Aquatic Visits Exercises/Activities:  38097 and / or 51535   Transfers:          % Immersion:            Ambulation:   UE Exercises:       Forwards  x Shoulder Shrugs      Lateral   x Shoulder Circles      Retro   x Scapular Retraction   x    Marching   Push Downs       [place]   Punching x    Jog    Rowing     Multifidi walkouts with paddle   Elbow Flex/Ext       Shldr Flex/Ext x      Shldr aBd/aDd x   LE Exercises:  Shldr Horiz aBd/aDd x   HR/TR x Shldr IR/ER x   Marches x Arm Circles    Squats  x PNF Diagonals    Hamstring Curls x Wall Push Ups x   Hip Flexion (SLR) x Figure 8's    Hip aBduction (SLR) x Bilateral Pull Downs    Hip Extension (SLR) x      Hip aDduction (SLR)      Hip Circles x Functional:    Hip IR/Er  Step up forward    TrA Set   Step up lateral     Pelvic Tilts  x Step down     Fig 8's   Lunges Forward    LE PNF  Lunges Retro      Lunges Lateral     Balance:   Lower ab curl with noodle     SLS  x      Tandem Stance x      NBOS eyes open  Seated:     NBOS eyes closed x Ankle pumps     Hand to Opposite Knee  Ankle Circles     Fwd Step ups to SLS  Knee Flex/Ext    Lateral Step ups to SLS  Hip aBd/aDd    Stop/Go Gait   Bicycle       Ankle DF/PF      Ankle Inv/Ev    Stretching:       Gastroc/Soleus      Hamstring   Noodle:     Knee Flex Stretch  Leg Press    Piriformis   Noodle Hang at [employer]    Hip Flexor  Noodle Hang Deep Water    SKTC  Noodle Bicycle     DKTC       ITB      Quad  Deep Water:    Mid Back  x Jog    UT  Jumping Jacks    Post Shoulder  Heel to Toe    Ladder Pull  Hand to Opposite Knee    Pec Stretch  Rocking Horse      [employer] Skier          Cervical:   Other:     AROM Flexion      AROM Extension      AROM Side Bending      AROM Rotation      Chin Tucks      Chin Nods        Aquatic Abbreviation Key  B= Belt DB= postural,  scapular, scapulothoracic and UE control with self care, reaching, carrying, lifting, house/yardwork, driving, computer work    Manual Treatments:  PROM / STM / Oscillations-Mobs:  G-I, II, III, IV (PA's, Inf., Post.)  [] (81854) Provided manual therapy to mobilize LE, proximal hip and/or LS spine soft tissue/joints for the purpose of modulating pain, promoting relaxation,  increasing ROM, reducing/eliminating soft tissue swelling/inflammation/restriction, improving soft tissue extensibility and allowing for proper ROM for normal function with   [] LE / lumbar: self care, mobility, lifting and ambulation. [] UE / Cervical: self care, reaching, carrying, lifting, house/yardwork, driving, computer work. Modalities:  [] (54099) Vasopneumatic compression: Utilized vasopneumatic compression to decrease edema / swelling for the purpose of improving mobility and quad tone / recruitment which will allow for increased overall function including but not limited to self-care, transfers, ambulation, and ascending / descending stairs. Modalities:    9/23: Pt was set up on lumbar traction in supine with bolsters under B knees with parameters of 65/50 lbs with on/off time of 30/10, for a total time of 10 minutes. Pt was given panic button as well as instructed how to use it if experiencing pain as well as given bell to call for needs. Charges:  Timed Code Treatment Minutes: 20   Total Treatment Minutes: 30     [] EVAL - LOW (06060)    [] EVAL - MOD (98704)  [] EVAL - HIGH (82041)  [] RE-EVAL (20662)  [x] TE (16947) x 1     [] Ionto  [] NMR (53360) x      [] Vaso  [] Manual (51751) x        [] Ultrasound  [] TA x       [x] Mech Traction (25002) x1  [] Gait Training x     [] ES (un) (38349):   [] Aquatic therapy x   [] Other:   [] Group:     GOALS:  Long term goals  Time Frame for Long term goals : 5 weeks:  Long term goal 1: Pt will improve 30 sec STS by 3 reps to demo improved LE endurance.   Long term

## 2019-09-25 ENCOUNTER — HOSPITAL ENCOUNTER (OUTPATIENT)
Age: 64
Discharge: HOME | End: 2019-09-25
Payer: COMMERCIAL

## 2019-09-25 LAB
A/G RATIO: 1.3 (ref 1.1–2.2)
ALBUMIN SERPL-MCNC: 3.9 G/DL (ref 3.4–5)
ALP BLD-CCNC: 80 U/L (ref 40–129)
ALT SERPL-CCNC: 19 U/L (ref 10–40)
ANION GAP SERPL CALCULATED.3IONS-SCNC: 14 MMOL/L (ref 3–16)
AST SERPL-CCNC: 14 U/L (ref 15–37)
BILIRUB SERPL-MCNC: 0.4 MG/DL (ref 0–1)
BUN BLDV-MCNC: 16 MG/DL (ref 7–20)
CALCIUM SERPL-MCNC: 9.2 MG/DL (ref 8.3–10.6)
CHLORIDE BLD-SCNC: 103 MMOL/L (ref 99–110)
CO2: 25 MMOL/L (ref 21–32)
CREAT SERPL-MCNC: 0.8 MG/DL (ref 0.6–1.2)
GFR AFRICAN AMERICAN: >60
GFR NON-AFRICAN AMERICAN: >60
GLOBULIN: 3 G/DL
GLUCOSE BLD-MCNC: 172 MG/DL (ref 70–99)
POTASSIUM SERPL-SCNC: 3.9 MMOL/L (ref 3.5–5.1)
SODIUM BLD-SCNC: 142 MMOL/L (ref 136–145)
T4 FREE: 1.1 NG/DL (ref 0.9–1.8)
TOTAL PROTEIN: 6.9 G/DL (ref 6.4–8.2)
TSH SERPL DL<=0.05 MIU/L-ACNC: 2.72 UIU/ML (ref 0.27–4.2)

## 2019-09-25 PROCEDURE — 84443 ASSAY THYROID STIM HORMONE: CPT

## 2019-09-25 PROCEDURE — 80053 COMPREHEN METABOLIC PANEL: CPT

## 2019-09-25 PROCEDURE — 36415 COLL VENOUS BLD VENIPUNCTURE: CPT

## 2019-09-25 PROCEDURE — 84439 ASSAY OF FREE THYROXINE: CPT

## 2019-09-26 ENCOUNTER — HOSPITAL ENCOUNTER (OUTPATIENT)
Dept: PHYSICAL THERAPY | Age: 64
Setting detail: THERAPIES SERIES
Discharge: HOME | End: 2019-09-26
Payer: COMMERCIAL

## 2019-09-26 PROCEDURE — 97110 THERAPEUTIC EXERCISES: CPT

## 2019-09-26 PROCEDURE — 97140 MANUAL THERAPY 1/> REGIONS: CPT

## 2019-09-26 PROCEDURE — 97012 MECHANICAL TRACTION THERAPY: CPT

## 2019-09-26 NOTE — FLOWSHEET NOTE
made and areas remaining for improvement, issued outcome measure   9/12: researched wedge pillows for pt - she plans to purchase one that is 4-5 inches tall to help with GERD and protect her spinal alignment   9/9: Reassessed goals, discussed progress made and areas remaining for improvement, issued outcome measure   8/16: Reassessed goals, discussed progress made and areas remaining for improvement, issued outcome measure  7/25:  -patient educated on diagnosis, prognosis and expectations for rehab  -all patient questions were answered    HEP instruction:  8/8: added tband therex to HEP and issued green and blue bands, added IR and ER stretches, also issued general LE mobility exercises for the pool for her hip and back    -pt provided with written and illustrated instructions for HEP (see scanned image in )  7/25  Cervical AROM flex/ext, rotation B, gentle supine chin tucks, scap squeezes/shoulder rolls     Therapeutic Exercise and NMR EXR  [x] (74732) Provided verbal/tactile cueing for activities related to strengthening, flexibility, endurance, ROM for improvements in  [] LE / Lumbar: LE, proximal hip, and core control with self care, mobility, lifting, ambulation. [x] UE / Cervical: cervical, postural, scapular, scapulothoracic and UE control with self care, reaching, carrying, lifting, house/yardwork, driving, computer work.  [] (19038) Provided verbal/tactile cueing for activities related to improving balance, coordination, kinesthetic sense, posture, motor skill, proprioception to assist with   [] LE / lumbar: LE, proximal hip, and core control in self care, mobility, lifting, ambulation and eccentric single leg control.    [] UE / cervical: cervical, scapular, scapulothoracic and UE control with self care, reaching, carrying, lifting, house/yardwork, driving, computer work.   [] (35833) Therapist is in constant attendance of 2 or more patients providing skilled therapy interventions, but not providing any significant amount of measurable one-on-one time to either patient, for improvements in  [] LE / lumbar: LE, proximal hip, and core control in self care, mobility, lifting, ambulation and eccentric single leg control. [] UE / cervical: cervical, scapular, scapulothoracic and UE control with self care, reaching, carrying, lifting, house/yardwork, driving, computer work. NMR and Therapeutic Activities:    [] (95039 or 58990) Provided verbal/tactile cueing for activities related to improving balance, coordination, kinesthetic sense, posture, motor skill, proprioception and motor activation to allow for proper function of   [] LE: / Lumbar core, proximal hip and LE with self care and ADLs  [] UE / Cervical: cervical, postural, scapular, scapulothoracic and UE control with self care, carrying, lifting, driving, computer work.   [] (84629) Gait Re-education- Provided training and instruction to the patient for proper LE, core and proximal hip recruitment and positioning and eccentric body weight control with ambulation re-education including up and down stairs     Home Management Training / Self Care:  [] (89374) Home Management Training / Self-care: ADLs and compensatory training, meal preparation, safety procedures and instruction in use of adaptive equipment, including bathing, grooming, dressing, personal hygiene, basic household cleaning and chores.      Home Exercise Program:    [] (57763) Reviewed/Progressed HEP activities related to strengthening, flexibility, endurance, ROM of   [] LE / Lumbar: core, proximal hip and LE for functional self-care, mobility, lifting and ambulation/stair navigation   [] UE / Cervical: cervical, postural, scapular, scapulothoracic and UE control with self care, reaching, carrying, lifting, house/yardwork, driving, computer work  [] (43429)Reviewed/Progressed HEP activities related to improving balance, coordination, kinesthetic sense, posture, motor skill,

## 2019-09-26 NOTE — FLOWSHEET NOTE
mobility, lifting, ambulation and eccentric single leg control. [] UE / cervical: cervical, scapular, scapulothoracic and UE control with self care, reaching, carrying, lifting, house/yardwork, driving, computer work.   [] (71634) Therapist is in constant attendance of 2 or more patients providing skilled therapy interventions, but not providing any significant amount of measurable one-on-one time to either patient, for improvements in  [] LE / lumbar: LE, proximal hip, and core control in self care, mobility, lifting, ambulation and eccentric single leg control. [] UE / cervical: cervical, scapular, scapulothoracic and UE control with self care, reaching, carrying, lifting, house/yardwork, driving, computer work.      NMR and Therapeutic Activities:    [] (09961 or 17905) Provided verbal/tactile cueing for activities related to improving balance, coordination, kinesthetic sense, posture, motor skill, proprioception and motor activation to allow for proper function of   [] LE: / Lumbar core, proximal hip and LE with self care and ADLs  [] UE / Cervical: cervical, postural, scapular, scapulothoracic and UE control with self care, carrying, lifting, driving, computer work.   [] (00032) Gait Re-education- Provided training and instruction to the patient for proper LE, core and proximal hip recruitment and positioning and eccentric body weight control with ambulation re-education including up and down stairs     Home Exercise Program:    [] (78005) Reviewed/Progressed HEP activities related to strengthening, flexibility, endurance, ROM of   [] LE / Lumbar: core, proximal hip and LE for functional self-care, mobility, lifting and ambulation/stair navigation   [] UE / Cervical: cervical, postural, scapular, scapulothoracic and UE control with self care, reaching, carrying, lifting, house/yardwork, driving, computer work  [] (41226)Reviewed/Progressed HEP activities related to improving balance, coordination, kinesthetic sense, posture, motor skill, proprioception of   [] LE: core, proximal hip and LE for self care, mobility, lifting, and ambulation/stair navigation    [] UE / Cervical: cervical, postural,  scapular, scapulothoracic and UE control with self care, reaching, carrying, lifting, house/yardwork, driving, computer work    Manual Treatments:  PROM / STM / Oscillations-Mobs:  G-I, II, III, IV (PA's, Inf., Post.)  [] (89468) Provided manual therapy to mobilize LE, proximal hip and/or LS spine soft tissue/joints for the purpose of modulating pain, promoting relaxation,  increasing ROM, reducing/eliminating soft tissue swelling/inflammation/restriction, improving soft tissue extensibility and allowing for proper ROM for normal function with   [] LE / lumbar: self care, mobility, lifting and ambulation. [] UE / Cervical: self care, reaching, carrying, lifting, house/yardwork, driving, computer work. Modalities:  [] (19621) Vasopneumatic compression: Utilized vasopneumatic compression to decrease edema / swelling for the purpose of improving mobility and quad tone / recruitment which will allow for increased overall function including but not limited to self-care, transfers, ambulation, and ascending / descending stairs. Modalities:    9/23: Pt was set up on lumbar traction in supine with bolsters under B knees with parameters of 65/50 lbs with on/off time of 30/10, for a total time of 10 minutes. Pt was given panic button as well as instructed how to use it if experiencing pain as well as given bell to call for needs.      Charges:  Timed Code Treatment Minutes: 24   Total Treatment Minutes: 36     [] EVAL - LOW (17501)    [] EVAL - MOD (52384)   [] EVAL - HIGH (21376)  [] RE-EVAL (64930)  [x] TE (13557) x 2     [] Ionto  [] NMR (79667) x      [] Vaso  [] Manual (99987) x        [] Ultrasound  [] TA x       [x] Mech Traction (15132) x1  [] Gait Training x     [] ES (un) (05291):   [] Aquatic therapy

## 2019-09-30 ENCOUNTER — HOSPITAL ENCOUNTER (OUTPATIENT)
Dept: PHYSICAL THERAPY | Age: 64
Setting detail: THERAPIES SERIES
Discharge: HOME | End: 2019-09-30
Payer: COMMERCIAL

## 2019-09-30 PROCEDURE — 97112 NEUROMUSCULAR REEDUCATION: CPT

## 2019-09-30 PROCEDURE — 97012 MECHANICAL TRACTION THERAPY: CPT

## 2019-09-30 PROCEDURE — 97110 THERAPEUTIC EXERCISES: CPT

## 2019-09-30 PROCEDURE — 97140 MANUAL THERAPY 1/> REGIONS: CPT

## 2019-10-03 ENCOUNTER — HOSPITAL ENCOUNTER (OUTPATIENT)
Dept: PHYSICAL THERAPY | Age: 64
Setting detail: THERAPIES SERIES
Discharge: HOME | End: 2019-10-03
Payer: COMMERCIAL

## 2019-10-03 ENCOUNTER — APPOINTMENT (OUTPATIENT)
Dept: PHYSICAL THERAPY | Age: 64
End: 2019-10-03
Payer: COMMERCIAL

## 2019-10-03 PROCEDURE — 97110 THERAPEUTIC EXERCISES: CPT

## 2019-10-03 PROCEDURE — 97150 GROUP THERAPEUTIC PROCEDURES: CPT

## 2019-10-03 PROCEDURE — 97113 AQUATIC THERAPY/EXERCISES: CPT

## 2019-10-03 PROCEDURE — 97140 MANUAL THERAPY 1/> REGIONS: CPT

## 2019-10-07 ENCOUNTER — HOSPITAL ENCOUNTER (OUTPATIENT)
Dept: PHYSICAL THERAPY | Age: 64
Setting detail: THERAPIES SERIES
Discharge: HOME | End: 2019-10-07
Payer: COMMERCIAL

## 2019-10-07 ENCOUNTER — APPOINTMENT (OUTPATIENT)
Dept: PHYSICAL THERAPY | Age: 64
End: 2019-10-07
Payer: COMMERCIAL

## 2019-10-07 PROCEDURE — 97110 THERAPEUTIC EXERCISES: CPT

## 2019-10-10 ENCOUNTER — APPOINTMENT (OUTPATIENT)
Dept: PHYSICAL THERAPY | Age: 64
End: 2019-10-10
Payer: COMMERCIAL

## 2019-10-10 ENCOUNTER — HOSPITAL ENCOUNTER (OUTPATIENT)
Dept: PHYSICAL THERAPY | Age: 64
Setting detail: THERAPIES SERIES
Discharge: HOME | End: 2019-10-10
Payer: COMMERCIAL

## 2019-10-10 PROCEDURE — 97150 GROUP THERAPEUTIC PROCEDURES: CPT

## 2019-10-10 PROCEDURE — 97113 AQUATIC THERAPY/EXERCISES: CPT

## 2019-10-10 PROCEDURE — 97140 MANUAL THERAPY 1/> REGIONS: CPT

## 2019-10-10 PROCEDURE — 97112 NEUROMUSCULAR REEDUCATION: CPT

## 2019-10-14 ENCOUNTER — APPOINTMENT (OUTPATIENT)
Dept: PHYSICAL THERAPY | Age: 64
End: 2019-10-14
Payer: COMMERCIAL

## 2019-10-15 ENCOUNTER — HOSPITAL ENCOUNTER (OUTPATIENT)
Dept: PHYSICAL THERAPY | Age: 64
Setting detail: THERAPIES SERIES
Discharge: HOME | End: 2019-10-15
Payer: COMMERCIAL

## 2019-10-15 PROCEDURE — 97110 THERAPEUTIC EXERCISES: CPT

## 2019-10-15 PROCEDURE — 97112 NEUROMUSCULAR REEDUCATION: CPT

## 2019-10-17 ENCOUNTER — APPOINTMENT (OUTPATIENT)
Dept: PHYSICAL THERAPY | Age: 64
End: 2019-10-17
Payer: COMMERCIAL

## 2019-10-18 ENCOUNTER — HOSPITAL ENCOUNTER (OUTPATIENT)
Dept: PHYSICAL THERAPY | Age: 64
Setting detail: THERAPIES SERIES
Discharge: HOME | End: 2019-10-18
Payer: COMMERCIAL

## 2019-10-18 PROCEDURE — 97112 NEUROMUSCULAR REEDUCATION: CPT

## 2019-10-18 PROCEDURE — 97140 MANUAL THERAPY 1/> REGIONS: CPT

## 2019-10-18 PROCEDURE — 97110 THERAPEUTIC EXERCISES: CPT

## 2019-10-21 ENCOUNTER — HOSPITAL ENCOUNTER (OUTPATIENT)
Dept: PHYSICAL THERAPY | Age: 64
Setting detail: THERAPIES SERIES
Discharge: HOME | End: 2019-10-21
Payer: COMMERCIAL

## 2019-10-21 PROCEDURE — 97140 MANUAL THERAPY 1/> REGIONS: CPT

## 2019-10-21 PROCEDURE — 97112 NEUROMUSCULAR REEDUCATION: CPT

## 2019-10-22 ENCOUNTER — HOSPITAL ENCOUNTER (OUTPATIENT)
Dept: PHYSICAL THERAPY | Age: 64
Setting detail: THERAPIES SERIES
Discharge: HOME | End: 2019-10-22
Payer: COMMERCIAL

## 2019-10-22 PROCEDURE — 97150 GROUP THERAPEUTIC PROCEDURES: CPT

## 2019-10-22 PROCEDURE — 97113 AQUATIC THERAPY/EXERCISES: CPT

## 2019-10-24 ENCOUNTER — APPOINTMENT (OUTPATIENT)
Dept: PHYSICAL THERAPY | Age: 64
End: 2019-10-24
Payer: COMMERCIAL

## 2019-10-25 ENCOUNTER — HOSPITAL ENCOUNTER (OUTPATIENT)
Dept: PHYSICAL THERAPY | Age: 64
Setting detail: THERAPIES SERIES
Discharge: HOME | End: 2019-10-25
Payer: COMMERCIAL

## 2019-10-25 PROCEDURE — 97110 THERAPEUTIC EXERCISES: CPT

## 2019-10-25 PROCEDURE — 97140 MANUAL THERAPY 1/> REGIONS: CPT

## 2019-10-25 PROCEDURE — 97112 NEUROMUSCULAR REEDUCATION: CPT

## 2019-10-28 ENCOUNTER — HOSPITAL ENCOUNTER (OUTPATIENT)
Dept: PHYSICAL THERAPY | Age: 64
Setting detail: THERAPIES SERIES
Discharge: HOME | End: 2019-10-28
Payer: COMMERCIAL

## 2019-10-28 ENCOUNTER — TELEPHONE (OUTPATIENT)
Dept: ORTHOPEDIC SURGERY | Age: 64
End: 2019-10-28

## 2019-10-28 PROCEDURE — 97140 MANUAL THERAPY 1/> REGIONS: CPT

## 2019-10-28 PROCEDURE — 97110 THERAPEUTIC EXERCISES: CPT

## 2019-10-29 ENCOUNTER — HOSPITAL ENCOUNTER (OUTPATIENT)
Dept: PHYSICAL THERAPY | Age: 64
Setting detail: THERAPIES SERIES
Discharge: HOME | End: 2019-10-29
Payer: COMMERCIAL

## 2019-10-29 PROCEDURE — 97150 GROUP THERAPEUTIC PROCEDURES: CPT

## 2019-10-29 PROCEDURE — 97113 AQUATIC THERAPY/EXERCISES: CPT

## 2019-11-01 ENCOUNTER — HOSPITAL ENCOUNTER (OUTPATIENT)
Dept: PHYSICAL THERAPY | Age: 64
Setting detail: THERAPIES SERIES
Discharge: HOME | End: 2019-11-01
Payer: COMMERCIAL

## 2019-11-01 ENCOUNTER — APPOINTMENT (OUTPATIENT)
Dept: PHYSICAL THERAPY | Age: 64
End: 2019-11-01
Payer: COMMERCIAL

## 2019-11-01 PROCEDURE — 97112 NEUROMUSCULAR REEDUCATION: CPT

## 2019-11-01 PROCEDURE — 97140 MANUAL THERAPY 1/> REGIONS: CPT

## 2019-11-01 PROCEDURE — 97110 THERAPEUTIC EXERCISES: CPT

## 2019-11-06 ENCOUNTER — HOSPITAL ENCOUNTER (OUTPATIENT)
Dept: INTERVENTIONAL RADIOLOGY/VASCULAR | Age: 64
Discharge: HOME | End: 2019-11-06
Payer: COMMERCIAL

## 2019-11-06 DIAGNOSIS — M51.36 ANNULAR TEAR OF LUMBAR DISC: ICD-10-CM

## 2019-11-06 PROCEDURE — 6360000002 HC RX W HCPCS

## 2019-11-06 PROCEDURE — 2500000003 HC RX 250 WO HCPCS

## 2019-11-06 PROCEDURE — 2709999900 HC NON-CHARGEABLE SUPPLY

## 2019-11-06 PROCEDURE — 64484 NJX AA&/STRD TFRM EPI L/S EA: CPT

## 2019-11-06 PROCEDURE — 64483 NJX AA&/STRD TFRM EPI L/S 1: CPT

## 2019-11-08 ENCOUNTER — APPOINTMENT (OUTPATIENT)
Dept: PHYSICAL THERAPY | Age: 64
End: 2019-11-08
Payer: COMMERCIAL

## 2019-11-11 ENCOUNTER — APPOINTMENT (OUTPATIENT)
Dept: PHYSICAL THERAPY | Age: 64
End: 2019-11-11
Payer: COMMERCIAL

## 2019-11-12 ENCOUNTER — HOSPITAL ENCOUNTER (OUTPATIENT)
Dept: PHYSICAL THERAPY | Age: 64
Setting detail: THERAPIES SERIES
Discharge: HOME | End: 2019-11-12
Payer: COMMERCIAL

## 2019-11-12 PROCEDURE — 97112 NEUROMUSCULAR REEDUCATION: CPT

## 2019-11-12 PROCEDURE — 97530 THERAPEUTIC ACTIVITIES: CPT

## 2019-11-12 PROCEDURE — 97110 THERAPEUTIC EXERCISES: CPT

## 2019-11-12 PROCEDURE — 97140 MANUAL THERAPY 1/> REGIONS: CPT

## 2019-11-14 ENCOUNTER — APPOINTMENT (OUTPATIENT)
Dept: PHYSICAL THERAPY | Age: 64
End: 2019-11-14
Payer: COMMERCIAL

## 2019-11-14 ENCOUNTER — HOSPITAL ENCOUNTER (OUTPATIENT)
Dept: PHYSICAL THERAPY | Age: 64
Setting detail: THERAPIES SERIES
Discharge: HOME | End: 2019-11-14
Payer: COMMERCIAL

## 2019-11-14 PROCEDURE — 97110 THERAPEUTIC EXERCISES: CPT

## 2019-11-14 PROCEDURE — 97140 MANUAL THERAPY 1/> REGIONS: CPT

## 2019-11-15 ENCOUNTER — APPOINTMENT (OUTPATIENT)
Dept: PHYSICAL THERAPY | Age: 64
End: 2019-11-15
Payer: COMMERCIAL

## 2019-11-18 ENCOUNTER — HOSPITAL ENCOUNTER (OUTPATIENT)
Dept: PHYSICAL THERAPY | Age: 64
Setting detail: THERAPIES SERIES
Discharge: HOME | End: 2019-11-18
Payer: COMMERCIAL

## 2019-11-18 PROCEDURE — 97140 MANUAL THERAPY 1/> REGIONS: CPT

## 2019-11-18 PROCEDURE — 97110 THERAPEUTIC EXERCISES: CPT

## 2019-11-19 ENCOUNTER — HOSPITAL ENCOUNTER (OUTPATIENT)
Dept: PHYSICAL THERAPY | Age: 64
Setting detail: THERAPIES SERIES
Discharge: HOME | End: 2019-11-19
Payer: COMMERCIAL

## 2019-11-19 PROCEDURE — 97113 AQUATIC THERAPY/EXERCISES: CPT

## 2019-11-19 PROCEDURE — 97150 GROUP THERAPEUTIC PROCEDURES: CPT

## 2019-11-20 ENCOUNTER — APPOINTMENT (OUTPATIENT)
Dept: PHYSICAL THERAPY | Age: 64
End: 2019-11-20
Payer: COMMERCIAL

## 2019-11-21 ENCOUNTER — HOSPITAL ENCOUNTER (OUTPATIENT)
Dept: PHYSICAL THERAPY | Age: 64
Setting detail: THERAPIES SERIES
Discharge: HOME | End: 2019-11-21
Payer: COMMERCIAL

## 2019-11-21 PROCEDURE — 97110 THERAPEUTIC EXERCISES: CPT

## 2019-11-22 ENCOUNTER — HOSPITAL ENCOUNTER (OUTPATIENT)
Dept: PHYSICAL THERAPY | Age: 64
Setting detail: THERAPIES SERIES
Discharge: HOME | End: 2019-11-22
Payer: COMMERCIAL

## 2019-11-22 PROCEDURE — 97140 MANUAL THERAPY 1/> REGIONS: CPT

## 2019-11-22 PROCEDURE — 97110 THERAPEUTIC EXERCISES: CPT

## 2019-11-25 ENCOUNTER — OFFICE VISIT (OUTPATIENT)
Dept: ORTHOPEDIC SURGERY | Age: 64
End: 2019-11-25
Payer: MEDICARE

## 2019-11-25 VITALS — RESPIRATION RATE: 17 BRPM | BODY MASS INDEX: 48.82 KG/M2 | HEIGHT: 65 IN | WEIGHT: 293 LBS

## 2019-11-25 DIAGNOSIS — M48.061 LUMBAR FORAMINAL STENOSIS: Primary | ICD-10-CM

## 2019-11-25 DIAGNOSIS — M48.061 DEGENERATIVE LUMBAR SPINAL STENOSIS: ICD-10-CM

## 2019-11-25 DIAGNOSIS — M54.50 CHRONIC MIDLINE LOW BACK PAIN WITHOUT SCIATICA: ICD-10-CM

## 2019-11-25 DIAGNOSIS — G89.29 CHRONIC MIDLINE LOW BACK PAIN WITHOUT SCIATICA: ICD-10-CM

## 2019-11-25 DIAGNOSIS — M47.817 LUMBOSACRAL SPONDYLOSIS WITHOUT MYELOPATHY: ICD-10-CM

## 2019-11-25 PROCEDURE — G8598 ASA/ANTIPLAT THER USED: HCPCS | Performed by: INTERNAL MEDICINE

## 2019-11-25 PROCEDURE — 1090F PRES/ABSN URINE INCON ASSESS: CPT | Performed by: INTERNAL MEDICINE

## 2019-11-25 PROCEDURE — G8417 CALC BMI ABV UP PARAM F/U: HCPCS | Performed by: INTERNAL MEDICINE

## 2019-11-25 PROCEDURE — 1123F ACP DISCUSS/DSCN MKR DOCD: CPT | Performed by: INTERNAL MEDICINE

## 2019-11-25 PROCEDURE — 1036F TOBACCO NON-USER: CPT | Performed by: INTERNAL MEDICINE

## 2019-11-25 PROCEDURE — 99213 OFFICE O/P EST LOW 20 MIN: CPT | Performed by: INTERNAL MEDICINE

## 2019-11-25 PROCEDURE — 3017F COLORECTAL CA SCREEN DOC REV: CPT | Performed by: INTERNAL MEDICINE

## 2019-11-25 PROCEDURE — G8399 PT W/DXA RESULTS DOCUMENT: HCPCS | Performed by: INTERNAL MEDICINE

## 2019-11-25 PROCEDURE — 4040F PNEUMOC VAC/ADMIN/RCVD: CPT | Performed by: INTERNAL MEDICINE

## 2019-11-25 PROCEDURE — G8427 DOCREV CUR MEDS BY ELIG CLIN: HCPCS | Performed by: INTERNAL MEDICINE

## 2019-11-25 PROCEDURE — G8484 FLU IMMUNIZE NO ADMIN: HCPCS | Performed by: INTERNAL MEDICINE

## 2019-11-25 RX ORDER — TIZANIDINE 4 MG/1
4 TABLET ORAL 3 TIMES DAILY PRN
Qty: 90 TABLET | Refills: 1 | Status: SHIPPED | OUTPATIENT
Start: 2019-11-25 | End: 2019-12-17 | Stop reason: SDUPTHER

## 2019-11-25 RX ORDER — CELECOXIB 200 MG/1
200 CAPSULE ORAL DAILY
Qty: 90 CAPSULE | Refills: 1 | Status: SHIPPED | OUTPATIENT
Start: 2019-11-25 | End: 2019-12-17 | Stop reason: SDUPTHER

## 2019-11-29 ENCOUNTER — OFFICE VISIT (OUTPATIENT)
Dept: ORTHOPEDIC SURGERY | Age: 64
End: 2019-11-29
Payer: MEDICARE

## 2019-11-29 VITALS
SYSTOLIC BLOOD PRESSURE: 130 MMHG | DIASTOLIC BLOOD PRESSURE: 87 MMHG | WEIGHT: 293 LBS | HEIGHT: 65 IN | HEART RATE: 76 BPM | BODY MASS INDEX: 48.82 KG/M2

## 2019-11-29 DIAGNOSIS — M47.817 LUMBOSACRAL SPONDYLOSIS WITHOUT MYELOPATHY: Primary | ICD-10-CM

## 2019-11-29 DIAGNOSIS — M48.061 LUMBAR FORAMINAL STENOSIS: ICD-10-CM

## 2019-11-29 DIAGNOSIS — M48.061 DEGENERATIVE LUMBAR SPINAL STENOSIS: ICD-10-CM

## 2019-11-29 PROCEDURE — 3017F COLORECTAL CA SCREEN DOC REV: CPT | Performed by: PHYSICAL MEDICINE & REHABILITATION

## 2019-11-29 PROCEDURE — G8598 ASA/ANTIPLAT THER USED: HCPCS | Performed by: PHYSICAL MEDICINE & REHABILITATION

## 2019-11-29 PROCEDURE — G8427 DOCREV CUR MEDS BY ELIG CLIN: HCPCS | Performed by: PHYSICAL MEDICINE & REHABILITATION

## 2019-11-29 PROCEDURE — 4040F PNEUMOC VAC/ADMIN/RCVD: CPT | Performed by: PHYSICAL MEDICINE & REHABILITATION

## 2019-11-29 PROCEDURE — 1036F TOBACCO NON-USER: CPT | Performed by: PHYSICAL MEDICINE & REHABILITATION

## 2019-11-29 PROCEDURE — 1123F ACP DISCUSS/DSCN MKR DOCD: CPT | Performed by: PHYSICAL MEDICINE & REHABILITATION

## 2019-11-29 PROCEDURE — G8417 CALC BMI ABV UP PARAM F/U: HCPCS | Performed by: PHYSICAL MEDICINE & REHABILITATION

## 2019-11-29 PROCEDURE — G8484 FLU IMMUNIZE NO ADMIN: HCPCS | Performed by: PHYSICAL MEDICINE & REHABILITATION

## 2019-11-29 PROCEDURE — G8399 PT W/DXA RESULTS DOCUMENT: HCPCS | Performed by: PHYSICAL MEDICINE & REHABILITATION

## 2019-11-29 PROCEDURE — 99204 OFFICE O/P NEW MOD 45 MIN: CPT | Performed by: PHYSICAL MEDICINE & REHABILITATION

## 2019-11-29 PROCEDURE — 1090F PRES/ABSN URINE INCON ASSESS: CPT | Performed by: PHYSICAL MEDICINE & REHABILITATION

## 2019-11-30 ENCOUNTER — ANESTHESIA EVENT (OUTPATIENT)
Dept: ENDOSCOPY | Age: 64
End: 2019-11-30
Payer: COMMERCIAL

## 2019-12-11 ENCOUNTER — HOSPITAL ENCOUNTER (OUTPATIENT)
Age: 64
Setting detail: OUTPATIENT SURGERY
Discharge: HOME | End: 2019-12-11
Attending: INTERNAL MEDICINE | Admitting: INTERNAL MEDICINE
Payer: COMMERCIAL

## 2019-12-11 ENCOUNTER — ANESTHESIA (OUTPATIENT)
Dept: ENDOSCOPY | Age: 64
End: 2019-12-11
Payer: COMMERCIAL

## 2019-12-11 VITALS
DIASTOLIC BLOOD PRESSURE: 83 MMHG | RESPIRATION RATE: 22 BRPM | OXYGEN SATURATION: 98 % | SYSTOLIC BLOOD PRESSURE: 151 MMHG

## 2019-12-11 VITALS
DIASTOLIC BLOOD PRESSURE: 80 MMHG | BODY MASS INDEX: 49.42 KG/M2 | HEIGHT: 65 IN | SYSTOLIC BLOOD PRESSURE: 147 MMHG | OXYGEN SATURATION: 98 % | TEMPERATURE: 99 F | RESPIRATION RATE: 18 BRPM | HEART RATE: 78 BPM

## 2019-12-11 PROCEDURE — 3700000000 HC ANESTHESIA ATTENDED CARE: Performed by: INTERNAL MEDICINE

## 2019-12-11 PROCEDURE — 2580000003 HC RX 258: Performed by: ANESTHESIOLOGY

## 2019-12-11 PROCEDURE — 6360000002 HC RX W HCPCS: Performed by: FAMILY MEDICINE

## 2019-12-11 PROCEDURE — 6360000002 HC RX W HCPCS: Performed by: NURSE ANESTHETIST, CERTIFIED REGISTERED

## 2019-12-11 PROCEDURE — 7100000011 HC PHASE II RECOVERY - ADDTL 15 MIN: Performed by: INTERNAL MEDICINE

## 2019-12-11 PROCEDURE — 2709999900 HC NON-CHARGEABLE SUPPLY: Performed by: INTERNAL MEDICINE

## 2019-12-11 PROCEDURE — 2500000003 HC RX 250 WO HCPCS: Performed by: NURSE ANESTHETIST, CERTIFIED REGISTERED

## 2019-12-11 PROCEDURE — 3609010600 HC COLONOSCOPY POLYPECTOMY SNARE/COLD BIOPSY: Performed by: INTERNAL MEDICINE

## 2019-12-11 PROCEDURE — 3700000001 HC ADD 15 MINUTES (ANESTHESIA): Performed by: INTERNAL MEDICINE

## 2019-12-11 PROCEDURE — 7100000010 HC PHASE II RECOVERY - FIRST 15 MIN: Performed by: INTERNAL MEDICINE

## 2019-12-11 RX ORDER — SODIUM CHLORIDE 0.9 % (FLUSH) 0.9 %
10 SYRINGE (ML) INJECTION PRN
Status: DISCONTINUED | OUTPATIENT
Start: 2019-12-11 | End: 2019-12-11 | Stop reason: HOSPADM

## 2019-12-11 RX ORDER — LIDOCAINE HYDROCHLORIDE 20 MG/ML
INJECTION, SOLUTION INFILTRATION; PERINEURAL PRN
Status: DISCONTINUED | OUTPATIENT
Start: 2019-12-11 | End: 2019-12-11 | Stop reason: SDUPTHER

## 2019-12-11 RX ORDER — PROPOFOL 10 MG/ML
INJECTION, EMULSION INTRAVENOUS PRN
Status: DISCONTINUED | OUTPATIENT
Start: 2019-12-11 | End: 2019-12-11 | Stop reason: SDUPTHER

## 2019-12-11 RX ORDER — SODIUM CHLORIDE 0.9 % (FLUSH) 0.9 %
10 SYRINGE (ML) INJECTION EVERY 12 HOURS SCHEDULED
Status: DISCONTINUED | OUTPATIENT
Start: 2019-12-11 | End: 2019-12-11 | Stop reason: HOSPADM

## 2019-12-11 RX ORDER — SODIUM CHLORIDE 9 MG/ML
INJECTION, SOLUTION INTRAVENOUS CONTINUOUS
Status: DISCONTINUED | OUTPATIENT
Start: 2019-12-11 | End: 2019-12-11 | Stop reason: HOSPADM

## 2019-12-11 RX ORDER — LOSARTAN POTASSIUM 50 MG/1
50 TABLET ORAL DAILY
COMMUNITY
End: 2021-10-04

## 2019-12-11 RX ORDER — ONDANSETRON 2 MG/ML
4 INJECTION INTRAMUSCULAR; INTRAVENOUS EVERY 6 HOURS PRN
Status: DISCONTINUED | OUTPATIENT
Start: 2019-12-11 | End: 2019-12-11 | Stop reason: HOSPADM

## 2019-12-11 RX ADMIN — PROPOFOL 80 MG: 10 INJECTION, EMULSION INTRAVENOUS at 09:03

## 2019-12-11 RX ADMIN — SODIUM CHLORIDE: 9 INJECTION, SOLUTION INTRAVENOUS at 08:43

## 2019-12-11 RX ADMIN — ONDANSETRON 4 MG: 2 INJECTION INTRAMUSCULAR; INTRAVENOUS at 08:47

## 2019-12-11 RX ADMIN — PROPOFOL 50 MG: 10 INJECTION, EMULSION INTRAVENOUS at 09:12

## 2019-12-11 RX ADMIN — PROPOFOL 70 MG: 10 INJECTION, EMULSION INTRAVENOUS at 08:58

## 2019-12-11 RX ADMIN — PROPOFOL 50 MG: 10 INJECTION, EMULSION INTRAVENOUS at 09:10

## 2019-12-11 RX ADMIN — LIDOCAINE HYDROCHLORIDE 60 MG: 20 INJECTION, SOLUTION INFILTRATION; PERINEURAL at 08:58

## 2019-12-11 RX ADMIN — PROPOFOL 20 MG: 10 INJECTION, EMULSION INTRAVENOUS at 09:05

## 2019-12-11 RX ADMIN — PROPOFOL 30 MG: 10 INJECTION, EMULSION INTRAVENOUS at 09:00

## 2019-12-11 ASSESSMENT — PAIN SCALES - GENERAL
PAINLEVEL_OUTOF10: 0

## 2019-12-11 ASSESSMENT — PAIN - FUNCTIONAL ASSESSMENT: PAIN_FUNCTIONAL_ASSESSMENT: 0-10

## 2019-12-11 ASSESSMENT — ENCOUNTER SYMPTOMS: SHORTNESS OF BREATH: 1

## 2019-12-12 ENCOUNTER — HOSPITAL ENCOUNTER (OUTPATIENT)
Dept: WOMENS IMAGING | Age: 64
Discharge: HOME | End: 2019-12-12
Payer: MEDICARE

## 2019-12-12 DIAGNOSIS — Z12.31 ENCOUNTER FOR SCREENING MAMMOGRAM FOR BREAST CANCER: ICD-10-CM

## 2019-12-12 PROCEDURE — 77063 BREAST TOMOSYNTHESIS BI: CPT

## 2019-12-17 ENCOUNTER — TELEPHONE (OUTPATIENT)
Dept: ORTHOPEDIC SURGERY | Age: 64
End: 2019-12-17

## 2019-12-17 RX ORDER — TIZANIDINE 4 MG/1
4 TABLET ORAL 3 TIMES DAILY PRN
Qty: 90 TABLET | Refills: 1 | Status: SHIPPED | OUTPATIENT
Start: 2019-12-17 | End: 2022-01-05 | Stop reason: SDUPTHER

## 2019-12-17 RX ORDER — CELECOXIB 200 MG/1
200 CAPSULE ORAL DAILY
Qty: 90 CAPSULE | Refills: 1 | Status: SHIPPED | OUTPATIENT
Start: 2019-12-17 | End: 2021-12-29 | Stop reason: SDUPTHER

## 2019-12-18 ENCOUNTER — HOSPITAL ENCOUNTER (OUTPATIENT)
Age: 64
Setting detail: OUTPATIENT SURGERY
Discharge: HOME | End: 2019-12-18
Attending: PHYSICAL MEDICINE & REHABILITATION | Admitting: PHYSICAL MEDICINE & REHABILITATION
Payer: MEDICARE

## 2019-12-18 ENCOUNTER — APPOINTMENT (OUTPATIENT)
Dept: GENERAL RADIOLOGY | Age: 64
End: 2019-12-18
Attending: PHYSICAL MEDICINE & REHABILITATION
Payer: MEDICARE

## 2019-12-18 VITALS
WEIGHT: 290 LBS | OXYGEN SATURATION: 97 % | BODY MASS INDEX: 48.32 KG/M2 | TEMPERATURE: 96.5 F | DIASTOLIC BLOOD PRESSURE: 89 MMHG | SYSTOLIC BLOOD PRESSURE: 162 MMHG | HEIGHT: 65 IN | RESPIRATION RATE: 16 BRPM | HEART RATE: 82 BPM

## 2019-12-18 PROCEDURE — 2709999900 HC NON-CHARGEABLE SUPPLY: Performed by: PHYSICAL MEDICINE & REHABILITATION

## 2019-12-18 PROCEDURE — 2500000003 HC RX 250 WO HCPCS: Performed by: PHYSICAL MEDICINE & REHABILITATION

## 2019-12-18 PROCEDURE — 3209999900 FLUORO FOR SURGICAL PROCEDURES

## 2019-12-18 PROCEDURE — 3610000058 HC PAIN LEVEL 5 BASE (NON-OR): Performed by: PHYSICAL MEDICINE & REHABILITATION

## 2019-12-18 RX ORDER — OMEPRAZOLE 10 MG/1
20 CAPSULE, DELAYED RELEASE ORAL DAILY
COMMUNITY
End: 2021-07-27

## 2019-12-18 RX ORDER — BUPIVACAINE HYDROCHLORIDE 5 MG/ML
INJECTION, SOLUTION EPIDURAL; INTRACAUDAL
Status: COMPLETED | OUTPATIENT
Start: 2019-12-18 | End: 2019-12-18

## 2019-12-18 RX ORDER — LIDOCAINE HYDROCHLORIDE 10 MG/ML
INJECTION, SOLUTION EPIDURAL; INFILTRATION; INTRACAUDAL; PERINEURAL
Status: COMPLETED | OUTPATIENT
Start: 2019-12-18 | End: 2019-12-18

## 2019-12-18 ASSESSMENT — PAIN SCALES - GENERAL: PAINLEVEL_OUTOF10: 2

## 2019-12-18 ASSESSMENT — PAIN - FUNCTIONAL ASSESSMENT: PAIN_FUNCTIONAL_ASSESSMENT: 0-10

## 2019-12-18 ASSESSMENT — PAIN DESCRIPTION - DESCRIPTORS: DESCRIPTORS: CONSTANT;ACHING;STABBING

## 2019-12-27 ENCOUNTER — OFFICE VISIT (OUTPATIENT)
Dept: ORTHOPEDIC SURGERY | Age: 64
End: 2019-12-27
Payer: COMMERCIAL

## 2019-12-27 VITALS
HEART RATE: 76 BPM | WEIGHT: 289.9 LBS | DIASTOLIC BLOOD PRESSURE: 85 MMHG | BODY MASS INDEX: 48.3 KG/M2 | SYSTOLIC BLOOD PRESSURE: 135 MMHG | HEIGHT: 65 IN

## 2019-12-27 DIAGNOSIS — M54.16 LUMBAR RADICULOPATHY: ICD-10-CM

## 2019-12-27 DIAGNOSIS — M48.061 LUMBAR FORAMINAL STENOSIS: ICD-10-CM

## 2019-12-27 DIAGNOSIS — M48.061 DEGENERATIVE LUMBAR SPINAL STENOSIS: Primary | ICD-10-CM

## 2019-12-27 PROCEDURE — G8598 ASA/ANTIPLAT THER USED: HCPCS | Performed by: PHYSICAL MEDICINE & REHABILITATION

## 2019-12-27 PROCEDURE — 3017F COLORECTAL CA SCREEN DOC REV: CPT | Performed by: PHYSICAL MEDICINE & REHABILITATION

## 2019-12-27 PROCEDURE — G8427 DOCREV CUR MEDS BY ELIG CLIN: HCPCS | Performed by: PHYSICAL MEDICINE & REHABILITATION

## 2019-12-27 PROCEDURE — 99214 OFFICE O/P EST MOD 30 MIN: CPT | Performed by: PHYSICAL MEDICINE & REHABILITATION

## 2019-12-27 PROCEDURE — G8417 CALC BMI ABV UP PARAM F/U: HCPCS | Performed by: PHYSICAL MEDICINE & REHABILITATION

## 2019-12-27 PROCEDURE — 1123F ACP DISCUSS/DSCN MKR DOCD: CPT | Performed by: PHYSICAL MEDICINE & REHABILITATION

## 2019-12-27 PROCEDURE — G8484 FLU IMMUNIZE NO ADMIN: HCPCS | Performed by: PHYSICAL MEDICINE & REHABILITATION

## 2019-12-27 PROCEDURE — 1036F TOBACCO NON-USER: CPT | Performed by: PHYSICAL MEDICINE & REHABILITATION

## 2019-12-27 PROCEDURE — 4040F PNEUMOC VAC/ADMIN/RCVD: CPT | Performed by: PHYSICAL MEDICINE & REHABILITATION

## 2019-12-27 PROCEDURE — 1090F PRES/ABSN URINE INCON ASSESS: CPT | Performed by: PHYSICAL MEDICINE & REHABILITATION

## 2019-12-27 PROCEDURE — G8399 PT W/DXA RESULTS DOCUMENT: HCPCS | Performed by: PHYSICAL MEDICINE & REHABILITATION

## 2019-12-31 ENCOUNTER — TELEPHONE (OUTPATIENT)
Dept: PULMONOLOGY | Age: 64
End: 2019-12-31

## 2020-01-02 ENCOUNTER — TELEPHONE (OUTPATIENT)
Dept: ORTHOPEDIC SURGERY | Age: 65
End: 2020-01-02

## 2020-01-02 NOTE — TELEPHONE ENCOUNTER
Patient needs to cancel injection due to illness, she has upper respiratory infection and is on antibiotics and steroids. Patient would like a call back.

## 2020-01-06 ENCOUNTER — TELEPHONE (OUTPATIENT)
Dept: ORTHOPEDIC SURGERY | Age: 65
End: 2020-01-06

## 2020-02-21 ENCOUNTER — TELEPHONE (OUTPATIENT)
Dept: PULMONOLOGY | Age: 65
End: 2020-02-21

## 2020-02-21 NOTE — TELEPHONE ENCOUNTER
Has 6 month appt 3-3-20. Script written 6-26-19 fpr Doxy 100 mg bid x 7 days with 1 refill along with Pred Taper. Ok to refill?

## 2020-02-21 NOTE — TELEPHONE ENCOUNTER
Patient called stated that she has had a cough that is progressing over the past few days. She stated it is a dry cough. She stated before she has always had antibiotic and steroids on hand, however she has used those up and is requesting more, because it is coming up on weekend and she knows she gets worse on the weekends.     [phone]

## 2020-03-03 ENCOUNTER — OFFICE VISIT (OUTPATIENT)
Dept: PULMONOLOGY | Age: 65
End: 2020-03-03
Payer: MEDICARE

## 2020-03-03 VITALS — DIASTOLIC BLOOD PRESSURE: 87 MMHG | OXYGEN SATURATION: 96 % | HEART RATE: 78 BPM | SYSTOLIC BLOOD PRESSURE: 165 MMHG

## 2020-03-03 PROCEDURE — G8926 SPIRO NO PERF OR DOC: HCPCS | Performed by: INTERNAL MEDICINE

## 2020-03-03 PROCEDURE — 3017F COLORECTAL CA SCREEN DOC REV: CPT | Performed by: INTERNAL MEDICINE

## 2020-03-03 PROCEDURE — 4040F PNEUMOC VAC/ADMIN/RCVD: CPT | Performed by: INTERNAL MEDICINE

## 2020-03-03 PROCEDURE — 3023F SPIROM DOC REV: CPT | Performed by: INTERNAL MEDICINE

## 2020-03-03 PROCEDURE — G8427 DOCREV CUR MEDS BY ELIG CLIN: HCPCS | Performed by: INTERNAL MEDICINE

## 2020-03-03 PROCEDURE — G8399 PT W/DXA RESULTS DOCUMENT: HCPCS | Performed by: INTERNAL MEDICINE

## 2020-03-03 PROCEDURE — 1036F TOBACCO NON-USER: CPT | Performed by: INTERNAL MEDICINE

## 2020-03-03 PROCEDURE — G8484 FLU IMMUNIZE NO ADMIN: HCPCS | Performed by: INTERNAL MEDICINE

## 2020-03-03 PROCEDURE — G8417 CALC BMI ABV UP PARAM F/U: HCPCS | Performed by: INTERNAL MEDICINE

## 2020-03-03 PROCEDURE — 99214 OFFICE O/P EST MOD 30 MIN: CPT | Performed by: INTERNAL MEDICINE

## 2020-03-03 PROCEDURE — 1090F PRES/ABSN URINE INCON ASSESS: CPT | Performed by: INTERNAL MEDICINE

## 2020-03-03 PROCEDURE — 1123F ACP DISCUSS/DSCN MKR DOCD: CPT | Performed by: INTERNAL MEDICINE

## 2020-03-03 RX ORDER — DOXYCYCLINE HYCLATE 100 MG/1
100 CAPSULE ORAL DAILY
Qty: 10 CAPSULE | Refills: 3 | Status: SHIPPED | OUTPATIENT
Start: 2020-03-03 | End: 2021-03-10 | Stop reason: SDUPTHER

## 2020-03-03 RX ORDER — PREDNISONE 10 MG/1
TABLET ORAL
Qty: 30 TABLET | Refills: 3 | Status: SHIPPED | OUTPATIENT
Start: 2020-03-03 | End: 2021-03-10 | Stop reason: SDUPTHER

## 2020-03-03 RX ORDER — BUDESONIDE AND FORMOTEROL FUMARATE DIHYDRATE 160; 4.5 UG/1; UG/1
2 AEROSOL RESPIRATORY (INHALATION) 2 TIMES DAILY
Qty: 3 INHALER | Refills: 5 | Status: SHIPPED | OUTPATIENT
Start: 2020-03-03 | End: 2021-03-10 | Stop reason: SDUPTHER

## 2020-03-03 ASSESSMENT — ENCOUNTER SYMPTOMS
ABDOMINAL PAIN: 0
SINUS PRESSURE: 0
DIARRHEA: 0
CONSTIPATION: 0
NAUSEA: 0

## 2020-03-03 NOTE — PROGRESS NOTES
prednisone. She had one around [facility] as well. Review of Systems  Review of Systems   Constitutional: Negative for fatigue and fever. HENT: Negative for congestion and sinus pressure. Eyes: Negative for visual disturbance. Cardiovascular: Negative for chest pain and palpitations. Gastrointestinal: Negative for abdominal pain, constipation, diarrhea and nausea. Genitourinary: Negative for difficulty urinating. Musculoskeletal: Negative for arthralgias. Skin: Negative for rash. Neurological: Negative for dizziness and light-headedness. Hematological: Does not bruise/bleed easily. Psychiatric/Behavioral: Negative for behavioral problems. History  I have reviewed past medical, surgical, social and family history. This is documented elsewhere in themedical record. Physical Exam:  Physical Exam  Constitutional:       General: She is not in acute distress. Appearance: She is well-developed. HENT:      Head: Normocephalic and atraumatic. Eyes:      Comments: Nasal mucosa, teeth and gums and oropharynx are clear. Neck:      Musculoskeletal: Neck supple. Thyroid: No thyromegaly (There are no other neck masses noted. ). Vascular: No JVD. Trachea: No tracheal deviation. Cardiovascular:      Rate and Rhythm: Normal rate and regular rhythm. Heart sounds: Normal heart sounds. No murmur. Pulmonary:      Effort: Pulmonary effort is normal. No respiratory distress. Breath sounds: Normal breath sounds. No wheezing or rales. Chest:      Chest wall: No tenderness. Abdominal:      General: Bowel sounds are normal. There is no distension. Palpations: Abdomen is soft. Mass: There is no hepatosplenomegaly. Tenderness: There is no abdominal tenderness. Musculoskeletal:         General: No tenderness (Muscle strength is normal and there is no obvious atrophy). Lymphadenopathy:      Cervical: No cervical adenopathy.       Lower Body: No right inguinal adenopathy. No left inguinal adenopathy. Skin:     General: Skin is warm and dry. Findings: No rash. Psychiatric:      Comments: Oriented to person place and time         Allergies   Allergen Reactions    Arthrotec [Diclofenac-Misoprostol]      HANDS ITCH     Prior to Visit Medications    Medication Sig Taking? Authorizing Provider   omeprazole (PRILOSEC) 10 MG delayed release capsule Take 10 mg by mouth daily  Historical Provider, MD   celecoxib (CELEBREX) 200 MG capsule Take 1 capsule by mouth daily  Levar Luna MD   tiZANidine (ZANAFLEX) 4 MG tablet Take 1 tablet by mouth 3 times daily as needed (Muscle tightness/spasm)  Levar Luna MD   losartan (COZAAR) 50 MG tablet Take 50 mg by mouth daily  Historical Provider, MD   budesonide-formoterol (SYMBICORT) 160-4.5 MCG/ACT AERO Inhale 2 puffs into the lungs 2 times daily  Summer Bravo MD   Nebulizers (COMPRESSOR/NEBULIZER) MISC 1 Device by Does not apply route 2 times daily  WINSTON Hunt - CNP   aspirin 81 MG tablet Take 81 mg by mouth daily  Historical Provider, MD   ibuprofen (ADVIL;MOTRIN) 200 MG tablet Take 200 mg by mouth every 6 hours as needed for Pain  Historical Provider, MD   hydrochlorothiazide (HYDRODIURIL) 25 MG tablet Take 25 mg by mouth daily  Historical Provider, MD   atorvastatin (LIPITOR) 20 MG tablet Take 1 tablet by mouth daily  Rj Grady MD   albuterol sulfate  (90 Base) MCG/ACT inhaler Inhale 2 puffs into the lungs every 6 hours as needed for Wheezing  Summer Bravo MD   cetirizine (ZYRTEC) 10 MG tablet Take 10 mg by mouth daily. Historical Provider, MD       Vitals:    03/03/20 1348   BP: (!) 174/94   Pulse: 78   SpO2: 96%     There is no height or weight on file to calculate BMI.      Wt Readings from Last 3 Encounters:   12/27/19 289 lb 14.5 oz (131.5 kg)   12/18/19 290 lb (131.5 kg)   11/29/19 296 lb 15.4 oz (134.7 kg)     BP Readings from Last 3 Encounters: 20 (!) 174/94   19 135/85   19 (!) 162/89        Social History     Tobacco Use   Smoking Status Former Smoker    Packs/day: 1.00    Years: 40.00    Pack years: 40.00    Types: Cigarettes    Last attempt to quit: 10/1/2018    Years since quittin.4   Smokeless Tobacco Never Used

## 2020-03-31 ENCOUNTER — TELEPHONE (OUTPATIENT)
Dept: PULMONOLOGY | Age: 65
End: 2020-03-31

## 2020-03-31 RX ORDER — ALBUTEROL SULFATE 2.5 MG/3ML
2.5 SOLUTION RESPIRATORY (INHALATION) EVERY 6 HOURS PRN
COMMUNITY

## 2020-03-31 NOTE — TELEPHONE ENCOUNTER
Pt is in need of a new nebulizer.  Could you please put in an addendum in her last office note 3-3-20 that she uses and benefits from a nebulizer for her COPD

## 2020-05-14 ENCOUNTER — OFFICE VISIT (OUTPATIENT)
Dept: ORTHOPEDIC SURGERY | Age: 65
End: 2020-05-14
Payer: MEDICARE

## 2020-05-14 VITALS — BODY MASS INDEX: 48.15 KG/M2 | HEIGHT: 65 IN | WEIGHT: 289 LBS | TEMPERATURE: 97.9 F

## 2020-05-14 PROCEDURE — G8417 CALC BMI ABV UP PARAM F/U: HCPCS | Performed by: ORTHOPAEDIC SURGERY

## 2020-05-14 PROCEDURE — 3017F COLORECTAL CA SCREEN DOC REV: CPT | Performed by: ORTHOPAEDIC SURGERY

## 2020-05-14 PROCEDURE — 1036F TOBACCO NON-USER: CPT | Performed by: ORTHOPAEDIC SURGERY

## 2020-05-14 PROCEDURE — 99204 OFFICE O/P NEW MOD 45 MIN: CPT | Performed by: ORTHOPAEDIC SURGERY

## 2020-05-14 PROCEDURE — 1090F PRES/ABSN URINE INCON ASSESS: CPT | Performed by: ORTHOPAEDIC SURGERY

## 2020-05-14 PROCEDURE — 1123F ACP DISCUSS/DSCN MKR DOCD: CPT | Performed by: ORTHOPAEDIC SURGERY

## 2020-05-14 PROCEDURE — 4040F PNEUMOC VAC/ADMIN/RCVD: CPT | Performed by: ORTHOPAEDIC SURGERY

## 2020-05-14 PROCEDURE — G8399 PT W/DXA RESULTS DOCUMENT: HCPCS | Performed by: ORTHOPAEDIC SURGERY

## 2020-05-14 PROCEDURE — G8427 DOCREV CUR MEDS BY ELIG CLIN: HCPCS | Performed by: ORTHOPAEDIC SURGERY

## 2020-05-14 NOTE — PROGRESS NOTES
Present Illness:  Brooks Hernandez is a 71 y.o. female seen for the first time for right shoulder pain in 2019 she fell at work injuring her shoulder she had a previous rotator cuff repair in 1996 pain is significant she has been doing physical therapy since her injury with no improvement in her function    Chief complaint presents in the office today: Right shoulder pain    Location right shoulder  Severity severe/moderate  Duration over 10 months  Associated sign/symptoms pain, loss of motion, loss of strength, weakness, painful range of motion    I have reviewed and discussed the below Pain assessment findings with the patient. Pain Assessment  Location of Pain: Shoulder  Location Modifiers: Right  Severity of Pain: 7  Quality of Pain: Dull, Aching  Duration of Pain: Persistent  Frequency of Pain: Constant  Aggravating Factors: Bending, Stretching, Straightening  Limiting Behavior: Yes  Relieving Factors: Rest  Result of Injury: No  Work-Related Injury: No  Are there other pain locations you wish to document?: No    Medical History  Patient's medications, allergies, past medical, surgical, social and family histories were reviewed and updated as appropriate.     Past Medical History:   Diagnosis Date    CAD (coronary artery disease)     COPD [facility]ch[facility] obstructive pulmonary disease) ([facility] [address]     HTN (hypertension)     Hyperlipidemia     Osteoarthritis, foot, localized 8/25/2014     Family History   Problem Relation Age of Onset    Hypertension Mother     Cancer Father     Colon Cancer Father     Heart Failure Paternal Grandmother      Social History     Socioeconomic History    Marital status:      Spouse name: None    Number of children: None    Years of education: None    Highest education level: None   Occupational History    Occupation: RN   Social Needs    Financial resource strain: None    Food insecurity     Worry: None     Inability: None    Transportation needs     Medical: None obvious deformity, no erythema, no abrasions or lacerations, no obvious muscle atrophy. Palpation:  Lateral deltoid moderate pain to palpation  AC joint mild pain to palpation  Mild pain Anterior to palpation  Mild pain Posterior to palpation  Moderate trapezial pain to palpation  Range of Motion:  Abduction --150 degrees  Flexion-- 180 degrees  Extension-- between 45-60 degrees  Latera/external rotation --close to 90 degrees  Medial/ internal rotation -- between 70-90 degrees    Strength: Right shoulder strength:   internal rotation against resistance is 5/5  external rotation against resistance is 3/5  and supraspinatus isolation against resistance is 4/5, Shoulder shrug is 5 over 5 , cervical spine strength is excellent, flexion extension at the elbow is 5 over 5, wrist and hand strength is equal bilaterally with supination pronation and flexion and extension  no winging no muscle atrophy. Special Tests:  Palpation demonstrates no swelling no effusion moderate pain. There is near full active and passive range of motion. Strength is weak and painful with internal rotation against resistance external rotation against resistance supraspinatus isolation against resistance. Shoulder shrug strength is 5 over 5 equal bilaterally. Radial ulnar and median nerve function is intact. Capillary refill is brisk. Sensation is intact from neck down to the fingers. Elbow motion finger and wrist motion is full equal bilaterally. Deep tendon reflexes of the Brachial radialis, biceps, triceps are all +2/4 equal bilaterally. Cervical spine range of motion is full without pain negative Spurling's test.  Load-and-shift test is negative. Crank test is negative. Apprehension and relocation are negative. Anterior and posterior glide are equal bilaterally. Negative sulcus sign. No signs of any significant multidirectional instability. There is no scapular winging.   There is no muscle atrophy of the latissimus dorsi, the deltoid, the periscapular musculature, the trapezius musculature or the pectoralis musculature. Positive Neer's test, positive Hdz test, positive pain with crossarm elevation. Gait: normal gait     Reflex: Deep tendon reflexes of the biceps, triceps, brachioradialis +2/4 equal bilaterally. Lower extremity reflexes:  +2/4 and equal bilaterally for patella and Achilles. Contralateral Shoulder exam: Palpation demonstrates no swelling no effusion no pain. There is full active and passive range of motion bilaterally. Strength is excellent with internal rotation against resistance external rotation against resistance supraspinatus isolation against resistance. Shoulder shrug strength is 5 over 5 equal bilaterally. Radial ulnar and median nerve function is intact. Capillary refill is brisk. Elbow motion finger and wrist motion is full equal bilaterally. Deep tendon reflexes of the Brachial radialis, biceps, triceps are all +2/4 equal bilaterally. Cervical spine range of motion is full without pain negative Spurling's test.  Load-and-shift test is negative. Crank test is negative. Apprehension and relocation are negative. Anterior and posterior glide are equal bilaterally. Negative sulcus sign. No signs of any significant multidirectional instability. There is no scapular winging. There is no muscle atrophy of the latissimus dorsi, the deltoid, the periscapular musculature, the trapezius musculature or the pectoralis musculature. Negative Neer's test, negative Hdz test, no pain with crossarm elevation. Abduction --150 degrees  Flexion-- 180 degrees  Extension-- between 45-60 degrees  Latera/external rotation --close to 90 degrees  Medial/ internal rotation -- between 70-90 degree    CERVICAL SPINE  EXAMINATION:  · Inspection: Local inspection shows no step-off or bruising. Cervical alignment is normal. No instability is noted.   · Palpation and Percussion: No evidence of Ordered today to rule out recurrent rotator cuff tear  Labs:None at this time. Xr Shoulder Right (min 2 Views)    Result Date: 5/14/2020  Radiology exam is complete. No Radiologist dictation. Please follow up with ordering provider. Past Surgical History:   Procedure Laterality Date    ANKLE FRACTURE SURGERY      CHOLECYSTECTOMY      COLONOSCOPY N/A 12/11/2019    COLONOSCOPY POLYPECTOMY SNARE/COLD BIOPSY performed by Lisa Rangel MD at [place] Bilateral 12/18/2019    BILATERAL L3, L4 AND L5 DORSAL RAMUS MEDIAL BRANCH BLOCK WITH FLUOROSCOPY (27878, 40020) performed by Sanjana Bowers MD at [address] [place] [address] CUFF    TOTAL KNEE ARTHROPLASTY Bilateral    .    Office Procedures:  Orders Placed This Encounter   Procedures    XR SHOULDER RIGHT (MIN 2 VIEWS)     Standing Status:   Future     Number of Occurrences:   1     Standing Expiration Date:   5/14/2021       Previous Treatments: Physical therapy, ice, rest, she did have a shoulder arthroscopy in 1996 with a rotator cuff repair, X-ray, anti-inflammatories,    Differential Diagnoses: Impingement, AC joint osteoarthritis, Rotator cuff tear, Labral tear, Instability, loose body, long head of bicep injury, glenohumeral osteoarthritis, AC joint separation, SLAP tear, Posterior labral tear, Anterior Labral tear, neck pathology, brachioplexis injury, muscle injury, neck radiculopathy, bone tumor, fracture or stress fracture. Diagnosis right shoulder full-thickness rotator cuff tear      Plan (Medical Decision Making):    I discussed the diagnosis and the treatment options with Valdemar Saravia today. In Summary:  The various treatment options were outlined and discussed with Valdemar Saravia including:  Conservative care options: physical therapy, ice, medications, bracing, and activity modification.  The indications for therapeutic

## 2020-05-21 ENCOUNTER — OFFICE VISIT (OUTPATIENT)
Dept: ORTHOPEDIC SURGERY | Age: 65
End: 2020-05-21
Payer: MEDICARE

## 2020-05-21 VITALS — BODY MASS INDEX: 48.15 KG/M2 | HEIGHT: 65 IN | TEMPERATURE: 97.5 F | WEIGHT: 289 LBS

## 2020-05-21 PROBLEM — M25.511 CHRONIC RIGHT SHOULDER PAIN: Status: ACTIVE | Noted: 2020-05-21

## 2020-05-21 PROBLEM — M25.511 ACUTE PAIN OF RIGHT SHOULDER: Status: ACTIVE | Noted: 2020-05-21

## 2020-05-21 PROBLEM — S46.011A TRAUMATIC INCOMPLETE TEAR OF RIGHT ROTATOR CUFF: Status: ACTIVE | Noted: 2020-05-21

## 2020-05-21 PROBLEM — G89.29 CHRONIC RIGHT SHOULDER PAIN: Status: ACTIVE | Noted: 2020-05-21

## 2020-05-21 PROCEDURE — G8417 CALC BMI ABV UP PARAM F/U: HCPCS | Performed by: ORTHOPAEDIC SURGERY

## 2020-05-21 PROCEDURE — 3017F COLORECTAL CA SCREEN DOC REV: CPT | Performed by: ORTHOPAEDIC SURGERY

## 2020-05-21 PROCEDURE — 99214 OFFICE O/P EST MOD 30 MIN: CPT | Performed by: ORTHOPAEDIC SURGERY

## 2020-05-21 PROCEDURE — 1036F TOBACCO NON-USER: CPT | Performed by: ORTHOPAEDIC SURGERY

## 2020-05-21 PROCEDURE — 4040F PNEUMOC VAC/ADMIN/RCVD: CPT | Performed by: ORTHOPAEDIC SURGERY

## 2020-05-21 PROCEDURE — G8399 PT W/DXA RESULTS DOCUMENT: HCPCS | Performed by: ORTHOPAEDIC SURGERY

## 2020-05-21 PROCEDURE — G8427 DOCREV CUR MEDS BY ELIG CLIN: HCPCS | Performed by: ORTHOPAEDIC SURGERY

## 2020-05-21 PROCEDURE — 1090F PRES/ABSN URINE INCON ASSESS: CPT | Performed by: ORTHOPAEDIC SURGERY

## 2020-05-21 PROCEDURE — 1123F ACP DISCUSS/DSCN MKR DOCD: CPT | Performed by: ORTHOPAEDIC SURGERY

## 2020-05-21 NOTE — PROGRESS NOTES
4/5  external rotation against resistance is 3/5  and supraspinatus isolation against resistance is 3/5, Shoulder shrug is 5 over 5 , cervical spine strength is excellent, flexion extension at the elbow is 5 over 5, wrist and hand strength is equal bilaterally with supination pronation and flexion and extension  no winging no muscle atrophy. Special Tests:  Palpation demonstrates no swelling no effusion moderate to severe pain. There is limited active and passive range of motion. Strength is weak and painful with internal rotation against resistance external rotation against resistance supraspinatus isolation against resistance. Shoulder shrug strength is 5 over 5 equal bilaterally. Radial ulnar and median nerve function is intact. Capillary refill is brisk. Sensation is intact from neck down to the fingers. Elbow motion finger and wrist motion is full equal bilaterally. Deep tendon reflexes of the Brachial radialis, biceps, triceps are all +2/4 equal bilaterally. Cervical spine range of motion is full without pain negative Spurling's test.  Load-and-shift test is negative. Crank test is negative. Apprehension and relocation are negative. Anterior and posterior glide are equal bilaterally. Negative sulcus sign. No signs of any significant multidirectional instability. There is no scapular winging. There is no muscle atrophy of the latissimus dorsi, the deltoid, the periscapular musculature, the trapezius musculature or the pectoralis musculature. Positive Neer's test, positive Hdz test, positive pain with crossarm elevation. Gait: normal gait     Reflex: Deep tendon reflexes of the biceps, triceps, brachioradialis +2/4 equal bilaterally. Lower extremity reflexes:  +2/4 and equal bilaterally for patella and Achilles. Contralateral Shoulder exam: Palpation demonstrates no swelling no effusion no pain. There is full active and passive range of motion bilaterally.   Strength is significant glenohumeral arthritis, no significant A.C. Joint arthritis, good joint space maintenance,    Impression no significant bony abnormality  MRI: MRI demonstrates more than 50% tear of the supraspinatus with some fatty infiltrate old subacromial decompression she has some moderate changes inside the labrum and the subacromial space  Labs:None at this time. Xr Shoulder Right (min 2 Views)    Result Date: 2020  Radiology exam is complete. No Radiologist dictation. Please follow up with ordering provider. Mri Upper Extremity W Jt Wo Contrast    Result Date: 2020  [facility]: [facility] #: [id number] #: [id number] Procedure: MR Right Shoulder joint w/o Contrast ; Reason for Exam: Pain In Right Shoulder This document is confidential medical information. Unauthorized disclosure or use of this information is prohibited by law. If you are not the intended recipient of this document, please advise us by calling immediately [phone]. [facility], [facility] PrPending [facility] to [facility]  Patient Name: Elian Sethi Case ID: [id number] Patient : 1955 Referring Physician: Robert Trinh MD Exam Date: 2020 Exam Description: MR Right Shoulder joint w/o Contrast HISTORY:  Pain, fall. Previous surgery. TECHNICAL FACTORS:  Long- and short-axis fat- and water-weighted images were performed. COMPARISON:  None. FINDINGS:  AC arthropathy is mild. Subacromial decompression. Moderate to severe regions of interstitial tendinosis infraspinatus and supraspinatus tendons. Diffuse interstitial microtearing. Ill defined partial thickness tear articular surface fibers supraspinatus critical zone approximately 9 mm. Moderate peritendinobursitis. Pseudocysts and marrow reaction greater tuberosity. Biceps arcuate segment is tendinopathic. Glenohumeral arthropathy is mild. No soft tissue mass. Fatty atrophy teres minor muscle. CONCLUSION: 1.  Moderate to severely

## 2020-06-02 ENCOUNTER — OFFICE VISIT (OUTPATIENT)
Dept: PRIMARY CARE CLINIC | Age: 65
End: 2020-06-02

## 2020-06-02 RX ORDER — AMLODIPINE BESYLATE 5 MG/1
5 TABLET ORAL NIGHTLY
COMMUNITY
End: 2021-10-22 | Stop reason: SDUPTHER

## 2020-06-02 NOTE — PROGRESS NOTES
Name_______________________________________Printed:____________________  Date and time of [id number]_______________________Rdaooir Time:__1000 MASC______________   1. The instructions given regarding when and if a patient needs to stop oral intake prior to surgery varies. Follow the specific instructions you were given                  _X__Nothing to eat or to drink after Midnight the night before.                             ____Endoscopy patient follow your DRS instructions-generally you will be doing a part of the prep after Midnight                   ____Carbo loading or ERAS instructions will be given to select patients-if you have been given those instructions -please do the following                           The evening before your surgery after dinner before midnight drink 40 ounces of gatorade. If you are diabetic use sugar free. The morning of surgery drink 40 ounces of water. This needs to be finished 3 hours prior to your surgery start time. 2. Take the following pills with a small sip of water on the morning of surgery___SYMBICORT, BRING RESCUE INHALER, NEBS IF NEEDED  ________________________________________________                  Do not take blood pressure medications ending in pril or sartan the milad prior to surgery or the morning of surgery_   3. Aspirin, Ibuprofen, Advil, Naproxen, Vitamin E and other Anti-inflammatory products and supplements should be stopped for 5 -7days before surgery or as directed by your physician. 4. Check with your Doctor regarding stopping Plavix, Coumadin,Eliquis, Lovenox,Effient,Pradaxa,Xarelto, Fragmin or other blood thinners and follow their instructions. 5. Do not smoke, and do not drink any alcoholic beverages 24 hours prior to surgery. This includes NA Beer. Refrain from the usage of any recreational drugs. 6. You may brush your teeth and gargle the morning of surgery. DO NOT SWALLOW WATER   7.  You MUST make arrangements for a responsible adult to stay on site while you are here and take you home after your surgery. You will not be allowed to leave alone or drive yourself home. It is strongly suggested someone stay with you the first 24 hrs. Your surgery will be cancelled if you do not have a ride home. 8. A parent/legal guardian must accompany a child scheduled for surgery and plan to stay at the hospital until the child is discharged. Please do not bring other children with you. 9. Please wear simple, loose fitting clothing to the hospital.  Juaquin Ring not bring valuables (money, credit cards, checkbooks, etc.) Do not wear any makeup (including no eye makeup) or nail polish on your fingers or toes. 10. DO NOT wear any jewelry or piercings on day of surgery. All body piercing jewelry must be removed. 11. If you have ___dentures, they will be removed before going to the OR; we will provide you a container. If you wear ___contact lenses or ___glasses, they will be removed; please bring a case for them. 12. Please see your family doctor/pediatrician for a history & physical and/or concerning medications. Bring any test results/reports from your physician's office. PCP__________________Phone___________H&P Appt. Date________             13 If you  have a Living Will and Durable Power of  for Healthcare, please bring in a copy. 15. Notify your Surgeon if you develop any illness between now and surgery  time, cough, cold, fever, sore throat, nausea, vomiting, etc.  Please notify your surgeon if you experience dizziness, shortness of breath or blurred vision between now & the time of your surgery             15. DO NOT shave your operative site 96 hours prior to surgery. For face & neck surgery, men may use an electric razor 48 hours prior to surgery. 16. Shower the night before or morning of surgery using an antibacterial soap or as you have been instructed.              17. To provide excellent

## 2020-06-04 LAB
SARS-COV-2: NOT DETECTED
SOURCE: NORMAL

## 2020-06-04 RX ORDER — OXYCODONE HYDROCHLORIDE 10 MG/1
10 TABLET ORAL EVERY 8 HOURS PRN
Qty: 21 TABLET | Refills: 0 | Status: SHIPPED | OUTPATIENT
Start: 2020-06-09 | End: 2020-06-16

## 2020-06-04 RX ORDER — MELOXICAM 15 MG/1
15 TABLET ORAL DAILY
Qty: 90 TABLET | Refills: 3 | Status: SHIPPED | OUTPATIENT
Start: 2020-06-09 | End: 2020-10-30

## 2020-06-04 NOTE — RESULT ENCOUNTER NOTE
Please contact patient with their testing results: Your test for COVID-19, also known as novel coronavirus, came back negative. No virus was detected from the sample collected. Until your symptoms are fully resolved, you may still be contagious. We recommend that you remain isolated for 7 days minimum or 72 hours after your symptoms have completely resolved, whichever is longer. Continually monitor symptoms. Contact a medical provider if symptoms are worsening. If you have any additional questions, contact your PCP.     For additional information, please visit the Centers for Disease Control and Prevention   [email or web address]

## 2020-06-09 ENCOUNTER — ANESTHESIA (OUTPATIENT)
Dept: OPERATING ROOM | Age: 65
End: 2020-06-09
Payer: MEDICARE

## 2020-06-09 ENCOUNTER — ANESTHESIA EVENT (OUTPATIENT)
Dept: OPERATING ROOM | Age: 65
End: 2020-06-09
Payer: MEDICARE

## 2020-06-09 ENCOUNTER — HOSPITAL ENCOUNTER (OUTPATIENT)
Age: 65
Setting detail: OUTPATIENT SURGERY
Discharge: HOME | End: 2020-06-09
Attending: ORTHOPAEDIC SURGERY | Admitting: ORTHOPAEDIC SURGERY
Payer: MEDICARE

## 2020-06-09 VITALS
RESPIRATION RATE: 7 BRPM | DIASTOLIC BLOOD PRESSURE: 61 MMHG | TEMPERATURE: 97.2 F | SYSTOLIC BLOOD PRESSURE: 126 MMHG | OXYGEN SATURATION: 96 %

## 2020-06-09 VITALS
OXYGEN SATURATION: 96 % | SYSTOLIC BLOOD PRESSURE: 149 MMHG | TEMPERATURE: 97.4 F | DIASTOLIC BLOOD PRESSURE: 78 MMHG | WEIGHT: 278 LBS | HEART RATE: 96 BPM | RESPIRATION RATE: 16 BRPM | BODY MASS INDEX: 46.32 KG/M2 | HEIGHT: 65 IN

## 2020-06-09 PROCEDURE — 6370000000 HC RX 637 (ALT 250 FOR IP): Performed by: ANESTHESIOLOGY

## 2020-06-09 PROCEDURE — 7100000001 HC PACU RECOVERY - ADDTL 15 MIN: Performed by: ORTHOPAEDIC SURGERY

## 2020-06-09 PROCEDURE — 2720000010 HC SURG SUPPLY STERILE: Performed by: ORTHOPAEDIC SURGERY

## 2020-06-09 PROCEDURE — 3700000001 HC ADD 15 MINUTES (ANESTHESIA): Performed by: ORTHOPAEDIC SURGERY

## 2020-06-09 PROCEDURE — 2709999900 HC NON-CHARGEABLE SUPPLY: Performed by: ORTHOPAEDIC SURGERY

## 2020-06-09 PROCEDURE — 7100000011 HC PHASE II RECOVERY - ADDTL 15 MIN: Performed by: ORTHOPAEDIC SURGERY

## 2020-06-09 PROCEDURE — 6360000002 HC RX W HCPCS: Performed by: NURSE ANESTHETIST, CERTIFIED REGISTERED

## 2020-06-09 PROCEDURE — 3600000004 HC SURGERY LEVEL 4 BASE: Performed by: ORTHOPAEDIC SURGERY

## 2020-06-09 PROCEDURE — 3700000000 HC ANESTHESIA ATTENDED CARE: Performed by: ORTHOPAEDIC SURGERY

## 2020-06-09 PROCEDURE — 2580000003 HC RX 258: Performed by: ORTHOPAEDIC SURGERY

## 2020-06-09 PROCEDURE — 6360000002 HC RX W HCPCS: Performed by: ORTHOPAEDIC SURGERY

## 2020-06-09 PROCEDURE — 2500000003 HC RX 250 WO HCPCS: Performed by: ORTHOPAEDIC SURGERY

## 2020-06-09 PROCEDURE — 2500000003 HC RX 250 WO HCPCS: Performed by: NURSE ANESTHETIST, CERTIFIED REGISTERED

## 2020-06-09 PROCEDURE — 7100000010 HC PHASE II RECOVERY - FIRST 15 MIN: Performed by: ORTHOPAEDIC SURGERY

## 2020-06-09 PROCEDURE — 3600000014 HC SURGERY LEVEL 4 ADDTL 15MIN: Performed by: ORTHOPAEDIC SURGERY

## 2020-06-09 PROCEDURE — 64415 NJX AA&/STRD BRCH PLXS IMG: CPT | Performed by: ANESTHESIOLOGY

## 2020-06-09 PROCEDURE — C1713 ANCHOR/SCREW BN/BN,TIS/BN: HCPCS | Performed by: ORTHOPAEDIC SURGERY

## 2020-06-09 PROCEDURE — C1762 CONN TISS, HUMAN(INC FASCIA): HCPCS | Performed by: ORTHOPAEDIC SURGERY

## 2020-06-09 PROCEDURE — 7100000000 HC PACU RECOVERY - FIRST 15 MIN: Performed by: ORTHOPAEDIC SURGERY

## 2020-06-09 DEVICE — IMPLANTABLE DEVICE
Type: IMPLANTABLE DEVICE | Site: SHOULDER | Status: FUNCTIONAL
Brand: BIOINDUCTIVE IMPLANT WITH ARTHROSCOPIC DELIVERY SYSTEM - MEDIUM

## 2020-06-09 DEVICE — Z INACTIVE USE 2600835 ANCHOR TEND 8 FOR REGENETEN BIOINDUCTIVE IMPL SYS: Type: IMPLANTABLE DEVICE | Site: SHOULDER | Status: FUNCTIONAL

## 2020-06-09 DEVICE — BONE ANCHORS 3 WITH ARTHROSCOPIC DELIVERY SYSTEM ADVANCED
Type: IMPLANTABLE DEVICE | Site: SHOULDER | Status: FUNCTIONAL
Brand: BONE ANCHORS WITH ARTHROSCOPIC DELIVERY SYSTEM - ADVANCED

## 2020-06-09 RX ORDER — SUCCINYLCHOLINE/SOD CL,ISO/PF 200MG/10ML
SYRINGE (ML) INTRAVENOUS PRN
Status: DISCONTINUED | OUTPATIENT
Start: 2020-06-09 | End: 2020-06-09 | Stop reason: SDUPTHER

## 2020-06-09 RX ORDER — OXYCODONE HYDROCHLORIDE AND ACETAMINOPHEN 5; 325 MG/1; MG/1
1 TABLET ORAL ONCE
Status: COMPLETED | OUTPATIENT
Start: 2020-06-09 | End: 2020-06-09

## 2020-06-09 RX ORDER — ONDANSETRON 2 MG/ML
INJECTION INTRAMUSCULAR; INTRAVENOUS PRN
Status: DISCONTINUED | OUTPATIENT
Start: 2020-06-09 | End: 2020-06-09 | Stop reason: SDUPTHER

## 2020-06-09 RX ORDER — FENTANYL CITRATE 50 UG/ML
INJECTION, SOLUTION INTRAMUSCULAR; INTRAVENOUS PRN
Status: DISCONTINUED | OUTPATIENT
Start: 2020-06-09 | End: 2020-06-09 | Stop reason: SDUPTHER

## 2020-06-09 RX ORDER — OXYCODONE HYDROCHLORIDE AND ACETAMINOPHEN 5; 325 MG/1; MG/1
TABLET ORAL
Status: DISCONTINUED
Start: 2020-06-09 | End: 2020-06-09 | Stop reason: HOSPADM

## 2020-06-09 RX ORDER — PROPOFOL 10 MG/ML
INJECTION, EMULSION INTRAVENOUS PRN
Status: DISCONTINUED | OUTPATIENT
Start: 2020-06-09 | End: 2020-06-09 | Stop reason: SDUPTHER

## 2020-06-09 RX ORDER — SENNA AND DOCUSATE SODIUM 50; 8.6 MG/1; MG/1
1 TABLET, FILM COATED ORAL 2 TIMES DAILY
Status: DISCONTINUED | OUTPATIENT
Start: 2020-06-09 | End: 2020-06-09 | Stop reason: HOSPADM

## 2020-06-09 RX ORDER — LIDOCAINE HYDROCHLORIDE 20 MG/ML
INJECTION, SOLUTION EPIDURAL; INFILTRATION; INTRACAUDAL; PERINEURAL PRN
Status: DISCONTINUED | OUTPATIENT
Start: 2020-06-09 | End: 2020-06-09 | Stop reason: SDUPTHER

## 2020-06-09 RX ORDER — ONDANSETRON 2 MG/ML
4 INJECTION INTRAMUSCULAR; INTRAVENOUS EVERY 6 HOURS PRN
Status: DISCONTINUED | OUTPATIENT
Start: 2020-06-09 | End: 2020-06-09 | Stop reason: HOSPADM

## 2020-06-09 RX ORDER — DEXAMETHASONE SODIUM PHOSPHATE 4 MG/ML
INJECTION, SOLUTION INTRA-ARTICULAR; INTRALESIONAL; INTRAMUSCULAR; INTRAVENOUS; SOFT TISSUE PRN
Status: DISCONTINUED | OUTPATIENT
Start: 2020-06-09 | End: 2020-06-09 | Stop reason: SDUPTHER

## 2020-06-09 RX ORDER — ACETAMINOPHEN 325 MG/1
650 TABLET ORAL EVERY 6 HOURS
Status: DISCONTINUED | OUTPATIENT
Start: 2020-06-09 | End: 2020-06-09 | Stop reason: HOSPADM

## 2020-06-09 RX ORDER — PROMETHAZINE HYDROCHLORIDE 25 MG/1
12.5 TABLET ORAL EVERY 6 HOURS PRN
Status: DISCONTINUED | OUTPATIENT
Start: 2020-06-09 | End: 2020-06-09 | Stop reason: HOSPADM

## 2020-06-09 RX ORDER — SODIUM CHLORIDE, SODIUM LACTATE, POTASSIUM CHLORIDE, CALCIUM CHLORIDE 600; 310; 30; 20 MG/100ML; MG/100ML; MG/100ML; MG/100ML
INJECTION, SOLUTION INTRAVENOUS CONTINUOUS
Status: DISCONTINUED | OUTPATIENT
Start: 2020-06-09 | End: 2020-06-09 | Stop reason: HOSPADM

## 2020-06-09 RX ADMIN — FENTANYL CITRATE 50 MCG: 50 INJECTION, SOLUTION INTRAMUSCULAR; INTRAVENOUS at 11:48

## 2020-06-09 RX ADMIN — LIDOCAINE HYDROCHLORIDE 40 MG: 20 INJECTION, SOLUTION EPIDURAL; INFILTRATION; INTRACAUDAL; PERINEURAL at 11:46

## 2020-06-09 RX ADMIN — FENTANYL CITRATE 50 MCG: 50 INJECTION, SOLUTION INTRAMUSCULAR; INTRAVENOUS at 11:46

## 2020-06-09 RX ADMIN — PHENYLEPHRINE HYDROCHLORIDE 100 MCG: 10 INJECTION INTRAVENOUS at 12:35

## 2020-06-09 RX ADMIN — Medication 3 G: at 11:38

## 2020-06-09 RX ADMIN — PROPOFOL 100 MG: 10 INJECTION, EMULSION INTRAVENOUS at 11:57

## 2020-06-09 RX ADMIN — ONDANSETRON 4 MG: 2 INJECTION INTRAMUSCULAR; INTRAVENOUS at 12:04

## 2020-06-09 RX ADMIN — OXYCODONE HYDROCHLORIDE AND ACETAMINOPHEN 1 TABLET: 5; 325 TABLET ORAL at 14:37

## 2020-06-09 RX ADMIN — Medication 160 MG: at 11:55

## 2020-06-09 RX ADMIN — DEXAMETHASONE SODIUM PHOSPHATE 8 MG: 4 INJECTION, SOLUTION INTRAMUSCULAR; INTRAVENOUS at 12:04

## 2020-06-09 RX ADMIN — SODIUM CHLORIDE, POTASSIUM CHLORIDE, SODIUM LACTATE AND CALCIUM CHLORIDE: 600; 310; 30; 20 INJECTION, SOLUTION INTRAVENOUS at 11:08

## 2020-06-09 RX ADMIN — PROPOFOL 200 MG: 10 INJECTION, EMULSION INTRAVENOUS at 11:55

## 2020-06-09 RX ADMIN — SODIUM CHLORIDE, POTASSIUM CHLORIDE, SODIUM LACTATE AND CALCIUM CHLORIDE: 600; 310; 30; 20 INJECTION, SOLUTION INTRAVENOUS at 11:40

## 2020-06-09 RX ADMIN — LIDOCAINE HYDROCHLORIDE 60 MG: 20 INJECTION, SOLUTION EPIDURAL; INFILTRATION; INTRACAUDAL; PERINEURAL at 11:48

## 2020-06-09 ASSESSMENT — PULMONARY FUNCTION TESTS
PIF_VALUE: 24
PIF_VALUE: 23
PIF_VALUE: 1
PIF_VALUE: 23
PIF_VALUE: 3
PIF_VALUE: 22
PIF_VALUE: 24
PIF_VALUE: 27
PIF_VALUE: 2
PIF_VALUE: 2
PIF_VALUE: 4
PIF_VALUE: 0
PIF_VALUE: 23
PIF_VALUE: 22
PIF_VALUE: 21
PIF_VALUE: 23
PIF_VALUE: 0
PIF_VALUE: 21
PIF_VALUE: 22
PIF_VALUE: 22
PIF_VALUE: 21
PIF_VALUE: 21
PIF_VALUE: 23
PIF_VALUE: 24
PIF_VALUE: 23
PIF_VALUE: 3
PIF_VALUE: 4
PIF_VALUE: 20
PIF_VALUE: 0
PIF_VALUE: 21
PIF_VALUE: 24
PIF_VALUE: 23
PIF_VALUE: 23
PIF_VALUE: 24
PIF_VALUE: 23
PIF_VALUE: 23
PIF_VALUE: 4
PIF_VALUE: 11
PIF_VALUE: 23
PIF_VALUE: 3
PIF_VALUE: 23
PIF_VALUE: 23
PIF_VALUE: 2
PIF_VALUE: 28
PIF_VALUE: 0
PIF_VALUE: 26
PIF_VALUE: 3
PIF_VALUE: 9
PIF_VALUE: 21
PIF_VALUE: 21
PIF_VALUE: 3
PIF_VALUE: 20
PIF_VALUE: 23
PIF_VALUE: 22
PIF_VALUE: 32
PIF_VALUE: 5
PIF_VALUE: 23
PIF_VALUE: 25
PIF_VALUE: 23
PIF_VALUE: 22
PIF_VALUE: 2
PIF_VALUE: 20
PIF_VALUE: 23
PIF_VALUE: 22
PIF_VALUE: 23
PIF_VALUE: 21
PIF_VALUE: 21
PIF_VALUE: 22
PIF_VALUE: 23
PIF_VALUE: 1
PIF_VALUE: 24
PIF_VALUE: 23
PIF_VALUE: 23
PIF_VALUE: 4
PIF_VALUE: 4
PIF_VALUE: 23
PIF_VALUE: 23
PIF_VALUE: 2
PIF_VALUE: 2
PIF_VALUE: 21
PIF_VALUE: 23
PIF_VALUE: 2
PIF_VALUE: 22
PIF_VALUE: 20
PIF_VALUE: 1

## 2020-06-09 ASSESSMENT — PAIN SCALES - GENERAL
PAINLEVEL_OUTOF10: 2
PAINLEVEL_OUTOF10: 3
PAINLEVEL_OUTOF10: 0
PAINLEVEL_OUTOF10: 3

## 2020-06-09 ASSESSMENT — PAIN - FUNCTIONAL ASSESSMENT
PAIN_FUNCTIONAL_ASSESSMENT: 0-10
PAIN_FUNCTIONAL_ASSESSMENT: PREVENTS OR INTERFERES SOME ACTIVE ACTIVITIES AND ADLS

## 2020-06-09 ASSESSMENT — PAIN DESCRIPTION - PAIN TYPE
TYPE: SURGICAL PAIN
TYPE: SURGICAL PAIN

## 2020-06-09 ASSESSMENT — PAIN DESCRIPTION - DESCRIPTORS: DESCRIPTORS: ACHING

## 2020-06-09 ASSESSMENT — ENCOUNTER SYMPTOMS: SHORTNESS OF BREATH: 1

## 2020-06-09 NOTE — ANESTHESIA PROCEDURE NOTES
Peripheral Block    Patient location during procedure: pre-op  Start time: 6/9/2020 11:29 AM  End time: 6/9/2020 11:31 AM  Staffing  Anesthesiologist: Freddy Anthony MD  Performed: anesthesiologist   Preanesthetic Checklist  Completed: patient identified, site marked, surgical consent, pre-op evaluation, timeout performed, IV checked, risks and benefits discussed, monitors and equipment checked, anesthesia consent given, oxygen available and patient being monitored  Peripheral Block  Patient position: sitting  Prep: ChloraPrep  Patient monitoring: cardiac monitor, continuous pulse ox, frequent blood pressure checks and IV access  Block type: Brachial plexus  Laterality: right  Injection technique: single-shot  Procedures: ultrasound guided  Local infiltration: lidocaine  Infiltration strength: 1 %  Dose: 3 mL  Interscalene  Provider prep: mask and sterile gloves  Local infiltration: lidocaine  Needle  Needle gauge: 21 G  Needle length: 10 cm  Needle localization: ultrasound guidance  Assessment  Injection assessment: negative aspiration for heme, no paresthesia on injection and local visualized surrounding nerve on ultrasound  Paresthesia pain: none  Slow fractionated injection: yes  Hemodynamics: stable  Additional Notes  R IS NB w US to 0.4 mA.   30 ml 0.5% bupi          Reason for block: post-op pain management and at surgeon's request

## 2020-06-09 NOTE — PROGRESS NOTES
Taking sips - less nausea. Denies need for pain med. Just \"sleepy\". Meets criteria to transfer to phase 2 of recovery care.  Will continue to monitor

## 2020-06-09 NOTE — ANESTHESIA PRE PROCEDURE
oxyCODONE HCl (OXY-IR) 10 MG immediate release tablet Take 1 tablet by mouth every 8 hours as needed for Pain for up to 7 days. 6/9/20 6/16/20  Noemí Tiwari DO   celecoxib (CELEBREX) 200 MG capsule Take 1 capsule by mouth daily 12/17/19   Yee Olson MD   Nebulizers (COMPRESSOR/NEBULIZER) MISC 1 Device by Does not apply route 2 times daily 6/26/19   WINSTON Avendano - CNP   aspirin 81 MG tablet Take 81 mg by mouth daily    Historical Provider, MD   ibuprofen (ADVIL;MOTRIN) 200 MG tablet Take 200 mg by mouth every 6 hours as needed for Pain    Historical Provider, MD       Current medications:    Current Facility-Administered Medications   Medication Dose Route Frequency Provider Last Rate Last Dose    ceFAZolin (ANCEF) 3 g in dextrose 5 % 100 mL IVPB  3 g Intravenous On Call to [facility],         acetaminophen (TYLENOL) tablet 650 mg  650 mg Oral Q6H Noemí Tiwari, DO        sennosides-docusate sodium (SENOKOT-S) 8.6-50 MG tablet 1 tablet  1 tablet Oral BID Noemí Tiwari, DO        magnesium hydroxide (MILK OF MAGNESIA) 400 MG/5ML suspension 30 mL  30 mL Oral Daily PRN Noemí Tiwari, DO        promethazine (PHENERGAN) tablet 12.5 mg  12.5 mg Oral Q6H PRN Noemí Tiwari,         Or    ondansetron [facility]) injection 4 mg  4 mg Intravenous Q6H PRN Noemí Tiwari, DO           Allergies:     Allergies   Allergen Reactions    Arthrotec [Diclofenac-Misoprostol]      HANDS ITCH       Problem List:    Patient Active Problem List   Diagnosis Code    Peroneal tendinitis M76.70    Transient synovitis, left ankle and foot M67.372    Osteoarthritis, foot, localized M19.079    SOB (shortness of breath) R06.02    COPD, moderate (HCC) J44.9    Moderate persistent asthma, uncomplicated M34.33    Environmental allergies Z91.09    Coronary artery calcification seen on CT scan I25.10    Essential hypertension I10    Dyslipidemia E78.5    Gastroesophageal reflux disease without esophagitis K21.9    Traumatic incomplete tear of right rotator cuff S46.011A    Chronic right shoulder pain M25.511, G89.29    Acute pain of right shoulder M25.511       Past Medical History:        Diagnosis Date    CAD (coronary artery disease)     mild MULTI VESSELper VO     COPD (ch[facility] obstructive [facility]) ([facility] [address]     HTN (hypertension)     Hyperlipidemia     Osteoarthritis, foot, localized 2014    Spinal stenosis        Past Surgical History:        Procedure Laterality Date    ANKLE FRACTURE SURGERY      CHOLECYSTECTOMY      COLONOSCOPY N/A 2019    COLONOSCOPY POLYPECTOMY SNARE/COLD BIOPSY performed [facility] Hollie Hinojosa [facility] at [facility] 2019    BILATERAL L3, L4 AND L5 DORSAL RAMUS MEDIAL BRANCH BLOCK WITH FLUOROSCOPY (60596, 25286) performed by Dennis Lopez MD at [place] [address]    TOTAL KNEE ARTHROPLASTY Bilateral        Social History:    Social History     Tobacco Use    Smoking status: Former Smoker     Packs/day: 1.00     Years: 40.00     Pack years: 40.00     Types: Cigarettes     Last attempt to quit: 10/1/2018     Years since quittin.6    Smokeless tobacco: Never Used   Substance Use Topics    Alcohol use: Yes     Comment: 3 DRINKS A MONTH                                Counseling given: Not Answered      Vital Signs (Current):   Vitals:    20 1420 20 1043   Weight: 281 lb (127.5 kg) 278 lb (126.1 kg)   Height: 5' 5\" (1.651 m) 5' 5\" (1.651 m)                                              BP Readings from Last 3 Encounters:   20 (!) 165/87   19 135/85   19 (!) 162/89       NPO Status: Time of last liquid consumption:                         Time of last solid consumption:                         Date of last liquid consumption: 20                        Date of last solid GI/Hepatic/Renal:   (+) GERD:,           Endo/Other:                     Abdominal:   (+) obese,         Vascular:                                        Anesthesia Plan      general     ASA 3       Induction: intravenous. Anesthetic plan and risks discussed with patient. Plan discussed with CRNA.                   Margot Gar MD   6/9/2020

## 2020-06-09 NOTE — PROGRESS NOTES
Dozing . Sips of liquid- slight nausea. Denies need for antiemetic or pain med at this time. Able to wiggle fingers easily.

## 2020-06-09 NOTE — OP NOTE
see there was moderate bursitis, synovitis, a subacromial spur and distal clavicle spur. Under direct visualization I put a 18-gauge spinal needle laterally through the deltoid into the subacromial space, liking this location I made my portal with a sharp scalpel and a blunt trocar. At this point I entered the subacromial space with a shaver and I removed all the bursa tissue synovial tissue and tissue lining the subacromial joint space and also the bone spur and around the distal clavicle. I used the bipolar to remove all soft tissue from the undersurface bone spur and the distal clavicle spur. I removed the bipolar tissue ablator and entered with the 5.5 mm  barrel bur and proceeded to perform a generous subacromial decompression through the lateral portal and through the posterior portal.      Next I went ahead and addressed the distal clavicle with a shaver and a bipolar through the lateral portal and then resecting 5 mm to the level of the subacromial decompression. I also used the shaver the bipolar and the 5.5 mm barrel bur to undermine the distal clavicle to make sure there was no impingement on the rotator cuff tissue. I now removed the 5.5 mm barrel bur from the lateral portal and I made an anterior portal with a blunt trocar and entered with a shaver then a bipolar and I finished off the distal clavicle to the capsule with a 5.5 mm barrel bur. The subacromial space was visualized through the anterior portal lateral portal and the posterior portal into bleeding tissue was cauterized any loose debris was removed with the shaver once all this was performed I removed all instruments from the subacromial space. Following the preparation of the subacromial space a rotator cuff augmentation was performed. Through a separate lateral portal made with a sharp scalpel and a blunt trocar I inserted the medium sized Regeneten augmentation graft.   Once the graft was deployed and

## 2020-06-09 NOTE — PROGRESS NOTES
Received from or drowsy,nasal cannula,right shoulder dressing dry and intact,sling in place,circulation good,ice placed,vss.

## 2020-06-15 ENCOUNTER — OFFICE VISIT (OUTPATIENT)
Dept: ORTHOPEDIC SURGERY | Age: 65
End: 2020-06-15

## 2020-06-15 VITALS — BODY MASS INDEX: 46.32 KG/M2 | WEIGHT: 278 LBS | HEIGHT: 65 IN

## 2020-06-15 PROCEDURE — 99024 POSTOP FOLLOW-UP VISIT: CPT | Performed by: ORTHOPAEDIC SURGERY

## 2020-07-17 ENCOUNTER — TELEPHONE (OUTPATIENT)
Dept: PULMONOLOGY | Age: 65
End: 2020-07-17

## 2020-07-17 RX ORDER — BUDESONIDE AND FORMOTEROL FUMARATE DIHYDRATE 160; 4.5 UG/1; UG/1
2 AEROSOL RESPIRATORY (INHALATION) 2 TIMES DAILY
Qty: 3 INHALER | Refills: 3 | Status: SHIPPED | OUTPATIENT
Start: 2020-07-17 | End: 2020-09-09 | Stop reason: SDUPTHER

## 2020-07-17 NOTE — TELEPHONE ENCOUNTER
Pt called in stating she has changed to Medicare now and her prescription coverage is through [facility]. Pt asking if we can send a 90-day script to [address] [place] [address] of the generic Symbicort. Pt stated this is cheaper for her.      Pt # [phone]

## 2020-07-20 ENCOUNTER — OFFICE VISIT (OUTPATIENT)
Dept: ORTHOPEDIC SURGERY | Age: 65
End: 2020-07-20

## 2020-07-20 PROCEDURE — 99024 POSTOP FOLLOW-UP VISIT: CPT | Performed by: ORTHOPAEDIC SURGERY

## 2020-07-20 NOTE — PROGRESS NOTES
7/20/20  History of Present of Illness:  S/P Shoulder Arthroscopy   The patient returns today for right shoulder evaluation 6 weeks after 6/9/2020 shoulder arthroscopy. Examination:  Inspection reveals warm, dry, intact skin. There is no adenopathy. The distal neurovascular exam is grossly intact. Examination of the contralateral shoulder reveals no atrophy or deformity. The skin is warm and dry. Range of motion is within normal limits. There is no focal tenderness with palpation. Provocative SLAP, biceps tension, apprehension AC joint or rotator cuff tests are negative. Strength is graded 5/5 in all muscle groups. The distal neurovascular exam is grossly intact. Cervical spine: The skin is warm and dry. There is no swelling, warmth, or erythema. Range of motion is within normal limits. There is no paraspinal or spinous process tenderness. Spurling's sign is negative and did not produce shoulder pain. The distal neurovascular exam is grossly intact. Diagnostic Test Findings:    No orders of the defined types were placed in this encounter. Treatment Plan:  She looks good she has good range of motion good strength she said she did a little too much last week but now it is starting to settle down again we will have her continue in therapy and I will see her back in a few weeks      Yanni Crow. RAJNI Hill. [facility] and [facility]  Sports Fellowship Trained  Board Certified  Rohm and Raya Team Physician    Disclaimer: \"This note was dictated with voice recognition software. Though review and correction are routine, we apologize for any errors. \"

## 2020-08-24 ENCOUNTER — OFFICE VISIT (OUTPATIENT)
Dept: ORTHOPEDIC SURGERY | Age: 65
End: 2020-08-24

## 2020-08-24 VITALS — BODY MASS INDEX: 46.32 KG/M2 | HEIGHT: 65 IN | WEIGHT: 278 LBS

## 2020-08-24 PROCEDURE — 99024 POSTOP FOLLOW-UP VISIT: CPT | Performed by: ORTHOPAEDIC SURGERY

## 2020-08-24 NOTE — PROGRESS NOTES
8/24/20  History of Present of Illness:  S/P Rotator Cuff Repair  The patient returns today for right shoulder evaluation 11 weeks after 6/9/2020 rotator cuff repair    Examination:  Inspection reveals warm, dry, intact skin. There is no adenopathy. The distal neurovascular exam is grossly intact. Examination of the contralateral shoulder reveals no atrophy or deformity. The skin is warm and dry. Range of motion is within normal limits. There is no focal tenderness with palpation. Provocative SLAP, biceps tension, apprehension AC joint or rotator cuff tests are negative. Strength is graded 5/5 in all muscle groups outside of the rotator cuff. Rotator cuff strength is strong. The distal neurovascular exam is grossly intact. Cervical spine: The skin is warm and dry. There is no swelling, warmth, or erythema. Range of motion is within normal limits. There is no paraspinal or spinous process tenderness. Spurling's sign is negative and did not produce shoulder pain. The distal neurovascular exam is grossly intact. Diagnostic Test Findings:    No orders of the defined types were placed in this encounter. Treatment Plan:  Continue to work on range of motion and strength follow-up in 8 weeks      Guerrero Posadas. RAJNI Hill. [address] [place] [address] 20 and Sports Medicine  Sports Fellowship Trained  Board Certified  Alli and Dorota Team Physician    Disclaimer: \"This note was dictated with voice recognition software. Though review and correction are routine, we apologize for any errors. \"

## 2020-09-09 ENCOUNTER — OFFICE VISIT (OUTPATIENT)
Dept: PULMONOLOGY | Age: 65
End: 2020-09-09
Payer: MEDICARE

## 2020-09-09 VITALS — OXYGEN SATURATION: 98 % | DIASTOLIC BLOOD PRESSURE: 84 MMHG | HEART RATE: 91 BPM | SYSTOLIC BLOOD PRESSURE: 138 MMHG

## 2020-09-09 PROCEDURE — G0008 ADMIN INFLUENZA VIRUS VAC: HCPCS | Performed by: INTERNAL MEDICINE

## 2020-09-09 PROCEDURE — 99214 OFFICE O/P EST MOD 30 MIN: CPT | Performed by: INTERNAL MEDICINE

## 2020-09-09 PROCEDURE — 90653 IIV ADJUVANT VACCINE IM: CPT | Performed by: INTERNAL MEDICINE

## 2020-09-09 RX ORDER — BUDESONIDE AND FORMOTEROL FUMARATE DIHYDRATE 160; 4.5 UG/1; UG/1
2 AEROSOL RESPIRATORY (INHALATION) 2 TIMES DAILY
Qty: 1 INHALER | Refills: 6 | Status: SHIPPED | OUTPATIENT
Start: 2020-09-09 | End: 2020-10-30

## 2020-09-09 NOTE — PROGRESS NOTES
[facility]  Consult Note  Iveth Aguiar MD       Dolphus Loss   YOB: 1955    Date of Visit:  9/9/2020    Assessment/Plan:  1. SOB (shortness of breath)  Multifactorial  · COPD  · Asthma  · Obesity  · Deconditioning    2. COPD, moderate  PFT 2/19:  Spirometry:  Flow volume loops were obstructed. The FEV-1/FVC ratio was   decreased. The FEV-1 was 1.5 liters (59% of predicted), which was   moderately decreased. The FVC was 2.15 liters (65% of predicted),   which was decreased. Response to inhaled bronchodilators   (albuterol) was not significant. Lung volumes:  Lung volumes were tested by plethysmography. The total lung   capacity was 4.45 liters (88% of predicted), which was normal.   The residual volume was 2.24 liters (114% of predicted), which   was increased. The ratio of residual volume to total lung   capacity (RV/TLC) was 50, which was incraesed. Diffusion capacity was found to be mildly decreased.       Interpretation:  Moderate obstructive airway disease. Symbicort  Albuterol    The patient would benefit from the use of a nebulizer at home. Give her a script for Doxy and Pred to hold onto at home. If she does this a lot, consider MWF azithromycin    3. Moderate persistent asthma, uncomplicated    Did not have methacholine because PFT was abnormal    4. GERD  Try Prilosec or similar nightly for the next month  Nothing to eat several hours before bed  Sleep with the head of the bed elevated. 6 months      Chief Complaint   Patient presents with    Shortness of Breath     6 month       HPI  The patient presents with a chief complaint of moderate shortness of breath related to moderate COPD of many years duration. He has mild associated cough. Exertion is a modifying factor. She has not had recent flare up.  No Chest pain, Nausea or vomiting reported      Review of Systems  No Chest pain, Nausea or vomiting reported    History  I have reviewed past medical, surgical, social and family history. This is documented elsewhere in themedical record. Physical Exam:  Well developed, well nourished  Alert and oriented  Sclera is clear  No cervical adenopathy  No JVD. Chest examination is clear. Cardiac examination reveals regular rate and rhythm without murmur, gallop or rub. The abdomen is soft, nontender and nondistended. There is no clubbing, cyanosis or edema of the extremities. There is no obvious skin rash. No focal neuro deficicts  Normal mood and affect      Allergies   Allergen Reactions    Arthrotec [Diclofenac-Misoprostol]      HANDS ITCH     Prior to Visit Medications    Medication Sig Taking? Authorizing Provider   budesonide-formoterol (SYMBICORT) 160-4.5 MCG/ACT AERO Inhale 2 puffs into the lungs 2 times daily Yes Shauna Keenan MD   Lido-Menthol-Methyl Sal-Camph (CBD KINGS EX) Apply topically  Historical Provider, MD   medical marijuana Take by mouth as needed.   Historical Provider, MD   meloxicam (MOBIC) 15 MG tablet Take 1 tablet by mouth daily  Estela Forrest DO   amLODIPine (NORVASC) 5 MG tablet Take 5 mg by mouth nightly  Historical Provider, MD   albuterol (PROVENTIL) (2.5 MG/3ML) 0.083% nebulizer solution Take 2.5 mg by nebulization every 6 hours as needed for Wheezing  Historical Provider, MD   budesonide-formoterol (SYMBICORT) 160-4.5 MCG/ACT AERO Inhale 2 puffs into the lungs 2 times daily  Shauna Keenan MD   omeprazole (PRILOSEC) 10 MG delayed release capsule Take 20 mg by mouth daily   Historical Provider, MD   celecoxib (CELEBREX) 200 MG capsule Take 1 capsule by mouth daily  Elsie Cornejo MD   tiZANidine (ZANAFLEX) 4 MG tablet Take 1 tablet by mouth 3 times daily as needed (Muscle tightness/spasm)  Elsie Cornejo MD   losartan (COZAAR) 50 MG tablet Take 50 mg by mouth daily  Historical Provider, MD   Nebulizers (COMPRESSOR/NEBULIZER) MISC 1 Device by Does not apply route 2 times daily  Kassi Gonzalez, APRN - CNP   aspirin 81 MG tablet Take 81 mg by mouth daily  Historical Provider, MD   ibuprofen (ADVIL;MOTRIN) 200 MG tablet Take 200 mg by mouth every 6 hours as needed for Pain  Historical Provider, MD   hydrochlorothiazide (HYDRODIURIL) 25 MG tablet Take 25 mg by mouth daily as needed   Historical Provider, MD   atorvastatin (LIPITOR) 20 MG tablet Take 1 tablet by mouth daily  Jessica Peck MD   albuterol sulfate  (90 Base) MCG/ACT inhaler Inhale 2 puffs into the lungs every 6 hours as needed for Wheezing  Yuan Vega MD   cetirizine (ZYRTEC) 10 MG tablet Take 10 mg by mouth daily. Historical Provider, MD       Vitals:    20 1507   BP: 138/84   Pulse: 91   SpO2: 98%     There is no height or weight on file to calculate BMI.      Wt Readings from Last 3 Encounters:   20 278 lb (126.1 kg)   06/15/20 278 lb (126.1 kg)   20 278 lb (126.1 kg)     BP Readings from Last 3 Encounters:   20 138/84   20 (!) 149/78   20 126/61        Social History     Tobacco Use   Smoking Status Former Smoker    Packs/day: 1.00    Years: 40.00    Pack years: 40.00    Types: Cigarettes    Last attempt to quit: 10/1/2018    Years since quittin.9   Smokeless Tobacco Never Used

## 2020-10-05 RX ORDER — BUDESONIDE AND FORMOTEROL FUMARATE DIHYDRATE 160; 4.5 UG/1; UG/1
2 AEROSOL RESPIRATORY (INHALATION) 2 TIMES DAILY
Qty: 1 INHALER | Refills: 6 | Status: SHIPPED | OUTPATIENT
Start: 2020-10-05 | End: 2020-10-30

## 2020-10-23 ENCOUNTER — TELEPHONE (OUTPATIENT)
Dept: PULMONOLOGY | Age: 65
End: 2020-10-23

## 2020-10-23 NOTE — TELEPHONE ENCOUNTER
Pt's CT Lung Screen order placed in Epic - pt informed that her last lung screen was in February 2019

## 2020-10-23 NOTE — TELEPHONE ENCOUNTER
Pt called and wants to know if she should have her yearly ct done of chest, if so order needs to be put in. Please call her to let her know.     Ph # [phone]

## 2020-10-26 ENCOUNTER — HOSPITAL ENCOUNTER (OUTPATIENT)
Dept: GENERAL RADIOLOGY | Age: 65
Discharge: HOME | End: 2020-10-26
Payer: MEDICARE

## 2020-10-26 ENCOUNTER — HOSPITAL ENCOUNTER (OUTPATIENT)
Age: 65
Discharge: HOME | End: 2020-10-26
Payer: MEDICARE

## 2020-10-26 PROCEDURE — 74018 RADEX ABDOMEN 1 VIEW: CPT

## 2020-10-30 ENCOUNTER — OFFICE VISIT (OUTPATIENT)
Dept: ENDOCRINOLOGY | Age: 65
End: 2020-10-30
Payer: MEDICARE

## 2020-10-30 VITALS
TEMPERATURE: 97.6 F | HEART RATE: 71 BPM | DIASTOLIC BLOOD PRESSURE: 80 MMHG | HEIGHT: 65 IN | BODY MASS INDEX: 43.82 KG/M2 | RESPIRATION RATE: 16 BRPM | SYSTOLIC BLOOD PRESSURE: 144 MMHG | WEIGHT: 263 LBS

## 2020-10-30 PROBLEM — E11.69 DYSLIPIDEMIA ASSOCIATED WITH TYPE 2 DIABETES MELLITUS (HCC): Status: ACTIVE | Noted: 2020-10-30

## 2020-10-30 PROBLEM — E66.01 MORBID OBESITY DUE TO EXCESS CALORIES (HCC): Status: ACTIVE | Noted: 2020-10-30

## 2020-10-30 PROBLEM — E78.5 DYSLIPIDEMIA ASSOCIATED WITH TYPE 2 DIABETES MELLITUS (HCC): Status: ACTIVE | Noted: 2020-10-30

## 2020-10-30 PROBLEM — E11.9 CONTROLLED TYPE 2 DIABETES MELLITUS WITHOUT COMPLICATION, WITHOUT LONG-TERM CURRENT USE OF INSULIN (HCC): Status: ACTIVE | Noted: 2020-10-30

## 2020-10-30 PROCEDURE — 4040F PNEUMOC VAC/ADMIN/RCVD: CPT | Performed by: INTERNAL MEDICINE

## 2020-10-30 PROCEDURE — 99204 OFFICE O/P NEW MOD 45 MIN: CPT | Performed by: INTERNAL MEDICINE

## 2020-10-30 PROCEDURE — G8417 CALC BMI ABV UP PARAM F/U: HCPCS | Performed by: INTERNAL MEDICINE

## 2020-10-30 PROCEDURE — 1123F ACP DISCUSS/DSCN MKR DOCD: CPT | Performed by: INTERNAL MEDICINE

## 2020-10-30 PROCEDURE — 3017F COLORECTAL CA SCREEN DOC REV: CPT | Performed by: INTERNAL MEDICINE

## 2020-10-30 PROCEDURE — 3046F HEMOGLOBIN A1C LEVEL >9.0%: CPT | Performed by: INTERNAL MEDICINE

## 2020-10-30 PROCEDURE — G8399 PT W/DXA RESULTS DOCUMENT: HCPCS | Performed by: INTERNAL MEDICINE

## 2020-10-30 PROCEDURE — G8427 DOCREV CUR MEDS BY ELIG CLIN: HCPCS | Performed by: INTERNAL MEDICINE

## 2020-10-30 PROCEDURE — 2022F DILAT RTA XM EVC RTNOPTHY: CPT | Performed by: INTERNAL MEDICINE

## 2020-10-30 PROCEDURE — G8482 FLU IMMUNIZE ORDER/ADMIN: HCPCS | Performed by: INTERNAL MEDICINE

## 2020-10-30 PROCEDURE — 1090F PRES/ABSN URINE INCON ASSESS: CPT | Performed by: INTERNAL MEDICINE

## 2020-10-30 PROCEDURE — 1036F TOBACCO NON-USER: CPT | Performed by: INTERNAL MEDICINE

## 2020-10-30 RX ORDER — METFORMIN HYDROCHLORIDE 500 MG/1
1000 TABLET, EXTENDED RELEASE ORAL
Qty: 60 TABLET | Refills: 3 | Status: SHIPPED | OUTPATIENT
Start: 2020-10-30 | End: 2021-01-14 | Stop reason: SDUPTHER

## 2020-10-30 RX ORDER — GLUCOSAMINE HCL/CHONDROITIN SU 500-400 MG
CAPSULE ORAL
Qty: 50 STRIP | Refills: 11 | Status: SHIPPED | OUTPATIENT
Start: 2020-10-30 | End: 2020-11-03 | Stop reason: SDUPTHER

## 2020-10-30 RX ORDER — LANCETS 30 GAUGE
1 EACH MISCELLANEOUS DAILY
Qty: 50 EACH | Refills: 11 | Status: SHIPPED | OUTPATIENT
Start: 2020-10-30

## 2020-10-30 RX ORDER — ERGOCALCIFEROL 1.25 MG/1
CAPSULE ORAL
COMMUNITY
Start: 2020-10-06 | End: 2021-01-14

## 2020-10-30 RX ORDER — ATORVASTATIN CALCIUM 40 MG/1
40 TABLET, FILM COATED ORAL DAILY
Qty: 90 TABLET | Refills: 3 | Status: SHIPPED | OUTPATIENT
Start: 2020-10-30 | End: 2021-12-14

## 2020-10-30 RX ORDER — DIPHENHYDRAMINE HYDROCHLORIDE 25 MG/1
CAPSULE, LIQUID FILLED ORAL
Qty: 1 KIT | Refills: 0 | Status: SHIPPED | OUTPATIENT
Start: 2020-10-30 | End: 2020-11-04 | Stop reason: SDUPTHER

## 2020-10-30 NOTE — PROGRESS NOTES
Patient ID:   Paul Jimenez is a 71 y.o. female    Chief Complaint:   Paul Jimenez presents for an initial evaluation of Type 2 Diabetes Mellitus , Hyperlipidemia and hypertension. Subjective:   Type 2 Diabetes Mellitus diagnosed in . Metformin 500mg daily after A1C of 6.5% in Oct 2020   Have nausea, getting EGD on Monday   Symptoms for a long time     She does not have a meter   Uses her husbands     Last steroid for back issues in spring 2020   Has COPD     Checks blood sugars 0-1 times per day. Reviewed/Reported  AM:   Lunch:  Supper:   HS:     Hypoglycemias: None     Working on diet , lost 35 lbs since start of    Meals: Three meals. Snack: sporadic (cheese, fruits, chips, pretzels)   Exercise: limited due to sciatica and lumbar disc issues     Denies chest pain. Family history of CAD: Mother had CAD in 66's. Maternal GM had CHF in her 63's. Denies smoking.     Currently not on Aspirin 81 mg due to stomach issues     The following portions of the patient's history were reviewed and updated as appropriate:       Family History   Problem Relation Age of Onset    Hypertension Mother     Cancer Father     Colon Cancer Father     Heart Failure Paternal Grandmother          Social History     Socioeconomic History    Marital status:      Spouse name: Not on file    Number of children: Not on file    Years of education: Not on file    Highest education level: Not on file   Occupational History    Occupation: RN   Social Needs    Financial resource strain: Not on file    Food insecurity     Worry: Not on file     Inability: Not on file   [employer] needs     Medical: Not on file     Non-medical: Not on file   Tobacco Use    Smoking status: Former Smoker     Packs/day: 1.00     Years: 40.00     Pack years: 40.00     Types: Cigarettes     Last attempt to quit: 10/1/2018     Years since quittin.0    Smokeless tobacco: Never Used    Tobacco comment: smoked for 40 yrs / smoked up to 1 ppd   Substance and Sexual Activity    Alcohol use: Yes     Comment: 3 DRINKS A MONTH    Drug use: Yes     Types: Marijuana     Comment: medical marijuana     Sexual activity: Yes     Partners: Male   Lifestyle    Physical activity     Days per week: Not on file     Minutes per session: Not on file    Stress: Not on file   Relationships    Social connections     Talks on phone: Not on file     Gets together: Not on file     Attends [employer] service: Not on file     Active member of club or organization: Not on file     Attends meetings of clubs or organizations: Not on file     Relationship status: Not on file    Intimate partner violence     Fear of current or ex partner: Not on file     Emotionally abused: Not on file     Physically abused: Not on file     Forced sexual activity: Not on file   Other Topics Concern    Not on file   Social History Narrative    Not on file       Past Medical History:   Diagnosis Date    CAD (coronary artery disease)     mild MULTI VESSELper VO     [facility]) [facility]     Diabetes mellitus ([facility]     HTN (hypertension)     Hyperlipidemia     Osteoarthritis, foot, localized 8/25/2014    Spinal stenosis        Past Surgical History:   Procedure Laterality Date    ANKLE FRACTURE SURGERY      CHOLECYSTECTOMY      COLONOSCOPY N/A 12/11/2019    COLONOSCOPY POLYPECTOMY SNARE/COLD BIOPSY performed by Cherry Nguyen MD at [facility] Bilateral 12/18/2019    BILATERAL L3, L4 AND L5 DORSAL RAMUS MEDIAL BRANCH BLOCK WITH FLUOROSCOPY (97703, 02778) performed by Steven Barron MD at [place] [address] ARTHROSCOPY Right 6/9/2020    RIGHT SHOULDER ARTHROSCOPE, ROTATOR CUFF AUGMENTATION IMPLANT, SUBACROMIAL DECOMPRESSION, DISTAL CLAVICLE EXCISION AND DEBRIDEMENT performed by Emily Ordoñez DO at [address] [place] [address] ROTATOR CUFF    TOTAL KNEE ARTHROPLASTY Bilateral          Allergies   Allergen Reactions    Arthrotec [Diclofenac-Misoprostol]      HANDS ITCH         Current Outpatient Medications:     Blood Glucose Monitoring Suppl (BLOOD GLUCOSE MONITOR SYSTEM) w/Device KIT, To check blood sugar, Disp: 1 kit, Rfl: 0    Lancets MISC, 1 each by Does not apply route daily Check blood sugars 1-2 per day, Disp: 50 each, Rfl: 11    blood glucose monitor strips, Check blood sugars 1-2 per day, Disp: 50 strip, Rfl: 11    metFORMIN (GLUCOPHAGE-XR) 500 MG extended release tablet, Take 2 tablets by mouth Daily with supper, Disp: 60 tablet, Rfl: 3    atorvastatin (LIPITOR) 40 MG tablet, Take 1 tablet by mouth daily, Disp: 90 tablet, Rfl: 3    Lido-Menthol-Methyl Sal-Camph (CBD KINGS EX), Apply topically, Disp: , Rfl:     medical marijuana, Take by mouth as needed. , Disp: , Rfl:     amLODIPine (NORVASC) 5 MG tablet, Take 5 mg by mouth nightly, Disp: , Rfl:     albuterol (PROVENTIL) (2.5 MG/3ML) 0.083% nebulizer solution, Take 2.5 mg by nebulization every 6 hours as needed for Wheezing, Disp: , Rfl:     budesonide-formoterol (SYMBICORT) 160-4.5 MCG/ACT AERO, Inhale 2 puffs into the lungs 2 times daily, Disp: 3 Inhaler, Rfl: 5    omeprazole (PRILOSEC) 10 MG delayed release capsule, Take 20 mg by mouth daily , Disp: , Rfl:     celecoxib (CELEBREX) 200 MG capsule, Take 1 capsule by mouth daily, Disp: 90 capsule, Rfl: 1    tiZANidine (ZANAFLEX) 4 MG tablet, Take 1 tablet by mouth 3 times daily as needed (Muscle tightness/spasm), Disp: 90 tablet, Rfl: 1    losartan (COZAAR) 50 MG tablet, Take 50 mg by mouth daily, Disp: , Rfl:     Nebulizers (COMPRESSOR/NEBULIZER) MISC, 1 Device by Does not apply route 2 times daily, Disp: 1 each, Rfl: 3    aspirin 81 MG tablet, Take 81 mg by mouth daily, Disp: , Rfl:     ibuprofen (ADVIL;MOTRIN) 200 MG tablet, Take 200 mg by mouth every 6 hours as needed for Pain, Disp: , Rfl:    hydrochlorothiazide (HYDRODIURIL) 25 MG tablet, Take 25 mg by mouth daily as needed , Disp: , Rfl:     albuterol sulfate  (90 Base) MCG/ACT inhaler, Inhale 2 puffs into the lungs every 6 hours as needed for Wheezing, Disp: 1 Inhaler, Rfl: 11    cetirizine (ZYRTEC) 10 MG tablet, Take 10 mg by mouth daily. , Disp: , Rfl:     vitamin D (ERGOCALCIFEROL) 1.25 MG (89900 UT) CAPS capsule, TAKE 1 CAPSULE BY MOUTH ONCE A WEEK, Disp: , Rfl:       Review of Systems:    Constitutional: Negative for fever, chills, and unexpected weight change. HENT: Negative for congestion, ear pain, rhinorrhea,  sore throat and trouble swallowing. Eyes: Negative for photophobia, redness, itching. Respiratory: Negative for cough, shortness of breath and sputum. Cardiovascular: Negative for chest pain, palpitations and leg swelling. Gastrointestinal: Negative for nausea, vomiting, abdominal pain, diarrhea, constipation. Endocrine: Negative for cold intolerance, heat intolerance, polydipsia, polyphagia and polyuria. Genitourinary: Negative for dysuria, urgency, frequency, hematuria and flank pain. Musculoskeletal: Negative for myalgias, back pain, arthralgias and neck pain. Skin/Nail: Negative for rash, itching. Normal nails. Neurological: Negative for seizures, weakness, light-headedness, numbness and headaches. Hematological/ Lymph nodes: Negative for adenopathy. Does not bruise/bleed easily. Psychiatric/Behavioral: Negative for suicidal ideas, depression, anxiety, sleep disturbance and decreased concentration. Objective:   Physical Exam:  BP (!) 144/80   Pulse 71   Temp 97.6 °F (36.4 °C)   Resp 16   Ht 5' 5\" (1.651 m)   Wt 263 lb (119.3 kg)   BMI 43.77 kg/m²   Constitutional: Patient is oriented to person, place, and time. Patient appears well-developed and well-nourished. HENT:    Head: Normocephalic and atraumatic. Eyes: Conjunctivae and EOM are normal.      Neck: Normal range of motion. Cardiovascular: Normal rate, regular rhythm and normal heart sounds. Pulmonary/Chest: Effort normal and breath sounds normal.   Abdominal: Soft. Bowel sounds are normal.   Musculoskeletal: Normal range of motion. Neurological: Patient is alert and oriented to person, place, and time. Patient has normal reflexes. Skin: Skin is warm and dry. Psychiatric: Patient has a normal mood and affect. Patient behavior is normal.     Lab Review:    Office Visit on 06/02/2020   Component Date Value Ref Range Status    SARS-CoV-2 06/02/2020 Not Detected  Not Detected Final    Source 06/02/2020 NP swab   Final           Assessment and Plan     Celeste Curtis was seen today for new patient. Diagnoses and all orders for this visit:    Controlled type 2 diabetes mellitus without complication, without long-term current use of insulin ([facility])  -     Blood Glucose Monitoring Suppl (BLOOD GLUCOSE MONITOR SYSTEM) w/Device KIT; To check blood sugar  -     Lancets MISC; 1 each by Does not apply route daily Check blood sugars 1-2 per day  -     blood glucose monitor strips; Check blood sugars 1-2 per day  -     metFORMIN (GLUCOPHAGE-XR) 500 MG extended release tablet; Take 2 tablets by mouth Daily with supper  -     Hemoglobin A1C; Future  -     Microalbumin / Creatinine Urine Ratio; Future  -     TSH WITH REFLEX TO FT4; Future    Dyslipidemia associated with type 2 diabetes mellitus (HCC)  -     atorvastatin (LIPITOR) 40 MG tablet; Take 1 tablet by mouth daily    Essential hypertension    Morbid obesity due to excess calories ([facility])          1: Type 2 DM uncomplicated   Controlled A1C 6.5% Oct 2020     Change Reg Metformin to ER 500mg one tab daily   After EGD increase metformin ER 500mg, two tabs with dinner     All instructions provided in written. Check Blood sugars 1-2 times per day. Log them along with insulin and send them every 2 weeks. Call for blood sugars less than 60 or more than 400.      Eye exam: Last exam scheduled Nov 2020. Reports early cataract. Foot exam:  Oct 2020   Deformity/amputation: absent  Skin lesions/pre-ulcerative calluses: absent  Edema: right- negative, left- negative  Sensory exam: Monofilament sensation: normal  Pulses: normal, Vibration (128 Hz): Intact    Renal screen: later     TSH screen: Sep 2019     2: HTN   Slightly high     3: Hyperlipidemia   LDL: 91 , HDL: 52 , TGs: 121 Oct 2020    Atorvastatin 20 mg daily   Increased to 40 mg daily     4: Morbid obesity   Discussed weight loss options of exercise (150 minutes per week) plus diet plans   Diet plans may include intermittent fasting, low carb diet, weight watchers, mediterranean diet or caloric restriction. Work on comorbid conditions and improved sleep     RTC in 10 weeks with A1C, MACR, TSH     EDUCATION:   Greater than 50% of this visit was spent in general counseling regarding obesity, diet, exercise, importance of adherence to insulin regime, recognition and treatment of hypo and hyperglycemia,  glucose logging, proper diabetes management, diabetic complications with poor management and the importance of glycemic control in order to avoid the complications of diabetes. Risks and potential complications of diabetes were reviewed with the patient. Diabetes health maintenance plan and follow-up were discussed and understood by the patient. We reviewed the importance of medication compliance and regular follow-up. Aggressive lifestyle modification was encouraged. Exercise Counselling: This patient is a candidate for regular physical exercise. Instructions to perform the following types of exercise:  Swimming or water aerobic exercise  Brisk walking  Playing tennis  Stationary bicycle or elliptical indoor  Low impact aerobic exercise    Instructions given to exercise for the following duration:  30 minutes a day for five-seven days per week.     Following instructions for being active throughout the day in addition to formal exercise:  Walk instead of drive whenever possible  Take the stairs instead of the elevator  Work in the garden  Park to the far end of the parking lot to add more walking steps to destination      Electronically signed by Meredith Kessler MD on 10/30/2020 at 9:50 AM

## 2020-11-03 ENCOUNTER — HOSPITAL ENCOUNTER (OUTPATIENT)
Dept: CT IMAGING | Age: 65
Discharge: HOME | End: 2020-11-03
Payer: MEDICARE

## 2020-11-03 PROCEDURE — G0297 LDCT FOR LUNG CA SCREEN: HCPCS

## 2020-11-03 RX ORDER — GLUCOSAMINE HCL/CHONDROITIN SU 500-400 MG
CAPSULE ORAL
Qty: 50 STRIP | Refills: 11 | Status: SHIPPED | OUTPATIENT
Start: 2020-11-03 | End: 2020-11-04 | Stop reason: SDUPTHER

## 2020-11-04 ENCOUNTER — TELEPHONE (OUTPATIENT)
Dept: ENDOCRINOLOGY | Age: 65
End: 2020-11-04

## 2020-11-04 RX ORDER — GLUCOSAMINE HCL/CHONDROITIN SU 500-400 MG
CAPSULE ORAL
Qty: 100 STRIP | Refills: 11 | Status: SHIPPED | OUTPATIENT
Start: 2020-11-04

## 2020-11-04 RX ORDER — DIPHENHYDRAMINE HYDROCHLORIDE 25 MG/1
CAPSULE, LIQUID FILLED ORAL
Qty: 1 KIT | Refills: 0 | Status: SHIPPED | OUTPATIENT
Start: 2020-11-04

## 2020-11-04 NOTE — TELEPHONE ENCOUNTER
[facility] lvm requesting a glucose meter, test strips, and lancets. The script Can Not read- As Directed or As Needed!! It must say how many per day and give Specific Quantity and Diagnosis codes!  [phone]

## 2020-12-09 ENCOUNTER — VIRTUAL VISIT (OUTPATIENT)
Dept: ENDOCRINOLOGY | Age: 65
End: 2020-12-09
Payer: MEDICARE

## 2020-12-09 PROCEDURE — 97802 MEDICAL NUTRITION INDIV IN: CPT | Performed by: DIETITIAN, REGISTERED

## 2020-12-09 NOTE — PROGRESS NOTES
Medical Nutrition Therapy for Diabetes    Arnulfo Lam  December 9, 2020      Patient Care Team:  Nadja Soler MD as PCP - Keerthi Courtney MD (Orthopedic Surgery)  Rodriguez Mckeon DPM as Consulting Physician (Podiatry)  Walt Tobias MD as Consulting Physician (Obstetrics & Gynecology)  Amberly Griffin RN as Nurse Navigator    Reason for visit: Type 2 Diabetes    ASSESSMENT/PLAN:   NUTRITION DIAGNOSIS   Identified patient by name and date of birth. Patient is meeting this Virtual appointment at home. I am at [facility] Endocrinology office. Patient is the sole participant on the video call. Pursuant to the emergency declaration under the [facility], 1135 waiver authority and the [employer] Act this Virtual Visit was insisted, with patient's consent, to reduce the patient's risk of exposure to COVID-19 and provide continuity of care for an established patient. #1 Problem: Altered Nutrition-Related Laboratory Values (NC-2.2)  Related to: Endocrine/Diabetes   As Evidenced by: Elevated Plasma glucose and/or HgbA1c levels         #2 Problem:  Inconsistent Carbohydrate and Fiber Intake  Related to: Food- and nutrition-related knowledge deficit concerning appropriate amount of carbohydrate and fiber intake  As evidenced by: patient food recall    #3 Problem: Fluid NI-3.1                     Inadequate fluid intake    Patient c/o of nausea every day. NUTRITION INTERVENTION  Nutrition Prescription: 3 meals, 3 snacks                                      15 - 30 grams carbohydrate per meal with protein and non-starch vegetables                                       15 gram carbohydrate snacks    Diabetes Education/Counseling included:  Carbohydrate Control, Activity/exercise, Label-reading, Monitoring, Medications and other: benefits of qulaity sleep and stress management.   Reviewed soft monitor strips Use 4 times daily to check blood sugar Diagnosis Code; E 11.9 100 strip 11    Lancets 30G MISC 4 each by Does not apply route daily Use 4 times daily to check blood sugar Diagnosis Code: E 11.9 200 each 3    vitamin D (ERGOCALCIFEROL) 1.25 MG (95790 UT) CAPS capsule TAKE 1 CAPSULE BY MOUTH ONCE A WEEK      Lancets MISC 1 each by Does not apply route daily Check blood sugars 1-2 per day 50 each 11    metFORMIN (GLUCOPHAGE-XR) 500 MG extended release tablet Take 2 tablets by mouth Daily with supper 60 tablet 3    atorvastatin (LIPITOR) 40 MG tablet Take 1 tablet by mouth daily 90 tablet 3    Lido-Menthol-Methyl Sal-Camph (CBD KINGS EX) Apply topically      medical marijuana Take by mouth as needed.  amLODIPine (NORVASC) 5 MG tablet Take 5 mg by mouth nightly      albuterol (PROVENTIL) (2.5 MG/3ML) 0.083% nebulizer solution Take 2.5 mg by nebulization every 6 hours as needed for Wheezing      budesonide-formoterol (SYMBICORT) 160-4.5 MCG/ACT AERO Inhale 2 puffs into the lungs 2 times daily 3 Inhaler 5    omeprazole (PRILOSEC) 10 MG delayed release capsule Take 20 mg by mouth daily       celecoxib (CELEBREX) 200 MG capsule Take 1 capsule by mouth daily 90 capsule 1    tiZANidine (ZANAFLEX) 4 MG tablet Take 1 tablet by mouth 3 times daily as needed (Muscle tightness/spasm) 90 tablet 1    losartan (COZAAR) 50 MG tablet Take 50 mg by mouth daily      Nebulizers (COMPRESSOR/NEBULIZER) MISC 1 Device by Does not apply route 2 times daily 1 each 3    aspirin 81 MG tablet Take 81 mg by mouth daily      ibuprofen (ADVIL;MOTRIN) 200 MG tablet Take 200 mg by mouth every 6 hours as needed for Pain      hydrochlorothiazide (HYDRODIURIL) 25 MG tablet Take 25 mg by mouth daily as needed       albuterol sulfate  (90 Base) MCG/ACT inhaler Inhale 2 puffs into the lungs every 6 hours as needed for Wheezing 1 Inhaler 11    cetirizine (ZYRTEC) 10 MG tablet Take 10 mg by mouth daily.        No current facility-administered medications for this visit. NUTRITION ASSESSMENT    Biochemical Data:    Lab Results   Component Value Date    LABA1C 6.2 06/20/2018     Lab Results   Component Value Date    .2 06/20/2018       Lab Results   Component Value Date    CHOL 162 05/22/2019    CHOL 198 06/20/2018    CHOL 199 05/31/2017     Lab Results   Component Value Date    TRIG 98 05/22/2019    TRIG 96 06/20/2018    TRIG 103 05/31/2017     Lab Results   Component Value Date    HDL 60 05/22/2019    HDL 53 06/20/2018    HDL 58 05/31/2017     Lab Results   Component Value Date    LDLCALC 82 05/22/2019    LDLCALC 126 (H) 06/20/2018    LDLCALC 120 (H) 05/31/2017     Lab Results   Component Value Date    LABVLDL 19 06/20/2018    LABVLDL 21 05/31/2017    LABVLDL 34 07/23/2015     No results found for: CHOLHDLRATIO    Lab Results   Component Value Date    WBC 10.0 02/25/2019    HGB 13.8 02/25/2019    HCT 41.3 02/25/2019    MCV 93.0 02/25/2019     02/25/2019       Lab Results   Component Value Date    CREATININE 0.8 09/25/2019    BUN 16 09/25/2019     09/25/2019    K 3.9 09/25/2019     09/25/2019    CO2 25 09/25/2019       Diabetes Medications: Yes  Knows name and dose of prescribed medications Yes  Knows prescribed schedule for medicationsYes  Recent change in medication type/dosage: No  Stores  medications properlyYes  Comments:     Monitoring:   Has BG meter: Yes  Testing frequency: random fasting  Recent results: fasting 113  Hypoglycemia? No    Anthropometric Measurements:   Wt:   Wt Readings from Last 3 Encounters:   10/30/20 263 lb (119.3 kg)   08/24/20 278 lb (126.1 kg)   06/15/20 278 lb (126.1 kg)      BMI:   BMI Readings from Last 3 Encounters:   10/30/20 43.77 kg/m²   08/24/20 46.26 kg/m²   06/15/20 46.26 kg/m²     Patient's stated goal weight:   7% Weight loss goal weight:     Physical Activity History:   Physical activity: none  Frequency of activity:   Duration of activity:

## 2021-01-11 DIAGNOSIS — E11.9 CONTROLLED TYPE 2 DIABETES MELLITUS WITHOUT COMPLICATION, WITHOUT LONG-TERM CURRENT USE OF INSULIN (HCC): ICD-10-CM

## 2021-01-12 LAB
A/G RATIO: 1.8 (ref 1.1–2.2)
ALBUMIN SERPL-MCNC: 4.4 G/DL (ref 3.4–5)
ALP BLD-CCNC: 97 U/L (ref 40–129)
ALT SERPL-CCNC: 16 U/L (ref 10–40)
ANION GAP SERPL CALCULATED.3IONS-SCNC: 12 MMOL/L (ref 3–16)
AST SERPL-CCNC: 12 U/L (ref 15–37)
BILIRUB SERPL-MCNC: 0.4 MG/DL (ref 0–1)
BUN BLDV-MCNC: 16 MG/DL (ref 7–20)
CALCIUM SERPL-MCNC: 9.5 MG/DL (ref 8.3–10.6)
CHLORIDE BLD-SCNC: 103 MMOL/L (ref 99–110)
CHOLESTEROL, TOTAL: 172 MG/DL (ref 0–199)
CO2: 26 MMOL/L (ref 21–32)
CREAT SERPL-MCNC: 0.8 MG/DL (ref 0.6–1.2)
CREATININE URINE: 148.7 MG/DL (ref 28–259)
ESTIMATED AVERAGE GLUCOSE: 125.5 MG/DL
FOLATE: 3.75 NG/ML (ref 4.78–24.2)
GFR AFRICAN AMERICAN: >60
GFR NON-AFRICAN AMERICAN: >60
GLOBULIN: 2.5 G/DL
GLUCOSE BLD-MCNC: 118 MG/DL (ref 70–99)
HBA1C MFR BLD: 6 %
HDLC SERPL-MCNC: 60 MG/DL (ref 40–60)
LDL CHOLESTEROL CALCULATED: 92 MG/DL
MICROALBUMIN UR-MCNC: 1.4 MG/DL
MICROALBUMIN/CREAT UR-RTO: 9.4 MG/G (ref 0–30)
POTASSIUM SERPL-SCNC: 4.3 MMOL/L (ref 3.5–5.1)
SODIUM BLD-SCNC: 141 MMOL/L (ref 136–145)
T4 FREE: 1.3 NG/DL (ref 0.9–1.8)
TOTAL PROTEIN: 6.9 G/DL (ref 6.4–8.2)
TRIGL SERPL-MCNC: 98 MG/DL (ref 0–150)
TSH SERPL DL<=0.05 MIU/L-ACNC: 3.03 UIU/ML (ref 0.27–4.2)
VITAMIN B-12: 355 PG/ML (ref 211–911)
VLDLC SERPL CALC-MCNC: 20 MG/DL

## 2021-01-14 ENCOUNTER — VIRTUAL VISIT (OUTPATIENT)
Dept: ENDOCRINOLOGY | Age: 66
End: 2021-01-14
Payer: MEDICARE

## 2021-01-14 DIAGNOSIS — E66.01 MORBID OBESITY DUE TO EXCESS CALORIES (HCC): ICD-10-CM

## 2021-01-14 DIAGNOSIS — I10 ESSENTIAL HYPERTENSION: ICD-10-CM

## 2021-01-14 DIAGNOSIS — E78.5 DYSLIPIDEMIA ASSOCIATED WITH TYPE 2 DIABETES MELLITUS (HCC): ICD-10-CM

## 2021-01-14 DIAGNOSIS — E11.69 DYSLIPIDEMIA ASSOCIATED WITH TYPE 2 DIABETES MELLITUS (HCC): ICD-10-CM

## 2021-01-14 DIAGNOSIS — E11.9 CONTROLLED TYPE 2 DIABETES MELLITUS WITHOUT COMPLICATION, WITHOUT LONG-TERM CURRENT USE OF INSULIN (HCC): Primary | ICD-10-CM

## 2021-01-14 PROCEDURE — 4040F PNEUMOC VAC/ADMIN/RCVD: CPT | Performed by: INTERNAL MEDICINE

## 2021-01-14 PROCEDURE — G8427 DOCREV CUR MEDS BY ELIG CLIN: HCPCS | Performed by: INTERNAL MEDICINE

## 2021-01-14 PROCEDURE — 2022F DILAT RTA XM EVC RTNOPTHY: CPT | Performed by: INTERNAL MEDICINE

## 2021-01-14 PROCEDURE — 1123F ACP DISCUSS/DSCN MKR DOCD: CPT | Performed by: INTERNAL MEDICINE

## 2021-01-14 PROCEDURE — G8399 PT W/DXA RESULTS DOCUMENT: HCPCS | Performed by: INTERNAL MEDICINE

## 2021-01-14 PROCEDURE — 3017F COLORECTAL CA SCREEN DOC REV: CPT | Performed by: INTERNAL MEDICINE

## 2021-01-14 PROCEDURE — 99214 OFFICE O/P EST MOD 30 MIN: CPT | Performed by: INTERNAL MEDICINE

## 2021-01-14 PROCEDURE — 1090F PRES/ABSN URINE INCON ASSESS: CPT | Performed by: INTERNAL MEDICINE

## 2021-01-14 PROCEDURE — 3044F HG A1C LEVEL LT 7.0%: CPT | Performed by: INTERNAL MEDICINE

## 2021-01-14 RX ORDER — METFORMIN HYDROCHLORIDE 500 MG/1
1000 TABLET, EXTENDED RELEASE ORAL
Qty: 180 TABLET | Refills: 1 | Status: SHIPPED | OUTPATIENT
Start: 2021-01-14 | End: 2021-08-18

## 2021-01-14 NOTE — PROGRESS NOTES
Patient ID:   María Bishop is a 76 y.o. female    Chief Complaint:   María Bishop presents for an evaluation of Type 2 Diabetes Mellitus , Hyperlipidemia and hypertension. Pursuant to the emergency declaration under the [address] [facility], [address] [place] [address] and the [employer] Act this visit was conducted after obtaining verbal consent, with the use of [employer] me interactive audio and video telecommunications system that permits real time communication between the patient and provider to substitute for an in-person clinic visit to reduce the patient's risk of exposure to COVID-19 and provide necessary medical care. Because this was a Telehealth visit, evaluation of the following organ systems was limited: Vitals, Constitutional, EENT, Resp, CV, GI, , MS, Neuro, Skin. Heme. Lymph, Imm. María Bishop was at home. Provider was at [facility] office. No one else was involved. The patient has also been advised to contact this office for worsening conditions or problems, and seek emergency medical treatment and/or call 911 if deemed necessary. Subjective:   Type 2 Diabetes Mellitus diagnosed in 2020. Metformin 500mg daily after A1C of 6.5% in Oct 2020     Currently on Metformin Er 500mg, two tabs with dinner. Sometimes lose stools and flatulence. Last steroid for back issues in spring 2020   Has COPD     BS Readings:    1/14  AM- 127    1/13  AM-140    1/5  AM- 114    12/29  AM- 123    12/9  AM-116    12/5  AM-136      Checks blood sugars 0-1 times per day. Reportedly as above   AM:   Lunch:  Supper:   HS:     Hypoglycemias: None     Working on diet   Lost 4 more lbs since last visit   Lost 39 lbs since start of 2020   Meals: Three meals. Snack: sporadic (cheese, fruits, chips, pretzels)   Exercise: limited due to sciatica and lumbar disc issues     Denies chest pain.      Family history of CAD: Mother had CAD in 70's. Maternal GM had CHF in her 63's. Denies smoking.     Currently not on Aspirin 81 mg due to stomach issues     The following portions of the patient's history were reviewed and updated as appropriate:       Family History   Problem Relation Age of Onset    Hypertension Mother     Cancer Father     Colon Cancer Father     Heart Failure Paternal Grandmother          Social History     Socioeconomic History    Marital status:      Spouse name: Not on file    Number of children: Not on file    Years of education: Not on file    Highest education level: Not on file   Occupational History    Occupation: RN   Social Needs    Financial resource strain: Not on file    Food insecurity     Worry: Not on file     Inability: Not on file   [employer] needs     Medical: Not on file     Non-medical: Not on file   Tobacco Use    Smoking status: Former Smoker     Packs/day: 1.00     Years: 40.00     Pack years: 40.00     Types: Cigarettes     Quit date: 10/1/2018     Years since quittin.2    Smokeless tobacco: Never Used    Tobacco comment: smoked for 40 yrs / smoked up to 1 ppd   Substance and Sexual Activity    Alcohol use: Yes     Comment: 3 DRINKS A MONTH    Drug use: Yes     Types: Marijuana     Comment: medical marijuana     Sexual activity: Yes     Partners: Male   Lifestyle    Physical activity     Days per week: Not on file     Minutes per session: Not on file    Stress: Not on file   Relationships    Social connections     Talks on phone: Not on file     Gets together: Not on file     Attends [employer]'s witness service: Not on file     Active member of club or organization: Not on file     Attends meetings of clubs or organizations: Not on file     Relationship status: Not on file    Intimate partner violence     Fear of current or ex partner: Not on file     Emotionally abused: Not on file     Physically abused: Not on file     Forced sexual activity: Not on file   Other Topics Concern  Not on file   Social History Narrative    Not on file       Past Medical History:   Diagnosis Date    CAD (coronary artery disease)     mild MULTI VESSELper VO     COPD (chronic obstructive pulmonary disease) ([facility] [address]     Diabetes mellitus ([facility]     HTN (hypertension)     Hyperlipidemia     Osteoarthritis, foot, localized 8/25/2014    Spinal stenosis        Past Surgical History:   Procedure Laterality Date    ANKLE FRACTURE SURGERY      CHOLECYSTECTOMY      COLONOSCOPY N/A 12/11/2019    COLONOSCOPY POLYPECTOMY SNARE/COLD BIOPSY performed by Randy Delgado MD at [facility] Bilateral 12/18/2019    BILATERAL L3, L4 AND L5 DORSAL RAMUS MEDIAL BRANCH BLOCK WITH FLUOROSCOPY (37318, 12256) performed by Jose Luis Roque MD at [facility] ARTHROSCOPY Right 6/9/2020    RIGHT SHOULDER ARTHROSCOPE, ROTATOR CUFF AUGMENTATION IMPLANT, SUBACROMIAL DECOMPRESSION, DISTAL CLAVICLE EXCISION AND DEBRIDEMENT performed by Dunia Rosen DO at [facility] OR    SHOULDER SURGERY      RIGHT ROTATOR CUFF    TOTAL KNEE ARTHROPLASTY Bilateral          Allergies   Allergen Reactions    Arthrotec [Diclofenac-Misoprostol]      HANDS ITCH         Current Outpatient Medications:     Cholecalciferol (VITAMIN D3) 125 MCG (5000 UT) TABS, Take by mouth, Disp: , Rfl:     metFORMIN (GLUCOPHAGE-XR) 500 MG extended release tablet, Take 2 tablets by mouth Daily with supper, Disp: 180 tablet, Rfl: 1    Blood Glucose Monitoring Suppl (BLOOD GLUCOSE MONITOR SYSTEM) w/Device KIT, To check blood sugar 4 times daily.  Diagnosis Code: E 11.9, Disp: 1 kit, Rfl: 0    blood glucose monitor strips, Use 4 times daily to check blood sugar Diagnosis Code; E 11.9, Disp: 100 strip, Rfl: 11    Lancets 30G MISC, 4 each by Does not apply route daily Use 4 times daily to check blood sugar Diagnosis Code: E 11.9, Disp: 200 each, Rfl: 3    Lancets MISC, 1 each by Does not apply route daily Check blood sugars 1-2 per day, Disp: 50 each, Rfl: 11    atorvastatin (LIPITOR) 40 MG tablet, Take 1 tablet by mouth daily, Disp: 90 tablet, Rfl: 3    Lido-Menthol-Methyl Sal-Camph (CBD KINGS EX), Apply topically, Disp: , Rfl:     medical marijuana, Take by mouth as needed. , Disp: , Rfl:     amLODIPine (NORVASC) 5 MG tablet, Take 5 mg by mouth nightly, Disp: , Rfl:     albuterol (PROVENTIL) (2.5 MG/3ML) 0.083% nebulizer solution, Take 2.5 mg by nebulization every 6 hours as needed for Wheezing, Disp: , Rfl:     budesonide-formoterol (SYMBICORT) 160-4.5 MCG/ACT AERO, Inhale 2 puffs into the lungs 2 times daily, Disp: 3 Inhaler, Rfl: 5    omeprazole (PRILOSEC) 10 MG delayed release capsule, Take 20 mg by mouth daily , Disp: , Rfl:     celecoxib (CELEBREX) 200 MG capsule, Take 1 capsule by mouth daily (Patient taking differently: Take 200 mg by mouth as needed ), Disp: 90 capsule, Rfl: 1    tiZANidine (ZANAFLEX) 4 MG tablet, Take 1 tablet by mouth 3 times daily as needed (Muscle tightness/spasm), Disp: 90 tablet, Rfl: 1    losartan (COZAAR) 50 MG tablet, Take 50 mg by mouth daily, Disp: , Rfl:     Nebulizers (COMPRESSOR/NEBULIZER) MISC, 1 Device by Does not apply route 2 times daily, Disp: 1 each, Rfl: 3    ibuprofen (ADVIL;MOTRIN) 200 MG tablet, Take 200 mg by mouth every 6 hours as needed for Pain, Disp: , Rfl:     hydrochlorothiazide (HYDRODIURIL) 25 MG tablet, Take 25 mg by mouth daily as needed , Disp: , Rfl:     cetirizine (ZYRTEC) 10 MG tablet, Take 10 mg by mouth as needed , Disp: , Rfl:     albuterol sulfate  (90 Base) MCG/ACT inhaler, Inhale 2 puffs into the lungs every 6 hours as needed for Wheezing, Disp: 1 Inhaler, Rfl: 11      Review of Systems:    Constitutional: Negative for fever, chills, and unexpected weight change. HENT: Negative for congestion, ear pain, rhinorrhea,  sore throat and trouble swallowing.    Eyes: Negative for photophobia, redness, itching. Respiratory: Negative for cough, shortness of breath and sputum. Cardiovascular: Negative for chest pain, palpitations and leg swelling. Gastrointestinal: Negative for nausea, vomiting, abdominal pain, diarrhea, constipation. Endocrine: Negative for cold intolerance, heat intolerance, polydipsia, polyphagia and polyuria. Genitourinary: Negative for dysuria, urgency, frequency, hematuria and flank pain. Musculoskeletal: Negative for myalgias, back pain, arthralgias and neck pain. Skin/Nail: Negative for rash, itching. Normal nails. Neurological: Negative for seizures, weakness, light-headedness, numbness and headaches. Hematological/ Lymph nodes: Negative for adenopathy. Does not bruise/bleed easily. Psychiatric/Behavioral: Negative for suicidal ideas, depression, anxiety, sleep disturbance and decreased concentration. Objective:   Physical Exam:  There were no vitals taken for this visit. Constitutional: Patient is oriented to person, place, and time. Patient appears well-developed and well-nourished. Pulmonary/Chest: Effort normal.   Neurological: Patient is alert and oriented to person, place, and time. Psychiatric: Patient has a normal mood and affect.  Patient behavior is normal.     Lab Review:    Orders Only on 01/11/2021   Component Date Value Ref Range Status    TSH 01/11/2021 3.03  0.27 - 4.20 uIU/mL Final   Orders Only on 01/11/2021   Component Date Value Ref Range Status    Hemoglobin A1C 01/11/2021 6.0  See comment % Final    eAG 01/11/2021 125.5  mg/dL Final   Orders Only on 01/11/2021   Component Date Value Ref Range Status    T4 Free 01/11/2021 1.3  0.9 - 1.8 ng/dL Final   Orders Only on 01/11/2021   Component Date Value Ref Range Status    Vitamin B-12 01/11/2021 355  211 - 911 pg/mL Final    Folate 01/11/2021 3.75* 4.78 - 24.20 ng/mL Final   Orders Only on 01/11/2021   Component Date Value Ref Range Status    Cholesterol, Total 01/11/2021 172  0 - 199 mg/dL Final    Triglycerides 01/11/2021 98  0 - 150 mg/dL Final    HDL 01/11/2021 60  40 - 60 mg/dL Final    LDL Calculated 01/11/2021 92  <100 mg/dL Final    VLDL Cholesterol Calculated 01/11/2021 20  Not Established mg/dL Final   Orders Only on 01/11/2021   Component Date Value Ref Range Status    Sodium 01/11/2021 141  136 - 145 mmol/L Final    Potassium 01/11/2021 4.3  3.5 - 5.1 mmol/L Final    Chloride 01/11/2021 103  99 - 110 mmol/L Final    CO2 01/11/2021 26  21 - 32 mmol/L Final    Anion Gap 01/11/2021 12  3 - 16 Final    Glucose 01/11/2021 118* 70 - 99 mg/dL Final    BUN 01/11/2021 16  7 - 20 mg/dL Final    CREATININE 01/11/2021 0.8  0.6 - 1.2 mg/dL Final    GFR Non- 01/11/2021 >60  >60 Final    GFR  01/11/2021 >60  >60 Final    Calcium 01/11/2021 9.5  8.3 - 10.6 mg/dL Final    Total Protein 01/11/2021 6.9  6.4 - 8.2 g/dL Final    Alb 01/11/2021 4.4  3.4 - 5.0 g/dL Final    Albumin/Globulin Ratio 01/11/2021 1.8  1.1 - 2.2 Final    Total Bilirubin 01/11/2021 0.4  0.0 - 1.0 mg/dL Final    Alkaline Phosphatase 01/11/2021 97  40 - 129 U/L Final    ALT 01/11/2021 16  10 - 40 U/L Final    AST 01/11/2021 12* 15 - 37 U/L Final    Globulin 01/11/2021 2.5  g/dL Final   Orders Only on 01/11/2021   Component Date Value Ref Range Status    Microalbumin, Random Urine 01/11/2021 1.40  <2.0 mg/dL Final    Creatinine, Ur 01/11/2021 148.7  28.0 - 259.0 mg/dL Final    Microalbumin Creatinine Ratio 01/11/2021 9.4  0.0 - 30.0 mg/g Final   Office Visit on 06/02/2020   Component Date Value Ref Range Status    SARS-CoV-2 06/02/2020 Not Detected  Not Detected Final    Source 06/02/2020 NP swab   Final           Assessment and Plan     Tomas Henry was seen today for diabetes.     Diagnoses and all orders for this visit:    Controlled type 2 diabetes mellitus without complication, without long-term current use of insulin (HCC)  -     Hemoglobin A1C; Future  -     metFORMIN (GLUCOPHAGE-XR) 500 MG extended release tablet; Take 2 tablets by mouth Daily with supper    Dyslipidemia associated with type 2 diabetes mellitus ([address]    Essential hypertension    Morbid obesity due to excess calories ([address]          1: Type 2 DM uncomplicated   Controlled A1C 6% < 6.5%      Blood sugars are in decent control   Target for fasting blood sugars under 120     C/w metformin ER 500mg, two tabs with dinner     All instructions provided in written. Check Blood sugars 1 times per day. Log them along with insulin and send them every 2 weeks. Call for blood sugars less than 60 or more than 400. Eye exam: Last exam scheduled Nov 2020. Reports early cataract. Foot exam:  Oct 2020   Deformity/amputation: absent  Skin lesions/pre-ulcerative calluses: absent  Edema: right- negative, left- negative  Sensory exam: Monofilament sensation: normal  Pulses: normal, Vibration (128 Hz): Intact    Renal screen: normal Jan 2021     TSH screen: Jan 2021  Saw Mike MAN     2: HTN   Controlled     3: Hyperlipidemia   LDL: 92 , HDL: 60, TGs: 98 Jan 2021     Atorvastatin 40 mg daily     4: Morbid obesity   Discussed weight loss options of exercise (150 minutes per week) plus diet plans   Diet plans may include intermittent fasting, low carb diet, weight watchers, mediterranean diet or caloric restriction. Work on comorbid conditions and improved sleep     RTC in 5 months with A1C     EDUCATION:   Greater than 50% of this visit was spent in general counseling regarding obesity, diet, exercise, importance of adherence to insulin regime, recognition and treatment of hypo and hyperglycemia,  glucose logging, proper diabetes management, diabetic complications with poor management and the importance of glycemic control in order to avoid the complications of diabetes. Risks and potential complications of diabetes were reviewed with the patient.   Diabetes health maintenance plan and follow-up were discussed and understood by the patient. We reviewed the importance of medication compliance and regular follow-up. Aggressive lifestyle modification was encouraged. Exercise Counselling: This patient is a candidate for regular physical exercise. Instructions to perform the following types of exercise:  Swimming or water aerobic exercise  Brisk walking  Playing tennis  Stationary bicycle or elliptical indoor  Low impact aerobic exercise    Instructions given to exercise for the following duration:  30 minutes a day for five-seven days per week.     Following instructions for being active throughout the day in addition to formal exercise:  Walk instead of drive whenever possible  Take the stairs instead of the elevator  Work in the garden  Park to the far end of the parking lot to add more walking steps to destination      Electronically signed by Naeem Stewart MD on 1/14/2021 at 11:11 AM

## 2021-02-24 ENCOUNTER — IMMUNIZATION (OUTPATIENT)
Dept: PRIMARY CARE CLINIC | Age: 66
End: 2021-02-24
Payer: MEDICARE

## 2021-02-24 PROCEDURE — 0001A COVID-19, PFIZER VACCINE 30MCG/0.3ML DOSE: CPT | Performed by: FAMILY MEDICINE

## 2021-02-24 PROCEDURE — 91300 COVID-19, PFIZER VACCINE 30MCG/0.3ML DOSE: CPT | Performed by: FAMILY MEDICINE

## 2021-03-10 ENCOUNTER — OFFICE VISIT (OUTPATIENT)
Dept: PULMONOLOGY | Age: 66
End: 2021-03-10
Payer: MEDICARE

## 2021-03-10 VITALS — HEART RATE: 91 BPM | OXYGEN SATURATION: 96 %

## 2021-03-10 DIAGNOSIS — J45.40 MODERATE PERSISTENT ASTHMA, UNCOMPLICATED: ICD-10-CM

## 2021-03-10 DIAGNOSIS — R06.02 SOB (SHORTNESS OF BREATH): Primary | ICD-10-CM

## 2021-03-10 DIAGNOSIS — K21.9 GASTROESOPHAGEAL REFLUX DISEASE WITHOUT ESOPHAGITIS: ICD-10-CM

## 2021-03-10 DIAGNOSIS — J44.9 COPD, MODERATE (HCC): ICD-10-CM

## 2021-03-10 PROCEDURE — G8427 DOCREV CUR MEDS BY ELIG CLIN: HCPCS | Performed by: INTERNAL MEDICINE

## 2021-03-10 PROCEDURE — 99214 OFFICE O/P EST MOD 30 MIN: CPT | Performed by: INTERNAL MEDICINE

## 2021-03-10 PROCEDURE — G8399 PT W/DXA RESULTS DOCUMENT: HCPCS | Performed by: INTERNAL MEDICINE

## 2021-03-10 PROCEDURE — 3023F SPIROM DOC REV: CPT | Performed by: INTERNAL MEDICINE

## 2021-03-10 PROCEDURE — 1036F TOBACCO NON-USER: CPT | Performed by: INTERNAL MEDICINE

## 2021-03-10 PROCEDURE — 1123F ACP DISCUSS/DSCN MKR DOCD: CPT | Performed by: INTERNAL MEDICINE

## 2021-03-10 PROCEDURE — 3017F COLORECTAL CA SCREEN DOC REV: CPT | Performed by: INTERNAL MEDICINE

## 2021-03-10 PROCEDURE — G8926 SPIRO NO PERF OR DOC: HCPCS | Performed by: INTERNAL MEDICINE

## 2021-03-10 PROCEDURE — G8482 FLU IMMUNIZE ORDER/ADMIN: HCPCS | Performed by: INTERNAL MEDICINE

## 2021-03-10 PROCEDURE — G8417 CALC BMI ABV UP PARAM F/U: HCPCS | Performed by: INTERNAL MEDICINE

## 2021-03-10 PROCEDURE — 4040F PNEUMOC VAC/ADMIN/RCVD: CPT | Performed by: INTERNAL MEDICINE

## 2021-03-10 PROCEDURE — 1090F PRES/ABSN URINE INCON ASSESS: CPT | Performed by: INTERNAL MEDICINE

## 2021-03-10 RX ORDER — BUDESONIDE AND FORMOTEROL FUMARATE DIHYDRATE 160; 4.5 UG/1; UG/1
2 AEROSOL RESPIRATORY (INHALATION) 2 TIMES DAILY
Qty: 3 INHALER | Refills: 5 | Status: SHIPPED | OUTPATIENT
Start: 2021-03-10 | End: 2021-09-15 | Stop reason: SDUPTHER

## 2021-03-10 RX ORDER — PREDNISONE 10 MG/1
TABLET ORAL
Qty: 30 TABLET | Refills: 3 | Status: SHIPPED | OUTPATIENT
Start: 2021-03-10 | End: 2021-03-20

## 2021-03-10 RX ORDER — BUDESONIDE AND FORMOTEROL FUMARATE DIHYDRATE 160; 4.5 UG/1; UG/1
2 AEROSOL RESPIRATORY (INHALATION) 2 TIMES DAILY
Qty: 3 INHALER | Refills: 5 | Status: SHIPPED | OUTPATIENT
Start: 2021-03-10 | End: 2021-03-10 | Stop reason: SDUPTHER

## 2021-03-10 RX ORDER — DOXYCYCLINE HYCLATE 100 MG/1
100 CAPSULE ORAL DAILY
Qty: 10 CAPSULE | Refills: 3 | Status: SHIPPED | OUTPATIENT
Start: 2021-03-10 | End: 2021-03-20

## 2021-03-10 NOTE — PROGRESS NOTES
[facility]  Consult Note  Dennise Singh MD       Poonam Giordano   YOB: 1955    Date of Visit:  3/10/2021    Assessment/Plan:  1. SOB (shortness of breath)  Multifactorial  · COPD  · Asthma  · Obesity  · Deconditioning    Feels this is worse due to lack of exercise during COVID    2. COPD, moderate  PFT 2/19:  Spirometry:  Flow volume loops were obstructed. The FEV-1/FVC ratio was   decreased. The FEV-1 was 1.5 liters (59% of predicted), which was   moderately decreased. The FVC was 2.15 liters (65% of predicted),   which was decreased. Response to inhaled bronchodilators   (albuterol) was not significant. Lung volumes:  Lung volumes were tested by plethysmography. The total lung   capacity was 4.45 liters (88% of predicted), which was normal.   The residual volume was 2.24 liters (114% of predicted), which   was increased. The ratio of residual volume to total lung   capacity (RV/TLC) was 50, which was incraesed. Diffusion capacity was found to be mildly decreased.       Interpretation:  Moderate obstructive airway disease. Symbicort  Albuterol    The patient would benefit from the use of a nebulizer at home. I gave her a script for Doxy and Pred to hold onto in case she flares up. 3. Moderate persistent asthma, uncomplicated    Did not have methacholine because PFT was abnormal    4. GERD  Try Prilosec or similar nightly for the next month  Nothing to eat several hours before bed  Sleep with the head of the bed elevated. She has had her first COVID shot and scheduled for her second    6 months      Chief Complaint   Patient presents with    Shortness of Breath     6 month Daughter needs a place to stay pt has had her 1st COVID Vaccine and getting 2nd one the 17th Daughter works on [address] [employer]        HPI  The patient presents with a chief complaint of moderate shortness of breath related to moderate COPD of many years duration.  He has mild associated cough. Exertion is a modifying factor. She has not had recent flare up. Review of Systems  No Chest pain, Nausea or vomiting reported    History  I have reviewed past medical, surgical, social and family history. This is documented elsewhere in themedical record. Physical Exam:  Well developed, well nourished  Alert and oriented  Sclera is clear  No cervical adenopathy  No JVD. Chest examination is clear. Cardiac examination reveals regular rate and rhythm without murmur, gallop or rub. The abdomen is soft, nontender and nondistended. There is no clubbing, cyanosis or edema of the extremities. There is no obvious skin rash. No focal neuro deficicts  Normal mood and affect      Allergies   Allergen Reactions    Arthrotec [Diclofenac-Misoprostol]      HANDS ITCH     Prior to Visit Medications    Medication Sig Taking? Authorizing Provider   Cholecalciferol (VITAMIN D3) 125 MCG (5000 UT) TABS Take by mouth  Historical Provider, MD   metFORMIN (GLUCOPHAGE-XR) 500 MG extended release tablet Take 2 tablets by mouth Daily with supper  Arden Herrera MD   Blood Glucose Monitoring Suppl (BLOOD GLUCOSE MONITOR SYSTEM) w/Device KIT To check blood sugar 4 times daily. Diagnosis Code: E 11.9  Arden Herrera MD   blood glucose monitor strips Use 4 times daily to check blood sugar Diagnosis Code; E 11.9  Arden Herrera MD   Lancets 30G MISC 4 each by Does not apply route daily Use 4 times daily to check blood sugar Diagnosis Code: E 11.9  Arden Herrera MD   Lancets MISC 1 each by Does not apply route daily Check blood sugars 1-2 per day  Arden Herrera MD   atorvastatin (LIPITOR) 40 MG tablet Take 1 tablet by mouth daily  Arden Herrera MD   Lido-Menthol-Methyl Sal-Camph (CBD KINGS EX) Apply topically  Historical Provider, MD   medical marijuana Take by mouth as needed.   Historical Provider, MD   amLODIPine (NORVASC) 5 MG tablet Take 5 mg by Smokeless Tobacco Never Used   Tobacco Comment    smoked for 40 yrs / smoked up to 1 ppd

## 2021-03-12 ENCOUNTER — TELEPHONE (OUTPATIENT)
Dept: PULMONOLOGY | Age: 66
End: 2021-03-12

## 2021-03-12 NOTE — TELEPHONE ENCOUNTER
Patient said her daughter is supposed to be coming to stay with them today. She was exposed to someone with COVID last Saturday at a .  Her daughter has had both her vaccines. Should the daughter stay away from them for a little longer or is it okay for her to come to their house? Please call patient at [phone].

## 2021-03-12 NOTE — TELEPHONE ENCOUNTER
Per Dr Eliecer Branham I let the patient know that if they were vaccinated she will be ok to come of not she should hold off on the visit.      The patient states they have had their vaccines

## 2021-03-17 ENCOUNTER — IMMUNIZATION (OUTPATIENT)
Dept: PRIMARY CARE CLINIC | Age: 66
End: 2021-03-17
Payer: MEDICARE

## 2021-03-17 PROCEDURE — 0002A COVID-19, PFIZER VACCINE 30MCG/0.3ML DOSE: CPT | Performed by: FAMILY MEDICINE

## 2021-03-17 PROCEDURE — 91300 COVID-19, PFIZER VACCINE 30MCG/0.3ML DOSE: CPT | Performed by: FAMILY MEDICINE

## 2021-05-17 ENCOUNTER — HOSPITAL ENCOUNTER (OUTPATIENT)
Dept: MAMMOGRAPHY | Age: 66
Discharge: HOME | End: 2021-05-17
Payer: MEDICARE

## 2021-05-17 VITALS — WEIGHT: 246 LBS | HEIGHT: 65 IN | BODY MASS INDEX: 40.98 KG/M2

## 2021-05-17 DIAGNOSIS — Z12.31 ENCOUNTER FOR SCREENING MAMMOGRAM FOR BREAST CANCER: ICD-10-CM

## 2021-05-17 PROCEDURE — 77063 BREAST TOMOSYNTHESIS BI: CPT

## 2021-05-20 ENCOUNTER — TELEPHONE (OUTPATIENT)
Dept: PULMONOLOGY | Age: 66
End: 2021-05-20

## 2021-05-20 RX ORDER — BENZONATATE 200 MG/1
200 CAPSULE ORAL 3 TIMES DAILY PRN
Qty: 30 CAPSULE | Refills: 1 | Status: SHIPPED | OUTPATIENT
Start: 2021-05-20 | End: 2021-05-27

## 2021-05-20 NOTE — TELEPHONE ENCOUNTER
Pt called and left message saying she wants some medication for her cough.  She said she had been on some before from a different doctor     Call pt at [phone]

## 2021-07-27 PROBLEM — I73.9 CLAUDICATION IN PERIPHERAL VASCULAR DISEASE (HCC): Status: ACTIVE | Noted: 2021-07-27

## 2021-07-27 PROBLEM — E11.9 DIABETES MELLITUS (HCC): Status: ACTIVE | Noted: 2020-10-30

## 2021-07-27 PROBLEM — R06.02 SOB (SHORTNESS OF BREATH): Status: RESOLVED | Noted: 2019-01-23 | Resolved: 2021-07-27

## 2021-07-29 ENCOUNTER — TELEPHONE (OUTPATIENT)
Dept: PULMONOLOGY | Age: 66
End: 2021-07-29

## 2021-07-29 RX ORDER — DOXYCYCLINE HYCLATE 100 MG
100 TABLET ORAL 2 TIMES DAILY
Qty: 20 TABLET | Refills: 4 | Status: SHIPPED | OUTPATIENT
Start: 2021-07-29 | End: 2021-08-08

## 2021-07-29 RX ORDER — PREDNISONE 10 MG/1
10 TABLET ORAL DAILY
Qty: 30 TABLET | Refills: 4 | Status: SHIPPED | OUTPATIENT
Start: 2021-07-29 | End: 2021-08-28

## 2021-07-29 NOTE — TELEPHONE ENCOUNTER
Pt called and said she needs refills on an antibiotic and steroid, pt did not know what they were called and said she needs 4 refills on them     Call pt at [phone]

## 2021-07-30 ENCOUNTER — TELEPHONE (OUTPATIENT)
Dept: FAMILY MEDICINE CLINIC | Age: 66
End: 2021-07-30

## 2021-07-30 NOTE — TELEPHONE ENCOUNTER
----- Message from Marilou Loja sent at 7/30/2021 11:53 AM EDT -----  Subject: Medication Problem    QUESTIONS  Name of Medication? celecoxib (CELEBREX) 200 MG capsule  Patient-reported dosage and instructions? 2X/day  What question or problem do you have with the medication? Wanted to give   the clinical staff the update on the medication. States she takes 2 per   day 200MG and forgot to mention this to Dr. Ricky Garcia at her appt on 7/27/21  Preferred Pharmacy? [facility], [address] [place] [address] 21 [phone] - F [phone]  Pharmacy phone number (if available)? [phone]  Additional Information for Provider?   ---------------------------------------------------------------------------  --------------  [address]  What is the best way for the office to contact you? OK to leave message on   voicemail  Preferred Call Back Phone Number? [phone]  ---------------------------------------------------------------------------  --------------  SCRIPT ANSWERS  Relationship to Patient?  Self

## 2021-08-15 NOTE — FLOWSHEET NOTE
[facility] - Outpatient Physical Therapy    Physical Therapy Daily Treatment/Progress Note  Date:  2019    Patient Name:  Jesusita Osgood  \"Ana Luisa\"  :  1955  MRN: [id number]  Medical/Treatment Diagnosis Information:  · Diagnosis: Neck/right arm/thorax strain S16.1xxA, C81.089V, S20.229A  · Treatment Diagnosis: Impaired cervical ROM, strength, stability, decreased posture, increased muscle guarding   Insurance/Certification information:  PT Insurance Information: [facility]  Physician Information:  Referring Practitioner: KIRA De Guzman   Plan of care signed (Y/N):  Y,     Date of Patient follow up with Physician: 19    Progress Note: [x]  Yes  []  No  Next due by:  WEEKLY (FAX TO [place] [phone])      Latex Allergy:  [x]NO      []YES  Preferred Language for Healthcare:   [x]English       []other:    Visit # Insurance Allowable Date Range (if applicable)    9   to 19  Case Chavez Morataya  Case # [id number]     Pain level: 3 /10 NECK/RIGHT SHOULDER, CONSTANT  - 5-6/10 low back     SUBJECTIVE:  Pt states she saw MD for her knee earlier this week. Believes she has improved about 50% since beginning therapy for her neck. Still having pain/trouble with ADLs - head movement, reaching, and doing her hair. Arm gets fatigued quickly. Slept in a bad position last night.      OBJECTIVE:    : patient 8 mins late   :   STM: trigger points in R UT near insertion  cerv AROM: flexion 30 deg, ext 25 deg, SBing 30 deg, rotation R 30, rotation L 45 deg    GHJ strength: 5/5 B  : Cervical Rotation AROM    R: 46*   L:  40*    RESTRICTIONS/PRECAUTIONS:  Also has history of B TKR, hip issues, and  chronic low back pain (set to have epidural)     Exercises/Interventions:     Therapeutic Exercises (34622)  Start time: 2:38 pm   End time: 2:47pm Resistance / level Sets/sec Reps Notes   UBE  When tolerated    Pulleys        Mid Row   High Row  LPD  ER/IR GreenGreenX 10X 10With cues for form   Cervical AROM Rotation        Supine Chin Tucks with towel  CT with rotation      HEP discussion        W wall slides  Chin Tucks with nerf ball on wall    5\"  X 10   X 10     scap pull    Chin Tucks with shoulder flex/abd (on wall)     Mid rows     LPD           Therapeutic Activities (52140)  Start time:   End time:                                    Neuromuscular Re-ed (17762)  Start time:   End time:        UT and LS stretches     1/2 foam roll along spine with PPT, scap retract and alt GHJ raises     1/2 foam roll along spine with PPT, scap retract and GHJ abduction     IR stretch with SOS      ER stretch on the wall     Self mobs to upper thor spine with foam roll   X 4 mins    Thor Ext over foam roll in chair   X 4 mins           theracane to R UT        Manual Intervention (52567)  Start time:   End time:          SOR,  STM cervical paraspinals, PROM cerv spine all directions    x4 mins    Trigger point release to R UT and manual cerv traction   X 4 mins    attempted prone PA mobs to cerv spine, but cannot tolerate the position for long periods    x1 min                             Pt. Education:  8/16: Reassessed goals, discussed progress made and areas remaining for improvement, issued outcome measure  7/25:  -patient educated on diagnosis, prognosis and expectations for rehab  -all patient questions were answered    HEP instruction:  8/8: added tband therex to HEP and issued green and blue bands, added IR and ER stretches, also issued general LE mobility exercises for the pool for her hip and back   -pt provided with written and illustrated instructions for HEP (see scanned image in )  7/25  Cervical AROM flex/ext, rotation B, gentle supine chin tucks, scap squeezes/shoulder rolls     Therapeutic Exercise and NMR EXR  [x] (65582) Provided verbal/tactile cueing for activities related to strengthening, flexibility, endurance, ROM for improvements in  [] LE / Lumbar: LE, Self Care:  [] (37468) Home Management Training / Self-care: ADLs and compensatory training, meal preparation, safety procedures and instruction in use of adaptive equipment, including bathing, grooming, dressing, personal hygiene, basic household cleaning and chores. Home Exercise Program:    [] (19862) Reviewed/Progressed HEP activities related to strengthening, flexibility, endurance, ROM of   [] LE / Lumbar: core, proximal hip and LE for functional self-care, mobility, lifting and ambulation/stair navigation   [] UE / Cervical: cervical, postural, scapular, scapulothoracic and UE control with self care, reaching, carrying, lifting, house/yardwork, driving, computer work  [] (15697)Reviewed/Progressed HEP activities related to improving balance, coordination, kinesthetic sense, posture, motor skill, proprioception of   [] LE: core, proximal hip and LE for self care, mobility, lifting, and ambulation/stair navigation    [] UE / Cervical: cervical, postural,  scapular, scapulothoracic and UE control with self care, reaching, carrying, lifting, house/yardwork, driving, computer work    Manual Treatments:  PROM / STM / Oscillations-Mobs:  G-I, II, III, IV (PA's, Inf., Post.)  [x] (04877) Provided manual therapy to mobilize LE, proximal hip and/or LS spine soft tissue/joints for the purpose of modulating pain, promoting relaxation,  increasing ROM, reducing/eliminating soft tissue swelling/inflammation/restriction, improving soft tissue extensibility and allowing for proper ROM for normal function with   [] LE / lumbar: self care, mobility, lifting and ambulation. [x] UE / Cervical: self care, reaching, carrying, lifting, house/yardwork, driving, computer work.      Modalities:  [] (99150) Vasopneumatic compression: Utilized vasopneumatic compression to decrease edema / swelling for the purpose of improving mobility and quad tone / recruitment which will allow for increased overall function including but not Limitations/Impairments:  [x]Sleeping []Sitting               []Standing []Transfers        []Walking []Kneeling               []Stairs []Squatting / bending   []ADLs [x]Reaching  [x]Lifting  [x]Housework  [x]Driving [x]Job related tasks  []Sports/Recreation []Other:        ASSESSMENT:  See PN      Treatment/Activity Tolerance:  [x] Patient able to complete tx  [] Patient limited by fatigue  [] Patient limited by pain  [] Patient limited by other medical complications  [] Other:     Prognosis: [x] Good [] Fair  [] Poor    Patient Requires Follow-up: [x] Yes  [] No    PLAN: See elia. PT 3x / week for 3 weeks.   Recommend continuing 2-3x/week x 3 weeks to continue beyond 8/17 approval date   [x] Continue per plan of care [] Alter current plan (see comments)  [] Plan of care initiated [] Hold pending MD visit [] Discharge    Electronically signed by: Mary Haro, PT, DPT no

## 2021-08-19 ENCOUNTER — VIRTUAL VISIT (OUTPATIENT)
Dept: ENDOCRINOLOGY | Age: 66
End: 2021-08-19
Payer: MEDICARE

## 2021-08-19 DIAGNOSIS — I10 ESSENTIAL HYPERTENSION: ICD-10-CM

## 2021-08-19 DIAGNOSIS — E66.01 MORBID OBESITY DUE TO EXCESS CALORIES (HCC): ICD-10-CM

## 2021-08-19 DIAGNOSIS — E11.9 CONTROLLED TYPE 2 DIABETES MELLITUS WITHOUT COMPLICATION, WITHOUT LONG-TERM CURRENT USE OF INSULIN (HCC): Primary | ICD-10-CM

## 2021-08-19 PROCEDURE — 2022F DILAT RTA XM EVC RTNOPTHY: CPT | Performed by: INTERNAL MEDICINE

## 2021-08-19 PROCEDURE — 4040F PNEUMOC VAC/ADMIN/RCVD: CPT | Performed by: INTERNAL MEDICINE

## 2021-08-19 PROCEDURE — 3044F HG A1C LEVEL LT 7.0%: CPT | Performed by: INTERNAL MEDICINE

## 2021-08-19 PROCEDURE — G8427 DOCREV CUR MEDS BY ELIG CLIN: HCPCS | Performed by: INTERNAL MEDICINE

## 2021-08-19 PROCEDURE — 3017F COLORECTAL CA SCREEN DOC REV: CPT | Performed by: INTERNAL MEDICINE

## 2021-08-19 PROCEDURE — 1123F ACP DISCUSS/DSCN MKR DOCD: CPT | Performed by: INTERNAL MEDICINE

## 2021-08-19 PROCEDURE — 99214 OFFICE O/P EST MOD 30 MIN: CPT | Performed by: INTERNAL MEDICINE

## 2021-08-19 PROCEDURE — G8399 PT W/DXA RESULTS DOCUMENT: HCPCS | Performed by: INTERNAL MEDICINE

## 2021-08-19 PROCEDURE — 1090F PRES/ABSN URINE INCON ASSESS: CPT | Performed by: INTERNAL MEDICINE

## 2021-08-19 NOTE — PROGRESS NOTES
Patient ID:   Priscilla Miner is a 76 y.o. female    Chief Complaint:   Priscilla Miner presents for an evaluation of Type 2 Diabetes Mellitus , Hyperlipidemia and hypertension. Pursuant to the emergency declaration under the [facility], [address] [place] [address] and the [employer] General Act this visit was conducted after obtaining verbal consent, with the use of D[employer]. me interactive audio and video telecommunications system that permits real time communication between the patient and provider to substitute for an in-person clinic visit to reduce the patient's risk of exposure to COVID-19 and provide necessary medical care. Because this was a Telehealth visit, evaluation of the following organ systems was limited: Vitals, Constitutional, EENT, Resp, CV, GI, , MS, Neuro, Skin. Heme. Lymph, Imm. Priscilla Miner was at home. Provider was at [facility] office. No one else was involved. The patient has also been advised to contact this office for worsening conditions or problems, and seek emergency medical treatment and/or call 911 if deemed necessary. Subjective:   Type 2 Diabetes Mellitus diagnosed in 2020. Metformin 500mg daily after A1C of 6.5% in Oct 2020     Currently on Metformin Er 500mg, two tabs with dinner. Sometimes lose stools and flatulence. Last steroid for back issues in spring 2020   Has COPD   Took steroids for URI 3 weeks     Not checking sugars     Checks blood sugars 0 times per day. Reportedly    AM:   Lunch:  Supper:   HS:     Hypoglycemias: None     Working on diet   Weight is stable now between 240-245 lbs    Lost about 40 lbs since start of 2020   Meals: Three meals. Snack: sporadic (cheese, fruits, chips, pretzels)   Exercise: limited due to sciatica and lumbar disc issues     Denies chest pain. Family history of CAD: Mother had CAD in 66's. Maternal GM had CHF in her 63's.    Denies smoking. Currently not on Aspirin 81 mg due to stomach issues     The following portions of the patient's history were reviewed and updated as appropriate:       Family History   Problem Relation Age of Onset    Hypertension Mother     Cancer Father         lung    Colon Cancer Father     Breast Cancer Sister     Diabetes Brother     Heart Failure Paternal Grandmother          Social History     Socioeconomic History    Marital status:      Spouse name: Not on file    Number of children: Not on file    Years of education: Not on file    Highest education level: Not on file   Occupational History    Occupation: RN   Tobacco Use    Smoking status: Former Smoker     Packs/day: 1.00     Years: 40.00     Pack years: 40.00     Types: Cigarettes     Quit date: 10/1/2018     Years since quittin.8    Smokeless tobacco: Never Used    Tobacco comment: smoked for 40 yrs / smoked up to 1 ppd   Vaping Use    Vaping Use: Never used   Substance and Sexual Activity    Alcohol use: Yes     Comment: 3 DRINKS A MONTH    Drug use: Yes     Types: Marijuana     Comment: medical marijuana     Sexual activity: Yes     Partners: Male   Other Topics Concern    Not on file   Social History Narrative    Not on file     Social Determinants of Health     Financial Resource Strain:     Difficulty of Paying Living Expenses:    Food Insecurity:     Worried About Running Out of Food in the Last Year:     Ran Out of Food in the Last Year:    Transportation Needs:     Lack of Transportation (Medical):      Lack of Transportation (Non-Medical):    Physical Activity:     Days of Exercise per Week:     Minutes of Exercise per Session:    Stress:     Feeling of Stress :    Social Connections:     Frequency of Communication with Friends and Family:     Frequency of Social Gatherings with Friends and Family:     Attends Yazidi Services:     Active Member of Clubs or Organizations:     Attends Club or Organization Meetings:     Marital Status:    Intimate Partner Violence:     Fear of Current or Ex-Partner:     Emotionally Abused:     Physically Abused:     Sexually Abused:        Past Medical History:   Diagnosis Date    CAD (coronary artery disease)     mild MULTI VESSELper VO     [facility]     Diabetes mellitus ([facility]     HTN (hypertension)     Hyperlipidemia     Osteoarthritis, foot, localized 8/25/2014    Spinal stenosis        Past Surgical History:   Procedure Laterality Date    ANKLE FRACTURE SURGERY      CHOLECYSTECTOMY      COLONOSCOPY N/A 12/11/2019    COLONOSCOPY POLYPECTOMY SNARE/COLD BIOPSY performed by Juan Pablo Farr MD at [facility] Bilateral 12/18/2019    BILATERAL L3, L4 AND L5 DORSAL RAMUS MEDIAL BRANCH BLOCK WITH FLUOROSCOPY (53965, [id number]) performed by Tashi Ledesma MD at [facility] [address] ARTHROSCOPY Right 06/09/2020    RIGHT SHOULDER ARTHROSCOPE, ROTATOR CUFF AUGMENTATION IMPLANT, SUBACROMIAL DECOMPRESSION, DISTAL CLAVICLE EXCISION AND DEBRIDEMENT performed by Rahel Rider DO at [address] [place] [address]      RIGHT ROTATOR CUFF    TOTAL KNEE ARTHROPLASTY Bilateral          Allergies   Allergen Reactions    Arthrotec [Diclofenac-Misoprostol]      HANDS ITCH         Current Outpatient Medications:     FOLIC ACID PO, Take by mouth, Disp: , Rfl:     metFORMIN (GLUCOPHAGE-XR) 500 MG extended release tablet, TAKE 2 TABLETS BY MOUTH ONCE DAILY WITH SUPPER, Disp: 180 tablet, Rfl: 1    predniSONE (DELTASONE) 10 MG tablet, Take 1 tablet by mouth daily 40 mg for 3 days 30 mg for 3 days 20 mg for 3 days 10 mg for 3 days, Disp: 30 tablet, Rfl: 4    pantoprazole (PROTONIX) 20 MG tablet, Take 20 mg by mouth daily, Disp: , Rfl:     diclofenac sodium (VOLTAREN) 1 % GEL, Apply topically 2 times daily, Disp: , Rfl:     ondansetron (ZOFRAN) 4 MG tablet, Take 4 mg by mouth every 8 hours as needed for Nausea or Vomiting, Disp: , Rfl:     SYMBICORT 160-4.5 MCG/ACT AERO, Inhale 2 puffs into the lungs 2 times daily, Disp: 3 Inhaler, Rfl: 5    Cholecalciferol (VITAMIN D3) 125 MCG (5000 UT) TABS, Take by mouth daily , Disp: , Rfl:     Blood Glucose Monitoring Suppl (BLOOD GLUCOSE MONITOR SYSTEM) w/Device KIT, To check blood sugar 4 times daily. Diagnosis Code: E 11.9, Disp: 1 kit, Rfl: 0    blood glucose monitor strips, Use 4 times daily to check blood sugar Diagnosis Code; E 11.9, Disp: 100 strip, Rfl: 11    Lancets 30G MISC, 4 each by Does not apply route daily Use 4 times daily to check blood sugar Diagnosis Code: E 11.9, Disp: 200 each, Rfl: 3    Lancets MISC, 1 each by Does not apply route daily Check blood sugars 1-2 per day, Disp: 50 each, Rfl: 11    atorvastatin (LIPITOR) 40 MG tablet, Take 1 tablet by mouth daily, Disp: 90 tablet, Rfl: 3    Lido-Menthol-Methyl Sal-Camph (CBD KINGS EX), Apply topically, Disp: , Rfl:     medical marijuana, Take by mouth as needed. , Disp: , Rfl:     amLODIPine (NORVASC) 5 MG tablet, Take 5 mg by mouth nightly, Disp: , Rfl:     albuterol (PROVENTIL) (2.5 MG/3ML) 0.083% nebulizer solution, Take 2.5 mg by nebulization every 6 hours as needed for Wheezing, Disp: , Rfl:     celecoxib (CELEBREX) 200 MG capsule, Take 1 capsule by mouth daily (Patient taking differently: Take 200 mg by mouth as needed ), Disp: 90 capsule, Rfl: 1    tiZANidine (ZANAFLEX) 4 MG tablet, Take 1 tablet by mouth 3 times daily as needed (Muscle tightness/spasm), Disp: 90 tablet, Rfl: 1    losartan (COZAAR) 50 MG tablet, Take 50 mg by mouth daily, Disp: , Rfl:     Nebulizers (COMPRESSOR/NEBULIZER) MISC, 1 Device by Does not apply route 2 times daily, Disp: 1 each, Rfl: 3    ibuprofen (ADVIL;MOTRIN) 200 MG tablet, Take 200 mg by mouth every 6 hours as needed for Pain, Disp: , Rfl:     hydrochlorothiazide (HYDRODIURIL) 25 MG tablet, Take 25 mg by mouth daily as needed , Disp: , Rfl:     cetirizine (ZYRTEC) 10 MG tablet, Take 10 mg by mouth as needed , Disp: , Rfl:     albuterol sulfate  (90 Base) MCG/ACT inhaler, Inhale 2 puffs into the lungs every 6 hours as needed for Wheezing, Disp: 1 Inhaler, Rfl: 11      Review of Systems:    Constitutional: Negative for fever, chills, and unexpected weight change. HENT: Negative for congestion, ear pain, rhinorrhea,  sore throat and trouble swallowing. Eyes: Negative for photophobia, redness, itching. Respiratory: Negative for cough, shortness of breath and sputum. Cardiovascular: Negative for chest pain, palpitations and leg swelling. Gastrointestinal: Negative for nausea, vomiting, abdominal pain, diarrhea, constipation. Endocrine: Negative for cold intolerance, heat intolerance, polydipsia, polyphagia and polyuria. Genitourinary: Negative for dysuria, urgency, frequency, hematuria and flank pain. Musculoskeletal: Negative for myalgias, back pain, arthralgias and neck pain. Skin/Nail: Negative for rash, itching. Normal nails. Neurological: Negative for seizures, weakness, light-headedness, numbness and headaches. Hematological/ Lymph nodes: Negative for adenopathy. Does not bruise/bleed easily. Psychiatric/Behavioral: Negative for suicidal ideas, depression, anxiety, sleep disturbance and decreased concentration. Objective:   Physical Exam:  There were no vitals taken for this visit. Constitutional: Patient is oriented to person, place, and time. Patient appears well-developed and well-nourished. Pulmonary/Chest: Effort normal.   Neurological: Patient is alert and oriented to person, place, and time. Psychiatric: Patient has a normal mood and affect.  Patient behavior is normal.     Lab Review:    Orders Only on 08/18/2021   Component Date Value Ref Range Status    Sodium 08/18/2021 144  136 - 145 mmol/L Final    Potassium 08/18/2021 4.4  3.5 - 5.1 mmol/L Final  Chloride 08/18/2021 103  99 - 110 mmol/L Final    CO2 08/18/2021 24  21 - 32 mmol/L Final    Anion Gap 08/18/2021 17* 3 - 16 Final    Glucose 08/18/2021 100* 70 - 99 mg/dL Final    BUN 08/18/2021 19  7 - 20 mg/dL Final    CREATININE 08/18/2021 0.9  0.6 - 1.2 mg/dL Final    GFR Non- 08/18/2021 >60  >60 Final    GFR  08/18/2021 >60  >60 Final    Calcium 08/18/2021 9.9  8.3 - 10.6 mg/dL Final    Hep C Ab Interp 08/18/2021 Non-reactive  Non-reactive Final    Hemoglobin A1C 08/18/2021 6.2  See comment % Final    eAG 08/18/2021 131.2  mg/dL Final   Orders Only on 01/11/2021   Component Date Value Ref Range Status    TSH 01/11/2021 3.03  0.27 - 4.20 uIU/mL Final   Orders Only on 01/11/2021   Component Date Value Ref Range Status    Hemoglobin A1C 01/11/2021 6.0  See comment % Final    eAG 01/11/2021 125.5  mg/dL Final   Orders Only on 01/11/2021   Component Date Value Ref Range Status    T4 Free 01/11/2021 1.3  0.9 - 1.8 ng/dL Final   Orders Only on 01/11/2021   Component Date Value Ref Range Status    Vitamin B-12 01/11/2021 355  211 - 911 pg/mL Final    Folate 01/11/2021 3.75* 4.78 - 24.20 ng/mL Final   Orders Only on 01/11/2021   Component Date Value Ref Range Status    Cholesterol, Total 01/11/2021 172  0 - 199 mg/dL Final    Triglycerides 01/11/2021 98  0 - 150 mg/dL Final    HDL 01/11/2021 60  40 - 60 mg/dL Final    LDL Calculated 01/11/2021 92  <100 mg/dL Final    VLDL Cholesterol Calculated 01/11/2021 20  Not Established mg/dL Final   Orders Only on 01/11/2021   Component Date Value Ref Range Status    Sodium 01/11/2021 141  136 - 145 mmol/L Final    Potassium 01/11/2021 4.3  3.5 - 5.1 mmol/L Final    Chloride 01/11/2021 103  99 - 110 mmol/L Final    CO2 01/11/2021 26  21 - 32 mmol/L Final    Anion Gap 01/11/2021 12  3 - 16 Final    Glucose 01/11/2021 118* 70 - 99 mg/dL Final    BUN 01/11/2021 16  7 - 20 mg/dL Final    CREATININE 01/11/2021 0.8  0.6 - 1.2 mg/dL Final    GFR Non- 01/11/2021 >60  >60 Final    GFR  01/11/2021 >60  >60 Final    Calcium 01/11/2021 9.5  8.3 - 10.6 mg/dL Final    Total Protein 01/11/2021 6.9  6.4 - 8.2 g/dL Final    Albumin 01/11/2021 4.4  3.4 - 5.0 g/dL Final    Albumin/Globulin Ratio 01/11/2021 1.8  1.1 - 2.2 Final    Total Bilirubin 01/11/2021 0.4  0.0 - 1.0 mg/dL Final    Alkaline Phosphatase 01/11/2021 97  40 - 129 U/L Final    ALT 01/11/2021 16  10 - 40 U/L Final    AST 01/11/2021 12* 15 - 37 U/L Final    Globulin 01/11/2021 2.5  g/dL Final   Orders Only on 01/11/2021   Component Date Value Ref Range Status    Microalbumin, Random Urine 01/11/2021 1.40  <2.0 mg/dL Final    Creatinine, Ur 01/11/2021 148.7  28.0 - 259.0 mg/dL Final    Microalbumin Creatinine Ratio 01/11/2021 9.4  0.0 - 30.0 mg/g Final           Assessment and Plan     Domingo Rolle was seen today for diabetes. Diagnoses and all orders for this visit:    Controlled type 2 diabetes mellitus without complication, without long-term current use of insulin (HCC)  -     Hemoglobin A1C; Future  -     Lipid, Fasting; Future  -     Microalbumin / Creatinine Urine Ratio; Future    Essential hypertension  -     Hemoglobin A1C; Future  -     Lipid, Fasting; Future  -     Microalbumin / Creatinine Urine Ratio; Future    Morbid obesity due to excess calories (HCC)  -     Hemoglobin A1C; Future  -     Lipid, Fasting; Future  -     Microalbumin / Creatinine Urine Ratio; Future          1: Type 2 DM uncomplicated   Controlled A1C 6.2% < 6% < 6.5%      A1C is good but we need to check some sugars     Target for fasting blood sugars under 120     C/w metformin ER 500mg, two tabs with dinner     All instructions provided in written. Check Blood sugars 1 times per day. Log them along with insulin and send them every 2 weeks. Call for blood sugars less than 60 or more than 400. Eye exam: Last exam scheduled Nov 2020.  Reports early cataract. Foot exam:  Oct 2020   Deformity/amputation: absent  Skin lesions/pre-ulcerative calluses: absent  Edema: right- negative, left- negative  Sensory exam: Monofilament sensation: normal  Pulses: normal, Vibration (128 Hz): Intact    Renal screen: normal Jan 2021     TSH screen: Jan 2021  Sarahi Santiago     2: HTN   Controlled     3: Hyperlipidemia   LDL: 92 , HDL: 60, TGs: 98 Jan 2021     C/w Atorvastatin 40 mg daily     The 10-year ASCVD risk score (Tiffanie Still, et al., 2013) is: 17.4%    Values used to calculate the score:      Age: 76 years      Sex: Female      Is Non- : No      Diabetic: Yes      Tobacco smoker: No      Systolic Blood Pressure: 704 mmHg      Is BP treated: Yes      HDL Cholesterol: 60 mg/dL      Total Cholesterol: 172 mg/dL    10 year for heart disease is high at 17.4%   Okay to try Aspirin   If it causes to much stomach issues. 4: Morbid obesity   Discussed weight loss options of exercise (150 minutes per week) plus diet plans   Diet plans may include intermittent fasting, low carb diet, weight watchers, mediterranean diet or caloric restriction. Work on comorbid conditions and improved sleep     RTC in 6 months with A1C, lipids, MACR   Next visit in person     EDUCATION:   Greater than 50% of this visit was spent in general counseling regarding obesity, diet, exercise, importance of adherence to insulin regime, recognition and treatment of hypo and hyperglycemia,  glucose logging, proper diabetes management, diabetic complications with poor management and the importance of glycemic control in order to avoid the complications of diabetes. Risks and potential complications of diabetes were reviewed with the patient. Diabetes health maintenance plan and follow-up were discussed and understood by the patient. We reviewed the importance of medication compliance and regular follow-up. Aggressive lifestyle modification was encouraged.      Exercise Counselling: This patient is a candidate for regular physical exercise. Instructions to perform the following types of exercise:  Swimming or water aerobic exercise  Brisk walking  Playing tennis  Stationary bicycle or elliptical indoor  Low impact aerobic exercise    Instructions given to exercise for the following duration:  30 minutes a day for five-seven days per week.     Following instructions for being active throughout the day in addition to formal exercise:  Walk instead of drive whenever possible  Take the stairs instead of the elevator  Work in the garden  Park to the far end of the parking lot to add more walking steps to destination      Electronically signed by Bao Gray MD on 8/19/2021 at 2:51 PM

## 2021-09-15 ENCOUNTER — OFFICE VISIT (OUTPATIENT)
Dept: PULMONOLOGY | Age: 66
End: 2021-09-15
Payer: MEDICARE

## 2021-09-15 VITALS — HEART RATE: 78 BPM | DIASTOLIC BLOOD PRESSURE: 80 MMHG | OXYGEN SATURATION: 99 % | SYSTOLIC BLOOD PRESSURE: 136 MMHG

## 2021-09-15 DIAGNOSIS — J44.9 COPD, MODERATE (HCC): ICD-10-CM

## 2021-09-15 DIAGNOSIS — R06.02 SOB (SHORTNESS OF BREATH): Primary | ICD-10-CM

## 2021-09-15 DIAGNOSIS — K21.9 GASTROESOPHAGEAL REFLUX DISEASE WITHOUT ESOPHAGITIS: ICD-10-CM

## 2021-09-15 DIAGNOSIS — Z87.891 FORMER SMOKER: ICD-10-CM

## 2021-09-15 PROCEDURE — G8427 DOCREV CUR MEDS BY ELIG CLIN: HCPCS | Performed by: INTERNAL MEDICINE

## 2021-09-15 PROCEDURE — G0008 ADMIN INFLUENZA VIRUS VAC: HCPCS | Performed by: INTERNAL MEDICINE

## 2021-09-15 PROCEDURE — 90694 VACC AIIV4 NO PRSRV 0.5ML IM: CPT | Performed by: INTERNAL MEDICINE

## 2021-09-15 PROCEDURE — G8926 SPIRO NO PERF OR DOC: HCPCS | Performed by: INTERNAL MEDICINE

## 2021-09-15 PROCEDURE — 3017F COLORECTAL CA SCREEN DOC REV: CPT | Performed by: INTERNAL MEDICINE

## 2021-09-15 PROCEDURE — 1123F ACP DISCUSS/DSCN MKR DOCD: CPT | Performed by: INTERNAL MEDICINE

## 2021-09-15 PROCEDURE — G8417 CALC BMI ABV UP PARAM F/U: HCPCS | Performed by: INTERNAL MEDICINE

## 2021-09-15 PROCEDURE — G8399 PT W/DXA RESULTS DOCUMENT: HCPCS | Performed by: INTERNAL MEDICINE

## 2021-09-15 PROCEDURE — 4040F PNEUMOC VAC/ADMIN/RCVD: CPT | Performed by: INTERNAL MEDICINE

## 2021-09-15 PROCEDURE — 3023F SPIROM DOC REV: CPT | Performed by: INTERNAL MEDICINE

## 2021-09-15 PROCEDURE — 1036F TOBACCO NON-USER: CPT | Performed by: INTERNAL MEDICINE

## 2021-09-15 PROCEDURE — 99214 OFFICE O/P EST MOD 30 MIN: CPT | Performed by: INTERNAL MEDICINE

## 2021-09-15 PROCEDURE — 1090F PRES/ABSN URINE INCON ASSESS: CPT | Performed by: INTERNAL MEDICINE

## 2021-09-15 RX ORDER — BUDESONIDE AND FORMOTEROL FUMARATE DIHYDRATE 160; 4.5 UG/1; UG/1
2 AEROSOL RESPIRATORY (INHALATION) 2 TIMES DAILY
Qty: 1 EACH | Refills: 5 | Status: SHIPPED | OUTPATIENT
Start: 2021-09-15 | End: 2022-06-14

## 2021-09-15 NOTE — PROGRESS NOTES
[facility]  Consult Note  MD Demario Koo   YOB: 1955    Date of Visit:  9/15/2021    Assessment/Plan:  1. SOB (shortness of breath)  Multifactorial  · COPD  · Asthma  · Obesity  · Deconditioning    Feels this is worse due to lack of exercise during COVID    2. COPD, moderate  PFT 2/19:  Spirometry:  Flow volume loops were obstructed. The FEV-1/FVC ratio was   decreased. The FEV-1 was 1.5 liters (59% of predicted), which was   moderately decreased. The FVC was 2.15 liters (65% of predicted),   which was decreased. Response to inhaled bronchodilators   (albuterol) was not significant. Lung volumes:  Lung volumes were tested by plethysmography. The total lung   capacity was 4.45 liters (88% of predicted), which was normal.   The residual volume was 2.24 liters (114% of predicted), which   was increased. The ratio of residual volume to total lung   capacity (RV/TLC) was 50, which was incraesed. Diffusion capacity was found to be mildly decreased.       Interpretation:  Moderate obstructive airway disease. Medication Management:  The patient benefits from the medical regimen and reports no complications. Symbicort  Albuterol  HHN    3. Moderate persistent asthma, uncomplicated    Did not have methacholine because PFT was abnormal    4. GERD  Medication Management:  The patient benefits from the medical regimen and reports no complications. Prilsoec    Also elevates the head of her bead and both help    5. Former Smoker  Needs LDCT chest around 12/21    6.  Immunization  COVID UTD  Flu shot given    6 months      Chief Complaint   Patient presents with    Shortness of Breath     6 month Grandchildren and daughter living with her now so has had to use her antibiotic and Pred Taper a few times       HPI  The patient presents with a chief complaint of moderate shortness of breath related to moderate COPD of many years duration. He has mild associated cough. Exertion is a modifying factor. She has not had recent flare up. Review of Systems  No Chest pain, Nausea or vomiting reported    History  I have reviewed past medical, surgical, social and family history. This is documented elsewhere in themedical record. Physical Exam:  Well developed, well nourished  Alert and oriented  Sclera is clear  No cervical adenopathy  No JVD. Chest examination is clear. Cardiac examination reveals regular rate and rhythm without murmur, gallop or rub. The abdomen is soft, nontender and nondistended. There is no clubbing, cyanosis or edema of the extremities. There is no obvious skin rash. No focal neuro deficicts  Normal mood and affect      Allergies   Allergen Reactions    Arthrotec [Diclofenac-Misoprostol]      HANDS ITCH     Prior to Visit Medications    Medication Sig Taking? Authorizing Provider   FOLIC ACID PO Take by mouth  Historical Provider, MD   metFORMIN (GLUCOPHAGE-XR) 500 MG extended release tablet TAKE 2 TABLETS BY MOUTH ONCE DAILY WITH SUPPER  Qing Aguirre MD   pantoprazole (PROTONIX) 20 MG tablet Take 20 mg by mouth daily  Historical Provider, MD   diclofenac sodium (VOLTAREN) 1 % GEL Apply topically 2 times daily  Historical Provider, MD   ondansetron (ZOFRAN) 4 MG tablet Take 4 mg by mouth every 8 hours as needed for Nausea or Vomiting  Historical Provider, MD   SYMBICORT 160-4.5 MCG/ACT AERO Inhale 2 puffs into the lungs 2 times daily  Oli Dunn MD   Cholecalciferol (VITAMIN D3) 125 MCG (5000 UT) TABS Take by mouth daily   Historical Provider, MD   Blood Glucose Monitoring Suppl (BLOOD GLUCOSE MONITOR SYSTEM) w/Device KIT To check blood sugar 4 times daily.  Diagnosis Code: E 11.9  Qing Aguirre MD   blood glucose monitor strips Use 4 times daily to check blood sugar Diagnosis Code; E 11.9  Qing Aguirre MD   Lancets 30G MISC 4 each by Does not apply route daily Use 4 times daily to check blood sugar Diagnosis Code: E 11.9  Bryanna Pérez MD   Lancets MISC 1 each by Does not apply route daily Check blood sugars 1-2 per day  Bryanna Pérez MD   atorvastatin (LIPITOR) 40 MG tablet Take 1 tablet by mouth daily  Bryanna Pérez MD   Lido-Menthol-Methyl Sal-Camph (CBD KINGS EX) Apply topically  Historical Provider, MD   medical marijuana Take by mouth as needed. Historical Provider, MD   amLODIPine (NORVASC) 5 MG tablet Take 5 mg by mouth nightly  Historical Provider, MD   albuterol (PROVENTIL) (2.5 MG/3ML) 0.083% nebulizer solution Take 2.5 mg by nebulization every 6 hours as needed for Wheezing  Historical Provider, MD   celecoxib (CELEBREX) 200 MG capsule Take 1 capsule by mouth daily  Patient taking differently: Take 200 mg by mouth as needed   Jong Mckeon MD   tiZANidine (ZANAFLEX) 4 MG tablet Take 1 tablet by mouth 3 times daily as needed (Muscle tightness/spasm)  Jong Mckeon MD   losartan (COZAAR) 50 MG tablet Take 50 mg by mouth daily  Historical Provider, MD   Nebulizers (COMPRESSOR/NEBULIZER) MISC 1 Device by Does not apply route 2 times daily  Murl Favorite Hortensiapke, APRN - CNP   ibuprofen (ADVIL;MOTRIN) 200 MG tablet Take 200 mg by mouth every 6 hours as needed for Pain  Historical Provider, MD   hydrochlorothiazide (HYDRODIURIL) 25 MG tablet Take 25 mg by mouth daily as needed   Historical Provider, MD   albuterol sulfate  (90 Base) MCG/ACT inhaler Inhale 2 puffs into the lungs every 6 hours as needed for Wheezing  Darcy , MD   cetirizine (ZYRTEC) 10 MG tablet Take 10 mg by mouth as needed   Historical Provider, MD       Vitals:    09/15/21 1130   BP: 136/80   Pulse: 78   SpO2: 99%     There is no height or weight on file to calculate BMI.      Wt Readings from Last 3 Encounters:   05/17/21 246 lb (111.6 kg)   10/30/20 263 lb (119.3 kg)   08/24/20 278 lb (126.1 kg)     BP Readings from Last 3 Encounters:   09/15/21 136/80   10/30/20 (!) 144/80   20 138/84        Social History     Tobacco Use   Smoking Status Former Smoker    Packs/day: 1.00    Years: 40.00    Pack years: 40.00    Types: Cigarettes    Quit date: 10/1/2018    Years since quittin.9   Smokeless Tobacco Never Used   Tobacco Comment    smoked for 40 yrs / smoked up to 1 ppd

## 2021-09-15 NOTE — PROGRESS NOTES
Vaccine Information Sheet, \"Influenza - Inactivated\"  given to Yovany Jin, or parent/legal guardian of  Yovany Jin and verbalized understanding. Patient responses:    Have you ever had a reaction to a flu vaccine? No  Do you have any current illness? No  Have you ever had Guillian Gackle Syndrome? No  Do you have a serious allergy to any of the follow: Neomycin, Polymyxin, Thimerosal, eggs or egg products? No    Flu vaccine given per order. Please see immunization tab. Risks and benefits explained. Current VIS given.       Immunizations Administered     Name Date Dose Route    Influenza, Quadv, adjuvanted, 65 yrs +, IM, PF (Fluad) 9/15/2021 0.5 mL Intramuscular    Site: Deltoid- Left    Lot: [id number]    NDC: 66977-047-94

## 2021-10-04 RX ORDER — LOSARTAN POTASSIUM 100 MG/1
TABLET ORAL
COMMUNITY
End: 2021-10-04 | Stop reason: SDUPTHER

## 2021-10-04 NOTE — TELEPHONE ENCOUNTER
Medication:   Requested Prescriptions     Pending Prescriptions Disp Refills    losartan (COZAAR) 100 MG tablet 30 tablet 0     Si tablet daily       Last Filled:  No info    Patient Phone Number: [phone] (home)     Last appt: 2021   Next appt: Visit date not found    Last BMP:   Lab Results   Component Value Date     2021    K 4.4 2021     2021    CO2 24 2021    ANIONGAP 17 2021    GLUCOSE 100 2021    BUN 19 2021    CREATININE 0.9 2021    LABGLOM >60 2021    GFRAA >60 2021    GFRAA >60 2013    CALCIUM 9.9 2021      Last CMP:   Lab Results   Component Value Date     2021    K 4.4 2021     2021    CO2 24 2021    ANIONGAP 17 2021    GLUCOSE 100 2021    BUN 19 2021    CREATININE 0.9 2021    LABGLOM >60 2021    GFRAA >60 2021    GFRAA >60 2013    PROT 6.9 2021    PROT 7.3 2012    LABALBU 4.4 2021    AGRATIO 1.8 2021    BILITOT 0.4 2021    ALKPHOS 97 2021    ALT 16 2021    AST 12 2021    GLOB 2.5 2021     Last Renal Function:   Lab Results   Component Value Date     2021    K 4.4 2021     2021    CO2 24 2021    GLUCOSE 100 2021    BUN 19 2021    CREATININE 0.9 2021    LABALBU 4.4 2021    CALCIUM 9.9 2021    GFR >60 2013    GFRAA >60 2021    GFRAA >60 2013       Last OARRS: No flowsheet data found.     Preferred Pharmacy:   [address] [place] [address] Av [phone] - F [phone]  [address] [address]  Phone: [phone] Fax: [phone] [facility] Delivery - [facility] [phone] - f [phone]  [address] [facility] [address]  Phone: [phone] Fax: [phone]

## 2021-10-04 NOTE — TELEPHONE ENCOUNTER
Patient is asking for a refill on her Losartan - the list states 50mg, but she states it should be 100mg once a day.     lov 7/27/21    Pharmacy [address]

## 2021-10-05 RX ORDER — LOSARTAN POTASSIUM 100 MG/1
TABLET ORAL
Qty: 30 TABLET | Refills: 3 | Status: SHIPPED | OUTPATIENT
Start: 2021-10-05 | End: 2022-01-18

## 2021-10-22 RX ORDER — AMLODIPINE BESYLATE 5 MG/1
5 TABLET ORAL NIGHTLY
Qty: 30 TABLET | Refills: 5 | Status: SHIPPED | OUTPATIENT
Start: 2021-10-22 | End: 2022-04-21

## 2021-10-22 NOTE — TELEPHONE ENCOUNTER
Medication:   Requested Prescriptions     Pending Prescriptions Disp Refills    amLODIPine (NORVASC) 5 MG tablet 30 tablet      Sig: Take 1 tablet by mouth nightly        Last Filled:  Unknown     Patient Phone Number: [phone] (home)     Last appt: 7/27/2021   Next appt: Visit date not found    Last OARRS: No flowsheet data found.

## 2021-11-01 RX ORDER — PANTOPRAZOLE SODIUM 20 MG/1
20 TABLET, DELAYED RELEASE ORAL DAILY
Qty: 30 TABLET | Refills: 3 | Status: SHIPPED | OUTPATIENT
Start: 2021-11-01 | End: 2021-11-12 | Stop reason: SDUPTHER

## 2021-11-01 NOTE — TELEPHONE ENCOUNTER
Medication and Quantity requested: request from [address] is for Pantoprazole 40mg take 1 tablet by mouth once daily, what is in the system is 20 mg     Quantity 90    Last Visit  7/27/21    Pharmacy and phone number updated in [facility]:  yes

## 2021-11-12 DIAGNOSIS — K21.9 GASTROESOPHAGEAL REFLUX DISEASE WITHOUT ESOPHAGITIS: Primary | ICD-10-CM

## 2021-11-12 RX ORDER — DOXYCYCLINE HYCLATE 100 MG
TABLET ORAL
COMMUNITY
Start: 2021-10-22

## 2021-11-12 RX ORDER — PANTOPRAZOLE SODIUM 20 MG/1
40 TABLET, DELAYED RELEASE ORAL DAILY
Qty: 60 TABLET | Refills: 5 | Status: SHIPPED | OUTPATIENT
Start: 2021-11-12 | End: 2021-12-10 | Stop reason: SDUPTHER

## 2021-11-12 RX ORDER — BENZONATATE 200 MG/1
CAPSULE ORAL
COMMUNITY
Start: 2021-10-07 | End: 2022-03-18 | Stop reason: SDUPTHER

## 2021-11-12 RX ORDER — PREDNISONE 10 MG/1
TABLET ORAL
COMMUNITY
Start: 2021-10-22 | End: 2022-08-08 | Stop reason: ALTCHOICE

## 2021-11-12 NOTE — TELEPHONE ENCOUNTER
Medication:   Requested Prescriptions     Pending Prescriptions Disp Refills    pantoprazole (PROTONIX) 20 MG tablet 30 tablet 3     Sig: Take 1 tablet by mouth daily        Last Filled:  FILLING FOR 40MG dosage per patient, this is the dose she had last time    Patient Phone Number: [phone] (home)     Last appt: 7/27/2021   Next appt: Visit date not found    Last OARRS: No flowsheet data found.

## 2021-11-30 ENCOUNTER — TELEPHONE (OUTPATIENT)
Dept: PULMONOLOGY | Age: 66
End: 2021-11-30

## 2021-11-30 ENCOUNTER — HOSPITAL ENCOUNTER (OUTPATIENT)
Dept: MRI IMAGING | Age: 66
Discharge: HOME | End: 2021-11-30
Payer: MEDICARE

## 2021-11-30 ENCOUNTER — HOSPITAL ENCOUNTER (OUTPATIENT)
Dept: CT IMAGING | Age: 66
Discharge: HOME | End: 2021-11-30
Payer: MEDICARE

## 2021-11-30 DIAGNOSIS — M76.72 PERONEAL TENDINITIS OF LEFT LOWER EXTREMITY: ICD-10-CM

## 2021-11-30 DIAGNOSIS — Z87.891 FORMER SMOKER: ICD-10-CM

## 2021-11-30 DIAGNOSIS — M19.272 MARTINS SYNDROME, LEFT: ICD-10-CM

## 2021-11-30 PROCEDURE — 71271 CT THORAX LUNG CANCER SCR C-: CPT

## 2021-11-30 PROCEDURE — 73721 MRI JNT OF LWR EXTRE W/O DYE: CPT

## 2021-11-30 NOTE — TELEPHONE ENCOUNTER
Called pt and went over her CT Scan results Dr Reese eBrry wanted to know if she is having any symptoms as it showed bronchitis pt stated she has noticed she is a little more short of breath then her usual Does not really have a cough though.

## 2021-11-30 NOTE — RESULT ENCOUNTER NOTE
Please call patientScreening CT showed small right middle lobe pulmonary nodule new in comparison to previous. Some changes suggestive of a bronchitis. Is she symptomatic?

## 2021-12-10 DIAGNOSIS — K21.9 GASTROESOPHAGEAL REFLUX DISEASE WITHOUT ESOPHAGITIS: ICD-10-CM

## 2021-12-10 RX ORDER — PANTOPRAZOLE SODIUM 20 MG/1
TABLET, DELAYED RELEASE ORAL
Qty: 180 TABLET | Refills: 3 | Status: SHIPPED | OUTPATIENT
Start: 2021-12-10 | End: 2021-12-14 | Stop reason: ALTCHOICE

## 2021-12-10 NOTE — TELEPHONE ENCOUNTER
Please review script and question     \"Patient asked to switch from the 20 mg twice a day to the 40 mg once a day for Protonix, pended that rx for 40 mg once a day, patient is also asking if you can change how many times a day she can use the Diclofenac and would like a year refill.  \"

## 2021-12-10 NOTE — TELEPHONE ENCOUNTER
Patient asked to switch from the 20 mg twice a day to the 40 mg once a day for Protonix, pended that rx for 40 mg once a day, patient is also asking if you can change how many times a day she can use the Diclofenac and would like a year refill. I have pended the original rx and gave three refills. Medication:   Requested Prescriptions     Pending Prescriptions Disp Refills    diclofenac sodium (VOLTAREN) 1 % GEL  3     Sig: Apply topically 2 times daily    pantoprazole (PROTONIX) 20 MG tablet 90 tablet 3     Sig: Take 2 tablets by mouth daily        Last Filled:  Unknown  11/12/2021 #60 Refills 5    Patient Phone Number: [phone] (home)     Last appt: 7/27/2021   Return in about 1 year (around 7/27/2022) for Annual Wellness Visit. Next appt: Visit date not found    Last OARRS: No flowsheet data found.

## 2021-12-14 DIAGNOSIS — K21.9 GASTROESOPHAGEAL REFLUX DISEASE WITHOUT ESOPHAGITIS: Primary | ICD-10-CM

## 2021-12-14 RX ORDER — PANTOPRAZOLE SODIUM 40 MG/1
40 TABLET, DELAYED RELEASE ORAL
Qty: 90 TABLET | Refills: 1 | Status: SHIPPED | OUTPATIENT
Start: 2021-12-14 | End: 2022-06-06

## 2021-12-14 NOTE — TELEPHONE ENCOUNTER
Patient requesting to switch from the 20 mg twice a day to the 40 mg once a day for Protonix.     [address]

## 2021-12-29 ENCOUNTER — TELEPHONE (OUTPATIENT)
Dept: FAMILY MEDICINE CLINIC | Age: 66
End: 2021-12-29

## 2021-12-29 RX ORDER — CELECOXIB 200 MG/1
200 CAPSULE ORAL DAILY PRN
Qty: 90 CAPSULE | Refills: 1 | Status: SHIPPED | OUTPATIENT
Start: 2021-12-29 | End: 2022-04-11 | Stop reason: SDUPTHER

## 2021-12-29 NOTE — TELEPHONE ENCOUNTER
Medication:   Requested Prescriptions     Pending Prescriptions Disp Refills    celecoxib (CELEBREX) 200 MG capsule 90 capsule 1     Sig: Take 1 capsule by mouth daily        Last Filled:  12/17/2019    Patient Phone Number: [phone] (home)     Last appt: 7/27/2021   Next appt: Visit date not found    Last OARRS: No flowsheet data found.

## 2021-12-29 NOTE — TELEPHONE ENCOUNTER
----- Message from Mona Hendrix sent at 12/29/2021  3:18 PM EST -----  Subject: Message to Provider    QUESTIONS  Information for Provider? tried to get celecoxib (CELEBREX) 200 MG   capsule, prescription and the pharmacy said it was denied she would like   to know ifs he needs to see the dr before it can be filled   ---------------------------------------------------------------------------  --------------  [address]  What is the best way for the office to contact you? OK to leave message on   voicemail  Preferred Call Back Phone Number?  [phone]  ---------------------------------------------------------------------------  --------------  SCRIPT ANSWERS  undefined

## 2022-01-03 NOTE — TELEPHONE ENCOUNTER
Please confirm what is she is taking for ? Ie what pain and warn her about kidney function impairment with chronic NSAIDS use and age.   So we do not recommend as every day meds

## 2022-01-03 NOTE — TELEPHONE ENCOUNTER
Patient states a little over a year ago her rx for Celebrex was increased from 200 mg once a day to twice a day.

## 2022-01-04 NOTE — TELEPHONE ENCOUNTER
Requesting refill for     Tizanidine 4mg  3xday    [address]     Patient said she has scans in her chart from 6/26/19 and 7/5/19 showing L1 S1 disc bulging and degenerative changes and that she has a chronic conditions    Wants to know if the doctor would like her to get a comprehensive lab done as well when she goes for her other labs since she is on Celebrex

## 2022-01-05 RX ORDER — TIZANIDINE 4 MG/1
4 TABLET ORAL 3 TIMES DAILY PRN
Qty: 90 TABLET | Refills: 1 | Status: SHIPPED | OUTPATIENT
Start: 2022-01-05 | End: 2022-03-25

## 2022-01-05 NOTE — TELEPHONE ENCOUNTER
Medication:   Requested Prescriptions     Pending Prescriptions Disp Refills    tiZANidine (ZANAFLEX) 4 MG tablet 90 tablet 1     Sig: Take 1 tablet by mouth 3 times daily as needed (Muscle tightness/spasm)        Last Filled:  12/17/2021 #90  W/1 RF    Patient Phone Number: [phone] (home)     Last appt: 7/27/2021   Next appt: Visit date not found    Last OARRS: No flowsheet data found.

## 2022-01-19 LAB — DIABETIC RETINOPATHY: NEGATIVE

## 2022-02-17 DIAGNOSIS — E66.01 MORBID OBESITY DUE TO EXCESS CALORIES (HCC): ICD-10-CM

## 2022-02-17 DIAGNOSIS — E11.9 CONTROLLED TYPE 2 DIABETES MELLITUS WITHOUT COMPLICATION, WITHOUT LONG-TERM CURRENT USE OF INSULIN (HCC): ICD-10-CM

## 2022-02-17 DIAGNOSIS — I10 ESSENTIAL HYPERTENSION: ICD-10-CM

## 2022-02-17 LAB
CHOLESTEROL, FASTING: 190 MG/DL (ref 0–199)
CREATININE URINE: 137.8 MG/DL (ref 28–259)
HDLC SERPL-MCNC: 66 MG/DL (ref 40–60)
LDL CHOLESTEROL CALCULATED: 102 MG/DL
MICROALBUMIN UR-MCNC: <1.2 MG/DL
MICROALBUMIN/CREAT UR-RTO: NORMAL MG/G (ref 0–30)
TRIGLYCERIDE, FASTING: 109 MG/DL (ref 0–150)
VLDLC SERPL CALC-MCNC: 22 MG/DL

## 2022-02-18 LAB
ESTIMATED AVERAGE GLUCOSE: 122.6 MG/DL
HBA1C MFR BLD: 5.9 %

## 2022-02-22 ENCOUNTER — OFFICE VISIT (OUTPATIENT)
Dept: ENDOCRINOLOGY | Age: 67
End: 2022-02-22
Payer: MEDICARE

## 2022-02-22 VITALS
BODY MASS INDEX: 41.45 KG/M2 | HEART RATE: 80 BPM | SYSTOLIC BLOOD PRESSURE: 160 MMHG | DIASTOLIC BLOOD PRESSURE: 84 MMHG | OXYGEN SATURATION: 97 % | WEIGHT: 248.8 LBS | HEIGHT: 65 IN

## 2022-02-22 DIAGNOSIS — E11.9 CONTROLLED TYPE 2 DIABETES MELLITUS WITHOUT COMPLICATION, WITHOUT LONG-TERM CURRENT USE OF INSULIN (HCC): Primary | ICD-10-CM

## 2022-02-22 DIAGNOSIS — I10 ESSENTIAL HYPERTENSION: ICD-10-CM

## 2022-02-22 DIAGNOSIS — E78.5 DYSLIPIDEMIA ASSOCIATED WITH TYPE 2 DIABETES MELLITUS (HCC): ICD-10-CM

## 2022-02-22 DIAGNOSIS — E11.69 DYSLIPIDEMIA ASSOCIATED WITH TYPE 2 DIABETES MELLITUS (HCC): ICD-10-CM

## 2022-02-22 DIAGNOSIS — E11.9 CONTROLLED TYPE 2 DIABETES MELLITUS WITHOUT COMPLICATION, WITHOUT LONG-TERM CURRENT USE OF INSULIN (HCC): ICD-10-CM

## 2022-02-22 DIAGNOSIS — E55.9 VITAMIN D DEFICIENCY: ICD-10-CM

## 2022-02-22 LAB — VITAMIN D 25-HYDROXY: 54.4 NG/ML

## 2022-02-22 PROCEDURE — 1123F ACP DISCUSS/DSCN MKR DOCD: CPT | Performed by: INTERNAL MEDICINE

## 2022-02-22 PROCEDURE — G8399 PT W/DXA RESULTS DOCUMENT: HCPCS | Performed by: INTERNAL MEDICINE

## 2022-02-22 PROCEDURE — 2022F DILAT RTA XM EVC RTNOPTHY: CPT | Performed by: INTERNAL MEDICINE

## 2022-02-22 PROCEDURE — 3044F HG A1C LEVEL LT 7.0%: CPT | Performed by: INTERNAL MEDICINE

## 2022-02-22 PROCEDURE — G8417 CALC BMI ABV UP PARAM F/U: HCPCS | Performed by: INTERNAL MEDICINE

## 2022-02-22 PROCEDURE — 1090F PRES/ABSN URINE INCON ASSESS: CPT | Performed by: INTERNAL MEDICINE

## 2022-02-22 PROCEDURE — G8484 FLU IMMUNIZE NO ADMIN: HCPCS | Performed by: INTERNAL MEDICINE

## 2022-02-22 PROCEDURE — 1036F TOBACCO NON-USER: CPT | Performed by: INTERNAL MEDICINE

## 2022-02-22 PROCEDURE — G8427 DOCREV CUR MEDS BY ELIG CLIN: HCPCS | Performed by: INTERNAL MEDICINE

## 2022-02-22 PROCEDURE — 4040F PNEUMOC VAC/ADMIN/RCVD: CPT | Performed by: INTERNAL MEDICINE

## 2022-02-22 PROCEDURE — 99214 OFFICE O/P EST MOD 30 MIN: CPT | Performed by: INTERNAL MEDICINE

## 2022-02-22 PROCEDURE — 3017F COLORECTAL CA SCREEN DOC REV: CPT | Performed by: INTERNAL MEDICINE

## 2022-02-22 RX ORDER — ATORVASTATIN CALCIUM 40 MG/1
TABLET, FILM COATED ORAL
Qty: 90 TABLET | Refills: 3 | Status: SHIPPED | OUTPATIENT
Start: 2022-02-22

## 2022-02-22 RX ORDER — METFORMIN HYDROCHLORIDE 500 MG/1
TABLET, EXTENDED RELEASE ORAL
Qty: 180 TABLET | Refills: 1 | Status: SHIPPED | OUTPATIENT
Start: 2022-02-22 | End: 2022-08-23 | Stop reason: SDUPTHER

## 2022-02-22 RX ORDER — VITAMIN B COMPLEX
1000 TABLET ORAL DAILY
COMMUNITY
End: 2022-08-08

## 2022-02-22 NOTE — PROGRESS NOTES
Patient ID:   Neftali Schmidt is a 78 y.o. female    Chief Complaint:   Neftali Schmidt presents for an evaluation of Type 2 Diabetes Mellitus , Hyperlipidemia and hypertension. Subjective:   Type 2 Diabetes Mellitus diagnosed in 2020. Metformin 500mg daily after A1C of 6.5% in Oct 2020     Currently on Metformin Er 500mg, two tabs with dinner. Sometimes lose stools and flatulence. Last steroid for back issues in 2021   Has COPD      She is having  muscle cramps   Not checking sugars     Checks blood sugars 1 per month. Reportedly 101-112    AM:   Lunch:  Supper:   HS:     Hypoglycemias: None     Working on diet   Weight is stable now 248 lbs    Lost about 40 lbs since start of 2020   Meals: Three meals. Snack: sporadic (cheese, fruits, chips, pretzels)   Exercise: limited due to sciatica and lumbar disc issues     Denies chest pain. Family history of CAD: Mother had CAD in 66's. Maternal GM had CHF in her 63's. Denies smoking.     Taking Aspirin 81 mg some days of the week      The following portions of the patient's history were reviewed and updated as appropriate:       Family History   Problem Relation Age of Onset    Hypertension Mother     Cancer Father         lung    Colon Cancer Father     Breast Cancer Sister     Diabetes Brother     Heart Failure Paternal Grandmother          Social History     Socioeconomic History    Marital status:      Spouse name: Not on file    Number of children: Not on file    Years of education: Not on file    Highest education level: Not on file   Occupational History    Occupation: RN   Tobacco Use    Smoking status: Former Smoker     Packs/day: 1.00     Years: 40.00     Pack years: 40.00     Types: Cigarettes     Quit date: 10/1/2018     Years since quitting: 3.3    Smokeless tobacco: Never Used    Tobacco comment: smoked for 40 yrs / smoked up to 1 ppd   Vaping Use    Vaping Use: Never used   Substance and Sexual Activity    Alcohol use: Yes     Comment: 3 DRINKS A MONTH    Drug use: Yes     Types: Marijuana Juanita Cabrales)     Comment: medical marijuana     Sexual activity: Yes     Partners: Male   Other Topics Concern    Not on file   Social History Narrative    Not on file     Social Determinants of Health     Financial Resource Strain:     Difficulty of Paying Living Expenses: Not on file   Food Insecurity:     Worried About Running Out of Food in the Last Year: Not on file    Denise of Food in the Last Year: Not on file   Transportation Needs:     Lack of Transportation (Medical): Not on file    Lack of Transportation (Non-Medical):  Not on file   Physical Activity:     Days of Exercise per Week: Not on file    Minutes of Exercise per Session: Not on file   Stress:     Feeling of Stress : Not on file   Social Connections:     Frequency of Communication with Friends and Family: Not on file    Frequency of Social Gatherings with Friends and Family: Not on file    Attends Baptism Services: Not on file    Active Member of [address] [place] [address] or Organizations: Not on file    Attends Club or Organization Meetings: Not on file    Marital Status: Not on file   Intimate Partner Violence:     Fear of Current or Ex-Partner: Not on file    Emotionally Abused: Not on file    Physically Abused: Not on file    Sexually Abused: Not on file   Housing Stability:     Unable to Pay for Housing in the Last Year: Not on file    Number of [place] in the Last Year: Not on file    Unstable Housing in the Last Year: Not on file       Past Medical History:   Diagnosis Date    CAD (coronary artery disease)     mild MULTI VESSELper VO     [facility]     Diabetes mellitus ([facility]     HTN (hypertension)     Hyperlipidemia     Osteoarthritis, foot, localized 8/25/2014    Spinal stenosis        Past Surgical History:   Procedure Laterality Date    ANKLE FRACTURE SURGERY      CHOLECYSTECTOMY      COLONOSCOPY N/A 12/11/2019    COLONOSCOPY POLYPECTOMY SNARE/COLD BIOPSY performed by Rolly Lester MD at [place] Bilateral 12/18/2019    BILATERAL L3, L4 AND L5 DORSAL RAMUS MEDIAL BRANCH BLOCK WITH FLUOROSCOPY (75278, 50138) performed by Aline Kessler MD at [facility] [address] ARTHROSCOPY Right 06/09/2020    RIGHT SHOULDER ARTHROSCOPE, ROTATOR CUFF AUGMENTATION IMPLANT, SUBACROMIAL DECOMPRESSION, DISTAL CLAVICLE EXCISION AND DEBRIDEMENT performed by Bailey Pavon DO at [address] [facility] [address] CUFF    TOTAL KNEE ARTHROPLASTY Bilateral          Allergies   Allergen Reactions    Acetaminophen      Other reaction(s): rash    Arthrotec [Diclofenac-Misoprostol]      HANDS ITCH         Current Outpatient Medications:     Cyanocobalamin (VITAMIN B-12 PO), Take by mouth, Disp: , Rfl:     metFORMIN (GLUCOPHAGE-XR) 500 MG extended release tablet, TAKE 2 TABLETS BY MOUTH ONCE DAILY WITH SUPPER, Disp: 180 tablet, Rfl: 1    atorvastatin (LIPITOR) 40 MG tablet, Take 1 tablet by mouth once daily, Disp: 90 tablet, Rfl: 3    losartan (COZAAR) 100 MG tablet, Take 1 tablet by mouth once daily, Disp: 30 tablet, Rfl: 5    tiZANidine (ZANAFLEX) 4 MG tablet, Take 1 tablet by mouth 3 times daily as needed (Muscle tightness/spasm), Disp: 90 tablet, Rfl: 1    celecoxib (CELEBREX) 200 MG capsule, Take 1 capsule by mouth daily as needed for Pain (Patient taking differently: Take 200 mg by mouth daily ), Disp: 90 capsule, Rfl: 1    pantoprazole (PROTONIX) 40 MG tablet, Take 1 tablet by mouth every morning (before breakfast), Disp: 90 tablet, Rfl: 1    diclofenac sodium (VOLTAREN) 1 % GEL, Apply topically 2 times daily, Disp: 100 g, Rfl: 3    predniSONE (DELTASONE) 10 MG tablet, Only when she has URI, Disp: , Rfl:     doxycycline hyclate (VIBRA-TABS) 100 MG tablet, Only with  URI, Disp: , Rfl:     benzonatate (TESSALON) 200 MG capsule, , Disp: , Rfl:     amLODIPine (NORVASC) 5 MG tablet, Take 1 tablet by mouth nightly, Disp: 30 tablet, Rfl: 5    SYMBICORT 160-4.5 MCG/ACT AERO, Inhale 2 puffs into the lungs 2 times daily, Disp: 1 each, Rfl: 5    FOLIC ACID PO, Take by mouth, Disp: , Rfl:     ondansetron (ZOFRAN) 4 MG tablet, Take 4 mg by mouth every 8 hours as needed for Nausea or Vomiting, Disp: , Rfl:     Cholecalciferol (VITAMIN D3) 125 MCG (5000 UT) TABS, Take by mouth daily , Disp: , Rfl:     Blood Glucose Monitoring Suppl (BLOOD GLUCOSE MONITOR SYSTEM) w/Device KIT, To check blood sugar 4 times daily. Diagnosis Code: E 11.9, Disp: 1 kit, Rfl: 0    blood glucose monitor strips, Use 4 times daily to check blood sugar Diagnosis Code; E 11.9, Disp: 100 strip, Rfl: 11    Lancets 30G MISC, 4 each by Does not apply route daily Use 4 times daily to check blood sugar Diagnosis Code: E 11.9, Disp: 200 each, Rfl: 3    Lancets MISC, 1 each by Does not apply route daily Check blood sugars 1-2 per day, Disp: 50 each, Rfl: 11    Lido-Menthol-Methyl Sal-Camph (CBD KINGS EX), Apply topically, Disp: , Rfl:     medical marijuana, Take by mouth as needed. , Disp: , Rfl:     albuterol (PROVENTIL) (2.5 MG/3ML) 0.083% nebulizer solution, Take 2.5 mg by nebulization every 6 hours as needed for Wheezing, Disp: , Rfl:     Nebulizers (COMPRESSOR/NEBULIZER) MISC, 1 Device by Does not apply route 2 times daily, Disp: 1 each, Rfl: 3    hydrochlorothiazide (HYDRODIURIL) 25 MG tablet, Take 25 mg by mouth daily as needed , Disp: , Rfl:     cetirizine (ZYRTEC) 10 MG tablet, Take 10 mg by mouth as needed , Disp: , Rfl:     ibuprofen (ADVIL;MOTRIN) 200 MG tablet, Take 200 mg by mouth every 6 hours as needed for Pain, Disp: , Rfl:     albuterol sulfate  (90 Base) MCG/ACT inhaler, Inhale 2 puffs into the lungs every 6 hours as needed for Wheezing, Disp: 1 Inhaler, Rfl: 11      Review of Systems:    Constitutional: Negative for fever, chills, and unexpected weight change. HENT: Negative for congestion, ear pain, rhinorrhea,  sore throat and trouble swallowing. Eyes: Negative for photophobia, redness, itching. Respiratory: Negative for cough, shortness of breath and sputum. Cardiovascular: Negative for chest pain, palpitations and leg swelling. Gastrointestinal: Negative for nausea, vomiting, abdominal pain, diarrhea, constipation. Endocrine: Negative for cold intolerance, heat intolerance, polydipsia, polyphagia and polyuria. Genitourinary: Negative for dysuria, urgency, frequency, hematuria and flank pain. Musculoskeletal: Negative for myalgias, back pain, arthralgias and neck pain. Skin/Nail: Negative for rash, itching. Normal nails. Neurological: Negative for seizures, weakness, light-headedness, numbness and headaches. Hematological/ Lymph nodes: Negative for adenopathy. Does not bruise/bleed easily. Psychiatric/Behavioral: Negative for suicidal ideas, depression, anxiety, sleep disturbance and decreased concentration. Objective:   Physical Exam:  BP (!) 160/84 (Site: Left Upper Arm, Position: Sitting, Cuff Size: Large Adult)   Pulse 80   Ht 5' 5\" (1.651 m)   Wt 248 lb 12.8 oz (112.9 kg)   SpO2 97%   BMI 41.40 kg/m²     Constitutional: Patient is oriented to person, place, and time. Patient appears well-developed and well-nourished. HENT:               Head: Normocephalic and atraumatic. Eyes: Conjunctivae and EOM are normal.                Neck: Normal range of motion. Cardiovascular: Normal rate, regular rhythm and normal heart sounds. Pulmonary/Chest: Effort normal and breath sounds normal.   Abdominal: Soft. Bowel sounds are normal.   Musculoskeletal: Normal range of motion. Neurological: Patient is alert and oriented to person, place, and time. Patient has normal reflexes. Skin: Skin is warm and dry. Psychiatric: Patient has a normal mood and affect.  Patient behavior is normal. Lab Review:    Office Visit on 02/22/2022   Component Date Value Ref Range Status    Diabetic Retinopathy 01/19/2022 Negative   Final   Orders Only on 02/17/2022   Component Date Value Ref Range Status    Microalbumin, Random Urine 02/17/2022 <1.20  <2.0 mg/dL Final    Creatinine, Ur 02/17/2022 137.8  28.0 - 259.0 mg/dL Final    Microalbumin Creatinine Ratio 02/17/2022 see below  0.0 - 30.0 mg/g Final    Cholesterol, Fasting 02/17/2022 190  0 - 199 mg/dL Final    Triglyceride, Fasting 02/17/2022 109  0 - 150 mg/dL Final    HDL 02/17/2022 66* 40 - 60 mg/dL Final    LDL Calculated 02/17/2022 102* <100 mg/dL Final    VLDL Cholesterol Calculated 02/17/2022 22  Not Established mg/dL Final    Hemoglobin A1C 02/17/2022 5.9  See comment % Final    eAG 02/17/2022 122.6  mg/dL Final   Orders Only on 08/18/2021   Component Date Value Ref Range Status    Sodium 08/18/2021 144  136 - 145 mmol/L Final    Potassium 08/18/2021 4.4  3.5 - 5.1 mmol/L Final    Chloride 08/18/2021 103  99 - 110 mmol/L Final    CO2 08/18/2021 24  21 - 32 mmol/L Final    Anion Gap 08/18/2021 17* 3 - 16 Final    Glucose 08/18/2021 100* 70 - 99 mg/dL Final    BUN 08/18/2021 19  7 - 20 mg/dL Final    CREATININE 08/18/2021 0.9  0.6 - 1.2 mg/dL Final    GFR Non- 08/18/2021 >60  >60 Final    GFR  08/18/2021 >60  >60 Final    Calcium 08/18/2021 9.9  8.3 - 10.6 mg/dL Final    Hep C Ab Interp 08/18/2021 Non-reactive  Non-reactive Final    Hemoglobin A1C 08/18/2021 6.2  See comment % Final    eAG 08/18/2021 131.2  mg/dL Final           Assessment and Plan     Marisabel Stephens was seen today for diabetes.     Diagnoses and all orders for this visit:    Controlled type 2 diabetes mellitus without complication, without long-term current use of insulin (HCC)  -     metFORMIN (GLUCOPHAGE-XR) 500 MG extended release tablet; TAKE 2 TABLETS BY MOUTH ONCE DAILY WITH SUPPER  -     TSH with Reflex to FT4; Future  - Hemoglobin A1C; Future    Dyslipidemia associated with type 2 diabetes mellitus (HCC)  -     atorvastatin (LIPITOR) 40 MG tablet; Take 1 tablet by mouth once daily  -     TSH with Reflex to FT4; Future  -     Hemoglobin A1C; Future    Essential hypertension  -     TSH with Reflex to FT4; Future  -     Hemoglobin A1C; Future    Vitamin D deficiency  -     Vitamin D 25 Hydroxy; Future  -     Vitamin D 25 Hydroxy; Future          1: Type 2 DM uncomplicated   Controlled A1C 5.9% < 6.2% < 6% < 6.5%      A1C is good but we need to check some sugars     Target for fasting blood sugars under 120     C/w metformin ER 500mg, two tabs with dinner     All instructions provided in written. Check Blood sugars 1 times per day. Log them along with insulin and send them every 2 weeks. Call for blood sugars less than 60 or more than 400. Eye exam: Last exam scheduled Jan 2022, denies  Reports stable early cataract. Foot exam:  Feb 2022   Deformity/amputation: absent  Skin lesions/pre-ulcerative calluses: absent  Edema: right- negative, left- negative  Sensory exam: Monofilament sensation: normal  Pulses: normal, Vibration (128 Hz):  Intact    Renal screen: normal Jan 2021     TSH screen: Jan 2021  Saw Ramon MAN     2: HTN   Controlled     3: Hyperlipidemia   LDL: 102 , HDL: 66, TGs: 109 - Feb 2022      C/w Atorvastatin 40 mg daily as she has muscle cramps     The 10-year ASCVD risk score (Nohemy Roach, et al., 2013) is: 23.6%    Values used to calculate the score:      Age: 78 years      Sex: Female      Is Non- : No      Diabetic: Yes      Tobacco smoker: No      Systolic Blood Pressure: 393 mmHg      Is BP treated: Yes      HDL Cholesterol: 66 mg/dL      Total Cholesterol: 190 mg/dL    4: Morbid obesity   Discussed weight loss options of exercise (150 minutes per week) plus diet plans   Diet plans may include intermittent fasting, low carb diet, weight watchers, mediterranean diet or caloric restriction. Work on comorbid conditions and improved sleep     5: Muscle cramps   Vit D, TSH   Take Vit D daily     RTC in 6 months with A1C, Vit D   Next visit in person     EDUCATION:   Greater than 50% of this visit was spent in general counseling regarding obesity, diet, exercise, importance of adherence to insulin regime, recognition and treatment of hypo and hyperglycemia,  glucose logging, proper diabetes management, diabetic complications with poor management and the importance of glycemic control in order to avoid the complications of diabetes. Risks and potential complications of diabetes were reviewed with the patient. Diabetes health maintenance plan and follow-up were discussed and understood by the patient. We reviewed the importance of medication compliance and regular follow-up. Aggressive lifestyle modification was encouraged. Exercise Counselling: This patient is a candidate for regular physical exercise. Instructions to perform the following types of exercise:  Swimming or water aerobic exercise  Brisk walking  Playing tennis  Stationary bicycle or elliptical indoor  Low impact aerobic exercise    Instructions given to exercise for the following duration:  30 minutes a day for five-seven days per week.     Following instructions for being active throughout the day in addition to formal exercise:  Walk instead of drive whenever possible  Take the stairs instead of the elevator  Work in the garden  Park to the far end of the parking lot to add more walking steps to destination      Electronically signed by Chase Truong MD on 2/22/2022 at 12:28 PM

## 2022-02-23 LAB
ESTIMATED AVERAGE GLUCOSE: 122.6 MG/DL
HBA1C MFR BLD: 5.9 %

## 2022-03-18 ENCOUNTER — TELEPHONE (OUTPATIENT)
Dept: PULMONOLOGY | Age: 67
End: 2022-03-18

## 2022-03-18 DIAGNOSIS — J44.9 COPD, MODERATE (HCC): Primary | ICD-10-CM

## 2022-03-18 DIAGNOSIS — U07.1 COVID-19 VIRUS INFECTION: Primary | ICD-10-CM

## 2022-03-18 RX ORDER — BENZONATATE 200 MG/1
200 CAPSULE ORAL 3 TIMES DAILY PRN
Qty: 21 CAPSULE | Refills: 0 | Status: SHIPPED | OUTPATIENT
Start: 2022-03-18 | End: 2022-03-25

## 2022-03-18 RX ORDER — PREDNISONE 10 MG/1
TABLET ORAL
Qty: 20 TABLET | Refills: 0 | Status: SHIPPED | OUTPATIENT
Start: 2022-03-18 | End: 2022-08-08

## 2022-03-18 NOTE — TELEPHONE ENCOUNTER
Pt informed and questioned why we are not following the CDC recommendations of pt's getting infusion within 5 days of onset of symptoms. And if [facility] does not have the appropriate infusion why we cannot send her to a facility that has the infusion.

## 2022-03-18 NOTE — TELEPHONE ENCOUNTER
Spoke with pt O2 sats are 94-98% She has had all her COVID Vaccines and Booster She is concernd because she has zero energy and gets very short of breath walking across room Sx's started Monday and she took COVID Test today and it was positive Has Headache as well no fever Asking about infusion  Has occasional cough and using tessalon pearls but only has a few and ? If she can have refill.

## 2022-03-18 NOTE — TELEPHONE ENCOUNTER
Pt called and said she has been feeling sick since Monday. She did a home covid test today and it tested positive. She is having sob and weakness.     Call her to discuss    Ph # [phone]

## 2022-03-18 NOTE — TELEPHONE ENCOUNTER
We do not have antibody infusion that work with current COVID strain. For now she can continue taking her inhalers. I sent a prescription of prednisone taper in case her breathing worsens and she needs a prednisone course. I also sent prescription of Tessalon Perles to the pharmacy.

## 2022-03-25 RX ORDER — TIZANIDINE 4 MG/1
TABLET ORAL
Qty: 90 TABLET | Refills: 0 | Status: SHIPPED | OUTPATIENT
Start: 2022-03-25 | End: 2022-04-25

## 2022-03-25 NOTE — TELEPHONE ENCOUNTER
Last OV 7/27/2021   Next OV 4/11/2022    Requested Prescriptions     Pending Prescriptions Disp Refills    tiZANidine (ZANAFLEX) 4 MG tablet [Pharmacy Med Name: tiZANidine HCl 4 MG Oral Tablet] 90 tablet 0     Sig: Take 1 tablet by mouth three times daily as needed for muscle spasm    last fill 1/5/22  pended

## 2022-03-29 ENCOUNTER — OFFICE VISIT (OUTPATIENT)
Dept: PULMONOLOGY | Age: 67
End: 2022-03-29
Payer: MEDICARE

## 2022-03-29 VITALS — HEART RATE: 77 BPM | DIASTOLIC BLOOD PRESSURE: 82 MMHG | SYSTOLIC BLOOD PRESSURE: 136 MMHG | OXYGEN SATURATION: 97 %

## 2022-03-29 DIAGNOSIS — J45.40 MODERATE PERSISTENT ASTHMA, UNCOMPLICATED: ICD-10-CM

## 2022-03-29 DIAGNOSIS — R06.02 SOB (SHORTNESS OF BREATH): Primary | ICD-10-CM

## 2022-03-29 DIAGNOSIS — U07.1 COVID-19 VIRUS INFECTION: ICD-10-CM

## 2022-03-29 DIAGNOSIS — J44.9 COPD, MODERATE (HCC): ICD-10-CM

## 2022-03-29 PROCEDURE — 1036F TOBACCO NON-USER: CPT | Performed by: INTERNAL MEDICINE

## 2022-03-29 PROCEDURE — 3023F SPIROM DOC REV: CPT | Performed by: INTERNAL MEDICINE

## 2022-03-29 PROCEDURE — 3017F COLORECTAL CA SCREEN DOC REV: CPT | Performed by: INTERNAL MEDICINE

## 2022-03-29 PROCEDURE — G8427 DOCREV CUR MEDS BY ELIG CLIN: HCPCS | Performed by: INTERNAL MEDICINE

## 2022-03-29 PROCEDURE — G8399 PT W/DXA RESULTS DOCUMENT: HCPCS | Performed by: INTERNAL MEDICINE

## 2022-03-29 PROCEDURE — 1090F PRES/ABSN URINE INCON ASSESS: CPT | Performed by: INTERNAL MEDICINE

## 2022-03-29 PROCEDURE — 99214 OFFICE O/P EST MOD 30 MIN: CPT | Performed by: INTERNAL MEDICINE

## 2022-03-29 PROCEDURE — G8484 FLU IMMUNIZE NO ADMIN: HCPCS | Performed by: INTERNAL MEDICINE

## 2022-03-29 PROCEDURE — G8417 CALC BMI ABV UP PARAM F/U: HCPCS | Performed by: INTERNAL MEDICINE

## 2022-03-29 PROCEDURE — 1123F ACP DISCUSS/DSCN MKR DOCD: CPT | Performed by: INTERNAL MEDICINE

## 2022-03-29 PROCEDURE — 4040F PNEUMOC VAC/ADMIN/RCVD: CPT | Performed by: INTERNAL MEDICINE

## 2022-03-29 RX ORDER — DOXYCYCLINE HYCLATE 100 MG/1
100 CAPSULE ORAL 2 TIMES DAILY
Qty: 20 CAPSULE | Refills: 0 | Status: SHIPPED | OUTPATIENT
Start: 2022-03-29 | End: 2022-04-08

## 2022-03-29 RX ORDER — PREDNISONE 10 MG/1
TABLET ORAL
Qty: 30 TABLET | Refills: 0 | Status: SHIPPED | OUTPATIENT
Start: 2022-03-29 | End: 2022-04-08

## 2022-03-29 RX ORDER — BUDESONIDE AND FORMOTEROL FUMARATE DIHYDRATE 160; 4.5 UG/1; UG/1
2 AEROSOL RESPIRATORY (INHALATION) 2 TIMES DAILY
Qty: 1 EACH | Refills: 6 | Status: SHIPPED | OUTPATIENT
Start: 2022-03-29 | End: 2022-06-14

## 2022-03-29 NOTE — PROGRESS NOTES
[facility]  Consult Note  MD Richie Avalos   YOB: 1955    Date of Visit:  3/29/2022    Assessment/Plan:  1. SOB (shortness of breath)  Multifactorial  · COPD  · Asthma  · Obesity  · Deconditioning    Had COVID earlier this month and still feeling the effects of that. She did get Paxlovid. 2. COPD, moderate  PFT 2/19:  Spirometry:  Flow volume loops were obstructed. The FEV-1/FVC ratio was   decreased. The FEV-1 was 1.5 liters (59% of predicted), which was   moderately decreased. The FVC was 2.15 liters (65% of predicted),   which was decreased. Response to inhaled bronchodilators   (albuterol) was not significant. Lung volumes:  Lung volumes were tested by plethysmography. The total lung   capacity was 4.45 liters (88% of predicted), which was normal.   The residual volume was 2.24 liters (114% of predicted), which   was increased. The ratio of residual volume to total lung   capacity (RV/TLC) was 50, which was incraesed. Diffusion capacity was found to be mildly decreased.       Interpretation:  Moderate obstructive airway disease. Medication Management:  The patient benefits from the medical regimen and reports no complications. Symbicort  Albuterol  HHN    3. Moderate persistent asthma, uncomplicated    Did not have methacholine because PFT was abnormal    4. GERD  Medication Management:  The patient benefits from the medical regimen and reports no complications. Prilsoec    Also elevates the head of her bead and both help    5. COVID infection  Still has some fatigue and feels her SOB are worse. 6. Immunization  COVID UTD  Flu shot given    6 months      Chief Complaint   Patient presents with    Shortness of Breath     had COVID 3-18-22 Was prescribed the 5 day course of medication by Dr Nat Valdivia for it.  Remains more short of breath then her norm but checks her sat's and they are always in the 90's    Fatigue    Medication Refill     would liek prescriptions for Prednisone Taper and antibiotics to have on hand       HPI  The patient presents with a chief complaint of moderate shortness of breath related to moderate COPD of many years duration. He has mild associated cough. Exertion is a modifying factor. She had recent COVID. She did take Paxlovid and did not need hospitalization. Review of Systems  No Chest pain, Nausea or vomiting reported    History  I have reviewed past medical, surgical, social and family history. This is documented elsewhere in themedical record. Physical Exam:  Well developed, well nourished  Alert and oriented  Sclera is clear  No cervical adenopathy  No JVD. Chest examination is clear. Cardiac examination reveals regular rate and rhythm without murmur, gallop or rub. The abdomen is soft, nontender and nondistended. There is no clubbing, cyanosis or edema of the extremities. There is no obvious skin rash. No focal neuro deficicts  Normal mood and affect      Allergies   Allergen Reactions    Acetaminophen      Other reaction(s): rash    Arthrotec [Diclofenac-Misoprostol]      HANDS ITCH     Prior to Visit Medications    Medication Sig Taking?  Authorizing Provider   tiZANidine (ZANAFLEX) 4 MG tablet Take 1 tablet by mouth three times daily as needed for muscle spasm  Ventura Damon MD   predniSONE (DELTASONE) 10 MG tablet 40 mg for 2 days,30 mg for 2 days,20 mg for 2 days,10 mg for 2 days  Elena Stewart MD   Cyanocobalamin (VITAMIN B-12 PO) Take by mouth  Historical Provider, MD   metFORMIN (GLUCOPHAGE-XR) 500 MG extended release tablet TAKE 2 TABLETS BY MOUTH ONCE DAILY WITH SUPPER  Carmela Gordon MD   atorvastatin (LIPITOR) 40 MG tablet Take 1 tablet by mouth once daily  Carmela Gordon MD   Vitamin D (CHOLECALCIFEROL) 25 MCG (1000 UT) TABS tablet Take 1,000 Units by mouth daily  Historical Provider, MD   losartan (COZAAR) 100 MG tablet Take 1 tablet by mouth once daily  Kam Pradhan MD   celecoxib (CELEBREX) 200 MG capsule Take 1 capsule by mouth daily as needed for Pain  Patient taking differently: Take 200 mg by mouth daily   Kam Pradhan MD   pantoprazole (PROTONIX) 40 MG tablet Take 1 tablet by mouth every morning (before breakfast)  Kam Pradhan MD   diclofenac sodium (VOLTAREN) 1 % GEL Apply topically 2 times daily  Kam Pradhan MD   predniSONE (DELTASONE) 10 MG tablet Only when she has URI  Historical Provider, MD   doxycycline hyclate (VIBRA-TABS) 100 MG tablet Only with  URI  Historical Provider, MD   amLODIPine (NORVASC) 5 MG tablet Take 1 tablet by mouth nightly  Annie Marrero MD   SYMBICORT 160-4.5 MCG/ACT AERO Inhale 2 puffs into the lungs 2 times daily  Xochitl Carbajal MD   FOLIC ACID PO Take by mouth  Historical Provider, MD   ondansetron (ZOFRAN) 4 MG tablet Take 4 mg by mouth every 8 hours as needed for Nausea or Vomiting  Historical Provider, MD   Blood Glucose Monitoring Suppl (BLOOD GLUCOSE MONITOR SYSTEM) w/Device KIT To check blood sugar 4 times daily. Diagnosis Code: E 11.9  Pablo Alarcon MD   blood glucose monitor strips Use 4 times daily to check blood sugar Diagnosis Code; E 11.9  Pablo Alarocn MD   Lancets 30G MISC 4 each by Does not apply route daily Use 4 times daily to check blood sugar Diagnosis Code: E 11.9  Pablo Alarcon MD   Lancets MISC 1 each by Does not apply route daily Check blood sugars 1-2 per day  Pablo Alarcon MD   Lido-Menthol-Methyl Sal-Camph (CBD KINGS EX) Apply topically  Historical Provider, MD   medical marijuana Take by mouth as needed.   Historical Provider, MD   albuterol (PROVENTIL) (2.5 MG/3ML) 0.083% nebulizer solution Take 2.5 mg by nebulization every 6 hours as needed for Wheezing  Historical Provider, MD   Nebulizers (COMPRESSOR/NEBULIZER) MISC 1 Device by Does not apply route 2 times daily  Yuly Gonzalez, APRN - CNP   ibuprofen (ADVIL;MOTRIN) 200 MG tablet Take 200 mg by mouth every 6 hours as needed for Pain  Historical Provider, MD   hydrochlorothiazide (HYDRODIURIL) 25 MG tablet Take 25 mg by mouth daily as needed   Historical Provider, MD   albuterol sulfate  (90 Base) MCG/ACT inhaler Inhale 2 puffs into the lungs every 6 hours as needed for Wheezing  Omid Gray MD   cetirizine (ZYRTEC) 10 MG tablet Take 10 mg by mouth as needed   Historical Provider, MD       Vitals:    03/29/22 1258   BP: 136/82   Pulse: 77   SpO2: 97%     There is no height or weight on file to calculate BMI.      Wt Readings from Last 3 Encounters:   02/22/22 248 lb 12.8 oz (112.9 kg)   05/17/21 246 lb (111.6 kg)   10/30/20 263 lb (119.3 kg)     BP Readings from Last 3 Encounters:   03/29/22 136/82   02/22/22 (!) 160/84   09/15/21 136/80        Social History     Tobacco Use   Smoking Status Former Smoker    Packs/day: 1.00    Years: 40.00    Pack years: 40.00    Types: Cigarettes    Quit date: 10/1/2018    Years since quitting: 3.4   Smokeless Tobacco Never Used   Tobacco Comment    smoked for 40 yrs / smoked up to 1 ppd

## 2022-04-07 ENCOUNTER — TELEPHONE (OUTPATIENT)
Dept: FAMILY MEDICINE CLINIC | Age: 67
End: 2022-04-07

## 2022-04-08 ENCOUNTER — TELEPHONE (OUTPATIENT)
Dept: FAMILY MEDICINE CLINIC | Age: 67
End: 2022-04-08

## 2022-04-08 SDOH — HEALTH STABILITY: PHYSICAL HEALTH: ON AVERAGE, HOW MANY DAYS PER WEEK DO YOU ENGAGE IN MODERATE TO STRENUOUS EXERCISE (LIKE A BRISK WALK)?: 0 DAYS

## 2022-04-08 ASSESSMENT — PATIENT HEALTH QUESTIONNAIRE - PHQ9
SUM OF ALL RESPONSES TO PHQ QUESTIONS 1-9: 0
2. FEELING DOWN, DEPRESSED OR HOPELESS: 0
SUM OF ALL RESPONSES TO PHQ QUESTIONS 1-9: 0
SUM OF ALL RESPONSES TO PHQ QUESTIONS 1-9: 0
1. LITTLE INTEREST OR PLEASURE IN DOING THINGS: 0
SUM OF ALL RESPONSES TO PHQ9 QUESTIONS 1 & 2: 0
SUM OF ALL RESPONSES TO PHQ QUESTIONS 1-9: 0

## 2022-04-08 ASSESSMENT — LIFESTYLE VARIABLES
HOW OFTEN DO YOU HAVE A DRINK CONTAINING ALCOHOL: 2-4 TIMES A MONTH
HOW OFTEN DO YOU HAVE SIX OR MORE DRINKS ON ONE OCCASION: 1
HOW MANY STANDARD DRINKS CONTAINING ALCOHOL DO YOU HAVE ON A TYPICAL DAY: 1 OR 2
HOW MANY STANDARD DRINKS CONTAINING ALCOHOL DO YOU HAVE ON A TYPICAL DAY: 1
HOW OFTEN DO YOU HAVE A DRINK CONTAINING ALCOHOL: 3

## 2022-04-08 NOTE — TELEPHONE ENCOUNTER
Please verify with the patient the prescription coverage and demographic information. The pharmacy information I got from the pharmacy is wrong. Please advise. Thank you.

## 2022-04-08 NOTE — TELEPHONE ENCOUNTER
tretinoin (RETIN-A) 0.025 % cream [[id number]]        Please initiate prior auth for the above medication. Thanks!

## 2022-04-11 ENCOUNTER — OFFICE VISIT (OUTPATIENT)
Dept: FAMILY MEDICINE CLINIC | Age: 67
End: 2022-04-11
Payer: MEDICARE

## 2022-04-11 VITALS
WEIGHT: 249 LBS | HEIGHT: 65 IN | BODY MASS INDEX: 41.48 KG/M2 | OXYGEN SATURATION: 96 % | HEART RATE: 86 BPM | TEMPERATURE: 97.8 F | SYSTOLIC BLOOD PRESSURE: 138 MMHG | DIASTOLIC BLOOD PRESSURE: 80 MMHG

## 2022-04-11 DIAGNOSIS — Z12.31 ENCOUNTER FOR SCREENING MAMMOGRAM FOR MALIGNANT NEOPLASM OF BREAST: ICD-10-CM

## 2022-04-11 DIAGNOSIS — I25.10 CORONARY ARTERY CALCIFICATION SEEN ON CT SCAN: ICD-10-CM

## 2022-04-11 DIAGNOSIS — N95.1 HOT FLASH, MENOPAUSAL: ICD-10-CM

## 2022-04-11 DIAGNOSIS — I73.9 CLAUDICATION IN PERIPHERAL VASCULAR DISEASE (HCC): ICD-10-CM

## 2022-04-11 DIAGNOSIS — Z00.00 INITIAL MEDICARE ANNUAL WELLNESS VISIT: Primary | ICD-10-CM

## 2022-04-11 DIAGNOSIS — Z78.0 POST-MENOPAUSAL: ICD-10-CM

## 2022-04-11 DIAGNOSIS — I10 PRIMARY HYPERTENSION: ICD-10-CM

## 2022-04-11 DIAGNOSIS — E66.01 OBESITY, CLASS III, BMI 40-49.9 (MORBID OBESITY) (HCC): ICD-10-CM

## 2022-04-11 DIAGNOSIS — K42.9 UMBILICAL HERNIA WITHOUT OBSTRUCTION AND WITHOUT GANGRENE: ICD-10-CM

## 2022-04-11 DIAGNOSIS — R26.89 BALANCE DISORDER: ICD-10-CM

## 2022-04-11 DIAGNOSIS — G89.29 OTHER CHRONIC PAIN: ICD-10-CM

## 2022-04-11 PROCEDURE — G0438 PPPS, INITIAL VISIT: HCPCS | Performed by: FAMILY MEDICINE

## 2022-04-11 PROCEDURE — 1123F ACP DISCUSS/DSCN MKR DOCD: CPT | Performed by: FAMILY MEDICINE

## 2022-04-11 PROCEDURE — 4040F PNEUMOC VAC/ADMIN/RCVD: CPT | Performed by: FAMILY MEDICINE

## 2022-04-11 PROCEDURE — 3017F COLORECTAL CA SCREEN DOC REV: CPT | Performed by: FAMILY MEDICINE

## 2022-04-11 RX ORDER — ONDANSETRON 4 MG/1
4 TABLET, FILM COATED ORAL EVERY 8 HOURS PRN
Qty: 30 TABLET | Refills: 1 | Status: SHIPPED | OUTPATIENT
Start: 2022-04-11 | End: 2022-10-10 | Stop reason: SDUPTHER

## 2022-04-11 RX ORDER — CELECOXIB 200 MG/1
200 CAPSULE ORAL 2 TIMES DAILY
Qty: 180 CAPSULE | Refills: 1 | Status: SHIPPED | OUTPATIENT
Start: 2022-04-11 | End: 2022-04-20 | Stop reason: SDUPTHER

## 2022-04-11 NOTE — PROGRESS NOTES
Medicare Annual Wellness Visit    Tash Llamas is here for Medicare AWV    Assessment & Plan   Initial Medicare annual wellness visit  Obesity, Class III, BMI 40-49.9 (morbid obesity) ([facility] [address]      Recommendations for Preventive Services Due: see orders and patient instructions/AVS.  Recommended screening schedule for the next 5-10 years is provided to the patient in written form: see Patient Instructions/AVS.     Return for Medicare Annual Wellness Visit in 1 year. Subjective   The following acute and/or chronic problems were also addressed today:    Patient's complete Health Risk Assessment and screening values have been reviewed and are found in Flowsheets. The following problems were reviewed today and where indicated follow up appointments were made and/or referrals ordered.     Positive Risk Factor Screenings with Interventions:    Fall Risk:  Do you feel unsteady or are you worried about falling? : (!) yes  2 or more falls in past year?: no  Fall with injury in past year?: no     Fall Risk Interventions:    · Home safety tips provided  · Physical therapy referral ordered for strength and balance training        Drug Use Screening:   DAST-10 Score Interpretation:  1-2: Low level - Monitor, re-assess at a later date; 3-5: Moderate level - Further Investigation; 6-8: Substantial level - Intensive Assessment; 9-10: Severe level - Intensive Assessment    Substance Abuse - Drug Use Interventions:  Patient finding benefit from [facility] and plans to continue       General Health and ACP:  General  In general, how would you say your health is?: Fair  In the past 7 days, have you experienced any of the following: New or Increased Pain, New or Increased Fatigue, Loneliness, Social Isolation, Stress or Anger?: No  Do you get the social and emotional support that you need?: Yes  Do you have a Living Will?: (!) No    Advance Directives     Power of  Living Will ACP-Advance Directive ACP-Power of [employer] Not on File Not on File Not on File Not on File      General Health Risk Interventions:  · No Living Will: Advance Care Planning addressed with patient today    Health Habits/Nutrition:     Physical Activity: Unknown    Days of Exercise per Week: 0 days    Minutes of Exercise per Session: Not on file     Have you lost any weight without trying in the past 3 months?: No  Body mass index: (!) 41.43  Have you seen the dentist within the past year?: Appointment is scheduled    Health Habits/Nutrition Interventions:  · Discussed diet and exercise             Objective   Vitals:    04/11/22 1151   BP: 138/80   Site: Right Upper Arm   Position: Sitting   Cuff Size: Small Adult   Pulse: 86   Temp: 97.8 °F (36.6 °C)   TempSrc: Oral   SpO2: 96%   Weight: 249 lb (112.9 kg)   Height: 5' 5\" (1.651 m)      Body mass index is 41.44 kg/m². Physical Exam  Constitutional:       General: She is not in acute distress. Appearance: She is well-developed. HENT:      Head: Normocephalic and atraumatic. Cardiovascular:      Rate and Rhythm: Normal rate and regular rhythm. Heart sounds: Normal heart sounds. No murmur heard. Pulmonary:      Effort: Pulmonary effort is normal. No respiratory distress. Breath sounds: Normal breath sounds. Abdominal:      General: Abdomen is flat. Bowel sounds are normal. There is no distension. Palpations: Abdomen is soft. Tenderness: There is no abdominal tenderness. Comments: + umbilical hernia, small, reducible. Skin:     General: Skin is warm and dry. Neurological:      Mental Status: She is alert. Psychiatric:         Behavior: Behavior normal.             Allergies   Allergen Reactions    Acetaminophen      Other reaction(s): rash    Arthrotec [Diclofenac-Misoprostol]      HANDS ITCH     Prior to Visit Medications    Medication Sig Taking? Authorizing Provider   tretinoin (RETIN-A) 0.025 % cream Apply topically nightly.  Yes Geri Holstein, MD budesonide-formoterol (SYMBICORT) 160-4.5 MCG/ACT AERO Inhale 2 puffs into the lungs 2 times daily Yes Lesley Balderas MD   tiZANidine (ZANAFLEX) 4 MG tablet Take 1 tablet by mouth three times daily as needed for muscle spasm Yes Celestina Diehl MD   predniSONE (DELTASONE) 10 MG tablet 40 mg for 2 days,30 mg for 2 days,20 mg for 2 days,10 mg for 2 days Yes Gio Joy MD   Cyanocobalamin (VITAMIN B-12 PO) Take by mouth Yes Historical Provider, MD   metFORMIN (GLUCOPHAGE-XR) 500 MG extended release tablet TAKE 2 TABLETS BY MOUTH ONCE DAILY WITH SUPPER Yes Luciana Beyer MD   atorvastatin (LIPITOR) 40 MG tablet Take 1 tablet by mouth once daily Yes Luciana Beyer MD   losartan (COZAAR) 100 MG tablet Take 1 tablet by mouth once daily Yes Bo Richter MD   celecoxib (CELEBREX) 200 MG capsule Take 1 capsule by mouth daily as needed for Pain  Patient taking differently: Take 200 mg by mouth daily  Yes Bo Richter MD   pantoprazole (PROTONIX) 40 MG tablet Take 1 tablet by mouth every morning (before breakfast) Yes Bo Richtre MD   diclofenac sodium (VOLTAREN) 1 % GEL Apply topically 2 times daily Yes Bo Richter MD   doxycycline hyclate (VIBRA-TABS) 100 MG tablet Only with  URI Yes Historical Provider, MD   amLODIPine (NORVASC) 5 MG tablet Take 1 tablet by mouth nightly Yes Jose Dinero MD   SYMBICORT 160-4.5 MCG/ACT AERO Inhale 2 puffs into the lungs 2 times daily Yes Lesley Balderas MD   FOLIC ACID PO Take by mouth Yes Historical Provider, MD   ondansetron (ZOFRAN) 4 MG tablet Take 4 mg by mouth every 8 hours as needed for Nausea or Vomiting Yes Historical Provider, MD   Blood Glucose Monitoring Suppl (BLOOD GLUCOSE MONITOR SYSTEM) w/Device KIT To check blood sugar 4 times daily.  Diagnosis Code: E 11.9 Yes Luciana Beyer MD   blood glucose monitor strips Use 4 times daily to check blood sugar Diagnosis Code; E 11.9 Yes Luciana Beyer MD   Lancets 30G MISC 4 each by Does not apply route daily Use 4 times daily to check blood sugar Diagnosis Code: E 11.9 Yes Samra Muñoz MD   Lancets MISC 1 each by Does not apply route daily Check blood sugars 1-2 per day Yes Samra Muñoz MD   Lido-Menthol-Methyl Sal-Camph (CBD KINGS EX) Apply topically Yes Historical Provider, MD   medical marijuana Take by mouth as needed. Yes Historical Provider, MD   albuterol (PROVENTIL) (2.5 MG/3ML) 0.083% nebulizer solution Take 2.5 mg by nebulization every 6 hours as needed for Wheezing Yes Historical Provider, MD   Nebulizers (COMPRESSOR/NEBULIZER) MISC 1 Device by Does not apply route 2 times daily Yes WINSTON Pearson - KAIDEN   hydrochlorothiazide (HYDRODIURIL) 25 MG tablet Take 25 mg by mouth daily as needed  Yes Historical Provider, MD   albuterol sulfate  (90 Base) MCG/ACT inhaler Inhale 2 puffs into the lungs every 6 hours as needed for Wheezing Yes Izzy Zafar MD   cetirizine (ZYRTEC) 10 MG tablet Take 10 mg by mouth as needed  Yes Historical Provider, MD   Vitamin D (CHOLECALCIFEROL) 25 MCG (1000 UT) TABS tablet Take 1,000 Units by mouth daily  Historical Provider, MD   predniSONE (DELTASONE) 10 MG tablet Only when she has URI  Historical Provider, MD   ibuprofen (ADVIL;MOTRIN) 200 MG tablet Take 200 mg by mouth every 6 hours as needed for Pain  Historical Provider, MD [place] (Including outside providers/suppliers regularly involved in providing care):   Patient Care Team:  Valma Favre, MD as PCP - General (Family Medicine)  Valma Favre, MD as PCP - [facility]  Rocío Sanders MD (Orthopedic Surgery)  Andrea Lewis DPM as Consulting Physician (Podiatry)  Blayne Kelsey MD as Consulting Physician (Obstetrics & Gynecology)  Bud Springer, RN as Nurse Navigator    Reviewed and updated this visit:  Meds                   Hernia: present for a year. Bothers her when she coughs. Discussed options. Referral to surgery. Hotflashes:  Gets them during the day and at night. Progressively has gotten worse the last year or so- has always had some. Hypertension: BP has been in the 130's/80's. Taking medications as prescribed. No symptoms concerning for end organ damage.      Dyslipidemia: Stable on Lipitor.      COPD: The patient sees pulmonology. She is stable on Symbicort and albuterol.     Moderate persistent asthma: The patient is stable on her current medications. She is seeing pulmonology.     GERD/ hiatal hernia:  Occasionally has nausea. Takes zofran prn. Takes protonix daily which helps her symptoms. Hx of seeing GI.      Type 2 diabetes: Diagnosed in 2020. She is seeing endocrinology every 6 months. She is on Metformin.     Obesity:      Coronary artery calcification: Seen on CT scan. History of seeing cardiology in 2019. Medically managed. No longer on low dose ASA.      Arthritis:  Has persistent pain. In her feet and low back. Takes a muscle relaxor, celebrex, and topical medication prn. She is also on [facility] for pain management- going to [facility] clinic and has a medical marijuana care from Dr. Scar Walsh. Hx of seeing orthopaedic surgery- no longer going as symptoms stable with no surgeries recommended. Referral to PT.      Family history of colon cancer: goes every 5 years for colonoscopies. Last colonoscopy was in 2019. Osteopenia: DXA 9/2019     SH: 7/2021:  Was seeing NP at [facility]. Lives with her  and daughter and 2 grandkids. Has 2 daughters. 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 RTC 6 months.

## 2022-04-11 NOTE — PATIENT INSTRUCTIONS
Personalized Preventive Plan for Julisa Carlos - 4/11/2022  Medicare offers a range of preventive health benefits. Some of the tests and screenings are paid in full while other may be subject to a deductible, co-insurance, and/or copay. Some of these benefits include a comprehensive review of your medical history including lifestyle, illnesses that may run in your family, and various assessments and screenings as appropriate. After reviewing your medical record and screening and assessments performed today your provider may have ordered immunizations, labs, imaging, and/or referrals for you. A list of these orders (if applicable) as well as your Preventive Care list are included within your After Visit Summary for your review. Other Preventive Recommendations:    · A preventive eye exam performed by an eye specialist is recommended every 1-2 years to screen for glaucoma; cataracts, macular degeneration, and other eye disorders. · A preventive dental visit is recommended every 6 months. · Try to get at least 150 minutes of exercise per week or 10,000 steps per day on a pedometer . · Order or download the FREE \"Exercise & Physical Activity: Your Everyday Guide\" from [employer]. Call 2-957.133.5696 or search [employer] online. · You need 0023-2766 mg of calcium and 4951-7804 IU of vitamin D per day. It is possible to meet your calcium requirement with diet alone, but a vitamin D supplement is usually necessary to meet this goal.  · When exposed to the sun, use a sunscreen that protects against both UVA and UVB radiation with an SPF of 30 or greater. Reapply every 2 to 3 hours or after sweating, drying off with a towel, or swimming. · Always wear a seat belt when traveling in a car. Always wear a helmet when riding a bicycle or motorcycle.

## 2022-04-19 ENCOUNTER — OFFICE VISIT (OUTPATIENT)
Dept: SURGERY | Age: 67
End: 2022-04-19
Payer: MEDICARE

## 2022-04-19 ENCOUNTER — TELEPHONE (OUTPATIENT)
Dept: ENDOCRINOLOGY | Age: 67
End: 2022-04-19

## 2022-04-19 ENCOUNTER — TELEPHONE (OUTPATIENT)
Dept: FAMILY MEDICINE CLINIC | Age: 67
End: 2022-04-19

## 2022-04-19 VITALS
HEIGHT: 65 IN | SYSTOLIC BLOOD PRESSURE: 122 MMHG | DIASTOLIC BLOOD PRESSURE: 70 MMHG | BODY MASS INDEX: 42.65 KG/M2 | WEIGHT: 256 LBS

## 2022-04-19 DIAGNOSIS — K42.9 UMBILICAL HERNIA WITHOUT OBSTRUCTION AND WITHOUT GANGRENE: Primary | ICD-10-CM

## 2022-04-19 PROCEDURE — 99203 OFFICE O/P NEW LOW 30 MIN: CPT | Performed by: SURGERY

## 2022-04-19 PROCEDURE — 1036F TOBACCO NON-USER: CPT | Performed by: SURGERY

## 2022-04-19 PROCEDURE — 4040F PNEUMOC VAC/ADMIN/RCVD: CPT | Performed by: SURGERY

## 2022-04-19 PROCEDURE — G8417 CALC BMI ABV UP PARAM F/U: HCPCS | Performed by: SURGERY

## 2022-04-19 PROCEDURE — 1090F PRES/ABSN URINE INCON ASSESS: CPT | Performed by: SURGERY

## 2022-04-19 PROCEDURE — 1123F ACP DISCUSS/DSCN MKR DOCD: CPT | Performed by: SURGERY

## 2022-04-19 PROCEDURE — G8427 DOCREV CUR MEDS BY ELIG CLIN: HCPCS | Performed by: SURGERY

## 2022-04-19 PROCEDURE — G8399 PT W/DXA RESULTS DOCUMENT: HCPCS | Performed by: SURGERY

## 2022-04-19 PROCEDURE — 3017F COLORECTAL CA SCREEN DOC REV: CPT | Performed by: SURGERY

## 2022-04-19 RX ORDER — ASPIRIN 81 MG/1
81 TABLET ORAL DAILY
COMMUNITY

## 2022-04-19 ASSESSMENT — ENCOUNTER SYMPTOMS
EYE DISCHARGE: 0
EYE ITCHING: 0
APNEA: 0
ABDOMINAL DISTENTION: 0
ABDOMINAL PAIN: 1
COLOR CHANGE: 0
BACK PAIN: 0
CHEST TIGHTNESS: 0

## 2022-04-19 NOTE — TELEPHONE ENCOUNTER
losartan (COZAAR) 100 MG tablet 30 tablet 5 1/18/2022     Sig: Take 1 tablet by mouth once daily           Pharmacy is calling stating that insurance request a 90 day supply for this medication         Please advise

## 2022-04-19 NOTE — PATIENT INSTRUCTIONS
1.  While patient does have an umbilical hernia that would benefit from repair at some point, it is not causing her major symptoms and patient is motivated to lose weight. Have encouraged this as it would increase her risk profile  2. Warning signs of an incarcerated hernia include inability to reduce the bulge, increased and constant pain, nausea, vomiting, fevers, chills and constipation. This is a surgical emergency and the patient should contact the office or present to an emergency department  3. Return to office in 3 months to discuss progress and further options.   She will return sooner should she develop symptoms

## 2022-04-19 NOTE — PROGRESS NOTES
[facility]  SUBJECTIVE:    Chief Complaint: umbilical hernia    Kevin Ferrer   1955   78 y.o. female presents with an umbilical hernia that has been growing for the last year and intermittently tender. Most pronounced with lifting straining and relieved with rest.  Never had abdominal surgery before. Medical conditions include diabetes COPD and obesity. Denies obstructive symptoms including fevers chills nausea vomiting jaundice dysuria change in appetite weight bowel movements chest pain or dyspnea other than baseline related to COPD    Review of Systems   Constitutional: Negative for activity change and appetite change. HENT: Negative for congestion and dental problem. Eyes: Negative for discharge and itching. Respiratory: Negative for apnea and chest tightness. Cardiovascular: Negative for chest pain and leg swelling. Gastrointestinal: Positive for abdominal pain. Negative for abdominal distention. Endocrine: Negative for cold intolerance and heat intolerance. Genitourinary: Negative for difficulty urinating and dyspareunia. Musculoskeletal: Negative for arthralgias and back pain. Skin: Negative for color change and pallor. Allergic/Immunologic: Negative for environmental allergies and food allergies. Neurological: Negative for dizziness and facial asymmetry. Hematological: Negative for adenopathy. Does not bruise/bleed easily. Psychiatric/Behavioral: Negative for agitation and behavioral problems.        Past Medical History:   Diagnosis Date    CAD (coronary artery disease)     mild MULTI VESSELper VO     COPD [facility] obstructive [facility] disease) [facility]     Diabetes mellitus ([facility]     HTN (hypertension)     Hyperlipidemia     Osteoarthritis, foot, localized 8/25/2014    Spinal stenosis      Past Surgical History:   Procedure Laterality Date    ANKLE FRACTURE SURGERY      CHOLECYSTECTOMY      COLONOSCOPY N/A 12/11/2019    COLONOSCOPY POLYPECTOMY SNARE/COLD BIOPSY performed by Geovanni Lorenzana MD at [place] Bilateral 12/18/2019    BILATERAL L3, L4 AND L5 DORSAL RAMUS MEDIAL BRANCH BLOCK WITH FLUOROSCOPY (42499, 62588) performed by Anabell Can MD at [facility] [place] [address] ARTHROSCOPY Right 06/09/2020    RIGHT SHOULDER ARTHROSCOPE, ROTATOR CUFF AUGMENTATION IMPLANT, SUBACROMIAL DECOMPRESSION, DISTAL CLAVICLE EXCISION AND DEBRIDEMENT performed by Batsheva Lindsay DO at [facility]    TOTAL KNEE ARTHROPLASTY Bilateral      Social History     Socioeconomic History    Marital status:      Spouse name: Not on file    Number of children: Not on file    Years of education: Not on file    Highest education level: Not on file   Occupational History    Occupation: RN   Tobacco Use    Smoking status: Former Smoker     Packs/day: 1.00     Years: 40.00     Pack years: 40.00     Types: Cigarettes     Quit date: 10/1/2018     Years since quitting: 3.5    Smokeless tobacco: Never Used    Tobacco comment: smoked for 40 yrs / smoked up to 1 ppd   Vaping Use    Vaping Use: Never used   Substance and Sexual Activity    Alcohol use: Yes     Comment: 3 DRINKS A MONTH    Drug use: Yes     Types: Marijuana Jerelyn November)     Comment: medical marijuana     Sexual activity: Yes     Partners: Male   Other Topics Concern    Not on file   Social History Narrative    Not on file     Social Determinants of Health     Financial Resource Strain:     Difficulty of Paying Living Expenses: Not on file   Food Insecurity:     Worried About [address] in the Last Year: Not on file    Denise of Food in the Last Year: Not on file   Transportation Needs:     Lack of Transportation (Medical): Not on file    Lack of Transportation (Non-Medical):  Not on file   Physical Activity: Unknown    Days of Exercise per Week: 0 days    Minutes of Exercise per Session: Not on file   Stress:     Feeling of Stress : Not on file   Social Connections:     Frequency of Communication with Friends and Family: Not on file    Frequency of Social Gatherings with Friends and Family: Not on file    Attends Zoroastrianism Services: Not on file    Active Member of [address] [place] [address] [employer] or Organizations: Not on file    Attends Club or Organization Meetings: Not on file    Marital Status: Not on file   Intimate Partner Violence:     Fear of Current or Ex-Partner: Not on file    Emotionally Abused: Not on file    Physically Abused: Not on file    Sexually Abused: Not on file   Housing Stability:     Unable to Pay for Housing in the Last Year: Not on file    Number of [place] in the Last Year: Not on file    Unstable Housing in the Last Year: Not on file      Family History   Problem Relation Age of Onset    Hypertension Mother     Cancer Father         lung    Colon Cancer Father     Breast Cancer Sister     Diabetes Brother     Heart Failure Paternal Grandmother      Current Outpatient Medications   Medication Sig Dispense Refill    aspirin 81 MG EC tablet Take 81 mg by mouth daily      celecoxib (CELEBREX) 200 MG capsule Take 1 capsule by mouth 2 times daily 180 capsule 1    ondansetron (ZOFRAN) 4 MG tablet Take 1 tablet by mouth every 8 hours as needed for Nausea or Vomiting 30 tablet 1    tretinoin (RETIN-A) 0.025 % cream Apply topically nightly.  45 g 5    budesonide-formoterol (SYMBICORT) 160-4.5 MCG/ACT AERO Inhale 2 puffs into the lungs 2 times daily 1 each 6    tiZANidine (ZANAFLEX) 4 MG tablet Take 1 tablet by mouth three times daily as needed for muscle spasm 90 tablet 0    Cyanocobalamin (VITAMIN B-12 PO) Take by mouth      metFORMIN (GLUCOPHAGE-XR) 500 MG extended release tablet TAKE 2 TABLETS BY MOUTH ONCE DAILY WITH SUPPER 180 tablet 1    atorvastatin (LIPITOR) 40 MG tablet Take 1 tablet by mouth once daily 90 tablet 3    losartan (COZAAR) 100 MG tablet Take 1 tablet by mouth once daily 30 tablet 5    pantoprazole (PROTONIX) 40 MG tablet Take 1 tablet by mouth every morning (before breakfast) 90 tablet 1    diclofenac sodium (VOLTAREN) 1 % GEL Apply topically 2 times daily 100 g 3    predniSONE (DELTASONE) 10 MG tablet Only when she has URI      doxycycline hyclate (VIBRA-TABS) 100 MG tablet Only with  URI      amLODIPine (NORVASC) 5 MG tablet Take 1 tablet by mouth nightly 30 tablet 5    SYMBICORT 160-4.5 MCG/ACT AERO Inhale 2 puffs into the lungs 2 times daily 1 each 5    FOLIC ACID PO Take by mouth      Blood Glucose Monitoring Suppl (BLOOD GLUCOSE MONITOR SYSTEM) w/Device KIT To check blood sugar 4 times daily. Diagnosis Code: E 11.9 1 kit 0    blood glucose monitor strips Use 4 times daily to check blood sugar Diagnosis Code; E 11.9 100 strip 11    Lancets 30G MISC 4 each by Does not apply route daily Use 4 times daily to check blood sugar Diagnosis Code: E 11.9 200 each 3    Lancets MISC 1 each by Does not apply route daily Check blood sugars 1-2 per day 50 each 11    Lido-Menthol-Methyl Sal-Camph (CBD KINGS EX) Apply topically      medical marijuana Take by mouth as needed.       albuterol (PROVENTIL) (2.5 MG/3ML) 0.083% nebulizer solution Take 2.5 mg by nebulization every 6 hours as needed for Wheezing      Nebulizers (COMPRESSOR/NEBULIZER) MISC 1 Device by Does not apply route 2 times daily 1 each 3    hydrochlorothiazide (HYDRODIURIL) 25 MG tablet Take 25 mg by mouth daily as needed       cetirizine (ZYRTEC) 10 MG tablet Take 10 mg by mouth as needed       predniSONE (DELTASONE) 10 MG tablet 40 mg for 2 days,30 mg for 2 days,20 mg for 2 days,10 mg for 2 days (Patient not taking: Reported on 4/19/2022) 20 tablet 0    Vitamin D (CHOLECALCIFEROL) 25 MCG (1000 UT) TABS tablet Take 1,000 Units by mouth daily      ibuprofen (ADVIL;MOTRIN) 200 MG tablet Take 200 mg by mouth every 6 hours as needed for Pain      albuterol sulfate  (90 Base) MCG/ACT inhaler Inhale 2 puffs into the lungs every 6 hours as needed for Wheezing 1 Inhaler 11     No current facility-administered medications for this visit. Allergies   Allergen Reactions    Acetaminophen      Other reaction(s): rash    [employer] [Diclofenac-Misoprostol]      HANDS ITCH        OBJECTIVE:  /70   Ht 5' 5\" (1.651 m)   Wt 256 lb (116.1 kg)   BMI 42.60 kg/m²    Physical Exam  Constitutional:       General: She is not in acute distress. Appearance: She is well-developed. She is not diaphoretic. HENT:      Head: Normocephalic and atraumatic. Eyes:      Conjunctiva/sclera: Conjunctivae normal.      Pupils: Pupils are equal, round, and reactive to light. Neck:      Thyroid: No thyromegaly. Trachea: No tracheal deviation. Cardiovascular:      Rate and Rhythm: Normal rate and regular rhythm. Heart sounds: Normal heart sounds. No murmur heard. No friction rub. No gallop. Pulmonary:      Effort: Pulmonary effort is normal. No respiratory distress. Breath sounds: Normal breath sounds. No wheezing. Abdominal:      General: There is no distension. Palpations: Abdomen is soft. There is no mass. Tenderness: There is no abdominal tenderness. There is no guarding or rebound. Musculoskeletal:      Cervical back: Normal range of motion and neck supple. Lymphadenopathy:      Cervical: No cervical adenopathy. Skin:     General: Skin is warm. Findings: No erythema or rash. Neurological:      Mental Status: She is alert and oriented to person, place, and time. Psychiatric:         Behavior: Behavior normal.         Thought Content: Thought content normal.         Judgment: Judgment normal.          Labs:  No visits with results within 6 Week(s) from this visit.    Latest known visit with results is:   Orders Only on 02/22/2022   Component Date Value Ref Range Status    Vit D, 25-Hydroxy 02/22/2022 54.4  >=30 ng/mL Final    Comment: <=20 ng/mL. ........... Pat Money Deficient  21-29 ng/mL. ......... Pat Money Insufficient  >=30 ng/mL. ........ Pat Money Sufficient      Hemoglobin A1C 02/22/2022 5.9  See comment % Final    Comment: Comment:  Diagnosis of Diabetes: > or = 6.5%  Increased risk of diabetes (Prediabetes): 5.7-6.4%  Glycemic Control: Nonpregnant Adults: <7.0%                    Pregnant: <6.0%        eAG 02/22/2022 122.6  mg/dL Final       Imaging:  No results found. ASSESSMENT:  Reducible umbilical hernia  Diabetes  COPD  Obesity, BMI 42.6    PLAN:  1. While patient does have an umbilical hernia that would benefit from repair at some point, it is not causing her major symptoms and patient is motivated to lose weight. Have encouraged this as it would increase her risk profile  2. Warning signs of an incarcerated hernia include inability to reduce the bulge, increased and constant pain, nausea, vomiting, fevers, chills and constipation. This is a surgical emergency and the patient should contact the office or present to an emergency department  3. Return to office in 3 months to discuss progress and further options. She will return sooner should she develop symptoms    Thomas Kirkpatrick MD, FACS  4/19/2022  2:10 PM

## 2022-04-19 NOTE — Clinical Note
Thank you for sending Ms. Cho. Hernia needs repair at some point.  Will try and lose weight first and return in 3 months to discuss progress or sooner if symptoms worsen

## 2022-04-19 NOTE — TELEPHONE ENCOUNTER
Patient called and is wanting to know if she can schedule a vv with Dr Wilton Knott for hot flashes and night sweats . I informed the patient that I wasn't sure since he treats her for dm. I told her I would send a message and let him make that decision. This is not a new condition she states she has been having these for years.

## 2022-04-20 RX ORDER — LOSARTAN POTASSIUM 100 MG/1
TABLET ORAL
Qty: 90 TABLET | Refills: 1 | Status: SHIPPED | OUTPATIENT
Start: 2022-04-20

## 2022-04-20 RX ORDER — CELECOXIB 200 MG/1
200 CAPSULE ORAL 2 TIMES DAILY
Qty: 180 CAPSULE | Refills: 1 | Status: SHIPPED | OUTPATIENT
Start: 2022-04-20

## 2022-04-20 NOTE — TELEPHONE ENCOUNTER
. Kenneth Rivero advised to see her GYN for work up per Dr. Nacho Boucher. Explained if work up is normal will see her in the office.

## 2022-04-20 NOTE — TELEPHONE ENCOUNTER
Medication:   Requested Prescriptions     Pending Prescriptions Disp Refills    losartan (COZAAR) 100 MG tablet 90 tablet 1     Sig: Take 1 tablet by mouth once daily        Last Filled:  Pended for 90 day script    Patient Phone Number: [phone] (home)     Last appt: 4/11/2022   Next appt: Visit date not found    Last OARRS: No flowsheet data found.

## 2022-04-20 NOTE — TELEPHONE ENCOUNTER
Medication:   Requested Prescriptions     Pending Prescriptions Disp Refills    celecoxib (CELEBREX) 200 MG capsule 180 capsule 1     Sig: Take 1 capsule by mouth 2 times daily        Last Filled:  4/11/2022    Patient Phone Number: [phone] (home)     Last appt: 4/11/2022   Next appt: Visit date not found    Last OARRS: No flowsheet data found.

## 2022-04-20 NOTE — TELEPHONE ENCOUNTER
Medication:   Requested Prescriptions     Pending Prescriptions Disp Refills    diclofenac sodium (VOLTAREN) 1 % GEL [Pharmacy Med Name: Diclofenac Sodium 1 % Transdermal Gel] 100 g 0     Sig: APPLY   TOPICALLY TWICE DAILY     Last Filled: 12.10.21 # 100g refills 3    Last appt: 4/11/2022   Next appt: 4/19/2022    Last OARRS: No flowsheet data found.

## 2022-04-21 ENCOUNTER — HOSPITAL ENCOUNTER (OUTPATIENT)
Dept: PHYSICAL THERAPY | Age: 67
Setting detail: THERAPIES SERIES
Discharge: HOME | End: 2022-04-21
Payer: MEDICARE

## 2022-04-21 PROCEDURE — 97161 PT EVAL LOW COMPLEX 20 MIN: CPT | Performed by: PHYSICAL THERAPIST

## 2022-04-21 PROCEDURE — 97530 THERAPEUTIC ACTIVITIES: CPT | Performed by: PHYSICAL THERAPIST

## 2022-04-21 PROCEDURE — 97110 THERAPEUTIC EXERCISES: CPT | Performed by: PHYSICAL THERAPIST

## 2022-04-21 RX ORDER — AMLODIPINE BESYLATE 5 MG/1
5 TABLET ORAL NIGHTLY
Qty: 30 TABLET | Refills: 0 | Status: SHIPPED | OUTPATIENT
Start: 2022-04-21 | End: 2022-05-23

## 2022-04-21 NOTE — TELEPHONE ENCOUNTER
Medication:   Requested Prescriptions     Pending Prescriptions Disp Refills    amLODIPine (NORVASC) 5 MG tablet [Pharmacy Med Name: amLODIPine Besylate 5 MG Oral Tablet] 30 tablet 0     Sig: Take 1 tablet by mouth nightly        Last Filled:  10/22/2021 30 tabs 5 refills     Patient Phone Number: [phone] (home)     Last appt: 4/11/2022   Next appt: Visit date not found    Last OARRS: No flowsheet data found.

## 2022-04-21 NOTE — PLAN OF CARE
[address] [place], [address] [facility]  Phone: [phone]  Fax: [phone]     Lana Gonzalez    Dear Dr. Luis Leader,    We had the pleasure of evaluating the following patient for physical therapy services at [address] [place] [address]. A summary of our findings can be found in the initial assessment below. This includes our plan of care. If you have any questions or concerns regarding these findings, please do not hesitate to contact me at the office phone number checked above. Thank you for the referral.       Physician Signature:_______________________________Date:__________________  By signing above (or electronic signature), therapists plan is approved by physician      Patient: Cesar Sena   : 1955   MRN: [id number]  Referring Physician:        Evaluation Date: 2022      Medical Diagnosis Information:  Diagnosis: R26.89  Balance Disorder, GG89.29  Chronic Pain                                             Insurance information: PT Insurance Information: Medicare    Precautions/ Contra-indications:     C-SSRS Triggered by Intake questionnaire (Past 2 wk assessment ):   [x] No, Questionnaire did not trigger screening.   [] Yes, Patient intake triggered C-SSRS Screening      [] C-SSRS Screening completed  [] PCP notified via Epic    Latex Allergy:  [x]NO      []YES  Preferred Language for Healthcare:   [x]English       []other:    SUBJECTIVE: Patient stated complaint: Pt has chronic LBP with intermittent radicular s/s. She has h/o B TKR. Pt struggles with stair ambulation and walking community distances. Home ADLs are impacted. She reports unsteady gait and decreased balance, tiesha on uneven surfaces. Patient recently joined the [facility] and plans to use the pool for exercise. She was referred by her PCP.     Relevant Medical History: DM, HTN, B TKR, COPD, OA, chronic LBP with DDD  Functional Outcome: FOTO raw score = 62.7/100    Pain Scale: 3-4/10avg  LB, at end of the day 8/10  Easing factors: inversion table  Provocative factors: walking, stairs    Type: [x]Constant   []Intermittent  []Radiating []Localized []other:     Numbness/Tingling: R LE to mid thigh, L glut    Occupation/School: retired     Living Status/Prior Level of Function: Prior to this injury / incident, pt was independent with ADLs and IADLs, stand    OBJECTIVE:     Palpation: across belt line of LB    Functional Mobility/Transfers: UEs with sit-stand    Inspection: skin WNLs    Posture: overwt, decreased Lx lordosis, Lx P-S mm guarding    Bandages/Dressings/Incisions:NA    Gait: (include devices/WB status): No AD, slight antalgic     Dermatomes Normal Abnormal Comments   inguinal area (L1)  x     anterior mid-thigh (L2) x     distal ant thigh/med knee (L3)  R    medial lower leg and foot (L4)  R    lateral lower leg and foot (L5) x     posterior calf (S1) x     medial calcaneus (S2) x         Myotomes Normal Abnormal Comments   Hip flexion (L1-L2) x     Knee extension (L2-L4) x     Dorsiflexion (L4-L5) x     Great Toe Ext (L5) x     Ankle Eversion (S1-S2) x     Ankle PF(S1-S2) x         Reflexes Normal Abnormal Comments   S1-2 Seated achilles x     S1-2 Prone knee bend      L3-4 Patellar tendon x     Clonus      Babinski          Joint mobility: Lx spine   []Normal    [x]Hypo   []Hyper    Strength (0-5) Left Right   Hip flex 4 4   Hip abd 4- 4-   quad 4+ 4   HS 4+ 4+   DF 5 5        Abs fair    multif fair         AROM     Lx flex , ext Mod restrictions                   Flexibility     HS -40 -45   piriformis Mod+ Mod+   calf Mod+ mod                Orthopaedic Special Tests  Positive  Negative  NT Comments    FF test  x     march  x     trendelenberg R                 Pt requested to not go prone                                                      [x] Patient history, allergies, meds reviewed. Medical chart reviewed. See intake form. Review Of Systems (ROS):  [x]Performed Review of systems (Integumentary, CardioPulmonary, Neurological) by intake and observation. Intake form has been scanned into medical record. Patient has been instructed to contact their primary care physician regarding ROS issues if not already being addressed at this time. Co-morbidities/Complexities (which will affect course of rehabilitation):   []None           Arthritic conditions   []Rheumatoid arthritis (M05.9)  [x]Osteoarthritis (M19.91)   Cardiovascular conditions   [x]Hypertension (I10)  []Hyperlipidemia (E78.5)  []Angina pectoris (I20)  []Atherosclerosis (I70)   Musculoskeletal conditions   [x]Disc pathology   []Congenital spine pathologies   [x]Prior surgical intervention  []Osteoporosis (M81.8)  []Osteopenia (M85.8)   Endocrine conditions   []Hypothyroid (E03.9)  []Hyperthyroid Gastrointestinal conditions   []Constipation (J64.14)   Metabolic conditions   []Morbid obesity (E66.01)  [x]Diabetes type 1(E10.65) or 2 (E11.65)   []Neuropathy (G60.9)     Pulmonary conditions   []Asthma (J45)  []Coughing   [x]COPD (J44.9)   Psychological Disorders  []Anxiety (F41.9)  []Depression (F32.9)   []Other:   [x]Other:     COVID 3/22  Hernia  BTKR     Barriers to/and or personal factors that will affect rehab potential:              []Age  []Sex   []Smoker              []Motivation/Lack of Motivation                        [x]Co-Morbidities              []Cognitive Function, education/learning barriers              []Environmental, home barriers              []profession/work barriers  []past PT/medical experience  []other:  Justification:     Falls Risk Assessment (30 days):   [x] Falls Risk assessed and no intervention required.   [] Falls Risk assessed and Patient requires intervention due to being higher risk   TUG score (>12s at risk):     [] Falls education provided, including ASSESSMENT:    Functional Impairments:     [x]Noted Lx spine hypomobility   []Noted joint hypermobility   [x]Decreased functional ROM   []Abnormal reflexes/sensation/myotomal/dermatomal deficits   [x]Decreased strength and neuromuscular control in core and LEs    [x]Decreased functional strength   []other:      Functional Activity Limitations (from functional questionnaire and intake)   [x]Reduced ability to tolerate prolonged functional positions   []Reduced ability or difficulty with changes of positions or transfers between positions   [x]Reduced ability to maintain good posture and demonstrate good body mechanics with sitting, bending, and lifting   [] Reduced ability or tolerance with driving and/or computer work   [x]Reduced ability to perform lifting, reaching, carrying tasks   []Reduced ability to concentrate   [x]Reduced ability to sleep    []Reduced ability to tolerate any impact through   [x]Reduced ability to ambulate prolonged functional periods/distances   [x]other: stair ambulation    Participation Restrictions   [x]Reduced participation in self care activities   [x]Reduced participation in home management activities   []Reduced participation in work activities   [x]Reduced participation in social activities. []Reduced participation in sport/recreational activities. Classification/Subgrouping:   []signs/symptoms consistent with Lx DDD with core weakness, pain, and LE weakness. Patient had decreased balance and gait d/t Lx involvement, as well as h/o TKR B.     Prognosis/Rehab Potential:      []Excellent   [x]Good    []Fair   []Poor    Tolerance of evaluation/treatment:    []Excellent   [x]Good    []Fair   []Poor    Physical Therapy Evaluation Complexity Justification  [x] A history of present problem with:  [] no personal factors and/or comorbidities that impact the plan of care;  []1-2 personal factors and/or comorbidities that impact the plan of care  [x]3 personal factors and/or comorbidities that impact the plan of care  [x] An examination of body systems using standardized tests and measures addressing any of the following: body structures and functions (impairments), activity limitations, and/or participation restrictions;:  [] a total of 1-2 or more elements   [x] a total of 3 or more elements   [] a total of 4 or more elements   [x] A clinical presentation with:  [] stable and/or uncomplicated characteristics   [x] evolving clinical presentation with changing characteristics  [] unstable and unpredictable characteristics;   [x] Clinical decision making of [] low, [x] moderate, [] high complexity using standardized patient assessment instrument and/or measurable assessment of functional outcome. [x] EVAL (LOW) 00245 (typically 20 minutes face-to-face)  [] EVAL (MOD) 05635 (typically 30 minutes face-to-face)  [] EVAL (HIGH) 87522 (typically 45 minutes face-to-face)  [] RE-EVAL     PLAN:   Frequency/Duration:  2 days per week for 6-8 Weeks:  Interventions:  [x]  Therapeutic exercise including: strength training, ROM, including postural re-education. [x]  NMR activation and proprioception, including postural re-education. [x]  Manual therapy as indicated to include: Dry Needling/IASTM, STM, PROM, Gr I-IV mobilizations, manipulation. [x] Modalities as needed that may include: thermal agents, E-stim, Biofeedback, US, iontophoresis as indicated  [x] Patient education on joint protection, postural re-education, activity modification, progression of HEP. HEP instruction: Written HEP instructions provided and reviewed     GOALS:  Patient stated goal: able to ambulate steps to her basement with ease, reduce LBP    Therapist goals for Patient:   Short Term Goals: To be achieved in: 2 weeks  1. Independent in HEP and progression per patient tolerance, in order to prevent re-injury.    2. Patient will have a decrease in pain to facilitate improvement in movement, function, and ADLs as indicated by Functional Deficits. Long Term Goals: To be achieved in: 8 weeks  1. FOTO Disability index score improved to 75 to assist with reaching prior level of function. 2. Patient will demonstrate increased AROM to [facility] to allow for proper joint functioning as indicated by patients Functional Deficits. 3. Patient will demonstrate an increase in postural awareness and control to allow for proper functional mobility as indicated by patients Functional Deficits. 4. Patient will demonstrate an increase in Strength to at least 4+/5 in B LE and good in core to allow for proper functional mobility as indicated by patients Functional Deficits. 5. Patient will return to functional activities including ease with steps, better sleep, walking on uneven surfaces without increased symptoms or restriction.    6. Patient will report 70% improvement in LBP and function    Electronically signed by:  ARY Ziegler Mc OMT-c [id number]

## 2022-04-21 NOTE — FLOWSHEET NOTE
C[facility]rett  Phone: [phone]  Fax: [phone]    Physical Therapy Treatment Note/ Progress Report:     Date:  2022    Patient Name:  Eva Le  :  1955  MRN: [id number]  Restrictions/Precautions:    Medical/Treatment Diagnosis Information:  Diagnosis: R26.89  Balance Disorder, GG89.29  Chronic Pain  Treatment Diagnosis: LBP, core and LE functional weakness, gait and balance deficits  Insurance/Certification information:  PT Insurance Information: Medicare + [facility] Supp  Physician Information:   Mike Guzmán MD  Plan of care signed (Y/N): []  Yes  [x]  No     Date of Patient follow up with Physician:      Progress Report: []  Yes  [x]  No     Date Range for reporting period:  Beginnin22  Ending:     Progress report due (10 Rx/or 30 days whichever is less):    Recertification due (POC duration/ or 90 days whichever is less):     Visit # Insurance Allowable Auth required? Date Range   1 [place] []  Yes  []  No      Latex Allergy:  [x]NO      []YES  Preferred Language for Healthcare:   [x]English       []other:    Functional Scale:        Date assessed:  FOTO: raw score = 62.7/100  GIBBS 44/56   22    Pain level: 3-4/10 avg LBp, by 8/10 at end of the day     SUBJECTIVE: Pt has chronic LBP with intermittent radicular s/s. She has h/o B TKR. Pt struggles with stair ambulation and walking community distances. Home ADLs are impacted. She reports unsteady gait and decreased balance, tiesha on uneven surfaces. Patient recently joined the [facility] and plans to use the pool for exercise.  She was referred by her PCP.      OBJECTIVE: See eval      RESTRICTIONS/PRECAUTIONS:     Exercises/Interventions:     Therapeutic Exercise (94086)  Resistance / level Sets/sec Reps Notes / Cues   Bike       IB       HSS  Stand vs seated       Foam:  NBOS EO, EC  HR  Hip abd  March  Squat  Tandem stance              Tball squat Mat Ex:  SLR  LTR  Piriformis str  Seated towel calf str  Add set with GS    SL hip abd  Clam  90/90 hold     10 B  10 gentle  2x20\" B  3x30\"  10 Requested no prone  pn w PPT   Therapeutic Activities (79331)       Instructed on proper water exercises - H/O issued  Discussed proper body mechanics with home ADLs              FSU  LSU  Step down       TB lat gait       Neuromuscular Re-ed (09807)                            Manual Intervention (88868)                                                     Pt. Education:  -pt educated on diagnosis, prognosis and expectations for rehab  -all pt questions were answered    Home Exercise Program:  Access Code: [email or web address]  URL: [email or web address]  Date: 04/21/2022  Prepared by: Mahad Morris  Exercises  Supine Straight Leg Raises - 1 x daily - 7 x weekly - 1-2 sets - 10 reps  Supine Piriformis Stretch with Foot on Ground - 1 x daily - 7 x weekly - 1 sets - 2 reps - 20 hold  Supine Lower Trunk Rotation - 1 x daily - 7 x weekly - 1 sets - 10 reps  Supine Hip Adduction Isometric with Ball - 1 x daily - 7 x weekly - 1 sets - 10 reps - 3 hold  Long Sitting Calf Stretch with Strap - 1 x daily - 7 x weekly - 1 sets - 2 reps - 30 hold  Heel Toe Raises at [address] [facility] - 1 x daily - 7 x weekly - 3 sets - 10 reps  Standing Hip Flexion Extension at [address] [facility] - 1 x daily - 7 x weekly - 3 sets - 10 reps  Standing Hip Abduction Adduction at [address] [facility] - 1 x daily - 7 x weekly - 3 sets - 10 reps  Standing March at [address] [facility] - 1 x daily - 7 x weekly - 3 sets - 10 reps  Gastroc Stretch on Wall in pool - 1 x daily - 7 x weekly - 1 sets - 2 reps - 30 hold    Therapeutic Exercise and NMR EXR  [x] (97155) Provided verbal/tactile cueing for activities related to strengthening, flexibility, endurance, ROM for improvements in LE, proximal hip, and core control with self care, mobility, lifting, ambulation.  [] (97361) Provided verbal/tactile cueing for activities related to improving balance, coordination, kinesthetic sense, posture, motor skill, proprioception  to assist with LE, proximal hip, and core control in self care, mobility, lifting, ambulation and eccentric single leg control. NMR and Therapeutic Activities:    [x] (03919 or 23528) Provided verbal/tactile cueing for activities related to improving balance, coordination, kinesthetic sense, posture, motor skill, proprioception and motor activation to allow for proper function of core, proximal hip and LE with self care and ADLs  [] (07013) Gait Re-education- Provided training and instruction to the patient for proper LE, core and proximal hip recruitment and positioning and eccentric body weight control with ambulation re-education including up and down stairs     Home Exercise Program:    [x] (71940) Reviewed/Progressed HEP activities related to strengthening, flexibility, endurance, ROM of core, proximal hip and LE for functional self-care, mobility, lifting and ambulation/stair navigation   [] (83364)Reviewed/Progressed HEP activities related to improving balance, coordination, kinesthetic sense, posture, motor skill, proprioception of core, proximal hip and LE for self care, mobility, lifting, and ambulation/stair navigation      Manual Treatments:  PROM / STM / Oscillations-Mobs:  G-I, II, III, IV (PA's, Inf., Post.)  [] (91974) Provided manual therapy to mobilize LE, proximal hip and/or LS spine soft tissue/joints for the purpose of modulating pain, promoting relaxation,  increasing ROM, reducing/eliminating soft tissue swelling/inflammation/restriction, improving soft tissue extensibility and allowing for proper ROM for normal function with self care, mobility, lifting and ambulation.      Modalities:  [] (81000) Vasopneumatic compression: Utilized vasopneumatic compression to decrease edema / swelling for the purpose of improving mobility and quad tone / recruitment which will allow for increased overall function including but not limited to self-care, transfers, ambulation, and ascending / descending stairs. Modalities: At home ICe/ MH prn    Charges:  Timed Code Treatment Minutes: 30   Total Treatment Minutes: 45     [x] EVAL - LOW (95745)   [] EVAL - MOD (87404)  [] EVAL - HIGH (56576)  [] RE-EVAL (96918)  [x] TE (33559) x1      [] Ionto (29570)  [] NMR (58215) x      [] Vaso (03488)  [] Manual (53301) x      [] Ultrasound  [x] TA (41308) x 1     [] Mech Traction (52124)  [] Gait Training x     [] ES (un) (84462):   [] Aquatic therapy x   [] Other:   [] Group:     GOALS:  Patient stated goal: able to ambulate steps to her basement with ease, reduce LBP     Therapist goals for Patient:   Short Term Goals: To be achieved in: 2 weeks  1. Independent in HEP and progression per patient tolerance, in order to prevent re-injury. 2. Patient will have a decrease in pain to facilitate improvement in movement, function, and ADLs as indicated by Functional Deficits.     Long Term Goals: To be achieved in: 8 weeks  1. FOTO Disability index score improved to 75 to assist with reaching prior level of function. 2. Patient will demonstrate increased AROM to [facility] to allow for proper joint functioning as indicated by patients Functional Deficits. 3. Patient will demonstrate an increase in postural awareness and control to allow for proper functional mobility as indicated by patients Functional Deficits. 4. Patient will demonstrate an increase in Strength to at least 4+/5 in B LE and good in core to allow for proper functional mobility as indicated by patients Functional Deficits. 5. Patient will return to functional activities including ease with steps, better sleep, walking on uneven surfaces without increased symptoms or restriction.    6. Patient will report 70% improvement in LBP and function     Overall Progression Towards Functional goals/ Treatment Progress Update:  [] Patient is progressing as expected towards functional goals listed. [] Progression is slowed due to complexities/Impairments listed. [] Progression has been slowed due to co-morbidities. [x] Plan just implemented, too soon to assess goals progression <30days   [] Goals require adjustment due to lack of progress  [] Patient is not progressing as expected and requires additional follow up with physician  [] Other    Persisting Functional Limitations/Impairments:  []Sitting [x]Standing   [x]Walking [x]Stairs   []Transfers [x]ADLs   [x]Squatting/bending []Kneeling  [x]Housework []Job related tasks  []Driving []Sports/Recreation   [x]Sleeping []Other:    ASSESSMENT:  See eval  Treatment/Activity Tolerance:  [x] Pt able to complete treatment [] Patient limited by fatique  [] Patient limited by pain  [] Patient limited by other medical complications  [] Other:     Prognosis:  [x] Good [] Fair  [] Poor    Patient Requires Follow-up: [x] Yes  [] No    Return to Play:    [x]  N/A   []  Stage 1: Intro to Strength   []  Stage 2: Return to Run and Strength   []  Stage 3: Return to Jump and Strength   []  Stage 4: Dynamic Strength and Agility   []  Stage 5: Sport Specific Training     []  Ready to Return to Play, Meets All Above Stages   []  Not Ready for Return to Sports   Comments:            Plan for next treatment session: progress core, LE strengthening as well as balance and gait act    PLAN: See eval. PT 2x / week for 6-8 weeks. [] Continue per plan of care [] Alter current plan (see comments)  [x] Plan of care initiated [] Hold pending MD visit [] Discharge    Electronically signed by: Dain Merlin, PT MPT  [id number]      Note: If patient does not return for scheduled/ recommended follow up visits, this note will serve as a discharge from care along with most recent update on progress.

## 2022-04-25 RX ORDER — TIZANIDINE 4 MG/1
TABLET ORAL
Qty: 90 TABLET | Refills: 0 | Status: SHIPPED | OUTPATIENT
Start: 2022-04-25 | End: 2022-06-09

## 2022-04-25 NOTE — TELEPHONE ENCOUNTER
Medication:   Requested Prescriptions     Pending Prescriptions Disp Refills    tiZANidine (ZANAFLEX) 4 MG tablet [Pharmacy Med Name: tiZANidine HCl 4 MG Oral Tablet] 90 tablet 0     Sig: Take 1 tablet by mouth three times daily as needed for muscle spasm        Last Filled:  3/25/2022 90 tabs 0 refills     Patient Phone Number: [phone] (home)     Last appt: 4/11/2022   Next appt: Visit date not found    Last OARRS: No flowsheet data found.

## 2022-04-26 ENCOUNTER — HOSPITAL ENCOUNTER (OUTPATIENT)
Dept: PHYSICAL THERAPY | Age: 67
Setting detail: THERAPIES SERIES
Discharge: HOME | End: 2022-04-26
Payer: MEDICARE

## 2022-04-26 PROCEDURE — 97530 THERAPEUTIC ACTIVITIES: CPT | Performed by: PHYSICAL THERAPIST

## 2022-04-26 PROCEDURE — 97110 THERAPEUTIC EXERCISES: CPT | Performed by: PHYSICAL THERAPIST

## 2022-04-26 NOTE — FLOWSHEET NOTE
[facility]  Phone: [phone]  Fax: [phone]    Physical Therapy Treatment Note/ Progress Report:     Date:  2022    Patient Name:  Shyla Friend  :  1955  MRN: [id number]  Restrictions/Precautions:    Medical/Treatment Diagnosis Information:  Diagnosis: R26.89  Balance Disorder, GG89.29  Chronic Pain  Treatment Diagnosis: LBP, core and LE functional weakness, gait and balance deficits  Insurance/Certification information:  PT Insurance Information: Medicare + [place] Supp  Physician Information:   Cindy Hitchcock MD  Plan of care signed (Y/N): []  Yes  [x]  No     Date of Patient follow up with Physician:      Progress Report: []  Yes  [x]  No     Date Range for reporting period:  Beginnin22  Ending:     Progress report due (10 Rx/or 30 days whichever is less):    Recertification due (POC duration/ or 90 days whichever is less):     Visit # Insurance Allowable Auth required? Date Range   2 [place] []  Yes  []  No      Latex Allergy:  [x]NO      []YES  Preferred Language for Healthcare:   [x]English       []other:    Functional Scale:        Date assessed:  FOTO: raw score = 62.7/100  GIBBS 44/56   22    Pain level: 3-4/10 avg LBP    History: Pt has chronic LBP with intermittent radicular s/s. She has h/o B TKR. Pt struggles with stair ambulation and walking community distances. Home ADLs are impacted. She reports unsteady gait and decreased balance, tiesha on uneven surfaces. Patient recently joined the [facility] and plans to use the pool for exercise. She was referred by her PCP.      SUBJECTIVE:     OBJECTIVE: See eval      RESTRICTIONS/PRECAUTIONS:     Exercises/Interventions:     Therapeutic Exercise (09815)  Resistance / level Sets/sec Reps Notes / Cues   Bike   add    IB   2x30\"    HSS   seated   2x30\" B    Foam:  NBOS EO, EC  HR  Hip abd  March  Squat       30\", 15\"  10  10 L/R    Seated Ex:   Add set with add set  TB hip ER     lime    10x3\"  10    Tball squat       med ball lift                     Mat Ex:  SLR  LTR  Piriformis str  Seated towel calf str  Add set with GS  Bridge  SL hip abd  Clam  90/90 hold     10 B  \"  10x3\"  add  10 L/R  10L/R  2x10\" Requested no prone  pn w PPT   Therapeutic Activities (67126)       Instructed on proper water exercises - H/O issued  Discussed proper body mechanics with home ADLs       Tandem stance   30\" L/R    FSU  LSU  Step down       TB lat gait       Neuromuscular Re-ed (06941)                            Manual Intervention (83406)       Roller to LB    5' Pt in [facility]                                          Pt. Education:  -pt educated on diagnosis, prognosis and expectations for rehab  -all pt questions were answered    Home Exercise Program:  Access Code: [email or web address]  URL: [email or web address]  Date: 04/21/2022  Prepared by: Blue Meza  Exercises  Supine Straight Leg Raises - 1 x daily - 7 x weekly - 1-2 sets - 10 reps  Supine Piriformis Stretch with Foot on Ground - 1 x daily - 7 x weekly - 1 sets - 2 reps - 20 hold  Supine Lower Trunk Rotation - 1 x daily - 7 x weekly - 1 sets - 10 reps  Supine Hip Adduction Isometric with Ball - 1 x daily - 7 x weekly - 1 sets - 10 reps - 3 hold  Long Sitting Calf Stretch with Strap - 1 x daily - 7 x weekly - 1 sets - 2 reps - 30 hold  Heel Toe Raises at [address] [facility] - 1 x daily - 7 x weekly - 3 sets - 10 reps  Standing Hip Flexion Extension at [address] [facility] - 1 x daily - 7 x weekly - 3 sets - 10 reps  Standing Hip Abduction Adduction at [address] [facility] - 1 x daily - 7 x weekly - 3 sets - 10 reps  Standing March at [address] [facility] - 1 x daily - 7 x weekly - 3 sets - 10 reps  Gastroc Stretch on Wall in pool - 1 x daily - 7 x weekly - 1 sets - 2 reps - 30 hold    Therapeutic Exercise and NMR EXR  [x] (61656) Provided verbal/tactile cueing for activities related to strengthening, flexibility, endurance, ROM for improvements in LE, proximal hip, and core control with self care, mobility, lifting, ambulation.  [] (02426) Provided verbal/tactile cueing for activities related to improving balance, coordination, kinesthetic sense, posture, motor skill, proprioception  to assist with LE, proximal hip, and core control in self care, mobility, lifting, ambulation and eccentric single leg control. NMR and Therapeutic Activities:    [x] (24762 or 80963) Provided verbal/tactile cueing for activities related to improving balance, coordination, kinesthetic sense, posture, motor skill, proprioception and motor activation to allow for proper function of core, proximal hip and LE with self care and ADLs  [] (65749) Gait Re-education- Provided training and instruction to the patient for proper LE, core and proximal hip recruitment and positioning and eccentric body weight control with ambulation re-education including up and down stairs     Home Exercise Program:    [x] (85528) Reviewed/Progressed HEP activities related to strengthening, flexibility, endurance, ROM of core, proximal hip and LE for functional self-care, mobility, lifting and ambulation/stair navigation   [] (23249)Reviewed/Progressed HEP activities related to improving balance, coordination, kinesthetic sense, posture, motor skill, proprioception of core, proximal hip and LE for self care, mobility, lifting, and ambulation/stair navigation      Manual Treatments:  PROM / STM / Oscillations-Mobs:  G-I, II, III, IV (PA's, Inf., Post.)  [] (43043) Provided manual therapy to mobilize LE, proximal hip and/or LS spine soft tissue/joints for the purpose of modulating pain, promoting relaxation,  increasing ROM, reducing/eliminating soft tissue swelling/inflammation/restriction, improving soft tissue extensibility and allowing for proper ROM for normal function with self care, mobility, lifting and ambulation.      Modalities:  [] (07926) Vasopneumatic compression: Utilized vasopneumatic compression to decrease edema / swelling for the purpose of improving mobility and quad tone / recruitment which will allow for increased overall function including but not limited to self-care, transfers, ambulation, and ascending / descending stairs. Modalities:  NA this date    Charges:  Timed Code Treatment Minutes: 40   Total Treatment Minutes: 40     [] EVAL - LOW (12388)   [] EVAL - MOD (78847)  [] EVAL - HIGH (16245)  [] RE-EVAL (58863)  [x] TE (39631) x2      [] Ionto (00438)  [] NMR (81254) x      [] Vaso (11146)  [] Manual (43032) x      [] Ultrasound  [x] TA (05861) x 1     [] Mech Traction (31238)  [] Gait Training x     [] ES (un) (19860):   [] Aquatic therapy x   [] Other:   [] Group:     GOALS:  Patient stated goal: able to ambulate steps to her basement with ease, reduce LBP     Therapist goals for Patient:   Short Term Goals: To be achieved in: 2 weeks  1. Independent in HEP and progression per patient tolerance, in order to prevent re-injury. 2. Patient will have a decrease in pain to facilitate improvement in movement, function, and ADLs as indicated by Functional Deficits.     Long Term Goals: To be achieved in: 8 weeks  1. FOTO Disability index score improved to 75 to assist with reaching prior level of function. 2. Patient will demonstrate increased AROM to [facility] to allow for proper joint functioning as indicated by patients Functional Deficits. 3. Patient will demonstrate an increase in postural awareness and control to allow for proper functional mobility as indicated by patients Functional Deficits. 4. Patient will demonstrate an increase in Strength to at least 4+/5 in B LE and good in core to allow for proper functional mobility as indicated by patients Functional Deficits. 5. Patient will return to functional activities including ease with steps, better sleep, walking on uneven surfaces without increased symptoms or restriction.    6. Patient will report 70% improvement in LBP and function     Overall Progression Towards Functional goals/ Treatment Progress Update:  [] Patient is progressing as expected towards functional goals listed. [] Progression is slowed due to complexities/Impairments listed. [] Progression has been slowed due to co-morbidities. [x] Plan just implemented, too soon to assess goals progression <30days   [] Goals require adjustment due to lack of progress  [] Patient is not progressing as expected and requires additional follow up with physician  [] Other    Persisting Functional Limitations/Impairments:  []Sitting [x]Standing   [x]Walking [x]Stairs   []Transfers [x]ADLs   [x]Squatting/bending []Kneeling  [x]Housework []Job related tasks  []Driving []Sports/Recreation   [x]Sleeping []Other:    ASSESSMENT: Pt was able to tolerate initial core stabs this session. Added roller to LB. Treatment/Activity Tolerance:  [x] Pt able to complete treatment [] Patient limited by fatique  [] Patient limited by pain  [] Patient limited by other medical complications  [] Other:     Prognosis:  [x] Good [] Fair  [] Poor    Patient Requires Follow-up: [x] Yes  [] No    Return to Play:    [x]  N/A   []  Stage 1: Intro to Strength   []  Stage 2: Return to Run and Strength   []  Stage 3: Return to Jump and Strength   []  Stage 4: Dynamic Strength and Agility   []  Stage 5: Sport Specific Training     []  Ready to Return to Play, Meets All Above Stages   []  Not Ready for Return to Sports   Comments:            Plan for next treatment session: progress core, LE strengthening as well as balance and gait act    PLAN: See eval. PT 2x / week for 6-8 weeks.    [x] Continue per plan of care [] Alter current plan (see comments)  [] Plan of care initiated [] Hold pending MD visit [] Discharge    Electronically signed by: Pepe Hampton, PT MPT  [id number]      Note: If patient does not return for scheduled/ recommended follow up visits, this note will serve as a discharge from care along with most recent update on progress.

## 2022-05-03 ENCOUNTER — HOSPITAL ENCOUNTER (OUTPATIENT)
Dept: PHYSICAL THERAPY | Age: 67
Setting detail: THERAPIES SERIES
Discharge: HOME | End: 2022-05-03
Payer: MEDICARE

## 2022-05-03 PROCEDURE — 97530 THERAPEUTIC ACTIVITIES: CPT | Performed by: PHYSICAL THERAPIST

## 2022-05-03 PROCEDURE — 97140 MANUAL THERAPY 1/> REGIONS: CPT | Performed by: PHYSICAL THERAPIST

## 2022-05-03 PROCEDURE — 97110 THERAPEUTIC EXERCISES: CPT | Performed by: PHYSICAL THERAPIST

## 2022-05-03 NOTE — FLOWSHEET NOTE
[facility]  Phone: [phone]  Fax: [phone]    Physical Therapy Treatment Note/ Progress Report:     Date:  5/3/2022    Patient Name:  Naomie Thakkar  :  1955  MRN: [id number]  Restrictions/Precautions:    Medical/Treatment Diagnosis Information:  Diagnosis: R26.89  Balance Disorder, GG89.29  Chronic Pain  Treatment Diagnosis: LBP, core and LE functional weakness, gait and balance deficits  Insurance/Certification information:  PT Insurance Information: Medicare + [facility]  Physician Information:   Tracee Rich MD  Plan of care signed (Y/N): []  Yes  [x]  No     Date of Patient follow up with Physician:      Progress Report: []  Yes  [x]  No     Date Range for reporting period:  Beginnin22  Ending:     Progress report due (10 Rx/or 30 days whichever is less):    Recertification due (POC duration/ or 90 days whichever is less):     Visit # Insurance Allowable Auth required? Date Range   3 [place] []  Yes  []  No      Latex Allergy:  [x]NO      []YES  Preferred Language for Healthcare:   [x]English       []other:    Functional Scale:        Date assessed:  FOTO: raw score = 62.7/100  GIBBS 44/56   22    Pain level: 3-4/10 avg LBP    History: Pt has chronic LBP with intermittent radicular s/s. She has h/o B TKR. Pt struggles with stair ambulation and walking community distances. Home ADLs are impacted. She reports unsteady gait and decreased balance, tiesha on uneven surfaces. Patient recently joined the [facility] and plans to use the pool for exercise. She was referred by her PCP.      SUBJECTIVE: C/o more LBP today. Was able to tolerate some yard work over the weekend.     OBJECTIVE: See eval      RESTRICTIONS/PRECAUTIONS:     Exercises/Interventions:     Therapeutic Exercise (71536)  Resistance / level Sets/sec Reps Notes / Cues   Bike   add    IB   2x30\"    HSS   seated   2x30\" B    Foam:  NBOS EO, EC  HR  Hip abd  March  Squat       30\", 15\"  10  10 L/R  10 L/R  10    Seated Ex: Add set with add set  TB hip ER  Tball AB press     Lime  Red ball    20x3\"  20  15x2\"    Tball squat       med ball lift                     Mat Ex:  SLR  LTR  Piriformis str  Seated towel calf str  Add set with GS  Bridge  SL hip abd  Clam  90/90 hold     10 B  \"  15x3\"  add  10 L/R  10L/R   Requested no prone  pn w PPT   Therapeutic Activities (97274)       Instructed on proper water exercises - H/O issued  Discussed proper body mechanics with home ADLs       Tandem stance   30\" L/R    FSU  LSU  Step down 4\"  add    TB lat gait lime  2 laps in // bars    Neuromuscular Re-ed (54239)                            Manual Intervention (37281)       STM to LB w hands  Roller to R hip/ITB    Manual stretch to B LE      10'      add Pt in L Slying w pillow b/t knees. Pt. Education:  -pt educated on diagnosis, prognosis and expectations for rehab  -all pt questions were answered    Home Exercise Program:  Access Code: [email or web address]  URL: [email or web address]  Date: 04/21/2022  Prepared by: Antonio Jarvis  Exercises  Supine Straight Leg Raises - 1 x daily - 7 x weekly - 1-2 sets - 10 reps  Supine Piriformis Stretch with Foot on Ground - 1 x daily - 7 x weekly - 1 sets - 2 reps - 20 hold  Supine Lower Trunk Rotation - 1 x daily - 7 x weekly - 1 sets - 10 reps  Supine Hip Adduction Isometric with Ball - 1 x daily - 7 x weekly - 1 sets - 10 reps - 3 hold  Long Sitting Calf Stretch with Strap - 1 x daily - 7 x weekly - 1 sets - 2 reps - 30 hold  Heel Toe Raises at [address] [facility] - 1 x daily - 7 x weekly - 3 sets - 10 reps  Standing Hip Flexion Extension at [address] [facility] - 1 x daily - 7 x weekly - 3 sets - 10 reps  Standing Hip Abduction Adduction at [address] [facility] - 1 x daily - 7 x weekly - 3 sets - 10 reps  Standing March at [address] 1 x daily - 7 x weekly - 3 sets - 10 reps  Gastroc Stretch on Wall in pool - 1 x daily - 7 x weekly - 1 sets - 2 reps - 30 hold    Therapeutic Exercise and NMR EXR  [x] (66059) Provided verbal/tactile cueing for activities related to strengthening, flexibility, endurance, ROM for improvements in LE, proximal hip, and core control with self care, mobility, lifting, ambulation.  [] (45428) Provided verbal/tactile cueing for activities related to improving balance, coordination, kinesthetic sense, posture, motor skill, proprioception  to assist with LE, proximal hip, and core control in self care, mobility, lifting, ambulation and eccentric single leg control.      NMR and Therapeutic Activities:    [x] (07761 or 90942) Provided verbal/tactile cueing for activities related to improving balance, coordination, kinesthetic sense, posture, motor skill, proprioception and motor activation to allow for proper function of core, proximal hip and LE with self care and ADLs  [] (72401) Gait Re-education- Provided training and instruction to the patient for proper LE, core and proximal hip recruitment and positioning and eccentric body weight control with ambulation re-education including up and down stairs     Home Exercise Program:    [x] (20474) Reviewed/Progressed HEP activities related to strengthening, flexibility, endurance, ROM of core, proximal hip and LE for functional self-care, mobility, lifting and ambulation/stair navigation   [] (33038)Reviewed/Progressed HEP activities related to improving balance, coordination, kinesthetic sense, posture, motor skill, proprioception of core, proximal hip and LE for self care, mobility, lifting, and ambulation/stair navigation      Manual Treatments:  PROM / STM / Oscillations-Mobs:  G-I, II, III, IV (PA's, Inf., Post.)  [x] (51554) Provided manual therapy to mobilize LE, proximal hip and/or LS spine soft tissue/joints for the purpose of modulating pain, promoting relaxation,  increasing ROM, reducing/eliminating soft tissue swelling/inflammation/restriction, improving soft tissue extensibility and allowing for proper ROM for normal function with self care, mobility, lifting and ambulation. Modalities:  [] (78357) Vasopneumatic compression: Utilized vasopneumatic compression to decrease edema / swelling for the purpose of improving mobility and quad tone / recruitment which will allow for increased overall function including but not limited to self-care, transfers, ambulation, and ascending / descending stairs. Modalities:  NA this date    Charges:  Timed Code Treatment Minutes: 42   Total Treatment Minutes: 42     [] EVAL - LOW (55660)   [] EVAL - MOD (59849)  [] EVAL - HIGH (75662)  [] RE-EVAL (09812)  [x] TE (18064) x1      [] Ionto (74891)  [] NMR (68046) x      [] Vaso (80003)  [x] Manual (24376) x1     [] Ultrasound  [x] TA (45121) x 1     [] Mech Traction (63267)  [] Gait Training x     [] ES (un) (35931):   [] Aquatic therapy x   [] Other:   [] Group:     GOALS:  Patient stated goal: able to ambulate steps to her basement with ease, reduce LBP     Therapist goals for Patient:   Short Term Goals: To be achieved in: 2 weeks  1. Independent in HEP and progression per patient tolerance, in order to prevent re-injury. 2. Patient will have a decrease in pain to facilitate improvement in movement, function, and ADLs as indicated by Functional Deficits.     Long Term Goals: To be achieved in: 8 weeks  1. FOTO Disability index score improved to 75 to assist with reaching prior level of function. 2. Patient will demonstrate increased AROM to [facility] to allow for proper joint functioning as indicated by patients Functional Deficits. 3. Patient will demonstrate an increase in postural awareness and control to allow for proper functional mobility as indicated by patients Functional Deficits. 4. Patient will demonstrate an increase in Strength to at least 4+/5 in B LE and good in core to allow for proper functional mobility as indicated by patients Functional Deficits. 5. Patient will return to functional activities including ease with steps, better sleep, walking on uneven surfaces without increased symptoms or restriction. 6. Patient will report 70% improvement in LBP and function     Overall Progression Towards Functional goals/ Treatment Progress Update:  [] Patient is progressing as expected towards functional goals listed. [] Progression is slowed due to complexities/Impairments listed. [] Progression has been slowed due to co-morbidities. [x] Plan just implemented, too soon to assess goals progression <30days   [] Goals require adjustment due to lack of progress  [] Patient is not progressing as expected and requires additional follow up with physician  [] Other    Persisting Functional Limitations/Impairments:  []Sitting [x]Standing   [x]Walking [x]Stairs   []Transfers [x]ADLs   [x]Squatting/bending []Kneeling  [x]Housework []Job related tasks  []Driving []Sports/Recreation   [x]Sleeping []Other:    ASSESSMENT: Pt was able to tolerate more core stabs this session. Added STM to LB and controller to R hip. Pt had relief after manual.    Treatment/Activity Tolerance:  [x] Pt able to complete treatment [] Patient limited by fatique  [] Patient limited by pain  [] Patient limited by other medical complications  [] Other:     Prognosis:  [x] Good [] Fair  [] Poor    Patient Requires Follow-up: [x] Yes  [] No    Return to Play:    [x]  N/A   []  Stage 1: Intro to Strength   []  Stage 2: Return to Run and Strength   []  Stage 3: Return to Jump and Strength   []  Stage 4: Dynamic Strength and Agility   []  Stage 5: Sport Specific Training     []  Ready to Return to Play, Meets All Above Stages   []  Not Ready for Return to Sports   Comments:            Plan for next treatment session: progress core, LE strengthening as well as balance and gait act    PLAN: See eval. PT 2x / week for 6-8 weeks.    [x] Continue per plan of care [] Alter current plan (see comments)  [] Plan of care initiated [] Hold pending MD visit [] Discharge    Electronically signed by: Radha Pena, PT MPT  [id number]      Note: If patient does not return for scheduled/ recommended follow up visits, this note will serve as a discharge from care along with most recent update on progress.

## 2022-05-05 ENCOUNTER — HOSPITAL ENCOUNTER (OUTPATIENT)
Dept: PHYSICAL THERAPY | Age: 67
Setting detail: THERAPIES SERIES
Discharge: HOME | End: 2022-05-05
Payer: MEDICARE

## 2022-05-05 PROCEDURE — 97140 MANUAL THERAPY 1/> REGIONS: CPT | Performed by: PHYSICAL THERAPIST

## 2022-05-05 PROCEDURE — 97110 THERAPEUTIC EXERCISES: CPT | Performed by: PHYSICAL THERAPIST

## 2022-05-05 PROCEDURE — 97530 THERAPEUTIC ACTIVITIES: CPT | Performed by: PHYSICAL THERAPIST

## 2022-05-05 NOTE — FLOWSHEET NOTE
[facility]  Phone: [phone]  Fax: [phone]    Physical Therapy Treatment Note/ Progress Report:     Date:  2022    Patient Name:  Rigoberto Sebastian  :  1955  MRN: [id number]  Restrictions/Precautions:    Medical/Treatment Diagnosis Information:  Diagnosis: R26.89  Balance Disorder, GG89.29  Chronic Pain  Treatment Diagnosis: LBP, core and LE functional weakness, gait and balance deficits  Insurance/Certification information:  PT Insurance Information: Medicare + [facility] Supp  Physician Information:   Julia Bailey MD  Plan of care signed (Y/N): []  Yes  [x]  No     Date of Patient follow up with Physician:      Progress Report: []  Yes  [x]  No     Date Range for reporting period:  Beginnin22  Ending:     Progress report due (10 Rx/or 30 days whichever is less):    Recertification due (POC duration/ or 90 days whichever is less):     Visit # Insurance Allowable Auth required? Date Range   4 [place] []  Yes  []  No      Latex Allergy:  [x]NO      []YES  Preferred Language for Healthcare:   [x]English       []other:    Functional Scale:        Date assessed:  FOTO: raw score = 62.7/100  GIBBS 44/56   22    Pain level: 3-4/10 avg LBP    History: Pt has chronic LBP with intermittent radicular s/s. She has h/o B TKR. Pt struggles with stair ambulation and walking community distances. Home ADLs are impacted. She reports unsteady gait and decreased balance, tiesha on uneven surfaces. Patient recently joined the [facility] and plans to use the pool for exercise. She was referred by her PCP.      SUBJECTIVE: Some relief after last session.      OBJECTIVE: See eval      RESTRICTIONS/PRECAUTIONS:     Exercises/Interventions:     Therapeutic Exercise (40027)  Resistance / level Sets/sec Reps Notes / Cues   Bike   No anna in her hip    IB   2x30\"    HSS   seated   2x30\" B    Foam:  NBOS EO, EC  HR  Hip abd  March  Squat       30\", 15\"  10  10 L/R  10 L/R  10    Seated Ex:  Glut set with add set  TB hip ER  Tball AB press     Lime  Red ball    20x3\"  20  20x2\"    Tball squat Red ball  10    med ball lift                     Mat Ex:  SLR  LTR  Piriformis str  Seated towel calf str  Add set with GS    SL hip abd  Clam  90/90 hold     10 B  2x20\" B\"  20x3\"    10 L/R  15 L/R   Requested no prone  pn w PPT   Therapeutic Activities (42198)       Instructed on proper water exercises - H/O issued  Discussed proper body mechanics with home ADLs       Tandem stance   30\" L/R    FSU  LSU  Step down 4\"  5x L/R    CC: multif 5#  Pain in R hip 2x 3x punch          TB lat gait lime      Neuromuscular Re-ed (76867)                            Manual Intervention (98576)       STM to LB w hands  Roller to R hip/ITB    Manual stretch to B LE      10'      add Pt in L Slying w pillow b/t knees. Pt. Education:  -pt educated on diagnosis, prognosis and expectations for rehab  -all pt questions were answered    Home Exercise Program:  Access Code: [email or web address]  URL: [email or web address]  Date: 04/21/2022  Prepared by: Rex Benavidez  Exercises  Supine Straight Leg Raises - 1 x daily - 7 x weekly - 1-2 sets - 10 reps  Supine Piriformis Stretch with Foot on Ground - 1 x daily - 7 x weekly - 1 sets - 2 reps - 20 hold  Supine Lower Trunk Rotation - 1 x daily - 7 x weekly - 1 sets - 10 reps  Supine Hip Adduction Isometric with Ball - 1 x daily - 7 x weekly - 1 sets - 10 reps - 3 hold  Long Sitting Calf Stretch with Strap - 1 x daily - 7 x weekly - 1 sets - 2 reps - 30 hold  Heel Toe Raises at [address] [facility] - 1 x daily - 7 x weekly - 3 sets - 10 reps  Standing Hip Flexion Extension at [address] [facility] - 1 x daily - 7 x weekly - 3 sets - 10 reps  Standing Hip Abduction Adduction at [address] [facility] - 1 x daily - 7 x weekly - 3 sets - 10 reps  Standing March at [address] 1 x daily - 7 x weekly - 3 sets - 10 reps  Gastroc Stretch on Wall in pool - 1 x daily - 7 x weekly - 1 sets - 2 reps - 30 hold    Therapeutic Exercise and NMR EXR  [x] (20564) Provided verbal/tactile cueing for activities related to strengthening, flexibility, endurance, ROM for improvements in LE, proximal hip, and core control with self care, mobility, lifting, ambulation.  [] (25171) Provided verbal/tactile cueing for activities related to improving balance, coordination, kinesthetic sense, posture, motor skill, proprioception  to assist with LE, proximal hip, and core control in self care, mobility, lifting, ambulation and eccentric single leg control.      NMR and Therapeutic Activities:    [x] (01810 or 26692) Provided verbal/tactile cueing for activities related to improving balance, coordination, kinesthetic sense, posture, motor skill, proprioception and motor activation to allow for proper function of core, proximal hip and LE with self care and ADLs  [] (80818) Gait Re-education- Provided training and instruction to the patient for proper LE, core and proximal hip recruitment and positioning and eccentric body weight control with ambulation re-education including up and down stairs     Home Exercise Program:    [x] (56525) Reviewed/Progressed HEP activities related to strengthening, flexibility, endurance, ROM of core, proximal hip and LE for functional self-care, mobility, lifting and ambulation/stair navigation   [] (11901)Reviewed/Progressed HEP activities related to improving balance, coordination, kinesthetic sense, posture, motor skill, proprioception of core, proximal hip and LE for self care, mobility, lifting, and ambulation/stair navigation      Manual Treatments:  PROM / STM / Oscillations-Mobs:  G-I, II, III, IV (PA's, Inf., Post.)  [x] (20368) Provided manual therapy to mobilize LE, proximal hip and/or LS spine soft tissue/joints for the purpose of modulating pain, promoting relaxation,  increasing ROM, reducing/eliminating soft tissue swelling/inflammation/restriction, improving soft tissue extensibility and allowing for proper ROM for normal function with self care, mobility, lifting and ambulation. Modalities:  [] (81129) Vasopneumatic compression: Utilized vasopneumatic compression to decrease edema / swelling for the purpose of improving mobility and quad tone / recruitment which will allow for increased overall function including but not limited to self-care, transfers, ambulation, and ascending / descending stairs. Modalities:  NA this date    Charges:  Timed Code Treatment Minutes: 42   Total Treatment Minutes: 42     [] EVAL - LOW (89151)   [] EVAL - MOD (04714)  [] EVAL - HIGH (89625)  [] RE-EVAL (65511)  [x] TE (43253) x1      [] Ionto (60379)  [] NMR (90593) x      [] Vaso (12092)  [x] Manual (05941) x1     [] Ultrasound  [x] TA (62279) x 1     [] Mech Traction (13841)  [] Gait Training x     [] ES (un) (72602):   [] Aquatic therapy x   [] Other:   [] Group:     GOALS:  Patient stated goal: able to ambulate steps to her basement with ease, reduce LBP     Therapist goals for Patient:   Short Term Goals: To be achieved in: 2 weeks  1. Independent in HEP and progression per patient tolerance, in order to prevent re-injury. 2. Patient will have a decrease in pain to facilitate improvement in movement, function, and ADLs as indicated by Functional Deficits.     Long Term Goals: To be achieved in: 8 weeks  1. FOTO Disability index score improved to 75 to assist with reaching prior level of function. 2. Patient will demonstrate increased AROM to [facility] to allow for proper joint functioning as indicated by patients Functional Deficits. 3. Patient will demonstrate an increase in postural awareness and control to allow for proper functional mobility as indicated by patients Functional Deficits.    4. Patient will demonstrate an increase in Strength to at least 4+/5 in B LE and good in core to allow for proper functional mobility as indicated by patients Functional Deficits. 5. Patient will return to functional activities including ease with steps, better sleep, walking on uneven surfaces without increased symptoms or restriction. 6. Patient will report 70% improvement in LBP and function     Overall Progression Towards Functional goals/ Treatment Progress Update:  [] Patient is progressing as expected towards functional goals listed. [] Progression is slowed due to complexities/Impairments listed. [] Progression has been slowed due to co-morbidities. [x] Plan just implemented, too soon to assess goals progression <30days   [] Goals require adjustment due to lack of progress  [] Patient is not progressing as expected and requires additional follow up with physician  [] Other    Persisting Functional Limitations/Impairments:  []Sitting [x]Standing   [x]Walking [x]Stairs   []Transfers [x]ADLs   [x]Squatting/bending []Kneeling  [x]Housework []Job related tasks  []Driving []Sports/Recreation   [x]Sleeping []Other:    ASSESSMENT: Pt was able to tolerate more core stabs this session. Added STM to LB and controller to R hip. Pt had relief after manual.    Treatment/Activity Tolerance:  [x] Pt able to complete treatment [] Patient limited by fatique  [] Patient limited by pain  [] Patient limited by other medical complications  [] Other:     Prognosis:  [x] Good [] Fair  [] Poor    Patient Requires Follow-up: [x] Yes  [] No    Return to Play:    [x]  N/A   []  Stage 1: Intro to Strength   []  Stage 2: Return to Run and Strength   []  Stage 3: Return to Jump and Strength   []  Stage 4: Dynamic Strength and Agility   []  Stage 5: Sport Specific Training     []  Ready to Return to Play, Meets All Above Stages   []  Not Ready for Return to Sports   Comments:            Plan for next treatment session: progress core, LE strengthening as well as balance and gait act    PLAN: See eval. PT 2x / week for 6-8 weeks.    [x] Continue per plan of care [] Alter current plan (see comments)  [] Plan of care initiated [] Hold pending MD visit [] Discharge    Electronically signed by: Boone Garcia, PT MPT  [id number]      Note: If patient does not return for scheduled/ recommended follow up visits, this note will serve as a discharge from care along with most recent update on progress.

## 2022-05-10 ENCOUNTER — HOSPITAL ENCOUNTER (OUTPATIENT)
Dept: PHYSICAL THERAPY | Age: 67
Setting detail: THERAPIES SERIES
Discharge: HOME | End: 2022-05-10
Payer: MEDICARE

## 2022-05-10 NOTE — PROGRESS NOTES
[facility]    Physical Therapy  Cancellation/No-show Note  Patient Name:  Kevin Ferrer  :  1955   Date:  5/10/2022    Cancelled visits to date: 1  No-shows to date: 0    For today's appointment patient:  [x]  Cancelled  []  Rescheduled appointment  []  No-show     Reason given by patient:  []  Patient ill  [x]  Conflicting appointment  []  No transportation    []  Conflict with work  []  No reason given  []  Other:     Comments:      Phone call information:   []  Phone call made today to patient at _ time at number provided:      []  Patient answered, conversation as follows:    []  Patient did not answer, message left as follows:  []  Phone call not made today  [x]  Phone call not needed - pt contacted us to cancel and provided reason for cancellation.      Electronically signed by:  Sharon Pickering, PT, MPT

## 2022-05-12 ENCOUNTER — HOSPITAL ENCOUNTER (OUTPATIENT)
Dept: PHYSICAL THERAPY | Age: 67
Setting detail: THERAPIES SERIES
Discharge: HOME | End: 2022-05-12
Payer: MEDICARE

## 2022-05-12 PROCEDURE — 97110 THERAPEUTIC EXERCISES: CPT | Performed by: PHYSICAL THERAPIST

## 2022-05-12 PROCEDURE — 97530 THERAPEUTIC ACTIVITIES: CPT | Performed by: PHYSICAL THERAPIST

## 2022-05-12 PROCEDURE — 97140 MANUAL THERAPY 1/> REGIONS: CPT | Performed by: PHYSICAL THERAPIST

## 2022-05-12 NOTE — FLOWSHEET NOTE
[facility]  Phone: [phone]  Fax: [phone]    Physical Therapy Treatment Note/ Progress Report:     Date:  2022    Patient Name:  Armani Kline  :  1955  MRN: [id number]  Restrictions/Precautions:    Medical/Treatment Diagnosis Information:  Diagnosis: R26.89  Balance Disorder, GG89.29  Chronic Pain  Treatment Diagnosis: LBP, core and LE functional weakness, gait and balance deficits  Insurance/Certification information:  PT Insurance Information: Medicare + [facility] Supp  Physician Information:   Jo Trevino MD  Plan of care signed (Y/N): []  Yes  [x]  No     Date of Patient follow up with Physician: none scheduled     Progress Report: []  Yes  [x]  No     Date Range for reporting period:  Beginnin22  Ending:     Progress report due (10 Rx/or 30 days whichever is less):    Recertification due (POC duration/ or 90 days whichever is less):     Visit # Insurance Allowable Auth required? Date Range   5 [place] []  Yes  []  No      Latex Allergy:  [x]NO      []YES  Preferred Language for Healthcare:   [x]English       []other:    Functional Scale:        Date assessed:  FOTO: raw score = 62.7/100  GIBBS 44/56   22    Pain level: 4-5/10 avg LBP    History: Pt has chronic LBP with intermittent radicular s/s. She has h/o B TKR. Pt struggles with stair ambulation and walking community distances. Home ADLs are impacted. She reports unsteady gait and decreased balance, tiesha on uneven surfaces. Patient recently joined the [employer] and plans to use the pool for exercise. She was referred by her PCP.      SUBJECTIVE: More sciatic pain in R LE but also worked in her yard over the weekend.     OBJECTIVE: See eval      RESTRICTIONS/PRECAUTIONS:     Exercises/Interventions:     Therapeutic Exercise (85217)  Resistance / level Sets/sec Reps Notes / Cues      No anna in her hip    IB   2x30\"    HSS   seated   2x30\" B Foam:  NBOS EO, EC  HR  Hip abd  March  Squat       30\", 20\"  15  15 L/R  10 L/R  15    Seated Ex:  Glut set with add set  TB hip ER  Tball AB press     Lime  Red ball      20x2\"    Tball squat Red ball      med ball lift                     Mat Ex:  SLR  LTR  Piriformis str  Seated towel calf str  Add set with GS  TB hip ER  SL hip abd  TB Clam  90/90 hold             Purple    purple    10 B  2x20\" B\"  20x3\"  20  10 L/R  15 L/R   Requested no prone  pn w PPT   Therapeutic Activities (28580)       Instructed on proper water exercises - H/O issued  Discussed proper body mechanics with home ADLs       Tandem stance   30\" L/R    FSU  LSU  Step down 6\"  5x L/R  add Up reps     CC: 4 way walking 5#   7.5# Pain in R hip   3x ea 3x punch          TB lat gait lime      Neuromuscular Re-ed (11184)                            Manual Intervention (00436)       STM to LB w hands  Roller to R hip/ITB    Manual stretch to B LE      10'      add Pt in L Slying w pillow b/t knees. Pt. Education:  -pt educated on diagnosis, prognosis and expectations for rehab  -all pt questions were answered    Home Exercise Program:  Access Code: [email or web address]  URL: [email or web address]  Date: 04/21/2022  Prepared by: Jeevan Stone  Exercises  Supine Straight Leg Raises - 1 x daily - 7 x weekly - 1-2 sets - 10 reps  Supine Piriformis Stretch with Foot on Ground - 1 x daily - 7 x weekly - 1 sets - 2 reps - 20 hold  Supine Lower Trunk Rotation - 1 x daily - 7 x weekly - 1 sets - 10 reps  Supine Hip Adduction Isometric with Ball - 1 x daily - 7 x weekly - 1 sets - 10 reps - 3 hold  Long Sitting Calf Stretch with Strap - 1 x daily - 7 x weekly - 1 sets - 2 reps - 30 hold  Heel Toe Raises at [address] [facility] - 1 x daily - 7 x weekly - 3 sets - 10 reps  Standing Hip Flexion Extension at [address] [facility] - 1 x daily - 7 x weekly - 3 sets - 10 reps  Standing Hip Abduction Adduction at Pool Wall - 1 x daily - 7 x weekly - 3 sets - 10 reps  Standing March at [facility] - 1 x daily - 7 x weekly - 3 sets - 10 reps  Gastroc Stretch on Wall in pool - 1 x daily - 7 x weekly - 1 sets - 2 reps - 30 hold    Therapeutic Exercise and NMR EXR  [x] (57135) Provided verbal/tactile cueing for activities related to strengthening, flexibility, endurance, ROM for improvements in LE, proximal hip, and core control with self care, mobility, lifting, ambulation.  [] (63807) Provided verbal/tactile cueing for activities related to improving balance, coordination, kinesthetic sense, posture, motor skill, proprioception  to assist with LE, proximal hip, and core control in self care, mobility, lifting, ambulation and eccentric single leg control.      NMR and Therapeutic Activities:    [x] (56541 or 11309) Provided verbal/tactile cueing for activities related to improving balance, coordination, kinesthetic sense, posture, motor skill, proprioception and motor activation to allow for proper function of core, proximal hip and LE with self care and ADLs  [] (45057) Gait Re-education- Provided training and instruction to the patient for proper LE, core and proximal hip recruitment and positioning and eccentric body weight control with ambulation re-education including up and down stairs     Home Exercise Program:    [x] (01425) Reviewed/Progressed HEP activities related to strengthening, flexibility, endurance, ROM of core, proximal hip and LE for functional self-care, mobility, lifting and ambulation/stair navigation   [] (01383)Reviewed/Progressed HEP activities related to improving balance, coordination, kinesthetic sense, posture, motor skill, proprioception of core, proximal hip and LE for self care, mobility, lifting, and ambulation/stair navigation      Manual Treatments:  PROM / STM / Oscillations-Mobs:  G-I, II, III, IV (PA's, Inf., Post.)  [x] (35701) Provided manual therapy to mobilize LE, proximal hip and/or LS spine soft tissue/joints for the purpose of modulating pain, promoting relaxation,  increasing ROM, reducing/eliminating soft tissue swelling/inflammation/restriction, improving soft tissue extensibility and allowing for proper ROM for normal function with self care, mobility, lifting and ambulation. Modalities:  [] (46294) Vasopneumatic compression: Utilized vasopneumatic compression to decrease edema / swelling for the purpose of improving mobility and quad tone / recruitment which will allow for increased overall function including but not limited to self-care, transfers, ambulation, and ascending / descending stairs. Modalities:  NA this date    Charges:  Timed Code Treatment Minutes: 42   Total Treatment Minutes: 42     [] EVAL - LOW (77904)   [] EVAL - MOD (12405)  [] EVAL - HIGH (13005)  [] RE-EVAL (10871)  [x] TE (02422) x1      [] Ionto (51484)  [] NMR (89992) x      [] Vaso (61361)  [x] Manual (94276) x1     [] Ultrasound  [x] TA (55917) x 1     [] Mech Traction (36052)  [] Gait Training x     [] ES (un) (67104):   [] Aquatic therapy x   [] Other:   [] Group:     GOALS:  Patient stated goal: able to ambulate steps to her basement with ease, reduce LBP     Therapist goals for Patient:   Short Term Goals: To be achieved in: 2 weeks  1. Independent in HEP and progression per patient tolerance, in order to prevent re-injury. 2. Patient will have a decrease in pain to facilitate improvement in movement, function, and ADLs as indicated by Functional Deficits.     Long Term Goals: To be achieved in: 8 weeks  1. FOTO Disability index score improved to 75 to assist with reaching prior level of function. 2. Patient will demonstrate increased AROM to [facility] to allow for proper joint functioning as indicated by patients Functional Deficits. 3. Patient will demonstrate an increase in postural awareness and control to allow for proper functional mobility as indicated by patients Functional Deficits.    4. Patient will demonstrate an increase in Strength to at least 4+/5 in B LE and good in core to allow for proper functional mobility as indicated by patients Functional Deficits. 5. Patient will return to functional activities including ease with steps, better sleep, walking on uneven surfaces without increased symptoms or restriction. 6. Patient will report 70% improvement in LBP and function     Overall Progression Towards Functional goals/ Treatment Progress Update:  [] Patient is progressing as expected towards functional goals listed. [] Progression is slowed due to complexities/Impairments listed. [] Progression has been slowed due to co-morbidities. [x] Plan just implemented, too soon to assess goals progression <30days   [] Goals require adjustment due to lack of progress  [] Patient is not progressing as expected and requires additional follow up with physician  [] Other    Persisting Functional Limitations/Impairments:  []Sitting [x]Standing   [x]Walking [x]Stairs   []Transfers [x]ADLs   [x]Squatting/bending []Kneeling  [x]Housework []Job related tasks  []Driving []Sports/Recreation   [x]Sleeping []Other:    ASSESSMENT: Pt was able to tolerate more core stabs this session. Cont STM to LB and roller to R hip.      Treatment/Activity Tolerance:  [x] Pt able to complete treatment [] Patient limited by fatique  [] Patient limited by pain  [] Patient limited by other medical complications  [] Other:     Prognosis:  [x] Good [] Fair  [] Poor    Patient Requires Follow-up: [x] Yes  [] No    Return to Play:    [x]  N/A   []  Stage 1: Intro to Strength   []  Stage 2: Return to Run and Strength   []  Stage 3: Return to Jump and Strength   []  Stage 4: Dynamic Strength and Agility   []  Stage 5: Sport Specific Training     []  Ready to Return to Play, Meets All Above Stages   []  Not Ready for Return to Sports   Comments:            Plan for next treatment session: progress core, LE strengthening as well as balance and gait act    PLAN: See elia. PT 2x / week for 6-8 weeks. [x] Continue per plan of care [] Alter current plan (see comments)  [] Plan of care initiated [] Hold pending MD visit [] Discharge    Electronically signed by: Radha Christie, PT MPT  [id number]      Note: If patient does not return for scheduled/ recommended follow up visits, this note will serve as a discharge from care along with most recent update on progress.

## 2022-05-17 ENCOUNTER — HOSPITAL ENCOUNTER (OUTPATIENT)
Dept: PHYSICAL THERAPY | Age: 67
Setting detail: THERAPIES SERIES
Discharge: HOME | End: 2022-05-17
Payer: MEDICARE

## 2022-05-17 PROCEDURE — 97140 MANUAL THERAPY 1/> REGIONS: CPT | Performed by: PHYSICAL THERAPIST

## 2022-05-17 PROCEDURE — 97110 THERAPEUTIC EXERCISES: CPT | Performed by: PHYSICAL THERAPIST

## 2022-05-17 PROCEDURE — 97530 THERAPEUTIC ACTIVITIES: CPT | Performed by: PHYSICAL THERAPIST

## 2022-05-17 NOTE — TELEPHONE ENCOUNTER
Last OV 4/11/2022   Next OV Visit date not found    Requested Prescriptions     Pending Prescriptions Disp Refills    diclofenac sodium (VOLTAREN) 1 % GEL [Pharmacy Med Name: Diclofenac Sodium 1 % Transdermal Gel] 100 g 0     Sig: APPLY   TOPICALLY TO AFFECTED AREA TWICE DAILY AS DIRECTED    last fill 4/20/22  pended

## 2022-05-17 NOTE — FLOWSHEET NOTE
[facility]  Phone: [phone]  Fax: [phone]    Physical Therapy Treatment Note/ Progress Report:     Date:  2022    Patient Name:  Keyona Maya  :  1955  MRN: [id number]  Restrictions/Precautions:    Medical/Treatment Diagnosis Information:  Diagnosis: R26.89  Balance Disorder, GG89.29  Chronic Pain  Treatment Diagnosis: LBP, core and LE functional weakness, gait and balance deficits  Insurance/Certification information:  PT Insurance Information: Medicare + [place] Supp  Physician Information:   Maria Esther Srinivasan MD  Plan of care signed (Y/N): []  Yes  [x]  No     Date of Patient follow up with Physician: none scheduled     Progress Report: []  Yes  [x]  No     Date Range for reporting period:  Beginnin22  Ending:     Progress report due (10 Rx/or 30 days whichever is less):    Recertification due (POC duration/ or 90 days whichever is less):     Visit # Insurance Allowable Auth required? Date Range   6 [place] []  Yes  []  No      Latex Allergy:  [x]NO      []YES  Preferred Language for Healthcare:   [x]English       []other:    Functional Scale:        Date assessed:  FOTO: raw score = 62.7/100  GIBBS 44/56   22    Pain level: 4-5/10 avg LBP    History: Pt has chronic LBP with intermittent radicular s/s. She has h/o B TKR. Pt struggles with stair ambulation and walking community distances. Home ADLs are impacted. She reports unsteady gait and decreased balance, tiesha on uneven surfaces. Patient recently joined the [facility] and plans to use the pool for exercise. She was referred by her PCP.      SUBJECTIVE: Pt noticing that her R LE is much weaker vs L. Stair ambulation leading with R LE and getting into her SUV is difficult. Pt has a long h/o LBP and has tried water therapy, injections, ablasions. She really wants to avoid surgery.  Pt has a TENs unit and PT encouraged her to try it again for pain management. OBJECTIVE: See eval      RESTRICTIONS/PRECAUTIONS:     Exercises/Interventions:     Therapeutic Exercise (75067)  Resistance / level Sets/sec Reps Notes / Cues      No anna in her hip    IB   2x30\"    HSS   seated   2x30\" B    Foam:  NBOS , EC  HR  Hip abd  March  Squat         1#  1#    30\"  20  20 R  20 L/R      Seated Ex:  Glut set with add set  TB hip ER  Tball AB press     Lime  Red ball      20x2\"    Tball squat Red ball  10    R LE TB hip   add    EIS   5x           Mat Ex:  SLR  SAQ  Piriformis str  Seated towel calf str  Add set with GS  TB hip ER  SL hip abd  TB Clam  90/90 hold     2#        Purple    purple    10 B  20 R2x20\" B\"  20x3\"  20  10 L/R  15 L/R   Requested no prone  pn w PPT   Therapeutic Activities (87167)       Rocker board: balance, F/B 30\" ea      Tandem stance   30\" L/R    FSU  LSU  Step down 6\"  10 L/R  No anna on R      CC: 4 way walking 5#   7.5# Pain in R hip    3x punch          TB lat gait lime      Neuromuscular Re-ed (58353)                            Manual Intervention (43847)       STM to LB w hands  Roller to R hip/ITB    Manual stretch to B LE  manual LE traction   10'      Add  Pt had NO relief Pt in L Slying w pillow b/t knees. Pt. Education:  -pt educated on diagnosis, prognosis and expectations for rehab  -all pt questions were answered    Home Exercise Program:  5/12: issued TB for clams and hip abd    Access Code: [email or web address]  URL: [email or web address]  Date: 04/21/2022  Prepared by: Claudene Dixon  Exercises  Supine Straight Leg Raises - 1 x daily - 7 x weekly - 1-2 sets - 10 reps  Supine Piriformis Stretch with Foot on Ground - 1 x daily - 7 x weekly - 1 sets - 2 reps - 20 hold  Supine Lower Trunk Rotation - 1 x daily - 7 x weekly - 1 sets - 10 reps  Supine Hip Adduction Isometric with Ball - 1 x daily - 7 x weekly - 1 sets - 10 reps - 3 hold  Long Sitting Calf Stretch with Strap - 1 x daily - 7 x weekly - 1 sets - 2 reps - 30 hold  Heel Toe Raises at [address] [facility] - 1 x daily - 7 x weekly - 3 sets - 10 reps  Standing Hip Flexion Extension at [address] [facility] - 1 x daily - 7 x weekly - 3 sets - 10 reps  Standing Hip Abduction Adduction at [address] [facility] - 1 x daily - 7 x weekly - 3 sets - 10 reps  Standing March at [address] 1 x daily - 7 x weekly - 3 sets - 10 reps  Gastroc Stretch on Wall in pool - 1 x daily - 7 x weekly - 1 sets - 2 reps - 30 hold    Therapeutic Exercise and NMR EXR  [x] (71092) Provided verbal/tactile cueing for activities related to strengthening, flexibility, endurance, ROM for improvements in LE, proximal hip, and core control with self care, mobility, lifting, ambulation.  [] (06650) Provided verbal/tactile cueing for activities related to improving balance, coordination, kinesthetic sense, posture, motor skill, proprioception  to assist with LE, proximal hip, and core control in self care, mobility, lifting, ambulation and eccentric single leg control.      NMR and Therapeutic Activities:    [x] (98529 or 18451) Provided verbal/tactile cueing for activities related to improving balance, coordination, kinesthetic sense, posture, motor skill, proprioception and motor activation to allow for proper function of core, proximal hip and LE with self care and ADLs  [] (68868) Gait Re-education- Provided training and instruction to the patient for proper LE, core and proximal hip recruitment and positioning and eccentric body weight control with ambulation re-education including up and down stairs     Home Exercise Program:    [x] (91663) Reviewed/Progressed HEP activities related to strengthening, flexibility, endurance, ROM of core, proximal hip and LE for functional self-care, mobility, lifting and ambulation/stair navigation   [] (59898)Reviewed/Progressed HEP activities related to improving balance, coordination, kinesthetic sense, posture, motor skill, proprioception of core, proximal hip and LE for self care, mobility, lifting, and ambulation/stair navigation      Manual Treatments:  PROM / STM / Oscillations-Mobs:  G-I, II, III, IV (PA's, Inf., Post.)  [x] (57888) Provided manual therapy to mobilize LE, proximal hip and/or LS spine soft tissue/joints for the purpose of modulating pain, promoting relaxation,  increasing ROM, reducing/eliminating soft tissue swelling/inflammation/restriction, improving soft tissue extensibility and allowing for proper ROM for normal function with self care, mobility, lifting and ambulation. Modalities:  [] (89218) Vasopneumatic compression: Utilized vasopneumatic compression to decrease edema / swelling for the purpose of improving mobility and quad tone / recruitment which will allow for increased overall function including but not limited to self-care, transfers, ambulation, and ascending / descending stairs. Modalities:  NA this date    Charges:  Timed Code Treatment Minutes: 46   Total Treatment Minutes: 46     [] EVAL - LOW (74188)   [] EVAL - MOD (88149)  [] EVAL - HIGH (45033)  [] RE-EVAL (25591)  [x] TE (20007) x1      [] Ionto (09769)  [] NMR (41833) x      [] Vaso (36013)  [x] Manual (87008) x1     [] Ultrasound  [x] TA (91355) x 1     [] Mech Traction (04633)  [] Gait Training x     [] ES (un) (66996):   [] Aquatic therapy x   [] Other:   [] Group:     GOALS:  Patient stated goal: able to ambulate steps to her basement with ease, reduce LBP     Therapist goals for Patient:   Short Term Goals: To be achieved in: 2 weeks  1. Independent in HEP and progression per patient tolerance, in order to prevent re-injury. 2. Patient will have a decrease in pain to facilitate improvement in movement, function, and ADLs as indicated by Functional Deficits.     Long Term Goals: To be achieved in: 8 weeks  1. FOTO Disability index score improved to 75 to assist with reaching prior level of function.    2. Patient will demonstrate increased AROM to [facility] to allow for proper joint functioning as indicated by patients Functional Deficits. 3. Patient will demonstrate an increase in postural awareness and control to allow for proper functional mobility as indicated by patients Functional Deficits. 4. Patient will demonstrate an increase in Strength to at least 4+/5 in B LE and good in core to allow for proper functional mobility as indicated by patients Functional Deficits. 5. Patient will return to functional activities including ease with steps, better sleep, walking on uneven surfaces without increased symptoms or restriction. 6. Patient will report 70% improvement in LBP and function     Overall Progression Towards Functional goals/ Treatment Progress Update:  [] Patient is progressing as expected towards functional goals listed. [] Progression is slowed due to complexities/Impairments listed. [] Progression has been slowed due to co-morbidities. [x] Plan just implemented, too soon to assess goals progression <30days   [] Goals require adjustment due to lack of progress  [] Patient is not progressing as expected and requires additional follow up with physician  [] Other    Persisting Functional Limitations/Impairments:  []Sitting [x]Standing   [x]Walking [x]Stairs   []Transfers [x]ADLs   [x]Squatting/bending []Kneeling  [x]Housework []Job related tasks  []Driving []Sports/Recreation   [x]Sleeping []Other:    ASSESSMENT: Pt had more difficulty with WB PREs this session. She was unable to [facility] or L hip abd d/t standing R SLS. No relief with manual LE traction. Cont STM to LB and roller to R hip. She needs further LE and core strengthening.      Treatment/Activity Tolerance:  [x] Pt able to complete treatment [] Patient limited by fatique  [] Patient limited by pain  [] Patient limited by other medical complications  [] Other:     Prognosis:  [x] Good [] Fair  [] Poor    Patient Requires Follow-up: [x] Yes  [] No    Return to Play:    [x]  N/A   []  Stage 1: Intro to Strength   []  Stage 2: Return to Run and Strength   []  Stage 3: Return to Jump and Strength   []  Stage 4: Dynamic Strength and Agility   []  Stage 5: Sport Specific Training     []  Ready to Return to Play, Meets All Above Stages   []  Not Ready for Return to Sports   Comments:            Plan for next treatment session: progress core, LE strengthening as well as balance and gait act    PLAN: See eval. PT 2x / week for 6-8 weeks. [x] Continue per plan of care [] Alter current plan (see comments)  [] Plan of care initiated [] Hold pending MD visit [] Discharge    Electronically signed by: Yahaira Lu, PT MPT  [id number]      Note: If patient does not return for scheduled/ recommended follow up visits, this note will serve as a discharge from care along with most recent update on progress.

## 2022-05-19 ENCOUNTER — HOSPITAL ENCOUNTER (OUTPATIENT)
Dept: PHYSICAL THERAPY | Age: 67
Setting detail: THERAPIES SERIES
Discharge: HOME | End: 2022-05-19
Payer: MEDICARE

## 2022-05-19 PROCEDURE — 97530 THERAPEUTIC ACTIVITIES: CPT | Performed by: PHYSICAL THERAPIST

## 2022-05-19 PROCEDURE — 97110 THERAPEUTIC EXERCISES: CPT | Performed by: PHYSICAL THERAPIST

## 2022-05-19 PROCEDURE — 97140 MANUAL THERAPY 1/> REGIONS: CPT | Performed by: PHYSICAL THERAPIST

## 2022-05-19 NOTE — FLOWSHEET NOTE
[facility]  Phone: [phone]  Fax: [phone]    Physical Therapy Treatment Note/ Progress Report:     Date:  2022    Patient Name:  Armani Kline  :  1955  MRN: [id number]  Restrictions/Precautions:    Medical/Treatment Diagnosis Information:  Diagnosis: R26.89  Balance Disorder, GG89.29  Chronic Pain  Treatment Diagnosis: LBP, core and LE functional weakness, gait and balance deficits  Insurance/Certification information:  PT Insurance Information: Medicare + [facility] Supp  Physician Information:   Jo Trevino MD  Plan of care signed (Y/N): []  Yes  [x]  No     Date of Patient follow up with Physician: none scheduled     Progress Report: []  Yes  [x]  No     Date Range for reporting period:  Beginnin22  Ending:     Progress report due (10 Rx/or 30 days whichever is less):    Recertification due (POC duration/ or 90 days whichever is less):     Visit # Insurance Allowable Auth required? Date Range   7 [place] []  Yes  []  No      Latex Allergy:  [x]NO      []YES  Preferred Language for Healthcare:   [x]English       []other:    Functional Scale:        Date assessed:  FOTO: raw score = 62.7/100  GIBBS 44/56   22    Pain level: 4/10 avg LBP    History: Pt has chronic LBP with intermittent radicular s/s. She has h/o B TKR. Pt struggles with stair ambulation and walking community distances. Home ADLs are impacted. She reports unsteady gait and decreased balance, tiesha on uneven surfaces. Patient recently joined the [facility] and plans to use the pool for exercise. She was referred by her PCP.      SUBJECTIVE: Pt noticing that she has more foot pain the next day after a PT session and getting cramp at night in calves. Pt requested to hold foam ex to see if makes a difference.     OBJECTIVE: See eval      RESTRICTIONS/PRECAUTIONS:     Exercises/Interventions:     Therapeutic Exercise (15274)  Resistance / level Sets/sec Reps Notes / Cues      No anna in her hip    IB   2x30\"    HSS   seated   2x30\" B    Foam:  NBOS , EC  HR  Hip abd  March  Squat         1#  1#     Seated Ex:  Glut set with add set  TB hip ER  Tball AB press     Lime  Red ball    20x3\"  20x2\"    Tball squat Red ball  10    R LE TB hip abd, ext     Attempted on CC thisdate    EIS   5x           Mat Ex:  SLR  SAQ  Piriformis str  Seated towel calf str  Add set with GS into a mini bridge  TB hip ER  SL hip abd  TB Clam  90/90 hold     2#        Purple    purple    2x10 B  20 R2x20\" B\"  10x3\"    10 L/R  15 L/R   Requested no prone  pn w PPT        At home    At home     Therapeutic Activities (38315)            Tandem stance   30\" L/R    FSU  LSU  Step down 6\"  10 L/R  No anna on R      CC: 4 way walking  CC: hip ext, abd 5#   7.5#  2.5# Pain in R hip   10 ea L/R 3x punch          TB lat gait lime      Neuromuscular Re-ed (33961)                            Manual Intervention (05648)       STM to LB w hands  Roller to R hip/ITB    Manual stretch to LE: hip flex,   manual LE traction   10'      3x ea R  Pt had NO relief Pt in L Slying w pillow b/t knees. Pt. Education:  -pt educated on diagnosis, prognosis and expectations for rehab  -all pt questions were answered    Home Exercise Program:  5/12: issued TB for clams and hip abd    Access Code: [email or web address]  URL: [email or web address]  Date: 04/21/2022  Prepared by: Ranjith العلي  Exercises  Supine Straight Leg Raises - 1 x daily - 7 x weekly - 1-2 sets - 10 reps  Supine Piriformis Stretch with Foot on Ground - 1 x daily - 7 x weekly - 1 sets - 2 reps - 20 hold  Supine Lower Trunk Rotation - 1 x daily - 7 x weekly - 1 sets - 10 reps  Supine Hip Adduction Isometric with Ball - 1 x daily - 7 x weekly - 1 sets - 10 reps - 3 hold  Long Sitting Calf Stretch with Strap - 1 x daily - 7 x weekly - 1 sets - 2 reps - 30 hold  Heel Toe Raises at Pool Wall - 1 x daily - 7 x weekly - 3 sets - 10 reps  Standing Hip Flexion Extension at [employer] - 1 x daily - 7 x weekly - 3 sets - 10 reps  Standing Hip Abduction Adduction at [employer] - 1 x daily - 7 x weekly - 3 sets - 10 reps  Standing March at [address] 1 x daily - 7 x weekly - 3 sets - 10 reps  Gastroc Stretch on Wall in pool - 1 x daily - 7 x weekly - 1 sets - 2 reps - 30 hold    Therapeutic Exercise and NMR EXR  [x] (42319) Provided verbal/tactile cueing for activities related to strengthening, flexibility, endurance, ROM for improvements in LE, proximal hip, and core control with self care, mobility, lifting, ambulation.  [] (26044) Provided verbal/tactile cueing for activities related to improving balance, coordination, kinesthetic sense, posture, motor skill, proprioception  to assist with LE, proximal hip, and core control in self care, mobility, lifting, ambulation and eccentric single leg control.      NMR and Therapeutic Activities:    [x] (97238 or 41344) Provided verbal/tactile cueing for activities related to improving balance, coordination, kinesthetic sense, posture, motor skill, proprioception and motor activation to allow for proper function of core, proximal hip and LE with self care and ADLs  [] (03360) Gait Re-education- Provided training and instruction to the patient for proper LE, core and proximal hip recruitment and positioning and eccentric body weight control with ambulation re-education including up and down stairs     Home Exercise Program:    [x] (56302) Reviewed/Progressed HEP activities related to strengthening, flexibility, endurance, ROM of core, proximal hip and LE for functional self-care, mobility, lifting and ambulation/stair navigation   [] (06795)Reviewed/Progressed HEP activities related to improving balance, coordination, kinesthetic sense, posture, motor skill, proprioception of core, proximal hip and LE for self care, mobility, lifting, and ambulation/stair navigation      Manual Treatments:  PROM / STM / Oscillations-Mobs:  G-I, II, III, IV (PA's, Inf., Post.)  [x] (48850) Provided manual therapy to mobilize LE, proximal hip and/or LS spine soft tissue/joints for the purpose of modulating pain, promoting relaxation,  increasing ROM, reducing/eliminating soft tissue swelling/inflammation/restriction, improving soft tissue extensibility and allowing for proper ROM for normal function with self care, mobility, lifting and ambulation. Modalities:  [] (48899) Vasopneumatic compression: Utilized vasopneumatic compression to decrease edema / swelling for the purpose of improving mobility and quad tone / recruitment which will allow for increased overall function including but not limited to self-care, transfers, ambulation, and ascending / descending stairs. Modalities:  5/19: trial US @ 100% 1.5w/cm x 6' to Lx    Charges:  Timed Code Treatment Minutes: 46   Total Treatment Minutes: 46     [] EVAL - LOW (78651)   [] EVAL - MOD (02797)  [] EVAL - HIGH (20242)  [] RE-EVAL (11038)  [x] TE (05923) x1      [] Ionto (79723)  [] NMR (98547) x      [] Vaso (65999)  [x] Manual (06537) x1     [] Ultrasound  [x] TA (41360) x 1     [] Mech Traction (22220)  [] Gait Training x     [] ES (un) (84734):   [] Aquatic therapy x   [] Other:   [] Group:     GOALS:  Patient stated goal: able to ambulate steps to her basement with ease, reduce LBP     Therapist goals for Patient:   Short Term Goals: To be achieved in: 2 weeks  1. Independent in HEP and progression per patient tolerance, in order to prevent re-injury. 2. Patient will have a decrease in pain to facilitate improvement in movement, function, and ADLs as indicated by Functional Deficits.     Long Term Goals: To be achieved in: 8 weeks  1. FOTO Disability index score improved to 75 to assist with reaching prior level of function. 2. Patient will demonstrate increased AROM to [facility] to allow for proper joint functioning as indicated by patients Functional Deficits. 3. Patient will demonstrate an increase in postural awareness and control to allow for proper functional mobility as indicated by patients Functional Deficits. 4. Patient will demonstrate an increase in Strength to at least 4+/5 in B LE and good in core to allow for proper functional mobility as indicated by patients Functional Deficits. 5. Patient will return to functional activities including ease with steps, better sleep, walking on uneven surfaces without increased symptoms or restriction. 6. Patient will report 70% improvement in LBP and function     Overall Progression Towards Functional goals/ Treatment Progress Update:  [] Patient is progressing as expected towards functional goals listed. [] Progression is slowed due to complexities/Impairments listed. [] Progression has been slowed due to co-morbidities. [x] Plan just implemented, too soon to assess goals progression <30days   [] Goals require adjustment due to lack of progress  [] Patient is not progressing as expected and requires additional follow up with physician  [] Other    Persisting Functional Limitations/Impairments:  []Sitting [x]Standing   [x]Walking [x]Stairs   []Transfers [x]ADLs   [x]Squatting/bending []Kneeling  [x]Housework []Job related tasks  []Driving []Sports/Recreation   [x]Sleeping []Other:    ASSESSMENT: pAIN CONTINUES. tRIAL us FOR PAIN MANAGEMENT. hELD fOAM EX TO SEE IF IT IS CONTRIBUTING TO HER FOOT PAIN. aDDED cc HIP preS. Cont STM to LB and roller to R hip. She needs further LE and core strengthening.      Treatment/Activity Tolerance:  [x] Pt able to complete treatment [] Patient limited by fatique  [] Patient limited by pain  [] Patient limited by other medical complications  [] Other:     Prognosis:  [x] Good [] Fair  [] Poor    Patient Requires Follow-up: [x] Yes  [] No    Return to Play:    [x]  N/A   []  Stage 1: Intro to Strength   []  Stage 2: Return to Run and Strength   []  Stage 3: Return to Jump and Strength   []  Stage 4: Dynamic Strength and Agility   []  Stage 5: Sport Specific Training     []  Ready to Return to Play, Meets All Above Stages   []  Not Ready for Return to Sports   Comments:            Plan for next treatment session: progress core, LE strengthening as well as balance and gait act    PLAN: See eval. PT 2x / week for 6-8 weeks. [x] Continue per plan of care [] Alter current plan (see comments)  [] Plan of care initiated [] Hold pending MD visit [] Discharge    Electronically signed by: Demian Montes De Oca, PT MPT  [id number]      Note: If patient does not return for scheduled/ recommended follow up visits, this note will serve as a discharge from care along with most recent update on progress.

## 2022-05-23 ENCOUNTER — HOSPITAL ENCOUNTER (OUTPATIENT)
Dept: PHYSICAL THERAPY | Age: 67
Setting detail: THERAPIES SERIES
Discharge: HOME | End: 2022-05-23
Payer: MEDICARE

## 2022-05-23 PROCEDURE — 97110 THERAPEUTIC EXERCISES: CPT | Performed by: PHYSICAL THERAPIST

## 2022-05-23 PROCEDURE — 97530 THERAPEUTIC ACTIVITIES: CPT | Performed by: PHYSICAL THERAPIST

## 2022-05-23 PROCEDURE — 97140 MANUAL THERAPY 1/> REGIONS: CPT | Performed by: PHYSICAL THERAPIST

## 2022-05-23 RX ORDER — AMLODIPINE BESYLATE 5 MG/1
5 TABLET ORAL NIGHTLY
Qty: 30 TABLET | Refills: 5 | Status: SHIPPED | OUTPATIENT
Start: 2022-05-23

## 2022-05-23 NOTE — FLOWSHEET NOTE
[facility]  Phone: [phone]  Fax: [phone]    Physical Therapy Treatment Note/ Progress Report:     Date:  2022    Patient Name:  Kameron Anderson  :  1955  MRN: [id number]  Restrictions/Precautions:    Medical/Treatment Diagnosis Information:  Diagnosis: R26.89  Balance Disorder, GG89.29  Chronic Pain  Treatment Diagnosis: LBP, core and LE functional weakness, gait and balance deficits  Insurance/Certification information:  PT Insurance Information: Medicare + [facility] Supp  Physician Information:   Randell Fitch MD  Plan of care signed (Y/N): []  Yes  [x]  No     Date of Patient follow up with Physician: none scheduled     Progress Report: []  Yes  [x]  No     Date Range for reporting period:  Beginnin22  Ending:     Progress report due (10 Rx/or 30 days whichever is less):    Recertification due (POC duration/ or 90 days whichever is less):     Visit # Insurance Allowable Auth required? Date Range   8 [place] []  Yes  []  No      Latex Allergy:  [x]NO      []YES  Preferred Language for Healthcare:   [x]English       []other:    Functional Scale:        Date assessed:  FOTO: raw score = 62.7/100  GIBBS 44/56   22    Pain level: 4/10 avg LBP    History: Pt has chronic LBP with intermittent radicular s/s. She has h/o B TKR. Pt struggles with stair ambulation and walking community distances. Home ADLs are impacted. She reports unsteady gait and decreased balance, tiesha on uneven surfaces. Patient recently joined the [facility] and plans to use the pool for exercise. She was referred by her PCP.      SUBJECTIVE: Pt cont to have L ankle/foot pain, along with R hip and LB.    OBJECTIVE: See eval      RESTRICTIONS/PRECAUTIONS:     Exercises/Interventions:   LBP and LE weakness  Therapeutic Exercise (54123)  Resistance / level Sets/sec Reps Notes / Cues      No anna in her hip    IB   2x30\"    HSS   seated   2x30\" B Foam:  NBOS , EC  HR  Hip abd  March  Squat         1#  1#     Seated Ex:  Glut set with add set  TB hip ER  Tball AB press     Lime  Red ball    20x3\"  20x2\"    Tball squat Red ball  15           EIS   5x HEP          Mat Ex:  SLR  SAQ  LAQ  Piriformis str  Seated towel calf str  Add set with GS into a mini bridge  TB hip ER  SL hip abd  TB Clam  90/90 hold     3#  3#        Purple    purple    2x10 B  20 R15 R  2x20\" B\"  15x3\"    10 L/R  15 L/R   Requested no prone  pn w PPT          At home    At home     Therapeutic Activities (22674)            Tandem stance   30\" L/R    FSU  LSU  Step down 6\"  10 L/R  No anna on R      CC: 4 way walking  CC: hip ext, abd 5#   7.5#  2.5# Pain in R hip   3x ea10 ea L/R 3x punch    SLS was SORE          TB lat gait lime  2 laps in // bars    Neuromuscular Re-ed (33655)                            Manual Intervention (37885)       STM to LB w hands  Roller to R hip/ITB    Manual stretch to LE: hip flex,   manual LE traction   10'      3x ea R  Pt had NO relief Pt in L Slying w pillow b/t knees. Pt. Education:  -pt educated on diagnosis, prognosis and expectations for rehab  -all pt questions were answered    Home Exercise Program:  5/12: issued TB for clams and hip abd    Access Code: [email or web address]  URL: [email or web address]  Date: 04/21/2022  Prepared by: Claudene Dixon  Exercises  Supine Straight Leg Raises - 1 x daily - 7 x weekly - 1-2 sets - 10 reps  Supine Piriformis Stretch with Foot on Ground - 1 x daily - 7 x weekly - 1 sets - 2 reps - 20 hold  Supine Lower Trunk Rotation - 1 x daily - 7 x weekly - 1 sets - 10 reps  Supine Hip Adduction Isometric with Ball - 1 x daily - 7 x weekly - 1 sets - 10 reps - 3 hold  Long Sitting Calf Stretch with Strap - 1 x daily - 7 x weekly - 1 sets - 2 reps - 30 hold  Heel Toe Raises at Pool Wall - 1 x daily - 7 x weekly - 3 sets - 10 reps  Standing Hip Flexion Extension at [employer] - 1 x daily - 7 x weekly - 3 sets - 10 reps  Standing Hip Abduction Adduction at [address] [facility] - 1 x daily - 7 x weekly - 3 sets - 10 reps  Standing March at [address] [facility] - 1 x daily - 7 x weekly - 3 sets - 10 reps  Gastroc Stretch on Wall in pool - 1 x daily - 7 x weekly - 1 sets - 2 reps - 30 hold    Therapeutic Exercise and NMR EXR  [x] (73603) Provided verbal/tactile cueing for activities related to strengthening, flexibility, endurance, ROM for improvements in LE, proximal hip, and core control with self care, mobility, lifting, ambulation.  [] (69889) Provided verbal/tactile cueing for activities related to improving balance, coordination, kinesthetic sense, posture, motor skill, proprioception  to assist with LE, proximal hip, and core control in self care, mobility, lifting, ambulation and eccentric single leg control.      NMR and Therapeutic Activities:    [x] (28845 or 82111) Provided verbal/tactile cueing for activities related to improving balance, coordination, kinesthetic sense, posture, motor skill, proprioception and motor activation to allow for proper function of core, proximal hip and LE with self care and ADLs  [] (95987) Gait Re-education- Provided training and instruction to the patient for proper LE, core and proximal hip recruitment and positioning and eccentric body weight control with ambulation re-education including up and down stairs     Home Exercise Program:    [x] (10999) Reviewed/Progressed HEP activities related to strengthening, flexibility, endurance, ROM of core, proximal hip and LE for functional self-care, mobility, lifting and ambulation/stair navigation   [] (51618)Reviewed/Progressed HEP activities related to improving balance, coordination, kinesthetic sense, posture, motor skill, proprioception of core, proximal hip and LE for self care, mobility, lifting, and ambulation/stair navigation      Manual Treatments:  PROM / STM / Oscillations-Mobs:  G-I, II, III, IV (PA's, Inf., Post.)  [x] (74772) Provided manual therapy to mobilize LE, proximal hip and/or LS spine soft tissue/joints for the purpose of modulating pain, promoting relaxation,  increasing ROM, reducing/eliminating soft tissue swelling/inflammation/restriction, improving soft tissue extensibility and allowing for proper ROM for normal function with self care, mobility, lifting and ambulation. Modalities:  [] (93368) Vasopneumatic compression: Utilized vasopneumatic compression to decrease edema / swelling for the purpose of improving mobility and quad tone / recruitment which will allow for increased overall function including but not limited to self-care, transfers, ambulation, and ascending / descending stairs. Modalities:     Charges:  Timed Code Treatment Minutes: 46   Total Treatment Minutes: 46     [] EVAL - LOW (78634)   [] EVAL - MOD (83903)  [] EVAL - HIGH (16620)  [] RE-EVAL (76732)  [x] TE (96231) x1      [] Ionto (74583)  [] NMR (44471) x      [] Vaso (52388)  [x] Manual (39804) x1     [] Ultrasound  [x] TA (52384) x 1     [] Mech Traction (37331)  [] Gait Training x     [] ES (un) (71873):   [] Aquatic therapy x   [] Other:   [] Group:     GOALS:  Patient stated goal: able to ambulate steps to her basement with ease, reduce LBP     Therapist goals for Patient:   Short Term Goals: To be achieved in: 2 weeks  1. Independent in HEP and progression per patient tolerance, in order to prevent re-injury. 2. Patient will have a decrease in pain to facilitate improvement in movement, function, and ADLs as indicated by Functional Deficits.     Long Term Goals: To be achieved in: 8 weeks  1. FOTO Disability index score improved to 75 to assist with reaching prior level of function. 2. Patient will demonstrate increased AROM to [facility] to allow for proper joint functioning as indicated by patients Functional Deficits.    3. Patient will demonstrate an increase in postural awareness and control to allow for proper functional mobility as indicated by patients Functional Deficits. 4. Patient will demonstrate an increase in Strength to at least 4+/5 in B LE and good in core to allow for proper functional mobility as indicated by patients Functional Deficits. 5. Patient will return to functional activities including ease with steps, better sleep, walking on uneven surfaces without increased symptoms or restriction. 6. Patient will report 70% improvement in LBP and function     Overall Progression Towards Functional goals/ Treatment Progress Update:  [] Patient is progressing as expected towards functional goals listed. [] Progression is slowed due to complexities/Impairments listed. [] Progression has been slowed due to co-morbidities. [x] Plan just implemented, too soon to assess goals progression <30days   [] Goals require adjustment due to lack of progress  [] Patient is not progressing as expected and requires additional follow up with physician  [] Other    Persisting Functional Limitations/Impairments:  []Sitting [x]Standing   [x]Walking [x]Stairs   []Transfers [x]ADLs   [x]Squatting/bending []Kneeling  [x]Housework []Job related tasks  []Driving []Sports/Recreation   [x]Sleeping []Other:    ASSESSMENT: Pt had pain with SLS with hip PREs at [address] [place] [address]. Sore with most exercise today. She needs further LE and core strengthening.      Treatment/Activity Tolerance:  [x] Pt able to complete treatment [] Patient limited by fatique  [] Patient limited by pain  [] Patient limited by other medical complications  [] Other:     Prognosis:  [x] Good [] Fair  [] Poor    Patient Requires Follow-up: [x] Yes  [] No    Return to Play:    [x]  N/A   []  Stage 1: Intro to Strength   []  Stage 2: Return to Run and Strength   []  Stage 3: Return to Jump and Strength   []  Stage 4: Dynamic Strength and Agility   []  Stage 5: Sport Specific Training     []  Ready to Return to Play, Meets All Above Stages   []  Not Ready for Return to Sports   Comments: Plan for next treatment session: progress core, LE strengthening as well as balance and gait act    PLAN: See eval. PT 2x / week for 6-8 weeks. [x] Continue per plan of care [] Alter current plan (see comments)  [] Plan of care initiated [] Hold pending MD visit [] Discharge    Electronically signed by: Brock Pickering, PT MPT  [id number]      Note: If patient does not return for scheduled/ recommended follow up visits, this note will serve as a discharge from care along with most recent update on progress.

## 2022-05-23 NOTE — TELEPHONE ENCOUNTER
Medication:   Requested Prescriptions     Pending Prescriptions Disp Refills    amLODIPine (NORVASC) 5 MG tablet [Pharmacy Med Name: amLODIPine Besylate 5 MG Oral Tablet] 30 tablet 0     Sig: Take 1 tablet by mouth nightly        Last Filled:  4/21/2022 30 tabs 0 refills     Patient Phone Number: [phone] (home)     Last appt: 4/11/2022   Next appt: Visit date not found    Last OARRS: No flowsheet data found.

## 2022-05-26 ENCOUNTER — HOSPITAL ENCOUNTER (OUTPATIENT)
Dept: MAMMOGRAPHY | Age: 67
Discharge: HOME | End: 2022-05-26
Payer: MEDICARE

## 2022-05-26 VITALS — HEIGHT: 65 IN | BODY MASS INDEX: 41.65 KG/M2 | WEIGHT: 250 LBS

## 2022-05-26 DIAGNOSIS — Z12.31 ENCOUNTER FOR SCREENING MAMMOGRAM FOR BREAST CANCER: ICD-10-CM

## 2022-05-26 PROCEDURE — 77063 BREAST TOMOSYNTHESIS BI: CPT

## 2022-05-31 NOTE — TELEPHONE ENCOUNTER
Last OV 4/11/2022   Next OV Visit date not found    Requested Prescriptions     Pending Prescriptions Disp Refills    diclofenac sodium (VOLTAREN) 1 % GEL [Pharmacy Med Name: Diclofenac Sodium 1 % Transdermal Gel] 100 g 0     Sig: APPLY   TOPICALLY TWICE DAILY    last fill 5/18/22    I spoke with patient she said she uses it on her back and feet and that 1 tube does not last a month  pended

## 2022-06-06 DIAGNOSIS — K21.9 GASTROESOPHAGEAL REFLUX DISEASE WITHOUT ESOPHAGITIS: ICD-10-CM

## 2022-06-06 RX ORDER — PANTOPRAZOLE SODIUM 40 MG/1
TABLET, DELAYED RELEASE ORAL
Qty: 90 TABLET | Refills: 0 | Status: SHIPPED | OUTPATIENT
Start: 2022-06-06 | End: 2022-09-06

## 2022-06-06 NOTE — TELEPHONE ENCOUNTER
Last OV 4/11/2022   Next OV Visit date not found    Requested Prescriptions     Pending Prescriptions Disp Refills    pantoprazole (PROTONIX) 40 MG tablet [Pharmacy Med Name: Pantoprazole Sodium 40 MG Oral Tablet Delayed Release] 90 tablet 0     Sig: TAKE 1 TABLET BY MOUTH ONCE DAILY IN THE MORNING BEFORE BREAKFAST   pended

## 2022-06-07 ENCOUNTER — HOSPITAL ENCOUNTER (OUTPATIENT)
Dept: PHYSICAL THERAPY | Age: 67
Setting detail: THERAPIES SERIES
Discharge: HOME | End: 2022-06-07
Payer: MEDICARE

## 2022-06-07 PROCEDURE — 97140 MANUAL THERAPY 1/> REGIONS: CPT | Performed by: PHYSICAL THERAPIST

## 2022-06-07 PROCEDURE — 97110 THERAPEUTIC EXERCISES: CPT | Performed by: PHYSICAL THERAPIST

## 2022-06-07 PROCEDURE — 97530 THERAPEUTIC ACTIVITIES: CPT | Performed by: PHYSICAL THERAPIST

## 2022-06-07 NOTE — FLOWSHEET NOTE
[facility]  Phone: [phone]  Fax: [phone]    Physical Therapy Treatment Note/ Progress Report:     Date:  2022    Patient Name:  Modesta Neff  :  1955  MRN: [id number]  Restrictions/Precautions:    Medical/Treatment Diagnosis Information:  Diagnosis: R26.89  Balance Disorder, GG89.29  Chronic Pain  Treatment Diagnosis: LBP, core and LE functional weakness, gait and balance deficits  Insurance/Certification information:  PT Insurance Information: Medicare + [facility] Supp  Physician Information:   Johnnie Xiao MD  Plan of care signed (Y/N): []  Yes  [x]  No     Date of Patient follow up with Physician: none scheduled     Progress Report: []  Yes  [x]  No     Date Range for reporting period:  Beginnin22  Ending:     Progress report due (10 Rx/or 30 days whichever is less):    Recertification due (POC duration/ or 90 days whichever is less):     Visit # Insurance Allowable Auth required? Date Range   9 MN []  Yes  []  No      Latex Allergy:  [x]NO      []YES  Preferred Language for Healthcare:   [x]English       []other:    Functional Scale:        Date assessed:  FOTO: raw score = 62.7/100  GIBBS 44/56   22    Pain level: 3-4/10 avg LBP    History: Pt has chronic LBP with intermittent radicular s/s. She has h/o B TKR. Pt struggles with stair ambulation and walking community distances. Home ADLs are impacted. She reports unsteady gait and decreased balance, tiesha on uneven surfaces. Patient recently joined the [facility] and plans to use the pool for exercise. She was referred by her PCP.      SUBJECTIVE: Pt is sore since was on vacation where she had more steps than normal. Pt cont to have L ankle/foot pain, wearing a brace today.  She does feel that PT is helpful - she gets relief after doing the HEP and attending PT.     OBJECTIVE: See eval      RESTRICTIONS/PRECAUTIONS:     Exercises/Interventions:   LBP and LE weakness  Therapeutic Exercise (31249)  Resistance / level Sets/sec Reps Notes / Cues      No anna in her hip    IB   2x30\"    HSS   seated   2x30\" B    Foam:  NBOS , EC  HR  Hip abd  March  Squat         1#  1#     Seated Ex:  Glut set with add set  TB hip ER  Tball AB press     Lime  Red ball      20x2\"    Tball squat Red ball             EIS   5x HEP       give FOTO   Mat Ex:  SLR  SAQ  LAQ  Piriformis str  Seated towel calf str  Add set with GS into a mini bridge  TB hip ER  SL hip abd  TB Clam  SLR with ER     3#  3#        Purple    purple    2x10 B  20 R/L20 L/ R  2x20\" B\"  20x3\"    10 L/R  15 L/R  x10 L/R Requested no prone  pn w PPT          At home    At home     Therapeutic Activities (53425)            Tandem stance   30\" L/R    FSU  LSU  Step down 6\"    No anna on R      CC: 4 way walking  CC: hip ext, abd 5#   7.5#  2.5# Pain in R hip   3x ea 3x punch    SLS was SORE   Hip abd & ext with glider   10 L/R ea    TB lat gait    Neuromuscular Re-ed (26200)                            Manual Intervention (20923)       STM to LB w hands  Roller to R hip/ITB    Manual stretch to LE: hip flex,   manual LE traction   10'      3x ea R  Pt had NO relief Pt in L Slying w pillow b/t knees. Pt. Education:  -pt educated on diagnosis, prognosis and expectations for rehab  -all pt questions were answered    Home Exercise Program:  5/12: issued TB for clams and hip abd    Access Code: [email or web address]  URL: [email or web address]  Date: 04/21/2022  Prepared by: Osman Prim  Exercises  Supine Straight Leg Raises - 1 x daily - 7 x weekly - 1-2 sets - 10 reps  Supine Piriformis Stretch with Foot on Ground - 1 x daily - 7 x weekly - 1 sets - 2 reps - 20 hold  Supine Lower Trunk Rotation - 1 x daily - 7 x weekly - 1 sets - 10 reps  Supine Hip Adduction Isometric with Ball - 1 x daily - 7 x weekly - 1 sets - 10 reps - 3 hold  Long Sitting Calf Stretch with Strap - 1 x daily - 7 x weekly - 1 sets - 2 reps - 30 hold  Heel Toe Raises at [address] [facility] - 1 x daily - 7 x weekly - 3 sets - 10 reps  Standing Hip Flexion Extension at [address] [facility] - 1 x daily - 7 x weekly - 3 sets - 10 reps  Standing Hip Abduction Adduction at [address] [facility] - 1 x daily - 7 x weekly - 3 sets - 10 reps  Standing March at [address] 1 x daily - 7 x weekly - 3 sets - 10 reps  Gastroc Stretch on Wall in pool - 1 x daily - 7 x weekly - 1 sets - 2 reps - 30 hold    Therapeutic Exercise and NMR EXR  [x] (72423) Provided verbal/tactile cueing for activities related to strengthening, flexibility, endurance, ROM for improvements in LE, proximal hip, and core control with self care, mobility, lifting, ambulation.  [] (57678) Provided verbal/tactile cueing for activities related to improving balance, coordination, kinesthetic sense, posture, motor skill, proprioception  to assist with LE, proximal hip, and core control in self care, mobility, lifting, ambulation and eccentric single leg control.      NMR and Therapeutic Activities:    [x] (91943 or 06965) Provided verbal/tactile cueing for activities related to improving balance, coordination, kinesthetic sense, posture, motor skill, proprioception and motor activation to allow for proper function of core, proximal hip and LE with self care and ADLs  [] (60499) Gait Re-education- Provided training and instruction to the patient for proper LE, core and proximal hip recruitment and positioning and eccentric body weight control with ambulation re-education including up and down stairs     Home Exercise Program:    [x] (93346) Reviewed/Progressed HEP activities related to strengthening, flexibility, endurance, ROM of core, proximal hip and LE for functional self-care, mobility, lifting and ambulation/stair navigation   [] (36531)Reviewed/Progressed HEP activities related to improving balance, coordination, kinesthetic sense, posture, motor skill, proprioception of core, proximal hip and LE for self care, mobility, lifting, and ambulation/stair navigation      Manual Treatments:  PROM / STM / Oscillations-Mobs:  G-I, II, III, IV (PA's, Inf., Post.)  [x] (51381) Provided manual therapy to mobilize LE, proximal hip and/or LS spine soft tissue/joints for the purpose of modulating pain, promoting relaxation,  increasing ROM, reducing/eliminating soft tissue swelling/inflammation/restriction, improving soft tissue extensibility and allowing for proper ROM for normal function with self care, mobility, lifting and ambulation. Modalities:  [] (96639) Vasopneumatic compression: Utilized vasopneumatic compression to decrease edema / swelling for the purpose of improving mobility and quad tone / recruitment which will allow for increased overall function including but not limited to self-care, transfers, ambulation, and ascending / descending stairs. Modalities:     Charges:  Timed Code Treatment Minutes: 46   Total Treatment Minutes: 46     [] EVAL - LOW (39923)   [] EVAL - MOD (04341)  [] EVAL - HIGH (46556)  [] RE-EVAL (43837)  [x] TE (05836) x1      [] Ionto (35812)  [] NMR (15807) x      [] Vaso (53214)  [x] Manual (17901) x1     [] Ultrasound  [x] TA (22735) x 1     [] Mech Traction (58356)  [] Gait Training x     [] ES (un) (55701):   [] Aquatic therapy x   [] Other:   [] Group:     GOALS:  Patient stated goal: able to ambulate steps to her basement with ease, reduce LBP     Therapist goals for Patient:   Short Term Goals: To be achieved in: 2 weeks  1. Independent in HEP and progression per patient tolerance, in order to prevent re-injury. 2. Patient will have a decrease in pain to facilitate improvement in movement, function, and ADLs as indicated by Functional Deficits.     Long Term Goals: To be achieved in: 8 weeks  1. FOTO Disability index score improved to 75 to assist with reaching prior level of function.    2. Patient will demonstrate increased AROM to [facility] to allow for proper joint functioning as indicated by patients Functional Deficits. 3. Patient will demonstrate an increase in postural awareness and control to allow for proper functional mobility as indicated by patients Functional Deficits. 4. Patient will demonstrate an increase in Strength to at least 4+/5 in B LE and good in core to allow for proper functional mobility as indicated by patients Functional Deficits. 5. Patient will return to functional activities including ease with steps, better sleep, walking on uneven surfaces without increased symptoms or restriction. 6. Patient will report 70% improvement in LBP and function     Overall Progression Towards Functional goals/ Treatment Progress Update:  [] Patient is progressing as expected towards functional goals listed. [] Progression is slowed due to complexities/Impairments listed. [] Progression has been slowed due to co-morbidities. [x] Plan just implemented, too soon to assess goals progression <30days   [] Goals require adjustment due to lack of progress  [] Patient is not progressing as expected and requires additional follow up with physician  [] Other    Persisting Functional Limitations/Impairments:  []Sitting [x]Standing   [x]Walking [x]Stairs   []Transfers [x]ADLs   [x]Squatting/bending []Kneeling  [x]Housework []Job related tasks  []Driving []Sports/Recreation   [x]Sleeping []Other:    ASSESSMENT: Pt has L ankle pain with WB PREs. Sore and weak with R hip strengthening today. She needs further LE and core strengthening. Relief with roller and STM after exercise.      Treatment/Activity Tolerance:  [x] Pt able to complete treatment [] Patient limited by fatique  [] Patient limited by pain  [] Patient limited by other medical complications  [] Other:     Prognosis:  [x] Good [] Fair  [] Poor    Patient Requires Follow-up: [x] Yes  [] No    Return to Play:    [x]  N/A   []  Stage 1: Intro to Strength   []  Stage 2: Return to Run and Strength   []  Stage 3: Return to Jump and Strength   []  Stage 4: Dynamic Strength and Agility   []  Stage 5: Sport Specific Training     []  Ready to Return to Play, Meets All Above Stages   []  Not Ready for Return to Sports   Comments:            Plan for next treatment session: progress core, LE strengthening as well as balance and gait act    PLAN: See eval. PT 2x / week for 6-8 weeks. [x] Continue per plan of care [] Alter current plan (see comments)  [] Plan of care initiated [] Hold pending MD visit [] Discharge    Electronically signed by: Sharon Pickering, PT MPT  2015      Note: If patient does not return for scheduled/ recommended follow up visits, this note will serve as a discharge from care along with most recent update on progress.

## 2022-06-09 ENCOUNTER — HOSPITAL ENCOUNTER (OUTPATIENT)
Dept: PHYSICAL THERAPY | Age: 67
Setting detail: THERAPIES SERIES
Discharge: HOME | End: 2022-06-09
Payer: MEDICARE

## 2022-06-09 RX ORDER — TIZANIDINE 4 MG/1
TABLET ORAL
Qty: 90 TABLET | Refills: 0 | Status: SHIPPED | OUTPATIENT
Start: 2022-06-09 | End: 2022-07-05

## 2022-06-09 NOTE — PROGRESS NOTES
[facility]    [facility] Therapy  Cancellation/No-show Note  Patient Name:  Inga Carranza  :  1955   Date:  2022    Cancelled visits to date: 2  No-shows to date: 0    For today's appointment patient:  [x]  Cancelled  5/10,   []  Rescheduled appointment  []  No-show     Reason given by patient:  []  Patient ill  [x]  Conflicting appointment  []  No transportation    []  Conflict with work  []  No reason given  []  Other:     Comments:      Phone call information:   []  Phone call made today to patient at _ time at number provided:      []  Patient answered, conversation as follows:    []  Patient did not answer, message left as follows:  []  Phone call not made today  [x]  Phone call not needed - pt contacted us to cancel and provided reason for cancellation.      Electronically signed by:  Demian Montes De Oca, PT, MPT

## 2022-06-09 NOTE — TELEPHONE ENCOUNTER
Medication:   Requested Prescriptions     Pending Prescriptions Disp Refills    tiZANidine (ZANAFLEX) 4 MG tablet [Pharmacy Med Name: tiZANidine HCl 4 MG Oral Tablet] 90 tablet 0     Sig: Take 1 tablet by mouth three times daily as needed for muscle spasm        Last Filled:  4/25/2022 90 tabs 0 refills     Patient Phone Number: [phone] (home)     Last appt: 4/11/2022   Next appt: Visit date not found    Last OARRS: No flowsheet data found.

## 2022-06-14 ENCOUNTER — HOSPITAL ENCOUNTER (OUTPATIENT)
Dept: PHYSICAL THERAPY | Age: 67
Setting detail: THERAPIES SERIES
Discharge: HOME | End: 2022-06-14
Payer: MEDICARE

## 2022-06-14 RX ORDER — BUDESONIDE AND FORMOTEROL FUMARATE DIHYDRATE 160; 4.5 UG/1; UG/1
AEROSOL RESPIRATORY (INHALATION)
Qty: 11 G | Refills: 0 | Status: SHIPPED | OUTPATIENT
Start: 2022-06-14 | End: 2022-06-15

## 2022-06-14 NOTE — PROGRESS NOTES
[facility]    Physical Therapy  Cancellation/No-show Note  Patient Name:  Lulu Lozano  :  1955   Date:  2022    Cancelled visits to date: 3  No-shows to date: 0    For today's appointment patient:  [x]  Cancelled  5/10, ,   []  Rescheduled appointment  []  No-show     Reason given by patient:  []  Patient ill  []  Conflicting appointment  []  No transportation    []  Conflict with work  []  No reason given  []  Other:  Power outage in our building   Comments:      Phone call information:   [x]  Phone call made today to patient at _ time at number provided:      [x]  Patient answered, conversation as follows:    []  Patient did not answer, message left as follows:  []  Phone call not made today  []  Phone call not needed - pt contacted us to cancel and provided reason for cancellation.      Electronically signed by:  Mahad Morris, PT, MPT

## 2022-06-15 ENCOUNTER — TELEPHONE (OUTPATIENT)
Dept: PULMONOLOGY | Age: 67
End: 2022-06-15

## 2022-06-15 RX ORDER — BUDESONIDE AND FORMOTEROL FUMARATE DIHYDRATE 160; 4.5 UG/1; UG/1
2 AEROSOL RESPIRATORY (INHALATION) 2 TIMES DAILY
Qty: 30.6 G | Refills: 11 | Status: SHIPPED | OUTPATIENT
Start: 2022-06-15

## 2022-06-21 ENCOUNTER — HOSPITAL ENCOUNTER (OUTPATIENT)
Dept: PHYSICAL THERAPY | Age: 67
Setting detail: THERAPIES SERIES
Discharge: HOME | End: 2022-06-21
Payer: MEDICARE

## 2022-06-21 PROCEDURE — 97530 THERAPEUTIC ACTIVITIES: CPT | Performed by: PHYSICAL THERAPIST

## 2022-06-21 PROCEDURE — 97140 MANUAL THERAPY 1/> REGIONS: CPT | Performed by: PHYSICAL THERAPIST

## 2022-06-21 PROCEDURE — 97110 THERAPEUTIC EXERCISES: CPT | Performed by: PHYSICAL THERAPIST

## 2022-06-21 NOTE — FLOWSHEET NOTE
[facility]  Phone: [phone]  Fax: [phone]    Physical Therapy Treatment Note/ Discharge Report:     Date:  2022    Patient Name:  Zofia Mixon  :  1955  MRN: [id number]  Restrictions/Precautions:    Medical/Treatment Diagnosis Information:  Diagnosis: R26.89  Balance Disorder, GG89.29  Chronic Pain  Treatment Diagnosis: LBP, core and LE functional weakness, gait and balance deficits  Insurance/Certification information:  PT Insurance Information: Medicare + [facility]  Physician Information:   Allyssa Kaminski MD   Plan of care signed (Y/N): []  Yes  [x]  No     Date of Patient follow up with Physician: none scheduled     Progress Report: []  Yes  [x]  No     Date Range for reporting period:  Beginnin22  Endin22    Progress report due (10 Rx/or 30 days whichever is less):    Recertification due (POC duration/ or 90 days whichever is less):     Visit # Insurance Allowable Auth required? Date Range   10 [place] []  Yes  []  No      Latex Allergy:  [x]NO      []YES  Preferred Language for Healthcare:   [x]English       []other:    Functional Scale:       Date assessed:  FOTO: raw score = 63/100  GIBBS 44/56   22  FOTO: raw score = 50/100      22      Pain level: 3-4/10 avg LBP    History: Pt has chronic LBP with intermittent radicular s/s. She has h/o B TKR. Pt struggles with stair ambulation and walking community distances. Home ADLs are impacted. She reports unsteady gait and decreased balance, tiesha on uneven surfaces. Patient recently joined the [facility] and plans to use the pool for exercise. She was referred by her PCP.      SUBJECTIVE:  Pt cont to have L ankle/foot pain and presents to clinic in a walk-in boot on L foot that foot MD put her in yesterday. She is limited on WB ex and prefers to DC with HEP at this time. Pt reports 20% improvement in LBP. Her ADL remain limited d/t the foot. OBJECTIVE: See eval      RESTRICTIONS/PRECAUTIONS:     Exercises/Interventions:   LBP and LE weakness  Therapeutic Exercise (39370)  Resistance / level Sets/sec Reps Notes / Cues      No anna in her hip    IB   2x30\"    HSS   seated   2x30\" B    Foam:  NBOS , EC  HR  Hip abd  March  Squat         1#  1#     Seated Ex:  Glut set with add set  TB hip ER  Tball AB press     Lime  Red ball      20x2\"    Tball squat Red ball             EIS   5x HEP          Mat Ex:  SLR  SAQ  LAQ  Piriformis str  Seated towel calf str  Add set with GS into a mini bridge  TB hip ER  SL hip abd  TB Clam  SLR with ER     3#  3#        Purple    purple    2x10 B  20 R/L30 L/ R  2x20\" B\"  20x3\"    10 L/R  15 L/R  x10 L/R Requested no prone  pn w PPT          At home    At home     Therapeutic Activities (73767)           Tandem stance      FSU  LSU  Step down 6\"    No anna on R      CC: 4 way walking  CC: hip ext, abd 5#   7.5#  2.5# Pain in R hip    3x punch    SLS was SORE      TB lat gait    Issued at heel lift for the R shoe to assist evening out since wearing L walk-in boot, stressed compliance to HEP, reviewed and updated HEP and proper body mechanics 6/21/22      Educated on 1 step ambulation Up with L and down with L  X 3 with cane and 1 HR   PT recommended she install a grab bar next to entry door from [facility]          Neuromuscular Re-ed (77279)                            Manual Intervention ([phone])       STM to LB w hands  Roller to R hip/ITB    Manual stretch to LE: hip flex,   manual LE traction   10'       Pt in L Slying w pillow b/t knees. Pt. Education:  -pt educated on diagnosis, prognosis and expectations for rehab  -all pt questions were answered    Home Exercise Program:  5/12: issued TB for clams and hip abd    Access Code: [email or web address]  URL: [email or web address]  Date: 04/21/2022  Prepared by: Radha Christie  Exercises  Supine Straight Leg Raises - 1 x daily - 7 x weekly - 1-2 sets - 10 reps  Supine Piriformis Stretch with Foot on Ground - 1 x daily - 7 x weekly - 1 sets - 2 reps - 20 hold  Supine Lower Trunk Rotation - 1 x daily - 7 x weekly - 1 sets - 10 reps  Supine Hip Adduction Isometric with Ball - 1 x daily - 7 x weekly - 1 sets - 10 reps - 3 hold  Long Sitting Calf Stretch with Strap - 1 x daily - 7 x weekly - 1 sets - 2 reps - 30 hold  Heel Toe Raises at [address] [facility] - 1 x daily - 7 x weekly - 3 sets - 10 reps  Standing Hip Flexion Extension at [address] [facility] - 1 x daily - 7 x weekly - 3 sets - 10 reps  Standing Hip Abduction Adduction at [address] [facility] - 1 x daily - 7 x weekly - 3 sets - 10 reps  Standing March at [address] [facility] - 1 x daily - 7 x weekly - 3 sets - 10 reps  Gastroc Stretch on Wall in pool - 1 x daily - 7 x weekly - 1 sets - 2 reps - 30 hold    Therapeutic Exercise and NMR EXR  [x] (97379) Provided verbal/tactile cueing for activities related to strengthening, flexibility, endurance, ROM for improvements in LE, proximal hip, and core control with self care, mobility, lifting, ambulation.  [] (28045) Provided verbal/tactile cueing for activities related to improving balance, coordination, kinesthetic sense, posture, motor skill, proprioception  to assist with LE, proximal hip, and core control in self care, mobility, lifting, ambulation and eccentric single leg control.      NMR and Therapeutic Activities:    [x] (60025 or 65559) Provided verbal/tactile cueing for activities related to improving balance, coordination, kinesthetic sense, posture, motor skill, proprioception and motor activation to allow for proper function of core, proximal hip and LE with self care and ADLs  [] (86078) Gait Re-education- Provided training and instruction to the patient for proper LE, core and proximal hip recruitment and positioning and eccentric body weight control with ambulation re-education including up and down stairs     Home Exercise Program:    [x] (42867) Reviewed/Progressed HEP activities related to strengthening, flexibility, endurance, ROM of core, proximal hip and LE for functional self-care, mobility, lifting and ambulation/stair navigation   [] (71918)Reviewed/Progressed HEP activities related to improving balance, coordination, kinesthetic sense, posture, motor skill, proprioception of core, proximal hip and LE for self care, mobility, lifting, and ambulation/stair navigation      Manual Treatments:  PROM / STM / Oscillations-Mobs:  G-I, II, III, IV (PA's, Inf., Post.)  [x] (97601) Provided manual therapy to mobilize LE, proximal hip and/or LS spine soft tissue/joints for the purpose of modulating pain, promoting relaxation,  increasing ROM, reducing/eliminating soft tissue swelling/inflammation/restriction, improving soft tissue extensibility and allowing for proper ROM for normal function with self care, mobility, lifting and ambulation. Modalities:  [] (63578) Vasopneumatic compression: Utilized vasopneumatic compression to decrease edema / swelling for the purpose of improving mobility and quad tone / recruitment which will allow for increased overall function including but not limited to self-care, transfers, ambulation, and ascending / descending stairs. Modalities:     Charges:  Timed Code Treatment Minutes: 46   Total Treatment Minutes: 46     [] EVAL - LOW (78922)   [] EVAL - MOD (08704)  [] EVAL - HIGH (83361)  [] RE-EVAL (15260)  [x] TE (34339) x1      [] Ionto (05675)  [] NMR (72906) x      [] Vaso (84631)  [x] Manual (18919) x1     [] Ultrasound  [x] TA (71196) x 1     [] Mech Traction (09995)  [] Gait Training x     [] ES (un) (27705):   [] Aquatic therapy x   [] Other:   [] Group:     GOALS:  Patient stated goal: able to ambulate steps to her basement with ease, reduce LBP GOAL NOT MET bc of foot, LBP is reduced     Therapist goals for Patient:   Short Term Goals: To be achieved in: 2 weeks  1.  Independent in HEP and progression per patient tolerance, in order to prevent re-injury. GOAL MET  2. Patient will have a decrease in pain to facilitate improvement in movement, function, and ADLs as indicated by Functional Deficits. GOAL MET     Long Term Goals: To be achieved in: 8 weeks  1. FOTO Disability index score improved to 75 to assist with reaching prior level of function. NO MET  2. Patient will demonstrate increased AROM to [facility] to allow for proper joint functioning as indicated by patients Functional Deficits. GOAL MET  3. Patient will demonstrate an increase in postural awareness and control to allow for proper functional mobility as indicated by patients Functional Deficits. 4. Patient will demonstrate an increase in Strength to at least 4+/5 in B LE and good in core to allow for proper functional mobility as indicated by patients Functional Deficits. PARTIALLY MET  5. Patient will return to functional activities including ease with steps, better sleep, walking on uneven surfaces without increased symptoms or restriction. GOAL NOT MET  6. Patient will report 70% improvement in LBP and function GOAL NOT MET     Overall Progression Towards Functional goals/ Treatment Progress Update:  [] Patient is progressing as expected towards functional goals listed. [] Progression is slowed due to complexities/Impairments listed. [] Progression has been slowed due to co-morbidities. [x] Plan just implemented, too soon to assess goals progression <30days   [] Goals require adjustment due to lack of progress  [] Patient is not progressing as expected and requires additional follow up with physician  [] Other    Persisting Functional Limitations/Impairments:  []Sitting [x]Standing   [x]Walking [x]Stairs   []Transfers [x]ADLs   [x]Squatting/bending []Kneeling  [x]Housework []Job related tasks  []Driving []Sports/Recreation   [x]Sleeping []Other:    ASSESSMENT: Pt is limited on further strengthening at this time d/t her foot. LBp and balance were improved.  Goals partially met. Will DC with HEP at this time. Recommend resume PT when recommended by MD.     Treatment/Activity Tolerance:  [x] Pt able to complete treatment [] Patient limited by fatique  [] Patient limited by pain  [] Patient limited by other medical complications  [] Other:     Prognosis:  [x] Good [] Fair  [] Poor    Patient Requires Follow-up: [] Yes  [x] No    Return to Play:    [x]  N/A   []  Stage 1: Intro to Strength   []  Stage 2: Return to Run and Strength   []  Stage 3: Return to Jump and Strength   []  Stage 4: Dynamic Strength and Agility   []  Stage 5: Sport Specific Training     []  Ready to Return to Play, Meets All Above Stages   []  Not Ready for Return to Sports   Comments:            PLAN: See eval. PT 2x / week for 6-8 weeks. [] Continue per plan of care [] Alter current plan (see comments)  [] Plan of care initiated [] Hold pending MD visit [x] Discharge    Electronically signed by: Claudene Dixon, PT MPT  [id number]      Note: If patient does not return for scheduled/ recommended follow up visits, this note will serve as a discharge from care along with most recent update on progress.

## 2022-06-23 ENCOUNTER — APPOINTMENT (OUTPATIENT)
Dept: PHYSICAL THERAPY | Age: 67
End: 2022-06-23
Payer: MEDICARE

## 2022-06-28 ENCOUNTER — APPOINTMENT (OUTPATIENT)
Dept: PHYSICAL THERAPY | Age: 67
End: 2022-06-28
Payer: MEDICARE

## 2022-06-30 ENCOUNTER — APPOINTMENT (OUTPATIENT)
Dept: PHYSICAL THERAPY | Age: 67
End: 2022-06-30
Payer: MEDICARE

## 2022-07-05 RX ORDER — TIZANIDINE 4 MG/1
TABLET ORAL
Qty: 90 TABLET | Refills: 0 | Status: SHIPPED | OUTPATIENT
Start: 2022-07-05 | End: 2022-08-11 | Stop reason: SDUPTHER

## 2022-07-05 NOTE — TELEPHONE ENCOUNTER
Medication:   Requested Prescriptions     Pending Prescriptions Disp Refills    tiZANidine (ZANAFLEX) 4 MG tablet [Pharmacy Med Name: tiZANidine HCl 4 MG Oral Tablet] 90 tablet 0     Sig: Take 1 tablet by mouth three times daily as needed for muscle spasm     Last Filled: 6.9.22 #90 REFILLS 0  Last appt: 4/11/2022   Next appt: Visit date not found    Last OARRS: No flowsheet data found.

## 2022-07-26 ENCOUNTER — HOSPITAL ENCOUNTER (OUTPATIENT)
Dept: MRI IMAGING | Age: 67
Discharge: HOME | End: 2022-07-26
Payer: MEDICARE

## 2022-07-26 DIAGNOSIS — M79.672 LEFT FOOT PAIN: ICD-10-CM

## 2022-07-26 DIAGNOSIS — M76.72 PERONEAL TENDINITIS OF LEFT LOWER EXTREMITY: ICD-10-CM

## 2022-07-26 PROCEDURE — 73721 MRI JNT OF LWR EXTRE W/O DYE: CPT

## 2022-08-08 ENCOUNTER — OFFICE VISIT (OUTPATIENT)
Dept: FAMILY MEDICINE CLINIC | Age: 67
End: 2022-08-08
Payer: MEDICARE

## 2022-08-08 ENCOUNTER — TELEPHONE (OUTPATIENT)
Dept: FAMILY MEDICINE CLINIC | Age: 67
End: 2022-08-08

## 2022-08-08 VITALS
HEART RATE: 82 BPM | OXYGEN SATURATION: 96 % | DIASTOLIC BLOOD PRESSURE: 74 MMHG | WEIGHT: 245 LBS | SYSTOLIC BLOOD PRESSURE: 112 MMHG | HEIGHT: 65 IN | BODY MASS INDEX: 40.82 KG/M2

## 2022-08-08 DIAGNOSIS — I10 PRIMARY HYPERTENSION: ICD-10-CM

## 2022-08-08 DIAGNOSIS — E78.5 DYSLIPIDEMIA ASSOCIATED WITH TYPE 2 DIABETES MELLITUS (HCC): ICD-10-CM

## 2022-08-08 DIAGNOSIS — E11.69 DYSLIPIDEMIA ASSOCIATED WITH TYPE 2 DIABETES MELLITUS (HCC): ICD-10-CM

## 2022-08-08 DIAGNOSIS — E78.5 HYPERLIPIDEMIA, UNSPECIFIED HYPERLIPIDEMIA TYPE: ICD-10-CM

## 2022-08-08 DIAGNOSIS — M19.079 OSTEOARTHRITIS OF FOOT, UNSPECIFIED LATERALITY, UNSPECIFIED OSTEOARTHRITIS TYPE: ICD-10-CM

## 2022-08-08 DIAGNOSIS — Z01.818 PREOPERATIVE EXAMINATION: Primary | ICD-10-CM

## 2022-08-08 DIAGNOSIS — Z01.818 PREOPERATIVE EXAMINATION: ICD-10-CM

## 2022-08-08 DIAGNOSIS — E11.9 CONTROLLED TYPE 2 DIABETES MELLITUS WITHOUT COMPLICATION, WITHOUT LONG-TERM CURRENT USE OF INSULIN (HCC): ICD-10-CM

## 2022-08-08 DIAGNOSIS — E55.9 VITAMIN D DEFICIENCY: ICD-10-CM

## 2022-08-08 DIAGNOSIS — I10 ESSENTIAL HYPERTENSION: ICD-10-CM

## 2022-08-08 DIAGNOSIS — M24.29 DISORDER OF LIGAMENT, OTHER SPECIFIED SITE: ICD-10-CM

## 2022-08-08 LAB
BACTERIA: ABNORMAL /HPF
BASOPHILS ABSOLUTE: 0 K/UL (ref 0–0.2)
BASOPHILS RELATIVE PERCENT: 0.5 %
BILIRUBIN URINE: NEGATIVE
BLOOD, URINE: NEGATIVE
CLARITY: CLEAR
COLOR: ABNORMAL
EOSINOPHILS ABSOLUTE: 0.1 K/UL (ref 0–0.6)
EOSINOPHILS RELATIVE PERCENT: 1.1 %
EPITHELIAL CELLS, UA: 12 /HPF (ref 0–5)
GLUCOSE URINE: NEGATIVE MG/DL
HCT VFR BLD CALC: 39.2 % (ref 36–48)
HEMOGLOBIN: 13.8 G/DL (ref 12–16)
HYALINE CASTS: 0 /LPF (ref 0–8)
KETONES, URINE: ABNORMAL MG/DL
LEUKOCYTE ESTERASE, URINE: ABNORMAL
LYMPHOCYTES ABSOLUTE: 1.7 K/UL (ref 1–5.1)
LYMPHOCYTES RELATIVE PERCENT: 23.3 %
MCH RBC QN AUTO: 31.7 PG (ref 26–34)
MCHC RBC AUTO-ENTMCNC: 35.1 G/DL (ref 31–36)
MCV RBC AUTO: 90.4 FL (ref 80–100)
MICROSCOPIC EXAMINATION: YES
MONOCYTES ABSOLUTE: 0.7 K/UL (ref 0–1.3)
MONOCYTES RELATIVE PERCENT: 9 %
NEUTROPHILS ABSOLUTE: 4.9 K/UL (ref 1.7–7.7)
NEUTROPHILS RELATIVE PERCENT: 66.1 %
NITRITE, URINE: NEGATIVE
PDW BLD-RTO: 13.9 % (ref 12.4–15.4)
PH UA: 5.5 (ref 5–8)
PLATELET # BLD: 291 K/UL (ref 135–450)
PMV BLD AUTO: 7.4 FL (ref 5–10.5)
PROTEIN UA: ABNORMAL MG/DL
RBC # BLD: 4.34 M/UL (ref 4–5.2)
RBC UA: 1 /HPF (ref 0–4)
SPECIFIC GRAVITY UA: 1.03 (ref 1–1.03)
TSH REFLEX FT4: 1.29 UIU/ML (ref 0.27–4.2)
URINE TYPE: ABNORMAL
UROBILINOGEN, URINE: 1 E.U./DL
WBC # BLD: 7.4 K/UL (ref 4–11)
WBC UA: 1 /HPF (ref 0–5)

## 2022-08-08 PROCEDURE — 93000 ELECTROCARDIOGRAM COMPLETE: CPT | Performed by: FAMILY MEDICINE

## 2022-08-08 PROCEDURE — G0009 ADMIN PNEUMOCOCCAL VACCINE: HCPCS | Performed by: FAMILY MEDICINE

## 2022-08-08 PROCEDURE — 3044F HG A1C LEVEL LT 7.0%: CPT | Performed by: FAMILY MEDICINE

## 2022-08-08 PROCEDURE — 1123F ACP DISCUSS/DSCN MKR DOCD: CPT | Performed by: FAMILY MEDICINE

## 2022-08-08 PROCEDURE — G8417 CALC BMI ABV UP PARAM F/U: HCPCS | Performed by: FAMILY MEDICINE

## 2022-08-08 PROCEDURE — 1090F PRES/ABSN URINE INCON ASSESS: CPT | Performed by: FAMILY MEDICINE

## 2022-08-08 PROCEDURE — 99214 OFFICE O/P EST MOD 30 MIN: CPT | Performed by: FAMILY MEDICINE

## 2022-08-08 PROCEDURE — 90677 PCV20 VACCINE IM: CPT | Performed by: FAMILY MEDICINE

## 2022-08-08 PROCEDURE — G8427 DOCREV CUR MEDS BY ELIG CLIN: HCPCS | Performed by: FAMILY MEDICINE

## 2022-08-08 PROCEDURE — 3017F COLORECTAL CA SCREEN DOC REV: CPT | Performed by: FAMILY MEDICINE

## 2022-08-08 PROCEDURE — G8399 PT W/DXA RESULTS DOCUMENT: HCPCS | Performed by: FAMILY MEDICINE

## 2022-08-08 PROCEDURE — 1036F TOBACCO NON-USER: CPT | Performed by: FAMILY MEDICINE

## 2022-08-08 PROCEDURE — 2022F DILAT RTA XM EVC RTNOPTHY: CPT | Performed by: FAMILY MEDICINE

## 2022-08-08 RX ORDER — TIZANIDINE 4 MG/1
TABLET ORAL
Qty: 90 TABLET | Refills: 0 | OUTPATIENT
Start: 2022-08-08

## 2022-08-08 RX ORDER — ESTRADIOL 1 MG/1
TABLET ORAL
COMMUNITY
Start: 2022-07-19

## 2022-08-08 RX ORDER — CLOBETASOL PROPIONATE 0.5 MG/G
CREAM TOPICAL
COMMUNITY

## 2022-08-08 NOTE — TELEPHONE ENCOUNTER
Last OV 8/8/2022   Next OV Visit date not found    Requested Prescriptions     Pending Prescriptions Disp Refills    tiZANidine (ZANAFLEX) 4 MG tablet [Pharmacy Med Name: tiZANidine HCl 4 MG Oral Tablet] 90 tablet 0     Sig: Take 1 tablet by mouth three times daily as needed for muscle spasm

## 2022-08-08 NOTE — PROGRESS NOTES
Preopertive Exam    Keely Chirinos   YOB: 1955    Date of Visit:  8/8/2022    No Active Allergies  Outpatient Medications Marked as Taking for the 8/8/22 encounter (Office Visit) with Chavo Scruggs MD   Medication Sig Dispense Refill    estradiol (ESTRACE) 1 MG tablet       clobetasol (TEMOVATE) 0.05 % cream       tiZANidine (ZANAFLEX) 4 MG tablet Take 1 tablet by mouth three times daily as needed for muscle spasm 90 tablet 0    SYMBICORT 160-4.5 MCG/ACT AERO Inhale 2 puffs into the lungs 2 times daily 30.6 g 11    pantoprazole (PROTONIX) 40 MG tablet TAKE 1 TABLET BY MOUTH ONCE DAILY IN THE MORNING BEFORE BREAKFAST 90 tablet 0    diclofenac sodium (VOLTAREN) 1 % GEL APPLY   TOPICALLY TWICE DAILY 200 g 5    amLODIPine (NORVASC) 5 MG tablet Take 1 tablet by mouth nightly 30 tablet 5    losartan (COZAAR) 100 MG tablet Take 1 tablet by mouth once daily 90 tablet 1    celecoxib (CELEBREX) 200 MG capsule Take 1 capsule by mouth 2 times daily 180 capsule 1    aspirin 81 MG EC tablet Take 81 mg by mouth daily      ondansetron (ZOFRAN) 4 MG tablet Take 1 tablet by mouth every 8 hours as needed for Nausea or Vomiting 30 tablet 1    tretinoin (RETIN-A) 0.025 % cream Apply topically nightly.  45 g 5    Cyanocobalamin (VITAMIN B-12 PO) Take by mouth      metFORMIN (GLUCOPHAGE-XR) 500 MG extended release tablet TAKE 2 TABLETS BY MOUTH ONCE DAILY WITH SUPPER 180 tablet 1    atorvastatin (LIPITOR) 40 MG tablet Take 1 tablet by mouth once daily 90 tablet 3    doxycycline hyclate (VIBRA-TABS) 100 MG tablet Only with  URI      FOLIC ACID PO Take by mouth      blood glucose monitor strips Use 4 times daily to check blood sugar Diagnosis Code; E 11.9 100 strip 11    Lancets 30G MISC 4 each by Does not apply route daily Use 4 times daily to check blood sugar Diagnosis Code: E 11.9 200 each 3    Lancets MISC 1 each by Does not apply route daily Check blood sugars 1-2 per day 50 each 11    Lido-Menthol-Methyl Juan-Camph (CBD KINGS EX) Apply topically      medical marijuana Take by mouth as needed. albuterol (PROVENTIL) (2.5 MG/3ML) 0.083% nebulizer solution Take 2.5 mg by nebulization every 6 hours as needed for Wheezing      hydrochlorothiazide (HYDRODIURIL) 25 MG tablet Take 25 mg by mouth daily as needed       cetirizine (ZYRTEC) 10 MG tablet Take 10 mg by mouth as needed              Bailee Fitzpatrick (:  1955) has requested an evaluation for a preoperative visit in preparation for an upcoming surgery. Vitals:    22 1402   BP: 112/74   Pulse: 82   SpO2: 96%   Weight: 245 lb (111.1 kg)   Height: 5' 5\" (1.651 m)     Body mass index is 40.77 kg/m². Wt Readings from Last 3 Encounters:   22 245 lb (111.1 kg)   22 248 lb (112.5 kg)   22 250 lb (113.4 kg)     BP Readings from Last 3 Encounters:   22 112/74   22 122/70   22 138/80        Chief Complaint   Patient presents with    Pre-op Exam     Left foot tendon repair/arthroscopy   Dr. Simone Pardo       HPI:  This patient presents to the office today for a preoperative consultation at the request of surgeon, Dr. Simone Pardo who plans on performing L ankle surgery. Patient denies any chest pain, palpitations, shortness of breath, or diaphoresis. Patient is able to go up a flight of stairs without any cardiac or respiratory symptoms but has trouble given her arthritis and has shortness of breath if her COPD is flaring but currently it is stable. The patient denies any history of problems with anesthesia or bleeding or clotting problems. The patient denies any family history of problems with anesthesia or bleeding or clotting problems. Patient has an issue with pain medication not working very well. Hernia: present for a year. Bothers her when she coughs. Discussed options. Referral to surgery. Hotflashes:  Gets them during the day and at night.   Progressively has gotten worse the last year or so- has always had some. Hypertension: BP has been in the 130's/80's. Taking medications as prescribed. No symptoms concerning for end organ damage. Dyslipidemia: Stable on Lipitor. COPD: The patient sees pulmonology. She is stable on Symbicort and albuterol - needs it rarely- with URI. Hasn't needed it recently. Moderate persistent asthma: The patient is stable on her current medications. She is seeing pulmonology. GERD/ hiatal hernia:  Occasionally has nausea. Takes zofran prn. Takes protonix daily which helps her symptoms. Hx of seeing GI. Type 2 diabetes: Diagnosed in 2020. She is seeing endocrinology every 6 months. She is on Metformin. Obesity:      Coronary artery calcification: Seen on CT scan. History of seeing cardiology in 2019. Medically managed. No longer on low dose ASA. Arthritis:  Has persistent pain. In her feet and low back. Takes a muscle relaxor, celebrex, and topical medication prn. She is also on [facility] for pain management- going to [facility] clinic and has a medical marijuana care from Dr. Heidi Lopes. Hx of seeing orthopaedic surgery- no longer going as symptoms stable with no surgeries recommended. Referral to PT. Family history of colon cancer: goes every 5 years for colonoscopies. Last colonoscopy was in 2019. Osteopenia: DXA 9/2019     SH: 7/2021:  Was seeing NP at [facility]. Lives with her  and daughter and 2 grandkids. Has 2 daughters. 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      Past Medical History:   Diagnosis Date    CAD (coronary artery disease)     mild MULTI VESSELper VO     COPD (chronic obstructive pulmonary disease) (HCC)     Diabetes mellitus ([facility] [address]     HTN (hypertension)     Hyperlipidemia     Osteoarthritis, foot, localized 8/25/2014    Spinal stenosis        Past Surgical History:   Procedure Laterality Date    ANKLE FRACTURE SURGERY      CHOLECYSTECTOMY      COLONOSCOPY N/A 12/11/2019 COLONOSCOPY POLYPECTOMY SNARE/COLD BIOPSY performed by Kennedy Lares MD at [address] Bilateral 12/18/2019    BILATERAL L3, L4 AND L5 DORSAL RAMUS MEDIAL BRANCH BLOCK WITH FLUOROSCOPY (63490, 50581) performed by Afsaneh Ruiz MD at [address] ARTHROSCOPY Right 06/09/2020    RIGHT SHOULDER ARTHROSCOPE, ROTATOR CUFF AUGMENTATION IMPLANT, SUBACROMIAL DECOMPRESSION, DISTAL CLAVICLE EXCISION AND DEBRIDEMENT performed by Andrés Harkins DO at [address] [facility] CUFF    TOTAL KNEE ARTHROPLASTY Bilateral        Family History   Problem Relation Age of Onset    Hypertension Mother     Cancer Father         lung    Colon Cancer Father     Breast Cancer Sister     Diabetes Brother     Heart Failure Paternal Grandmother        Social History     Socioeconomic History    Marital status:      Spouse name: Not on file    Number of children: Not on file    Years of education: Not on file    Highest education level: Not on file   Occupational History    Occupation: RN   Tobacco Use    Smoking status: Former     Packs/day: 1.00     Years: 40.00     Pack years: 40.00     Types: Cigarettes     Quit date: 10/1/2018     Years since quitting: 3.8    Smokeless tobacco: Never    Tobacco comments:     smoked for 40 yrs / smoked up to 1 ppd   Vaping Use    Vaping Use: Never used   Substance and Sexual Activity    Alcohol use: Yes     Comment: 3 DRINKS A MONTH    Drug use: Yes     Types: Marijuana Oneallisa De Souza)     Comment: medical marijuana     Sexual activity: Yes     Partners: Male   Other Topics Concern    Not on file   Social History Narrative    Not on file     Social Determinants of Health     Financial Resource Strain: Not on file   Food Insecurity: Not on file   Transportation Needs: Not on file   Physical Activity: Unknown    Days of Exercise per Week: 0 days    Minutes of Exercise per Session: Not on file Stress: Not on file   Social Connections: Not on file   Intimate Partner Violence: Not on file   Housing Stability: Not on file       Review of Systems  A comprehensive review of systems was negative except for what was noted in the HPI. /74   Pulse 82   Ht 5' 5\" (1.651 m)   Wt 245 lb (111.1 kg)   SpO2 96%   BMI 40.77 kg/m²     Physical Exam   Constitutional: She is oriented to person, place, and time. She appears well-developed and well-nourished. No distress. HENT:   Head: Normocephalic and atraumatic. Mouth/Throat: Uvula is midline, oropharynx is clear and moist and mucous membranes are normal.   Eyes: Conjunctivae and EOM are normal. Pupils are equal, round, and reactive to light. Neck: Trachea normal and normal range of motion. Neck supple. No mass and no thyromegaly present. Cardiovascular: Normal rate, regular rhythm, normal heart sounds. No M/R/G  Pulmonary/Chest: Effort normal and breath sounds normal. No respiratory distress. She has no wheezes. She has no rales. Abdominal: Soft. bowel sounds are normal. She exhibits no distension and no mass. No tenderness. Musculoskeletal: She exhibits no edema  Neurological: She is alert and oriented to person, place, and time. No cranial nerve deficit   Skin: Skin is warm and dry. No rash noted. No erythema. Psychiatric: She has a normal mood and affect. Her behavior is normal.          Assessment:       38 y.o. patient with planned surgery as above.     Known risk factors for perioperative complications: Diabetes mellitus, Hypertension       Plan:     Preoperative workup as follows: ECG, labs per surgeon request    Change in medication regimen before surgery: Discontinue ASA 7 days before surgery, Discontinue NSAIDs (Celebrex and voltaren gel unless ok with surgeon) 7 days before surgery    Prophylaxis for cardiac events with perioperative beta-blockers: Not indicated  ACC/AHA indications for pre-operative beta-blocker use:    Vascular surgery with history of postitive stress test  Intermediate or high risk surgery with history of CAD   Intermediate or high risk surgery with multiple clinical predictors of CAD- 2 of the following: history of compensated or prior heart failure, history of cerebrovascular disease, DM, or renal insufficiency    Routine administration of higher-dose, long-acting metoprolol in beta-blocker-naïve patients on the day of surgery, and in the absence of dose titration is associated with an overall increase in mortality. Beta-blockers should be started days to weeks prior to surgery and titrated to pulse < 70. Deep vein thrombosis prophylaxis: regimen to be chosen by surgical team    Pre-Operative Risk assessment using 2014 ACC/AHA guidelines     Emergent procedure No  Active Cardiac Condition No (decompensated HF, Arrhythmia, MI <3 weeks, severe valve disease)  Risk Level of Procedure Intermediate Risk (intraperitoneal, intrathoracic, HENT, orthopedic, or carotid endarterectomy, etc.)  Revised Cardiac Risk Index Risk factors: None  Measurement of Exercise Tolerance before Surgery >4 Yes    According to the 2014 ACC/AHA pre-operative risk assessment guidelines this patient is a low risk for major cardiac complications during a intermediate risk procedure and may continue as planned. This note will be shared with the requesting provider. 1. Preoperative examination  See shiela. - EKG 12 Lead NSR with no signs of acute ischemia or infarct    - CBC with Auto Differential; Future  - Urinalysis with Microscopic; Future  - Basic Metabolic Panel; Future  - Hemoglobin A1C; Future  - Protime-INR; Future  - APTT; Future    2. Primary hypertension  Stable. Continue current regimen. - Protime-INR; Future  - APTT; Future    3. Controlled type 2 diabetes mellitus without complication, without long-term current use of insulin (HCC)  Stable. Continue current regimen. Seeing endo.    - Hemoglobin A1C; Future  - Protime-INR; Future  - APTT; Future    4. Osteoarthritis of foot, unspecified laterality, unspecified osteoarthritis type  See above. - Protime-INR; Future  - APTT; Future    5. Hyperlipidemia, unspecified hyperlipidemia type  Stable. Continue current regimen. - Protime-INR; Future  - APTT; Future    6. Disorder of ligament, other specified site   See above. - Protime-INR; Future  - APTT; Future      Return in about 6 months (around 2/8/2023) for Chronic conditions.

## 2022-08-09 LAB
ANION GAP SERPL CALCULATED.3IONS-SCNC: 14 MMOL/L (ref 3–16)
APTT: 27.3 SEC (ref 23–34.3)
BUN BLDV-MCNC: 15 MG/DL (ref 7–20)
CALCIUM SERPL-MCNC: 9.4 MG/DL (ref 8.3–10.6)
CHLORIDE BLD-SCNC: 104 MMOL/L (ref 99–110)
CO2: 24 MMOL/L (ref 21–32)
CREAT SERPL-MCNC: 0.7 MG/DL (ref 0.6–1.2)
ESTIMATED AVERAGE GLUCOSE: 131.2 MG/DL
GFR AFRICAN AMERICAN: >60
GFR NON-AFRICAN AMERICAN: >60
GLUCOSE BLD-MCNC: 110 MG/DL (ref 70–99)
HBA1C MFR BLD: 6.2 %
INR BLD: 1.03 (ref 0.87–1.14)
POTASSIUM SERPL-SCNC: 4 MMOL/L (ref 3.5–5.1)
PROTHROMBIN TIME: 13.5 SEC (ref 11.7–14.5)
SODIUM BLD-SCNC: 142 MMOL/L (ref 136–145)
VITAMIN D 25-HYDROXY: 66 NG/ML

## 2022-08-09 NOTE — TELEPHONE ENCOUNTER
Patient called requesting a refill of tiZANidine (ZANAFLEX) 4 MG tablets. Stated that the pharmacy told her her refill was denied and she is concerned as she is having trouble walking and the medication makes her condition more manageable. Additionally she'd like the prescription strength potentially increased and for the frequency to be changed to 3 times a day as that's what she's doing now.

## 2022-08-11 RX ORDER — TIZANIDINE 4 MG/1
TABLET ORAL
Qty: 180 TABLET | Refills: 1 | Status: SHIPPED | OUTPATIENT
Start: 2022-08-11 | End: 2022-10-04

## 2022-08-11 NOTE — TELEPHONE ENCOUNTER
I increased it to 8 mg 3 times a day as needed so she can take 2 of the 4 mg pills up to 3 times a day. I also sent in a new prescription.

## 2022-08-23 ENCOUNTER — TELEMEDICINE (OUTPATIENT)
Dept: ENDOCRINOLOGY | Age: 67
End: 2022-08-23
Payer: MEDICARE

## 2022-08-23 DIAGNOSIS — I10 ESSENTIAL HYPERTENSION: ICD-10-CM

## 2022-08-23 DIAGNOSIS — E11.69 DYSLIPIDEMIA ASSOCIATED WITH TYPE 2 DIABETES MELLITUS (HCC): ICD-10-CM

## 2022-08-23 DIAGNOSIS — E11.9 CONTROLLED TYPE 2 DIABETES MELLITUS WITHOUT COMPLICATION, WITHOUT LONG-TERM CURRENT USE OF INSULIN (HCC): Primary | ICD-10-CM

## 2022-08-23 DIAGNOSIS — E78.5 DYSLIPIDEMIA ASSOCIATED WITH TYPE 2 DIABETES MELLITUS (HCC): ICD-10-CM

## 2022-08-23 PROBLEM — J30.1 ALLERGIC RHINITIS DUE TO POLLEN: Status: ACTIVE | Noted: 2022-08-23

## 2022-08-23 PROBLEM — I25.10 ATHEROSCLEROSIS OF CORONARY ARTERY: Status: ACTIVE | Noted: 2022-08-23

## 2022-08-23 PROBLEM — M47.817 LUMBOSACRAL SPONDYLOSIS WITHOUT MYELOPATHY: Status: ACTIVE | Noted: 2022-08-23

## 2022-08-23 PROBLEM — E55.9 VITAMIN D DEFICIENCY: Status: ACTIVE | Noted: 2022-08-23

## 2022-08-23 PROBLEM — L20.9 ATOPIC DERMATITIS: Status: ACTIVE | Noted: 2022-08-23

## 2022-08-23 PROCEDURE — 1123F ACP DISCUSS/DSCN MKR DOCD: CPT | Performed by: INTERNAL MEDICINE

## 2022-08-23 PROCEDURE — 2022F DILAT RTA XM EVC RTNOPTHY: CPT | Performed by: INTERNAL MEDICINE

## 2022-08-23 PROCEDURE — 99214 OFFICE O/P EST MOD 30 MIN: CPT | Performed by: INTERNAL MEDICINE

## 2022-08-23 PROCEDURE — 1090F PRES/ABSN URINE INCON ASSESS: CPT | Performed by: INTERNAL MEDICINE

## 2022-08-23 PROCEDURE — G8427 DOCREV CUR MEDS BY ELIG CLIN: HCPCS | Performed by: INTERNAL MEDICINE

## 2022-08-23 PROCEDURE — 3017F COLORECTAL CA SCREEN DOC REV: CPT | Performed by: INTERNAL MEDICINE

## 2022-08-23 PROCEDURE — G8399 PT W/DXA RESULTS DOCUMENT: HCPCS | Performed by: INTERNAL MEDICINE

## 2022-08-23 PROCEDURE — 3044F HG A1C LEVEL LT 7.0%: CPT | Performed by: INTERNAL MEDICINE

## 2022-08-23 RX ORDER — METFORMIN HYDROCHLORIDE 500 MG/1
TABLET, EXTENDED RELEASE ORAL
Qty: 180 TABLET | Refills: 1 | Status: SHIPPED | OUTPATIENT
Start: 2022-08-23

## 2022-08-23 RX ORDER — PROGESTERONE 100 MG/1
CAPSULE ORAL
COMMUNITY
Start: 2022-08-15

## 2022-08-23 NOTE — PROGRESS NOTES
Patient ID:   Samuel Peacock is a 78 y.o. female    Chief Complaint:   Samuel Peacock presents for an evaluation of Type 2 Diabetes Mellitus , Hyperlipidemia and hypertension. The patient (or guardian if applicable) is aware that this is a billable service, which includes applicable co-pays. This Virtual Visit was conducted with patient's (and/or legal guardian's) consent. The visit was conducted pursuant to the emergency declaration under the [facility] [place], [address] [place] [address] and the [employer] Act. Patient identification was verified, and a caregiver was present when appropriate. Because this was a Telehealth visit, evaluation of the following organ systems was limited: Vitals, Constitutional, EENT, Resp, CV, GI, , MS, Neuro, Skin. Heme. Lymph, Imm. Samuel Peacock was at home. Provider was at [facility] office. The patient has also been advised to contact this office for worsening conditions or problems, and seek emergency medical treatment and/or call 911 if deemed necessary. Subjective:   Type 2 Diabetes Mellitus diagnosed in 2020. Metformin 500mg daily after A1C of 6.5% in Oct 2020     Last steroid for foot inflammation in July 2022    Has COPD     Currently on Metformin Er 500mg, two tabs with dinner. Sometimes lose stools and flatulence. No BS readings available per pt  Stated running high 90's to 100    Checks blood sugars 1 per month. Reportedly as above   AM:   Lunch:  Supper:   HS:     Hypoglycemias: None     Working on diet   Weight is stable now 245 lbs    Lost about 40 lbs since start of 2020   Meals: Three meals. Snack: sporadic (cheese, fruits, chips, pretzels)   Exercise: limited due to sciatica and lumbar disc issues     Denies chest pain. Family history of CAD: Mother had CAD in 66's. Maternal GM had CHF in her 63's. Denies smoking.     Taking Aspirin 81 mg some days of the week      The following portions of the patient's history were reviewed and updated as appropriate:       Family History   Problem Relation Age of Onset    Hypertension Mother     Cancer Father         lung    Colon Cancer Father     Breast Cancer Sister     Diabetes Brother     Heart Failure Paternal Grandmother          Social History     Socioeconomic History    Marital status:      Spouse name: Not on file    Number of children: Not on file    Years of education: Not on file    Highest education level: Not on file   Occupational History    Occupation: RN   Tobacco Use    Smoking status: Former Smoker     Packs/day: 1.00     Years: 40.00     Pack years: 40.00     Types: Cigarettes     Quit date: 10/1/2018     Years since quitting: 3.3    Smokeless tobacco: Never Used    Tobacco comment: smoked for 40 yrs / smoked up to 1 ppd   Vaping Use    Vaping Use: Never used   Substance and Sexual Activity    Alcohol use: Yes     Comment: 3 DRINKS A MONTH    Drug use: Yes     Types: Marijuana [place])     Comment: medical marijuana     Sexual activity: Yes     Partners: Male   Other Topics Concern    Not on file   Social History Narrative    Not on file     Social Determinants of Health     Financial Resource Strain:     Difficulty of Paying Living Expenses: Not on file   Food Insecurity:     Worried About [address] [employer] in the Last Year: Not on file    Denise of Food in the Last Year: Not on file   Transportation Needs:     Lack of Transportation (Medical): Not on file    Lack of Transportation (Non-Medical):  Not on file   Physical Activity:     Days of Exercise per Week: Not on file    Minutes of Exercise per Session: Not on file   Stress:     Feeling of Stress : Not on file   Social Connections:     Frequency of Communication with Friends and Family: Not on file    Frequency of Social Gatherings with Friends and Family: Not on file    Attends Religion Services: Not on file    Active Member of Clubs or Organizations: Not on file    Attends Club or Organization Meetings: Not on file    Marital Status: Not on file   Intimate Partner Violence:     Fear of Current or Ex-Partner: Not on file    Emotionally Abused: Not on file    Physically Abused: Not on file    Sexually Abused: Not on file   Housing Stability:     Unable to Pay for Housing in the Last Year: Not on file    Number of [place] in the Last Year: Not on file    Unstable Housing in the Last Year: Not on file       Past Medical History:   Diagnosis Date    CAD (coronary artery disease)     mild MULTI VESSELper VO     COPD (chronic obstructive pulmonary disease) ([facility] [address]     Diabetes mellitus ([facility] [address]     HTN (hypertension)     Hyperlipidemia     Osteoarthritis, foot, localized 8/25/2014    Spinal stenosis        Past Surgical History:   Procedure Laterality Date    ANKLE FRACTURE SURGERY      CHOLECYSTECTOMY      COLONOSCOPY N/A 12/11/2019    COLONOSCOPY POLYPECTOMY SNARE/COLD BIOPSY performed by Elsy Garza MD at [address] Bilateral 12/18/2019    BILATERAL L3, L4 AND L5 DORSAL RAMUS MEDIAL BRANCH BLOCK WITH FLUOROSCOPY (41511, 13453) performed by Christopher Crenshaw MD at [address] ARTHROSCOPY Right 06/09/2020    RIGHT SHOULDER ARTHROSCOPE, ROTATOR CUFF AUGMENTATION IMPLANT, SUBACROMIAL DECOMPRESSION, DISTAL CLAVICLE EXCISION AND DEBRIDEMENT performed by Mukul Molina DO at [address] ARTHROPLASTY Bilateral          Allergies   Allergen Reactions    Acetaminophen      Other reaction(s): rash    Arthrotec [Diclofenac-Misoprostol]      HANDS ITCH         Current Outpatient Medications:     Cyanocobalamin (VITAMIN B-12 PO), Take by mouth, Disp: , Rfl:     metFORMIN (GLUCOPHAGE-XR) 500 MG extended release tablet, TAKE 2 TABLETS BY MOUTH ONCE DAILY WITH SUPPER, Disp: 180 tablet, Rfl: 1    atorvastatin (LIPITOR) 40 MG tablet, Take 1 tablet by mouth once daily, Disp: 90 tablet, Rfl: 3    losartan (COZAAR) 100 MG tablet, Take 1 tablet by mouth once daily, Disp: 30 tablet, Rfl: 5    tiZANidine (ZANAFLEX) 4 MG tablet, Take 1 tablet by mouth 3 times daily as needed (Muscle tightness/spasm), Disp: 90 tablet, Rfl: 1    celecoxib (CELEBREX) 200 MG capsule, Take 1 capsule by mouth daily as needed for Pain (Patient taking differently: Take 200 mg by mouth daily ), Disp: 90 capsule, Rfl: 1    pantoprazole (PROTONIX) 40 MG tablet, Take 1 tablet by mouth every morning (before breakfast), Disp: 90 tablet, Rfl: 1    diclofenac sodium (VOLTAREN) 1 % GEL, Apply topically 2 times daily, Disp: 100 g, Rfl: 3    predniSONE (DELTASONE) 10 MG tablet, Only when she has URI, Disp: , Rfl:     doxycycline hyclate (VIBRA-TABS) 100 MG tablet, Only with  URI, Disp: , Rfl:     benzonatate (TESSALON) 200 MG capsule, , Disp: , Rfl:     amLODIPine (NORVASC) 5 MG tablet, Take 1 tablet by mouth nightly, Disp: 30 tablet, Rfl: 5    SYMBICORT 160-4.5 MCG/ACT AERO, Inhale 2 puffs into the lungs 2 times daily, Disp: 1 each, Rfl: 5    FOLIC ACID PO, Take by mouth, Disp: , Rfl:     ondansetron (ZOFRAN) 4 MG tablet, Take 4 mg by mouth every 8 hours as needed for Nausea or Vomiting, Disp: , Rfl:     Cholecalciferol (VITAMIN D3) 125 MCG (5000 UT) TABS, Take by mouth daily , Disp: , Rfl:     Blood Glucose Monitoring Suppl (BLOOD GLUCOSE MONITOR SYSTEM) w/Device KIT, To check blood sugar 4 times daily.  Diagnosis Code: E 11.9, Disp: 1 kit, Rfl: 0    blood glucose monitor strips, Use 4 times daily to check blood sugar Diagnosis Code; E 11.9, Disp: 100 strip, Rfl: 11    Lancets 30G MISC, 4 each by Does not apply route daily Use 4 times daily to check blood sugar Diagnosis Code: E 11.9, Disp: 200 each, Rfl: 3    Lancets MISC, 1 each by Does not apply route daily Check blood sugars 1-2 per day, Disp: 50 each, Rfl: 11    Lido-Menthol-Methyl Sal-Camph (CBD KINGS EX), Apply topically, Disp: , Rfl:     medical marijuana, Take by mouth as needed. , Disp: , Rfl:     albuterol (PROVENTIL) (2.5 MG/3ML) 0.083% nebulizer solution, Take 2.5 mg by nebulization every 6 hours as needed for Wheezing, Disp: , Rfl:     Nebulizers (COMPRESSOR/NEBULIZER) MISC, 1 Device by Does not apply route 2 times daily, Disp: 1 each, Rfl: 3    hydrochlorothiazide (HYDRODIURIL) 25 MG tablet, Take 25 mg by mouth daily as needed , Disp: , Rfl:     cetirizine (ZYRTEC) 10 MG tablet, Take 10 mg by mouth as needed , Disp: , Rfl:     ibuprofen (ADVIL;MOTRIN) 200 MG tablet, Take 200 mg by mouth every 6 hours as needed for Pain, Disp: , Rfl:     albuterol sulfate  (90 Base) MCG/ACT inhaler, Inhale 2 puffs into the lungs every 6 hours as needed for Wheezing, Disp: 1 Inhaler, Rfl: 11      Review of Systems:    Constitutional: Negative for fever, chills, and unexpected weight change. HENT: Negative for congestion, ear pain, rhinorrhea,  sore throat and trouble swallowing. Eyes: Negative for photophobia, redness, itching. Respiratory: Negative for cough, shortness of breath and sputum. Cardiovascular: Negative for chest pain, palpitations and leg swelling. Gastrointestinal: Negative for nausea, vomiting, abdominal pain, diarrhea, constipation. Endocrine: Negative for cold intolerance, heat intolerance, polydipsia, polyphagia and polyuria. Genitourinary: Negative for dysuria, urgency, frequency, hematuria and flank pain. Musculoskeletal: Negative for myalgias, back pain, arthralgias and neck pain. Skin/Nail: Negative for rash, itching. Normal nails. Neurological: Negative for seizures, weakness, light-headedness, numbness and headaches. Hematological/ Lymph nodes: Negative for adenopathy. Does not bruise/bleed easily. Psychiatric/Behavioral: Negative for suicidal ideas, depression, anxiety, sleep disturbance and decreased concentration.           Objective:   Physical Exam:  BP (!) 160/84 (Site: Left Upper Arm, Position: Sitting, Cuff Size: Large Adult)   Pulse 80   Ht 5' 5\" (1.651 m)   Wt 248 lb 12.8 oz (112.9 kg)   SpO2 97%   BMI 41.40 kg/m²       Lab Review:    Office Visit on 02/22/2022   Component Date Value Ref Range Status    Diabetic Retinopathy 01/19/2022 Negative   Final   Orders Only on 02/17/2022   Component Date Value Ref Range Status    Microalbumin, Random Urine 02/17/2022 <1.20  <2.0 mg/dL Final    Creatinine, Ur 02/17/2022 137.8  28.0 - 259.0 mg/dL Final    Microalbumin Creatinine Ratio 02/17/2022 see below  0.0 - 30.0 mg/g Final    Cholesterol, Fasting 02/17/2022 190  0 - 199 mg/dL Final    Triglyceride, Fasting 02/17/2022 109  0 - 150 mg/dL Final    HDL 02/17/2022 66* 40 - 60 mg/dL Final    LDL Calculated 02/17/2022 102* <100 mg/dL Final    VLDL Cholesterol Calculated 02/17/2022 22  Not Established mg/dL Final    Hemoglobin A1C 02/17/2022 5.9  See comment % Final    eAG 02/17/2022 122.6  mg/dL Final   Orders Only on 08/18/2021   Component Date Value Ref Range Status    Sodium 08/18/2021 144  136 - 145 mmol/L Final    Potassium 08/18/2021 4.4  3.5 - 5.1 mmol/L Final    Chloride 08/18/2021 103  99 - 110 mmol/L Final    CO2 08/18/2021 24  21 - 32 mmol/L Final    Anion Gap 08/18/2021 17* 3 - 16 Final    Glucose 08/18/2021 100* 70 - 99 mg/dL Final    BUN 08/18/2021 19  7 - 20 mg/dL Final    CREATININE 08/18/2021 0.9  0.6 - 1.2 mg/dL Final    GFR Non- 08/18/2021 >60  >60 Final    GFR  08/18/2021 >60  >60 Final    Calcium 08/18/2021 9.9  8.3 - 10.6 mg/dL Final    Hep C Ab Interp 08/18/2021 Non-reactive  Non-reactive Final    Hemoglobin A1C 08/18/2021 6.2  See comment % Final    eAG 08/18/2021 131.2  mg/dL Final           Assessment and Plan     Tesha Wellington was seen today for diabetes.     Diagnoses and all orders for this visit:    Controlled type 2 diabetes mellitus without complication, without long-term current use of insulin (HCC)  -     metFORMIN (GLUCOPHAGE-XR) 500 MG extended release tablet; TAKE 2 TABLETS BY MOUTH ONCE DAILY WITH SUPPER  -     TSH with Reflex to FT4; Future  -     Hemoglobin A1C; Future    Dyslipidemia associated with type 2 diabetes mellitus (HCC)  -     atorvastatin (LIPITOR) 40 MG tablet; Take 1 tablet by mouth once daily  -     TSH with Reflex to FT4; Future  -     Hemoglobin A1C; Future    Essential hypertension  -     TSH with Reflex to FT4; Future  -     Hemoglobin A1C; Future    Vitamin D deficiency  -     Vitamin D 25 Hydroxy; Future  -     Vitamin D 25 Hydroxy; Future          1: Type 2 DM uncomplicated   Controlled A1C 6.2% < 5.9% < 6.2% < 6% < 6.5%      A1C is good but we need to keep checking some sugars      C/w metformin ER 500mg, two tabs with dinner     All instructions provided in written. Check Blood sugars 1 times per day. Log them along with insulin and send them every 2 weeks. Call for blood sugars less than 60 or more than 400. Eye exam: Last exam scheduled Jan 2022, denies  Reports stable early cataract. Foot exam:  Feb 2022   Deformity/amputation: absent  Skin lesions/pre-ulcerative calluses: absent  Edema: right- negative, left- negative  Sensory exam: Monofilament sensation: normal  Pulses: normal, Vibration (128 Hz): Intact    Renal screen: normal Feb 2022      TSH screen: Feb 2022  Saw Keri MAN President     2: HTN   Controlled     3: Hyperlipidemia   LDL: 102 , HDL: 66, TGs: 109 - Feb 2022      C/w Atorvastatin 40 mg daily   She has cramps due to arthritis. Vit D and TSH normal Feb 2022.       The 10-year ASCVD risk score (Ruth Moon, et al., 2013) is: 23.6%    Values used to calculate the score:      Age: 78 years      Sex: Female      Is Non- : No      Diabetic: Yes      Tobacco smoker: No      Systolic Blood Pressure: 216 mmHg      Is BP treated: Yes      HDL Cholesterol: 66 mg/dL      Total Cholesterol: 190 mg/dL    4: Morbid obesity   Discussed weight loss options of exercise (150 minutes per week) plus diet plans   Diet plans may include intermittent fasting, low carb diet, weight watchers, mediterranean diet or caloric restriction. Work on comorbid conditions and improved sleep      RTC in 6 months with A1C, lipids, MACR   Next visit in person     EDUCATION:   Greater than 50% of this visit was spent in general counseling regarding obesity, diet, exercise, importance of adherence to insulin regime, recognition and treatment of hypo and hyperglycemia,  glucose logging, proper diabetes management, diabetic complications with poor management and the importance of glycemic control in order to avoid the complications of diabetes. Risks and potential complications of diabetes were reviewed with the patient. Diabetes health maintenance plan and follow-up were discussed and understood by the patient. We reviewed the importance of medication compliance and regular follow-up. Aggressive lifestyle modification was encouraged. Exercise Counselling: This patient is a candidate for regular physical exercise. Instructions to perform the following types of exercise:  Swimming or water aerobic exercise  Brisk walking  Playing tennis  Stationary bicycle or elliptical indoor  Low impact aerobic exercise    Instructions given to exercise for the following duration:  30 minutes a day for five-seven days per week.     Following instructions for being active throughout the day in addition to formal exercise:  Walk instead of drive whenever possible  Take the stairs instead of the elevator  Work in the garden  Park to the far end of the parking lot to add more walking steps to destination      Electronically signed by Debbie Alonzo MD on 2/22/2022 at 12:28 PM

## 2022-09-03 DIAGNOSIS — K21.9 GASTROESOPHAGEAL REFLUX DISEASE WITHOUT ESOPHAGITIS: ICD-10-CM

## 2022-09-06 RX ORDER — PANTOPRAZOLE SODIUM 40 MG/1
TABLET, DELAYED RELEASE ORAL
Qty: 90 TABLET | Refills: 0 | Status: SHIPPED | OUTPATIENT
Start: 2022-09-06

## 2022-09-12 ENCOUNTER — TELEPHONE (OUTPATIENT)
Dept: PULMONOLOGY | Age: 67
End: 2022-09-12

## 2022-09-12 RX ORDER — BENZONATATE 200 MG/1
200 CAPSULE ORAL 3 TIMES DAILY PRN
Qty: 30 CAPSULE | Refills: 0 | Status: CANCELLED | OUTPATIENT
Start: 2022-09-12 | End: 2022-09-19

## 2022-09-12 NOTE — TELEPHONE ENCOUNTER
Spoke with patient. She c/o dry cough that is constant and nasal congestion. Denies fever, wheezing or shortness of breath. Last refill was 03/18/22.

## 2022-10-04 RX ORDER — TIZANIDINE 4 MG/1
TABLET ORAL
Qty: 180 TABLET | Refills: 0 | Status: SHIPPED | OUTPATIENT
Start: 2022-10-04

## 2022-10-04 NOTE — TELEPHONE ENCOUNTER
Medication:   Requested Prescriptions     Pending Prescriptions Disp Refills    tiZANidine (ZANAFLEX) 4 MG tablet [Pharmacy Med Name: tiZANidine HCl 4 MG Oral Tablet] 180 tablet 0     Sig: TAKE 2 TABLETS BY MOUTH THREE TIMES DAILY AS NEEDED FOR MUSCLE SPASM        Last Filled:      Patient Phone Number: [phone] (home)     Last appt: 8/8/2022   Next appt: Visit date not found    Last OARRS: No flowsheet data found.

## 2022-10-10 ENCOUNTER — TELEMEDICINE (OUTPATIENT)
Dept: FAMILY MEDICINE CLINIC | Age: 67
End: 2022-10-10
Payer: MEDICARE

## 2022-10-10 DIAGNOSIS — I10 PRIMARY HYPERTENSION: ICD-10-CM

## 2022-10-10 DIAGNOSIS — R19.7 DIARRHEA, UNSPECIFIED TYPE: Primary | ICD-10-CM

## 2022-10-10 DIAGNOSIS — E11.9 CONTROLLED TYPE 2 DIABETES MELLITUS WITHOUT COMPLICATION, WITHOUT LONG-TERM CURRENT USE OF INSULIN (HCC): ICD-10-CM

## 2022-10-10 DIAGNOSIS — K64.9 HEMORRHOIDS, UNSPECIFIED HEMORRHOID TYPE: ICD-10-CM

## 2022-10-10 PROCEDURE — G8399 PT W/DXA RESULTS DOCUMENT: HCPCS | Performed by: FAMILY MEDICINE

## 2022-10-10 PROCEDURE — 2022F DILAT RTA XM EVC RTNOPTHY: CPT | Performed by: FAMILY MEDICINE

## 2022-10-10 PROCEDURE — G8417 CALC BMI ABV UP PARAM F/U: HCPCS | Performed by: FAMILY MEDICINE

## 2022-10-10 PROCEDURE — 1036F TOBACCO NON-USER: CPT | Performed by: FAMILY MEDICINE

## 2022-10-10 PROCEDURE — 1123F ACP DISCUSS/DSCN MKR DOCD: CPT | Performed by: FAMILY MEDICINE

## 2022-10-10 PROCEDURE — 3044F HG A1C LEVEL LT 7.0%: CPT | Performed by: FAMILY MEDICINE

## 2022-10-10 PROCEDURE — 3017F COLORECTAL CA SCREEN DOC REV: CPT | Performed by: FAMILY MEDICINE

## 2022-10-10 PROCEDURE — G8427 DOCREV CUR MEDS BY ELIG CLIN: HCPCS | Performed by: FAMILY MEDICINE

## 2022-10-10 PROCEDURE — G8484 FLU IMMUNIZE NO ADMIN: HCPCS | Performed by: FAMILY MEDICINE

## 2022-10-10 PROCEDURE — 99214 OFFICE O/P EST MOD 30 MIN: CPT | Performed by: FAMILY MEDICINE

## 2022-10-10 PROCEDURE — 1090F PRES/ABSN URINE INCON ASSESS: CPT | Performed by: FAMILY MEDICINE

## 2022-10-10 RX ORDER — ONDANSETRON 4 MG/1
4 TABLET, FILM COATED ORAL EVERY 8 HOURS PRN
Qty: 30 TABLET | Refills: 1 | Status: SHIPPED | OUTPATIENT
Start: 2022-10-10

## 2022-10-10 NOTE — PROGRESS NOTES
TELEHEALTH EVALUATION -- Audio/Visual     Jessi Mills   YOB: 1955    Date of Visit:  10/10/2022    No Known Allergies  Current Outpatient Medications on File Prior to Visit   Medication Sig Dispense Refill    tiZANidine (ZANAFLEX) 4 MG tablet TAKE 2 TABLETS BY MOUTH THREE TIMES DAILY AS NEEDED FOR MUSCLE SPASM 180 tablet 0    pantoprazole (PROTONIX) 40 MG tablet TAKE 1 TABLET BY MOUTH ONCE DAILY IN THE MORNING BEFORE BREAKFAST 90 tablet 0    progesterone (PROMETRIUM) 100 MG CAPS capsule TAKE 1 CAPSULE BY MOUTH ONCE DAILY      metFORMIN (GLUCOPHAGE-XR) 500 MG extended release tablet TAKE 2 TABLETS BY MOUTH ONCE DAILY WITH SUPPER 180 tablet 1    estradiol (ESTRACE) 1 MG tablet       clobetasol (TEMOVATE) 0.05 % cream       SYMBICORT 160-4.5 MCG/ACT AERO Inhale 2 puffs into the lungs 2 times daily 30.6 g 11    diclofenac sodium (VOLTAREN) 1 % GEL APPLY   TOPICALLY TWICE DAILY 200 g 5    amLODIPine (NORVASC) 5 MG tablet Take 1 tablet by mouth nightly 30 tablet 5    losartan (COZAAR) 100 MG tablet Take 1 tablet by mouth once daily 90 tablet 1    celecoxib (CELEBREX) 200 MG capsule Take 1 capsule by mouth 2 times daily 180 capsule 1    aspirin 81 MG EC tablet Take 81 mg by mouth daily      tretinoin (RETIN-A) 0.025 % cream Apply topically nightly. 45 g 5    Cyanocobalamin (VITAMIN B-12 PO) Take by mouth      atorvastatin (LIPITOR) 40 MG tablet Take 1 tablet by mouth once daily 90 tablet 3    doxycycline hyclate (VIBRA-TABS) 100 MG tablet Only with  URI      FOLIC ACID PO Take by mouth      Blood Glucose Monitoring Suppl (BLOOD GLUCOSE MONITOR SYSTEM) w/Device KIT To check blood sugar 4 times daily.  Diagnosis Code: E 11.9 1 kit 0    blood glucose monitor strips Use 4 times daily to check blood sugar Diagnosis Code; E 11.9 100 strip 11    Lancets 30G MISC 4 each by Does not apply route daily Use 4 times daily to check blood sugar Diagnosis Code: E 11.9 200 each 3    Lancets MISC 1 each by Does not apply route daily Check blood sugars 1-2 per day 50 each 11    medical marijuana Take by mouth as needed. albuterol (PROVENTIL) (2.5 MG/3ML) 0.083% nebulizer solution Take 2.5 mg by nebulization every 6 hours as needed for Wheezing      Nebulizers (COMPRESSOR/NEBULIZER) MISC 1 Device by Does not apply route 2 times daily 1 each 3    hydrochlorothiazide (HYDRODIURIL) 25 MG tablet Take 25 mg by mouth daily as needed       cetirizine (ZYRTEC) 10 MG tablet Take 10 mg by mouth as needed       albuterol sulfate  (90 Base) MCG/ACT inhaler Inhale 2 puffs into the lungs every 6 hours as needed for Wheezing 1 Inhaler 11     No current facility-administered medications on file prior to visit. Wt Readings from Last 3 Encounters:   22 245 lb (111.1 kg)   22 248 lb (112.5 kg)   22 250 lb (113.4 kg)     BP Readings from Last 3 Encounters:   22 112/74   22 122/70   22 138/80          Demetriusmauro Tracy (:  1955) has requested to and consented to an audio/video evaluation for the concerns or medical issues discussed below. Chief Complaint   Patient presents with    Diarrhea     [id number] going on since last Wednesday        HPI    Diarrhea:  patient reports her grandkids had nausea, vomitting, and diarrhea last week. She reports she is on her 6th day of symptoms. She is taking imodium and still having diarrhea. No vomiting. Has had diarrhea 4 times already today. She is working on staying hydrated. The diarrhea is pure liquid. Has urinated twice today. Eating toast and bland foods. No better or worse since it started. No blood in the stool. Also tried peptobismol. No recent travel. Had her foot surgery 21. No fevers. No recent antibiotic use. Takes zofran prn. Hemorrhoids- chronic issue worse with diarrhea. She would like to see a specialist for this issue. Hernia: present for a year. Bothers her when she coughs. Discussed options.  Referral to surgery. Hotflashes:  Gets them during the day and at night. Progressively has gotten worse the last year or so- has always had some. Hypertension:  Taking medications as prescribed. No symptoms concerning for end organ damage. Dyslipidemia: Stable on Lipitor. COPD: The patient sees pulmonology. She is stable on Symbicort and albuterol - needs it rarely- with URI. Hasn't needed it recently. Moderate persistent asthma: The patient is stable on her current medications. She is seeing pulmonology. GERD/ hiatal hernia:  Occasionally has nausea. Takes zofran prn. Takes protonix daily which helps her symptoms. Hx of seeing GI. Type 2 diabetes: Diagnosed in 2020. She is seeing endocrinology every 6 months. She is on Metformin - taking sporadically right now with her diarrhea. Obesity:      Coronary artery calcification: Seen on CT scan. History of seeing cardiology in 2019. Medically managed. No longer on low dose ASA. Arthritis:  Has persistent pain. In her feet and low back. Takes a muscle relaxor, celebrex, and topical medication prn. She is also on [facility] for pain management- going to [facility] clinic and has a medical marijuana care from Dr. Xi Sanders. Hx of seeing orthopaedic surgery- no longer going as symptoms stable with no surgeries recommended. Referral to PT. Family history of colon cancer: goes every 5 years for colonoscopies. Last colonoscopy was in 2019. Osteopenia: DXA 9/2019     SH: 7/2021:  Was seeing NP at [facility]. Lives with her  and daughter and 2 grandkids. Has 2 daughters. 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      Social History     Socioeconomic History    Marital status:      Spouse name: Not on file    Number of children: Not on file    Years of education: Not on file    Highest education level: Not on file   Occupational History    Occupation: RN   Tobacco Use    Smoking status: Former Packs/day: 1.00     Years: 40.00     Pack years: 40.00     Types: Cigarettes     Quit date: 10/1/2018     Years since quittin.0    Smokeless tobacco: Never    Tobacco comments:     smoked for 40 yrs / smoked up to 1 ppd   Vaping Use    Vaping Use: Never used   Substance and Sexual Activity    Alcohol use: Yes     Comment: 3 DRINKS A MONTH    Drug use: Yes     Types: Marijuana Stephen Passy)     Comment: medical marijuana     Sexual activity: Yes     Partners: Male   Other Topics Concern    Not on file   Social History Narrative    Not on file     Social Determinants of Health     Financial Resource Strain: Not on file   Food Insecurity: Not on file   Transportation Needs: Not on file   Physical Activity: Unknown    Days of Exercise per Week: 0 days    Minutes of Exercise per Session: Not on file   Stress: Not on file   Social Connections: Not on file   Intimate Partner Violence: Not on file   Housing Stability: Not on file         Review of Systems  As documented in the HPI. Currently no complaints of CP or CHICA. Vital Signs: (As obtained by patient/caregiver or practitioner observation)    There were no vitals taken for this visit. Patient-Reported Vitals 2021   Patient-Reported Weight 240lbs   Patient-Reported Height 5'5.5\"        Physical Exam  Constitutional:       General: She is not in acute distress. Appearance: Normal appearance. She is not ill-appearing. HENT:      Head: Normocephalic and atraumatic. Nose: Nose normal.   Pulmonary:      Effort: Pulmonary effort is normal. No respiratory distress. Neurological:      General: No focal deficit present. Mental Status: She is alert. Psychiatric:         Mood and Affect: Mood normal.         Behavior: Behavior normal.         Assessment/Plan     1. Diarrhea, unspecified type  Persistent. Stop imodium. Probiotic. Labs and stool studies. Counseled on hydration and PO intake.   Indications for going to ER discussed including signs of dehydration.     - Comprehensive Metabolic Panel; Future  - CBC with Auto Differential; Future  - GI Bacterial Pathogens By PCR; Future  - Fecal Leukocytes; Future  - C DIFF TOXIN/ANTIGEN; Future  - OVA & PARASITE ID/COUNT #1; Future  - XR ABDOMEN (KUB) (SINGLE AP VIEW); Future    2. Controlled type 2 diabetes mellitus without complication, without long-term current use of insulin (HCC)  Stable. Hold metformin until GI issue improved. 3. Primary hypertension  Stable. Continue current regimen. 4. Hemorrhoids, unspecified hemorrhoid type  Persistent. Pt would like referral.   - Ana Mccray MD, Colorectal Surgery, [facility]    Discussed medications with patient, who voiced understanding of their use and indications. All questions answered. No follow-ups on file. Claudette Alonso, was evaluated through a synchronous (real-time) audio-video encounter. The patient (or guardian if applicable) is aware that this is a billable service, which includes applicable co-pays. This Virtual Visit was conducted with patient's (and/or legal guardian's) consent. The visit was conducted pursuant to the emergency declaration under the [facility], [facility] waiver authority and the [employer] General Act. Patient identification was verified, and a caregiver was present when appropriate. The patient was located at Home: [address] [place] [address]. Provider was located at [facility] ([address] [place] [address]): [address] [facility]. [address],  [address] [facility]. Patient identification was verified at the start of the visit: Yes    Total time spent on this encounter: Not billed by time. Services were provided through a video synchronous discussion virtually to substitute for in-person clinic visit. Documentation was done using voice recognition dragon software.  Efforts were made to ensure accuracy; however, inadvertent, unintentional computerized transcription errors may be present. --Eduardo Lucia MD on 10/10/2022    An electronic signature was used to authenticate this note.

## 2022-10-11 ENCOUNTER — TELEPHONE (OUTPATIENT)
Dept: FAMILY MEDICINE CLINIC | Age: 67
End: 2022-10-11

## 2022-10-11 NOTE — TELEPHONE ENCOUNTER
Patient wanted to inform provider that she is feeling better compared to yesterday (regarding GI issue) and will be waiting another day to see if it all gets resolved on its own.

## 2022-11-01 ENCOUNTER — OFFICE VISIT (OUTPATIENT)
Dept: PULMONOLOGY | Age: 67
End: 2022-11-01
Payer: MEDICARE

## 2022-11-01 VITALS — HEART RATE: 81 BPM | OXYGEN SATURATION: 98 %

## 2022-11-01 DIAGNOSIS — Z87.891 PERSONAL HISTORY OF TOBACCO USE: Primary | ICD-10-CM

## 2022-11-01 DIAGNOSIS — J44.9 COPD, MODERATE (HCC): ICD-10-CM

## 2022-11-01 DIAGNOSIS — R91.1 PULMONARY NODULE: ICD-10-CM

## 2022-11-01 DIAGNOSIS — K21.9 GASTROESOPHAGEAL REFLUX DISEASE WITHOUT ESOPHAGITIS: ICD-10-CM

## 2022-11-01 PROCEDURE — 90694 VACC AIIV4 NO PRSRV 0.5ML IM: CPT | Performed by: INTERNAL MEDICINE

## 2022-11-01 PROCEDURE — G8417 CALC BMI ABV UP PARAM F/U: HCPCS | Performed by: INTERNAL MEDICINE

## 2022-11-01 PROCEDURE — 1123F ACP DISCUSS/DSCN MKR DOCD: CPT | Performed by: INTERNAL MEDICINE

## 2022-11-01 PROCEDURE — G8484 FLU IMMUNIZE NO ADMIN: HCPCS | Performed by: INTERNAL MEDICINE

## 2022-11-01 PROCEDURE — G8427 DOCREV CUR MEDS BY ELIG CLIN: HCPCS | Performed by: INTERNAL MEDICINE

## 2022-11-01 PROCEDURE — G8399 PT W/DXA RESULTS DOCUMENT: HCPCS | Performed by: INTERNAL MEDICINE

## 2022-11-01 PROCEDURE — 99214 OFFICE O/P EST MOD 30 MIN: CPT | Performed by: INTERNAL MEDICINE

## 2022-11-01 PROCEDURE — 1036F TOBACCO NON-USER: CPT | Performed by: INTERNAL MEDICINE

## 2022-11-01 PROCEDURE — G0296 VISIT TO DETERM LDCT ELIG: HCPCS | Performed by: INTERNAL MEDICINE

## 2022-11-01 PROCEDURE — 3017F COLORECTAL CA SCREEN DOC REV: CPT | Performed by: INTERNAL MEDICINE

## 2022-11-01 PROCEDURE — G0008 ADMIN INFLUENZA VIRUS VAC: HCPCS | Performed by: INTERNAL MEDICINE

## 2022-11-01 PROCEDURE — 1090F PRES/ABSN URINE INCON ASSESS: CPT | Performed by: INTERNAL MEDICINE

## 2022-11-01 PROCEDURE — 3023F SPIROM DOC REV: CPT | Performed by: INTERNAL MEDICINE

## 2022-11-01 RX ORDER — PREDNISONE 10 MG/1
TABLET ORAL
Qty: 30 TABLET | Refills: 3 | Status: SHIPPED | OUTPATIENT
Start: 2022-11-01 | End: 2022-11-11

## 2022-11-01 RX ORDER — BENZONATATE 200 MG/1
200 CAPSULE ORAL 3 TIMES DAILY PRN
Qty: 30 CAPSULE | Refills: 3 | Status: SHIPPED | OUTPATIENT
Start: 2022-11-01 | End: 2022-11-08

## 2022-11-01 RX ORDER — DOXYCYCLINE HYCLATE 100 MG
100 TABLET ORAL DAILY
Qty: 10 TABLET | Refills: 3 | Status: SHIPPED | OUTPATIENT
Start: 2022-11-01 | End: 2022-11-06

## 2022-11-01 RX ORDER — BUDESONIDE AND FORMOTEROL FUMARATE DIHYDRATE 160; 4.5 UG/1; UG/1
2 AEROSOL RESPIRATORY (INHALATION) 2 TIMES DAILY
Qty: 1 EACH | Refills: 6 | Status: SHIPPED | OUTPATIENT
Start: 2022-11-01

## 2022-11-01 NOTE — PROGRESS NOTES
[facility]  Consult Note  MD Barbie Ward   YOB: 1955    Date of Visit:  11/1/2022    Assessment/Plan:  1. SOB (shortness of breath)  Multifactorial  COPD  Asthma  Obesity  Deconditioning    She had COVID earlier this year and has recovered. 2. COPD, moderate  PFT 2/19:  Spirometry:  Flow volume loops were obstructed. The FEV-1/FVC ratio was   decreased. The FEV-1 was 1.5 liters (59% of predicted), which was   moderately decreased. The FVC was 2.15 liters (65% of predicted),   which was decreased. Response to inhaled bronchodilators   (albuterol) was not significant. Lung volumes:  Lung volumes were tested by plethysmography. The total lung   capacity was 4.45 liters (88% of predicted), which was normal.   The residual volume was 2.24 liters (114% of predicted), which   was increased. The ratio of residual volume to total lung   capacity (RV/TLC) was 50, which was incraesed. Diffusion capacity was found to be mildly decreased. Interpretation:  Moderate obstructive airway disease. Medication Management:  The patient benefits from the medical regimen and reports no complications. Symbicort  Albuterol  HHN    3. Moderate persistent asthma, uncomplicated    Did not have methacholine because PFT was abnormal    4. GERD  Medication Management:  The patient benefits from the medical regimen and reports no complications. Prilsoec    Also elevates the head of her bead and both help    5. COVID infection  recovered. 6. Immunization  COVID UTD  Flu shot given    6 months      Chief Complaint   Patient presents with    Shortness of Breath     6 month     Discuss Medications     Needs her Doxy and Pred taper filled so she can have on hand along with tessalon pearls        HPI  The patient presents with a chief complaint of moderate shortness of breath related to moderate COPD of many years duration.  He has mild associated cough. Exertion is a modifying factor. She had recent COVID. She did take Paxlovid and did not need hospitalization. Review of Systems  No Chest pain, Nausea or vomiting reported    History  I have reviewed past medical, surgical, social and family history. This is documented elsewhere in themedical record. Physical Exam:  Well developed, well nourished  Alert and oriented  Sclera is clear  No cervical adenopathy  No JVD. Chest examination is clear. Cardiac examination reveals regular rate and rhythm without murmur, gallop or rub. The abdomen is soft, nontender and nondistended. There is no clubbing, cyanosis or edema of the extremities. There is no obvious skin rash. No focal neuro deficicts  Normal mood and affect      No Known Allergies    Prior to Visit Medications    Medication Sig Taking?  Authorizing Provider   ondansetron (ZOFRAN) 4 MG tablet Take 1 tablet by mouth every 8 hours as needed for Nausea or Vomiting  Bruce Corona MD   tiZANidine (ZANAFLEX) 4 MG tablet TAKE 2 TABLETS BY MOUTH THREE TIMES DAILY AS NEEDED FOR MUSCLE SPASM  Bruce Corona MD   pantoprazole (PROTONIX) 40 MG tablet TAKE 1 TABLET BY MOUTH ONCE DAILY IN THE MORNING BEFORE BREAKFAST  Bruce Corona MD   progesterone (PROMETRIUM) 100 MG CAPS capsule TAKE 1 CAPSULE BY MOUTH ONCE DAILY  Historical Provider, MD   metFORMIN (GLUCOPHAGE-XR) 500 MG extended release tablet TAKE 2 TABLETS BY MOUTH ONCE DAILY WITH SUPPER  Kandimoncho Lopez MD   estradiol (ESTRACE) 1 MG tablet   Historical Provider, MD   clobetasol (TEMOVATE) 0.05 % cream   Historical Provider, MD   SYMBICORT 160-4.5 MCG/ACT AERO Inhale 2 puffs into the lungs 2 times daily  Shauna Griffin MD   diclofenac sodium (VOLTAREN) 1 % GEL APPLY   TOPICALLY TWICE DAILY  Bruce Corona MD   amLODIPine (NORVASC) 5 MG tablet Take 1 tablet by mouth nightly  Sunday Briones MD   losartan (COZAAR) 100 MG tablet Take 1 tablet by mouth once daily Nicole Rodriguez MD   celecoxib (CELEBREX) 200 MG capsule Take 1 capsule by mouth 2 times daily  Anthony Romeo MD   aspirin 81 MG EC tablet Take 81 mg by mouth daily  Historical Provider, MD   tretinoin (RETIN-A) 0.025 % cream Apply topically nightly. Marilyn Mar MD   Cyanocobalamin (VITAMIN B-12 PO) Take by mouth  Historical Provider, MD   atorvastatin (LIPITOR) 40 MG tablet Take 1 tablet by mouth once daily  Paulette Santos MD   doxycycline hyclate (VIBRA-TABS) 100 MG tablet Only with  URI  Historical Provider, MD   FOLIC ACID PO Take by mouth  Historical Provider, MD   Blood Glucose Monitoring Suppl (BLOOD GLUCOSE MONITOR SYSTEM) w/Device KIT To check blood sugar 4 times daily. Diagnosis Code: E 11.9  Paulette Santos MD   blood glucose monitor strips Use 4 times daily to check blood sugar Diagnosis Code; E 11.9  Paulette Santos MD   Lancets 30G MISC 4 each by Does not apply route daily Use 4 times daily to check blood sugar Diagnosis Code: E 11.9  Paulette Santos MD   Lancets MISC 1 each by Does not apply route daily Check blood sugars 1-2 per day  Paulette Santos MD   medical marijuana Take by mouth as needed. Historical Provider, MD   albuterol (PROVENTIL) (2.5 MG/3ML) 0.083% nebulizer solution Take 2.5 mg by nebulization every 6 hours as needed for Wheezing  Historical Provider, MD   Nebulizers (COMPRESSOR/NEBULIZER) MISC 1 Device by Does not apply route 2 times daily  Jaxon Gonzalez, APRN - [facility]   hydrochlorothiazide (HYDRODIURIL) 25 MG tablet Take 25 mg by mouth daily as needed   Historical Provider, MD   albuterol sulfate  (90 Base) MCG/ACT inhaler Inhale 2 puffs into the lungs every 6 hours as needed for Wheezing  Leeann Nichole MD   cetirizine (ZYRTEC) 10 MG tablet Take 10 mg by mouth as needed   Historical Provider, MD       Vitals:    11/01/22 1149   Pulse: 81   SpO2: 98%     There is no height or weight on file to calculate BMI. Wt Readings from Last 3 Encounters:   22 245 lb (111.1 kg)   22 248 lb (112.5 kg)   22 250 lb (113.4 kg)     BP Readings from Last 3 Encounters:   22 112/74   22 122/70   22 138/80        Social History     Tobacco Use   Smoking Status Former    Packs/day: 1.00    Years: 40.00    Pack years: 40.00    Types: Cigarettes    Quit date: 10/1/2018    Years since quittin.0   Smokeless Tobacco Never   Tobacco Comments    smoked for 40 yrs / smoked up to 1 ppd                    Discussed with the patient the current USPSTF guidelines released 2021 for screening for lung cancer. For adults aged 48 to [de-identified] years who have a 20 pack-year smoking history and currently smoke or have quit within the past 15 years the grade B recommendation is to:  Screen for lung cancer with low-dose computed tomography (LDCT) every year. Stop screening once a person has not smoked for 15 years or has a health problem that limits life expectancy or the ability to have lung surgery. The patient  reports that she quit smoking about 4 years ago. Her smoking use included cigarettes. She has a 40.00 pack-year smoking history. She has never used smokeless tobacco.. Discussed with patient the risks and benefits of screening, including over-diagnosis, false positive rate, and total radiation exposure. The patient currently exhibits no signs or symptoms suggestive of lung cancer. Discussed with patient the importance of compliance with yearly annual lung cancer screenings and willingness to undergo diagnosis and treatment if screening scan is positive. In addition, the patient was counseled regarding the importance of remaining smoke free and/or total smoking cessation.     Also reviewed the following if the patient has Medicare that as of February 10, 2022, Medicare only covers LDCT screening in patients aged 51-72 with at least a 20 pack-year smoking history who currently smoke or have quit in the last 15 years

## 2022-11-01 NOTE — PATIENT INSTRUCTIONS

## 2022-11-07 ENCOUNTER — TELEPHONE (OUTPATIENT)
Dept: CASE MANAGEMENT | Age: 67
End: 2022-11-07

## 2022-11-07 RX ORDER — TIZANIDINE 4 MG/1
TABLET ORAL
Qty: 180 TABLET | Refills: 0 | Status: SHIPPED | OUTPATIENT
Start: 2022-11-07 | End: 2022-12-01

## 2022-11-09 ENCOUNTER — OFFICE VISIT (OUTPATIENT)
Dept: SURGERY | Age: 67
End: 2022-11-09
Payer: MEDICARE

## 2022-11-09 VITALS
HEIGHT: 65 IN | OXYGEN SATURATION: 96 % | DIASTOLIC BLOOD PRESSURE: 86 MMHG | HEART RATE: 86 BPM | WEIGHT: 244 LBS | SYSTOLIC BLOOD PRESSURE: 140 MMHG | TEMPERATURE: 98.4 F | BODY MASS INDEX: 40.65 KG/M2

## 2022-11-09 DIAGNOSIS — L29.0 PERIANAL IRRITATION: Primary | ICD-10-CM

## 2022-11-09 PROCEDURE — G8417 CALC BMI ABV UP PARAM F/U: HCPCS | Performed by: SURGERY

## 2022-11-09 PROCEDURE — 99203 OFFICE O/P NEW LOW 30 MIN: CPT | Performed by: SURGERY

## 2022-11-09 PROCEDURE — 3074F SYST BP LT 130 MM HG: CPT | Performed by: SURGERY

## 2022-11-09 PROCEDURE — 1090F PRES/ABSN URINE INCON ASSESS: CPT | Performed by: SURGERY

## 2022-11-09 PROCEDURE — G8399 PT W/DXA RESULTS DOCUMENT: HCPCS | Performed by: SURGERY

## 2022-11-09 PROCEDURE — 3017F COLORECTAL CA SCREEN DOC REV: CPT | Performed by: SURGERY

## 2022-11-09 PROCEDURE — 1123F ACP DISCUSS/DSCN MKR DOCD: CPT | Performed by: SURGERY

## 2022-11-09 PROCEDURE — 1036F TOBACCO NON-USER: CPT | Performed by: SURGERY

## 2022-11-09 PROCEDURE — G8427 DOCREV CUR MEDS BY ELIG CLIN: HCPCS | Performed by: SURGERY

## 2022-11-09 PROCEDURE — G8484 FLU IMMUNIZE NO ADMIN: HCPCS | Performed by: SURGERY

## 2022-11-09 PROCEDURE — 3078F DIAST BP <80 MM HG: CPT | Performed by: SURGERY

## 2022-11-09 NOTE — PROGRESS NOTES
[facility]  [address]  Dept: [phone]  Dept Fax: [phone]  Loc: [phone]    Visit Date: 11/9/2022    Mynor Handley is a 75 y.o. female who presents today for: New Patient (hemorrhoids)      HPI:       Mynor Handley is a 75 y.o. female referred to me for possible hemorrhoids or perianal irritation. Kadeem Mclean tells me that she has had several months if not years of irritation around the rectum. She does have daily diarrhea from her metformin. Last colonoscopy was a few years ago 2019, does not recall any polyps. She does have a history of diabetes, COPD, CAD    Patient's problem list, medications, past medical, surgical, family, and social histories were reviewed and updated in the chart as indicated today.     Past Medical History:   Diagnosis Date    CAD (coronary artery disease)     mild MULTI VESSELper VO     COPD (chronic obstructive pulmonary disease) (HCC)     Diabetes mellitus (HCC)     HTN (hypertension)     Hyperlipidemia     Osteoarthritis, foot, localized 8/25/2014    Spinal stenosis        Past Surgical History:   Procedure Laterality Date    ANKLE FRACTURE SURGERY      CHOLECYSTECTOMY      COLONOSCOPY N/A 12/11/2019    COLONOSCOPY POLYPECTOMY SNARE/COLD BIOPSY performed by Hunter Spaulding MD at [address] [facility] [address] Bilateral 12/18/2019    BILATERAL L3, L4 AND L5 DORSAL RAMUS MEDIAL BRANCH BLOCK WITH FLUOROSCOPY (62414, 14520) performed by Anna King MD at [address] ARTHROSCOPY Right 06/09/2020    RIGHT SHOULDER ARTHROSCOPE, ROTATOR CUFF AUGMENTATION IMPLANT, SUBACROMIAL DECOMPRESSION, DISTAL CLAVICLE EXCISION AND DEBRIDEMENT performed by Rocio Espinoza DO at [address] [facility] Cancer-related family history includes Breast Cancer in her sister; Cancer in her father; Colon Cancer in her father. Social History:   Social History     Tobacco Use    Smoking status: Former     Packs/day: 1.00     Years: 40.00     Pack years: 40.00     Types: Cigarettes     Quit date: 10/1/2018     Years since quittin.1    Smokeless tobacco: Never    Tobacco comments:     smoked for 40 yrs / smoked up to 1 ppd   Substance Use Topics    Alcohol use: Yes     Comment: 3 DRINKS A MONTH      Tobacco cessation counseling provided as appropriate. REVIEW OF SYSTEMS:    Pertinent positives and negatives are mentioned in the HPI above. Otherwise, all other systems were reviewed and negative. Objective:     Physical Exam   BP (!) 140/86   Pulse 86   Temp 98.4 °F (36.9 °C) (Infrared)   Ht 5' 5\" (1.651 m)   Wt 244 lb (110.7 kg)   SpO2 96%   BMI 40.60 kg/m²   Constitutional: Appears well-developed and well-nourished. Grooming appropriate. No gross deformities. Body mass index is 40.6 kg/m². Eyes: No scleral icterus. Conjunctiva/lids normal. Vision intact grossly. Pupils equal/symmetric, reactive bilaterally. ENT: External ears/nose without defect, scars, or masses. Hearing grossly intact. No facial deformity. Lips normal, normal dentition. Neck: No masses. Trachea midline. No crepitus. Thyroid not enlarged. Cardiovascular: Normal rate. No peripheral edema. Abdominal aorta normal size to palpation. Pulmonary/Chest: Effort normal. No respiratory distress. No wheezes. No use of accessory muscles. Musculoskeletal: Normal range of motion x all 4 extremities and head/neck, without deformity, pain, or crepitus, with normal strength and tone. Normal gait. Nails without clubbing or cyanosis. Neurological: Alert and oriented to person, place, and time. No gross deficits. Sensation intact. Skin: Skin is dry. No rashes noted. No pallor. No induration of nodules. Psychiatric: Normal mood and affect. Behavior normal. Oriented to person, place, and time. Judgment and insight reasonable. Abdominal/wound: Soft, obese, nontender    Anorectal Exam: Chaperone present in room throughout exam.  Patient placed in the left lateral position. Buttocks spread. Digital rectal exam performed with lubricated finger. Anoscopy performed. Findings reveal: Fair bit of perianal irritation with possibility of lichen sclerosis versus other dermatologic disease. Internal exam with mild hemorrhoids    Labs reviewed: None  Radiology reviewed: None    Last colonoscopy: 2019, [facility]      Assessment/Plan:     A/P:  New problem(s) with uncertain prognosis: Perianal irritation, rash  Established problem(s): Obesity, diabetes  Additional workup/treatment planned: Expectant care, fiber supplementation, skin care, potential office biopsy  Risk of complications/morbidity: Moderate    Discussed with her that most cases of perianal irritation and rash are related to local wound care and toilet hygiene. I discussed fiber septation, healthy fruits and vegetables, avoiding constipation diarrhea if possible. Discussed using a bidet and wet wipes and avoiding heavy toilet paper or scratching. I recommended over-the-counter Calmoseptine or other skin protectant ointment. If she continues to have issues or the rash appears to be worse, discussed in office punch biopsy for further diagnostic work-up. Briefly discussed lichen sclerosus, that is typically treated with steroid ointment. Continue with current medications    DISPOSITION:  f/u 2 months for office biopsy    My findings will be relayed to consulting practitioner or PCP via Epic    Note completed using dictation software, please excuse any errors.     Electronically signed by Graciela Garvin MD on 11/9/2022 at 1:32 PM

## 2022-11-10 RX ORDER — AMLODIPINE BESYLATE 5 MG/1
5 TABLET ORAL NIGHTLY
Qty: 30 TABLET | Refills: 0 | Status: SHIPPED | OUTPATIENT
Start: 2022-11-10

## 2022-11-10 RX ORDER — LOSARTAN POTASSIUM 100 MG/1
TABLET ORAL
Qty: 90 TABLET | Refills: 0 | Status: SHIPPED | OUTPATIENT
Start: 2022-11-10

## 2022-11-10 NOTE — TELEPHONE ENCOUNTER
Medication:   Requested Prescriptions     Pending Prescriptions Disp Refills    losartan (COZAAR) 100 MG tablet [Pharmacy Med Name: Losartan Potassium 100 MG Oral Tablet] 90 tablet 0     Sig: Take 1 tablet by mouth once daily       Last Filled:  04/20/2022 #90 1rf    Patient Phone Number: [phone] (home)     Last appt: 10/10/2022   Next appt: Visit date not found    Lab Results   Component Value Date     08/08/2022    K 4.0 08/08/2022     08/08/2022    CO2 24 08/08/2022    BUN 15 08/08/2022    CREATININE 0.7 08/08/2022    GLUCOSE 110 (H) 08/08/2022    CALCIUM 9.4 08/08/2022    PROT 6.9 01/11/2021    LABALBU 4.4 01/11/2021    BILITOT 0.4 01/11/2021    ALKPHOS 97 01/11/2021    AST 12 (L) 01/11/2021    ALT 16 01/11/2021    LABGLOM >60 08/08/2022    GFRAA >60 08/08/2022    AGRATIO 1.8 01/11/2021    GLOB 2.5 01/11/2021

## 2022-11-10 NOTE — TELEPHONE ENCOUNTER
Medication:   Requested Prescriptions     Pending Prescriptions Disp Refills    amLODIPine (NORVASC) 5 MG tablet [Pharmacy Med Name: amLODIPine Besylate 5 MG Oral Tablet] 30 tablet 0     Sig: Take 1 tablet by mouth nightly        Last Filled:  5/23/2022 30 tabs 5 refills     Patient Phone Number: [phone] (home)     Last appt: 10/10/2022   Next appt: 11/9/2022    Last OARRS: No flowsheet data found.

## 2022-11-14 NOTE — TELEPHONE ENCOUNTER
Medication:   Requested Prescriptions     Pending Prescriptions Disp Refills    celecoxib (CELEBREX) 200 MG capsule [Pharmacy Med Name: Celecoxib 200 MG Oral Capsule] 180 capsule 0     Sig: Take 1 capsule by mouth twice daily        Last Filled:  04/20/2022 #180 1rf     Patient Phone Number: [phone] (home)     Last appt: 10/10/2022   Next appt: Visit date not found    Last OARRS: No flowsheet data found.

## 2022-11-15 RX ORDER — CELECOXIB 200 MG/1
CAPSULE ORAL
Qty: 180 CAPSULE | Refills: 0 | Status: SHIPPED | OUTPATIENT
Start: 2022-11-15

## 2022-12-01 DIAGNOSIS — K21.9 GASTROESOPHAGEAL REFLUX DISEASE WITHOUT ESOPHAGITIS: ICD-10-CM

## 2022-12-01 RX ORDER — TIZANIDINE 4 MG/1
TABLET ORAL
Qty: 180 TABLET | Refills: 0 | Status: SHIPPED | OUTPATIENT
Start: 2022-12-01

## 2022-12-01 RX ORDER — PANTOPRAZOLE SODIUM 40 MG/1
TABLET, DELAYED RELEASE ORAL
Qty: 90 TABLET | Refills: 0 | Status: SHIPPED | OUTPATIENT
Start: 2022-12-01

## 2022-12-01 NOTE — TELEPHONE ENCOUNTER
Medication:   Requested Prescriptions     Pending Prescriptions Disp Refills    tiZANidine (ZANAFLEX) 4 MG tablet [Pharmacy Med Name: tiZANidine HCl 4 MG Oral Tablet] 180 tablet 0     Sig: TAKE 2 TABLETS BY MOUTH THREE TIMES DAILY AS NEEDED FOR MUSCLE SPASM        Last Filled:  11/7/2022 180 tabs 0 refills     Patient Phone Number: [phone] (home)     Last appt: 10/10/2022   Next appt: Visit date not found    Last OARRS: No flowsheet data found.

## 2022-12-01 NOTE — TELEPHONE ENCOUNTER
Medication:   Requested Prescriptions     Pending Prescriptions Disp Refills    pantoprazole (PROTONIX) 40 MG tablet [Pharmacy Med Name: Pantoprazole Sodium 40 MG Oral Tablet Delayed Release] 90 tablet 0     Sig: TAKE 1 TABLET BY MOUTH ONCE DAILY IN THE MORNING BEFORE BREAKFAST        Last Filled:  9/6/2022 90 tabs 0 efills     Patient Phone Number: [phone] (home)     Last appt: 10/10/2022   Next appt: Visit date not found    Last OARRS: No flowsheet data found.

## 2022-12-14 ENCOUNTER — OFFICE VISIT (OUTPATIENT)
Dept: SURGERY | Age: 67
End: 2022-12-14

## 2022-12-14 VITALS
DIASTOLIC BLOOD PRESSURE: 84 MMHG | SYSTOLIC BLOOD PRESSURE: 156 MMHG | HEART RATE: 98 BPM | WEIGHT: 248 LBS | OXYGEN SATURATION: 96 % | TEMPERATURE: 98 F | HEIGHT: 65 IN | BODY MASS INDEX: 41.32 KG/M2

## 2022-12-14 DIAGNOSIS — L29.0 PERIANAL IRRITATION: Primary | ICD-10-CM

## 2022-12-14 DIAGNOSIS — K62.9 RECTAL LESION: Primary | ICD-10-CM

## 2022-12-14 NOTE — PROGRESS NOTES
Procedure note -perianal skin biopsy    Indications -perianal skin irritation, concern for possible lichen sclerosis. Complications: None  Specimen: Perianal skin punch biopsy for permanent examination  EBL minimal    Procedure details:  Patient placed in left lateral position on the office table. Buttock spread. Betadine used for prep. Timeout was performed. Local anesthetic used at the edge of the area of chronic changes. 4 cc of local used in total, 1% lidocaine. Punch biopsy used and tissue excised using pickups and scissors. Tissue placed in formalin and sent for permanent examination. Direct pressure and silver nitrate used to obtain hemostasis. Patient instructed on postoperative wound care. We will call with pathology results.

## 2022-12-15 ENCOUNTER — HOSPITAL ENCOUNTER (OUTPATIENT)
Dept: CT IMAGING | Age: 67
Discharge: HOME | End: 2022-12-15
Payer: MEDICARE

## 2022-12-15 DIAGNOSIS — Z87.891 PERSONAL HISTORY OF TOBACCO USE: ICD-10-CM

## 2022-12-15 PROCEDURE — 71271 CT THORAX LUNG CANCER SCR C-: CPT

## 2022-12-19 ENCOUNTER — TELEPHONE (OUTPATIENT)
Dept: CASE MANAGEMENT | Age: 67
End: 2022-12-19

## 2022-12-19 NOTE — TELEPHONE ENCOUNTER
Annual lung screen on 12/15/22. LRAD2. Recommended screen in one year. Patient viewed results in my chart.   Will follow in the lung screening program.

## 2022-12-20 DIAGNOSIS — L29.0 PERIANAL IRRITATION: Primary | ICD-10-CM

## 2022-12-20 RX ORDER — AMLODIPINE BESYLATE 5 MG/1
5 TABLET ORAL NIGHTLY
Qty: 30 TABLET | Refills: 5 | Status: SHIPPED | OUTPATIENT
Start: 2022-12-20

## 2022-12-20 RX ORDER — TRIAMCINOLONE ACETONIDE 0.25 MG/G
CREAM TOPICAL
Qty: 454 G | Refills: 0 | Status: SHIPPED | OUTPATIENT
Start: 2022-12-20

## 2022-12-20 NOTE — TELEPHONE ENCOUNTER
Medication:   Requested Prescriptions     Pending Prescriptions Disp Refills    amLODIPine (NORVASC) 5 MG tablet [Pharmacy Med Name: amLODIPine Besylate 5 MG Oral Tablet] 30 tablet 0     Sig: Take 1 tablet by mouth nightly        Last Filled:      Patient Phone Number: [phone] (home)     Last appt: 10/10/2022   Next appt: Visit date not found    Last OARRS: No flowsheet data found.

## 2022-12-20 NOTE — RESULT ENCOUNTER NOTE
Discussed biopsy results. No signs of premalignancy or lichen sclerosis. No evidence of fungal infection. Will trial short duration high-dose steroid ointment to see if this improves.

## 2022-12-27 ENCOUNTER — TELEPHONE (OUTPATIENT)
Dept: FAMILY MEDICINE CLINIC | Age: 67
End: 2022-12-27

## 2022-12-27 NOTE — TELEPHONE ENCOUNTER
Patient is requesting a refill on      Hydrochlort 12.5mg take one a day    Patient states that it was last filled with [facility]      Please advise

## 2022-12-27 NOTE — TELEPHONE ENCOUNTER
Hydrocholorathiazide? Last dose we gave was 25. Did it get changed to 12.5? Please have her also schedule f/u appt.

## 2022-12-28 RX ORDER — HYDROCHLOROTHIAZIDE 25 MG/1
25 TABLET ORAL DAILY
Qty: 30 TABLET | Refills: 0 | Status: SHIPPED | OUTPATIENT
Start: 2022-12-28

## 2022-12-28 NOTE — TELEPHONE ENCOUNTER
Patient states that she will call back to schedule appointment. Patient states that she believes that  she was taking the 25mg and must have ran out so she took the 12.5mg. Patient is asking can she have a medication script for the 25mg?  Please advise

## 2023-01-06 ENCOUNTER — TELEPHONE (OUTPATIENT)
Dept: FAMILY MEDICINE CLINIC | Age: 68
End: 2023-01-06

## 2023-01-06 RX ORDER — TIZANIDINE 4 MG/1
TABLET ORAL
Qty: 180 TABLET | Refills: 0 | Status: SHIPPED | OUTPATIENT
Start: 2023-01-06

## 2023-01-06 NOTE — TELEPHONE ENCOUNTER
Left message for patient to call back in order to schedule AWV.    ================================  [address]  P [facility] [address] [facility] Staff  Subject: Appointment Request     Reason for Call: Established Patient Appointment needed: Routine Medicare   AWV     QUESTIONS     Reason for appointment request? No appointments available during search       Additional Information for Provider?  Patient wants a well check &   medication follow-up. screened green on 1/6   ---------------------------------------------------------------------------   --------------   Josias MONTAÑO   [phone]; OK to leave message on voicemail   ---------------------------------------------------------------------------   --------------   SCRIPT ANSWERS   COVID Screen: Brinda Stapleton CONSTITUTIONAL: denies fever, chills, fatigue, weakness  HEENT: denies blurred vision, sore throat  SKIN: denies new lesions, rash  CARDIOVASCULAR: denies chest pain, chest pressure, palpitations  RESPIRATORY: denies shortness of breath, sputum production  GASTROINTESTINAL: denies nausea, vomiting, diarrhea, abdominal pain  GENITOURINARY: denies dysuria, discharge  NEUROLOGICAL: denies numbness, headache, focal weakness  MUSCULOSKELETAL: denies new joint pain, muscle aches  HEMATOLOGIC: denies gross bleeding, bruising  LYMPHATICS: denies enlarged lymph nodes, extremity swelling  PSYCHIATRIC: denies recent changes in anxiety, depression  ENDOCRINOLOGIC: denies sweating, cold or heat intolerance CONSTITUTIONAL: admits to weakness; denies fever, chills, fatigue  HEENT: denies blurred vision, sore throat  SKIN: denies new lesions, rash  CARDIOVASCULAR: denies chest pain, chest pressure, palpitations  RESPIRATORY: admits to cough productive of clear sputum; denies shortness of breath  GASTROINTESTINAL: admits to bloody diarrhea; denies nausea, vomiting, abdominal pain  GENITOURINARY: denies dysuria, discharge  NEUROLOGICAL: denies numbness, headache, focal weakness  MUSCULOSKELETAL: denies new joint pain, muscle aches  HEMATOLOGIC: denies bruising  LYMPHATICS: denies enlarged lymph nodes, extremity swelling  PSYCHIATRIC: denies recent changes in anxiety, depression  ENDOCRINOLOGIC: denies sweating, cold or heat intolerance

## 2023-01-06 NOTE — TELEPHONE ENCOUNTER
Medication:   Requested Prescriptions     Pending Prescriptions Disp Refills    tiZANidine (ZANAFLEX) 4 MG tablet [Pharmacy Med Name: tiZANidine HCl 4 MG Oral Tablet] 180 tablet 0     Sig: TAKE 2 TABLETS BY MOUTH THREE TIMES DAILY AS NEEDED FOR MUSCLE SPASM     Last Filled:  12.1.22 #180 refills 0    Last appt: 10/10/2022   Next appt: Visit date not found    Last OARRS: No flowsheet data found.

## 2023-01-13 ENCOUNTER — OFFICE VISIT (OUTPATIENT)
Dept: FAMILY MEDICINE CLINIC | Age: 68
End: 2023-01-13
Payer: MEDICARE

## 2023-01-13 VITALS
SYSTOLIC BLOOD PRESSURE: 130 MMHG | BODY MASS INDEX: 41.45 KG/M2 | WEIGHT: 248.8 LBS | HEART RATE: 93 BPM | TEMPERATURE: 97.8 F | DIASTOLIC BLOOD PRESSURE: 82 MMHG | HEIGHT: 65 IN | OXYGEN SATURATION: 96 %

## 2023-01-13 DIAGNOSIS — J44.9 COPD, MODERATE (HCC): ICD-10-CM

## 2023-01-13 DIAGNOSIS — Z00.00 HEALTHCARE MAINTENANCE: Primary | ICD-10-CM

## 2023-01-13 DIAGNOSIS — I10 PRIMARY HYPERTENSION: ICD-10-CM

## 2023-01-13 DIAGNOSIS — Z78.0 POST-MENOPAUSAL: ICD-10-CM

## 2023-01-13 DIAGNOSIS — E11.69 DYSLIPIDEMIA ASSOCIATED WITH TYPE 2 DIABETES MELLITUS (HCC): ICD-10-CM

## 2023-01-13 DIAGNOSIS — I73.9 CLAUDICATION IN PERIPHERAL VASCULAR DISEASE (HCC): ICD-10-CM

## 2023-01-13 DIAGNOSIS — E78.5 DYSLIPIDEMIA ASSOCIATED WITH TYPE 2 DIABETES MELLITUS (HCC): ICD-10-CM

## 2023-01-13 DIAGNOSIS — E66.01 OBESITY, CLASS III, BMI 40-49.9 (MORBID OBESITY) (HCC): ICD-10-CM

## 2023-01-13 DIAGNOSIS — K21.9 GASTROESOPHAGEAL REFLUX DISEASE WITHOUT ESOPHAGITIS: ICD-10-CM

## 2023-01-13 DIAGNOSIS — L29.0 PERIANAL IRRITATION: ICD-10-CM

## 2023-01-13 PROCEDURE — G8484 FLU IMMUNIZE NO ADMIN: HCPCS | Performed by: FAMILY MEDICINE

## 2023-01-13 PROCEDURE — 3046F HEMOGLOBIN A1C LEVEL >9.0%: CPT | Performed by: FAMILY MEDICINE

## 2023-01-13 PROCEDURE — 3075F SYST BP GE 130 - 139MM HG: CPT | Performed by: FAMILY MEDICINE

## 2023-01-13 PROCEDURE — 1123F ACP DISCUSS/DSCN MKR DOCD: CPT | Performed by: FAMILY MEDICINE

## 2023-01-13 PROCEDURE — 3079F DIAST BP 80-89 MM HG: CPT | Performed by: FAMILY MEDICINE

## 2023-01-13 PROCEDURE — 3017F COLORECTAL CA SCREEN DOC REV: CPT | Performed by: FAMILY MEDICINE

## 2023-01-13 PROCEDURE — G0439 PPPS, SUBSEQ VISIT: HCPCS | Performed by: FAMILY MEDICINE

## 2023-01-13 RX ORDER — ONDANSETRON 4 MG/1
4 TABLET, FILM COATED ORAL EVERY 8 HOURS PRN
Qty: 30 TABLET | Refills: 5 | Status: SHIPPED | OUTPATIENT
Start: 2023-01-13

## 2023-01-13 RX ORDER — HYDROCHLOROTHIAZIDE 25 MG/1
25 TABLET ORAL DAILY
Qty: 90 TABLET | Refills: 3 | Status: SHIPPED | OUTPATIENT
Start: 2023-01-13

## 2023-01-13 RX ORDER — AMLODIPINE BESYLATE 5 MG/1
5 TABLET ORAL NIGHTLY
Qty: 90 TABLET | Refills: 3 | Status: SHIPPED | OUTPATIENT
Start: 2023-01-13

## 2023-01-13 RX ORDER — TRIAMCINOLONE ACETONIDE 0.25 MG/G
CREAM TOPICAL
Qty: 454 G | Refills: 0 | Status: SHIPPED | OUTPATIENT
Start: 2023-01-13

## 2023-01-13 RX ORDER — PANTOPRAZOLE SODIUM 20 MG/1
TABLET, DELAYED RELEASE ORAL
Qty: 180 TABLET | Refills: 3 | Status: SHIPPED | OUTPATIENT
Start: 2023-01-13

## 2023-01-13 RX ORDER — LOSARTAN POTASSIUM 100 MG/1
TABLET ORAL
Qty: 90 TABLET | Refills: 3 | Status: SHIPPED | OUTPATIENT
Start: 2023-01-13

## 2023-01-13 RX ORDER — TIZANIDINE 4 MG/1
TABLET ORAL
Qty: 540 TABLET | Refills: 3 | Status: SHIPPED | OUTPATIENT
Start: 2023-01-13

## 2023-01-13 ASSESSMENT — PATIENT HEALTH QUESTIONNAIRE - PHQ9
SUM OF ALL RESPONSES TO PHQ9 QUESTIONS 1 & 2: 0
1. LITTLE INTEREST OR PLEASURE IN DOING THINGS: 0
SUM OF ALL RESPONSES TO PHQ QUESTIONS 1-9: 0
2. FEELING DOWN, DEPRESSED OR HOPELESS: 0
SUM OF ALL RESPONSES TO PHQ QUESTIONS 1-9: 0

## 2023-01-13 ASSESSMENT — LIFESTYLE VARIABLES
HOW OFTEN DO YOU HAVE A DRINK CONTAINING ALCOHOL: 2-4 TIMES A MONTH
HOW MANY STANDARD DRINKS CONTAINING ALCOHOL DO YOU HAVE ON A TYPICAL DAY: 1 OR 2

## 2023-01-13 NOTE — PROGRESS NOTES
Medicare Annual Wellness Visit    Chelsea Needs is here for Medicare [address] [facility]  COPD, moderate ([facility]  Dyslipidemia associated with type 2 diabetes mellitus ([facility]  Claudication in peripheral vascular disease ([facility]  Primary hypertension  -     Basic Metabolic Panel; Future  Obesity, Class III, BMI 40-49.9 (morbid obesity) (HCC)  Gastroesophageal reflux disease without esophagitis  -     pantoprazole (PROTONIX) 20 MG tablet; TAKE 1-2 TABLET BY MOUTH ONCE DAILY IN THE MORNING BEFORE BREAKFAST, Disp-180 tablet, R-3Normal  Perianal irritation  -     triamcinolone (KENALOG) 0.025 % cream; Apply Topically as needed up to twice daily, not to exceed 4 weeks. , Disp-454 g, R-0, Normal  Post-menopausal  -     DEXA BONE DENSITY 2 SITES; Future      Recommendations for Preventive Services Due: see orders and patient instructions/AVS.  Recommended screening schedule for the next 5-10 years is provided to the patient in written form: see Patient Instructions/AVS.     Return for Medicare Annual Wellness Visit in 1 year. Subjective   The following acute and/or chronic problems were also addressed today:    Perianal irritation: s/p evaluation by Dr. Cande Wills 11/22. S/p biopsy. Using a cream prn. Hernia: present for a year. Bothers her when she coughs. Discussed options. Referral to surgery. Hotflashes:  Gets them during the day and at night. Progressively has gotten worse the last year or so- has always had some. Hypertension:  Taking medications as prescribed. No symptoms concerning for end organ damage. Dyslipidemia: Stable on Lipitor. COPD: The patient sees pulmonology. She is stable on Symbicort and albuterol - needs it rarely- with URI. Hasn't needed it recently. Moderate persistent asthma: The patient is stable on her current medications. She is seeing pulmonology. GERD/ hiatal hernia:  Occasionally has nausea. Takes zofran prn.   Takes protonix daily which helps her symptoms. Hx of seeing GI. Type 2 diabetes: Diagnosed in 2020. She is seeing endocrinology every 6 months. She is on Metformin - taking sporadically right now with her diarrhea. Obesity:      Coronary artery calcification: Seen on CT scan. History of seeing cardiology in 2019. Medically managed. No longer on low dose ASA. Arthritis:  Has persistent pain. In her feet and low back. Takes a muscle relaxor, celebrex, and topical medication prn. She is also on [facility] for pain management- going to [facility] clinic and has a medical marijuana care from Dr. Radha Vazquez. Hx of seeing orthopaedic surgery- no longer going as symptoms stable with no surgeries recommended. Referral to PT. Family history of colon cancer: goes every 5 years for colonoscopies. Last colonoscopy was in 2019. Osteopenia: DXA 9/2019     SH: 7/2021:  Was seeing NP at [facility]. Lives with her  and daughter and 2 grandkids. Has 2 daughters. 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 Patient's complete Health Risk Assessment and screening values have been reviewed and are found in Flowsheets. The following problems were reviewed today and where indicated follow up appointments were made and/or referrals ordered. Positive Risk Factor Screenings with Interventions:              Weight and Activity:  Physical Activity: Insufficiently Active    Days of Exercise per Week: 3 days    Minutes of Exercise per Session: 20 min     On average, how many days per week do you engage in moderate to strenuous exercise (like a brisk walk)?: 3 days  Have you lost any weight without trying in the past 3 months?: No  Body mass index: (!) 41.4    Obesity Interventions:  Patient comments: recommended diet and exercise.                                 Objective   Vitals:    01/13/23 1037   BP: 130/82   Site: Left Upper Arm   Position: Sitting   Cuff Size: Small Adult   Pulse: 93   Temp: 97.8 °F (36.6 °C)   TempSrc: Temporal   SpO2: 96%   Weight: 248 lb 12.8 oz (112.9 kg)   Height: 5' 5\" (1.651 m)      Body mass index is 41.4 kg/m². Physical Exam  Constitutional:       General: She is not in acute distress. Appearance: She is well-developed. HENT:      Head: Normocephalic and atraumatic. Cardiovascular:      Rate and Rhythm: Normal rate and regular rhythm. Heart sounds: Normal heart sounds. No murmur heard. Pulmonary:      Effort: Pulmonary effort is normal. No respiratory distress. Breath sounds: Normal breath sounds. Skin:     General: Skin is warm and dry. Neurological:      Mental Status: She is alert. Psychiatric:         Behavior: Behavior normal.       No Known Allergies  Prior to Visit Medications    Medication Sig Taking? Authorizing Provider   diclofenac sodium (VOLTAREN) 1 % GEL APPLY   TOPICALLY TWICE DAILY Yes Judge Andrew MD   tiZANidine (ZANAFLEX) 4 MG tablet TAKE 2 TABLETS BY MOUTH THREE TIMES DAILY Yes Judge Andrew MD   ondansetron (ZOFRAN) 4 MG tablet Take 1 tablet by mouth every 8 hours as needed for Nausea or Vomiting Yes Judge Andrew MD   hydroCHLOROthiazide (HYDRODIURIL) 25 MG tablet Take 1 tablet by mouth daily Take 25 mg by mouth daily Yes Judge Andrew MD   pantoprazole (PROTONIX) 20 MG tablet TAKE 1-2 TABLET BY MOUTH ONCE DAILY IN THE MORNING BEFORE BREAKFAST Yes Judge Andrew MD   losartan (COZAAR) 100 MG tablet Take 1 tablet by mouth once daily Yes Judge Andrew MD   amLODIPine (NORVASC) 5 MG tablet Take 1 tablet by mouth nightly Yes Judge Andrew MD   triamcinolone (KENALOG) 0.025 % cream Apply Topically as needed up to twice daily, not to exceed 4 weeks.  Yes Judge Andrew MD   progesterone (PROMETRIUM) 100 MG CAPS capsule TAKE 1 CAPSULE BY MOUTH ONCE DAILY Yes Historical Provider, MD   metFORMIN (GLUCOPHAGE-XR) 500 MG extended release tablet TAKE 2 TABLETS BY MOUTH ONCE DAILY WITH SUPPER Yes Romario Fisher Jeniffer Vergara MD   estradiol (ESTRACE) 1 MG tablet  Yes Historical Provider, MD   clobetasol (TEMOVATE) 0.05 % cream  Yes Historical Provider, MD   SYMBICORT 160-4.5 MCG/ACT AERO Inhale 2 puffs into the lungs 2 times daily Yes Norlene Bamberger, MD   tretinoin (RETIN-A) 0.025 % cream Apply topically nightly. Yes Dat Steiner MD   Cyanocobalamin (VITAMIN B-12 PO) Take by mouth Yes Historical Provider, MD   atorvastatin (LIPITOR) 40 MG tablet Take 1 tablet by mouth once daily Yes Luke Larose MD   doxycycline hyclate (VIBRA-TABS) 100 MG tablet Only with  URI Yes Historical Provider, MD   FOLIC ACID PO Take by mouth Yes Historical Provider, MD   albuterol (PROVENTIL) (2.5 MG/3ML) 0.083% nebulizer solution Take 2.5 mg by nebulization every 6 hours as needed for Wheezing Yes Historical Provider, MD   Nebulizers (COMPRESSOR/NEBULIZER) MISC 1 Device by Does not apply route 2 times daily Yes WINSTON Meza - CNP   albuterol sulfate  (90 Base) MCG/ACT inhaler Inhale 2 puffs into the lungs every 6 hours as needed for Wheezing Yes Norlene Bamberger, MD   cetirizine (ZYRTEC) 10 MG tablet Take 10 mg by mouth as needed  Yes Historical Provider, MD   budesonide-formoterol (SYMBICORT) 160-4.5 MCG/ACT AERO Inhale 2 puffs into the lungs 2 times daily  Norlene Bamberger, MD   Blood Glucose Monitoring Suppl (BLOOD GLUCOSE MONITOR SYSTEM) w/Device KIT To check blood sugar 4 times daily. Diagnosis Code: E 11.9  Luke Larose MD   blood glucose monitor strips Use 4 times daily to check blood sugar Diagnosis Code; E 11.9  Luke Larose MD   Lancets 30G MISC 4 each by Does not apply route daily Use 4 times daily to check blood sugar Diagnosis Code: E 11.9  Luke Larose MD   Lancets MISC 1 each by Does not apply route daily Check blood sugars 1-2 per day  Luke Larose MD   medical marijuana Take by mouth as needed.   Historical Provider, MD Adalberto Choudhury (Including outside providers/suppliers regularly involved in providing care):   Patient Care Team:  Dat Steiner MD as PCP - General (Family Medicine)  Dat Steiner MD as PCP [facility]  Stephani Suggs MD (Orthopedic Surgery)  Prabhjot Dowd DPM as Consulting Physician (Podiatry)  Luciana Martinez MD as Consulting Physician (Obstetrics & Gynecology)  Goldie Trevino MD as Consulting Physician (General Surgery)  Iona Florentino MD as Surgeon (General Surgery)  Robert Christian RN as Nurse Navigator     Reviewed and updated this visit:  Allergies  Meds       Ok to RTC as long as continues to have regular evaluations and labs with specialists.

## 2023-01-18 ENCOUNTER — TELEPHONE (OUTPATIENT)
Dept: PULMONOLOGY | Age: 68
End: 2023-01-18

## 2023-01-18 NOTE — TELEPHONE ENCOUNTER
Spoke with pt and her Doxycycline that Dr Ines Mehta filled for her back in November to have on hand says take 1 a day x 5 days #10 Per Dr Ines Mehta should be 1 a day x 10 days.  Pt and Pharmacy informed

## 2023-02-04 NOTE — PATIENT INSTRUCTIONS
Advance Directives: Care Instructions  Overview  An advance directive is a legal way to state your wishes at the end of your life. It tells your family and your doctor what to do if you can't say what you want.  There are two main types of advance directives. You can change them any time your wishes change.  Living will.  This form tells your family and your doctor your wishes about life support and other treatment. The form is also called a declaration.  Medical power of .  This form lets you name a person to make treatment decisions for you when you can't speak for yourself. This person is called a health care agent (health care proxy, health care surrogate). The form is also called a durable power of  for health care.  If you do not have an advance directive, decisions about your medical care may be made by a family member, or by a doctor or a  who doesn't know you.  It may help to think of an advance directive as a gift to the people who care for you. If you have one, they won't have to make tough decisions by themselves.  For more information, including forms for your state, see the [employer] website (www.c[employer]info.org/planning/advance-directives/).  Follow-up care is a key part of your treatment and safety. Be sure to make and go to all appointments, and call your doctor if you are having problems. It's also a good idea to know your test results and keep a list of the medicines you take.  What should you include in an advance directive?  Many states have a unique advance directive form. (It may ask you to address specific issues.) Or you might use a universal form that's approved by many states.  If your form doesn't tell you what to address, it may be hard to know what to include in your advance directive. Use the questions below to help you get started.  Who do you want to make decisions about your medical care if you are not able to?  What life-support measures do you want if you  have a serious illness that gets worse over time or can't be cured? What are you most afraid of that might happen? (Maybe you're afraid of having pain, losing your independence, or being kept alive by machines.)  Where would you prefer to die? (Your home? A hospital? A nursing home?)  Do you want to donate your organs when you die? Do you want certain Mu-ism practices performed before you die? When should you call for help? Be sure to contact your doctor if you have any questions. Where can you learn more? Go to http://www.acosta.com/ and enter R264 to learn more about \"Advance Directives: Care Instructions. \"  Current as of: June 16, 2022               Content Version: 13.5  © 7823-9602 [employer]. Care instructions adapted under license by [facility] ([facility]). If you have questions about a medical condition or this instruction, always ask your healthcare professional. [place] [address] any warranty or liability for your use of this information. Personalized Preventive Plan for Parris Herrera - 1/13/2023  Medicare offers a range of preventive health benefits. Some of the tests and screenings are paid in full while other may be subject to a deductible, co-insurance, and/or copay. Some of these benefits include a comprehensive review of your medical history including lifestyle, illnesses that may run in your family, and various assessments and screenings as appropriate. After reviewing your medical record and screening and assessments performed today your provider may have ordered immunizations, labs, imaging, and/or referrals for you. A list of these orders (if applicable) as well as your Preventive Care list are included within your After Visit Summary for your review.     Other Preventive Recommendations:    A preventive eye exam performed by an eye specialist is recommended every 1-2 years to screen for glaucoma; cataracts, macular degeneration, and other eye disorders. A preventive dental visit is recommended every 6 months. Try to get at least 150 minutes of exercise per week or 10,000 steps per day on a pedometer . Order or download the FREE \"Exercise & Physical Activity: Your Everyday Guide\" from [employer]. Call 8-765.403.3224 or search [employer] online. You need 8782-6470 mg of calcium and 8773-4663 IU of vitamin D per day. It is possible to meet your calcium requirement with diet alone, but a vitamin D supplement is usually necessary to meet this goal.  When exposed to the sun, use a sunscreen that protects against both UVA and UVB radiation with an SPF of 30 or greater. Reapply every 2 to 3 hours or after sweating, drying off with a towel, or swimming. Always wear a seat belt when traveling in a car. Always wear a helmet when riding a bicycle or motorcycle. Dinorah Hogan is an 87 year old female, , domiciled with daughter with PPHx of bipolar I disorder, 1 previous inpatient psychiatric hospitalization in 1970s, no known history of SI/SA and PMHx of asthma, GERD, sciatica with previous vertebral fracture who is admitted on 9.27 status for symptoms of hortencia including irritability, loud speech, and agitation.     DDx: hortencia vs hypomania vs comorbid delirium    Today, patient is angry about her current admission and denying any psychiatric history. She is asking to be discharged immediately and yelling at treatment team. She is noted to have loud and rapid speech, but is able to be interrupted. She states she has been sleeping poorly due to hip and R knee pain. She denies S/IIP, H/IIP, AVH, paranoia, or ideas of reference. She is AOx1-2 (person, place--hospital), but unable to state year or location in [place]. She requires 9.27 inpatient admission for evaluation and management of hortencia.    1/22: less irritable but still requiring PRN med, more compliant with meds, no SI/HI.  1/23: Accepted meds yd and this AM though required PRN yd. Less irritable, appropriately conversant, making needs known. Suspicious bordering on paranoid, however no fully formed delusional thought content apparent.  1/24: Irritable and labile, suspicious regarding staff members. Discussed medication, pt will be offered psychiatric meds and is aware she has right to refuse.  1/25: Hostile, irritable, labile. TOO application in progress given poor insight and intermittent refusal of psychiatric mediations. Pt at risk for deconditioning given refusal to ambulate and minimal engagement with PT, given ambulatory at home refusal here appears to be behavioral, will continue to encourage participation. Refused cognitive evaluation today. Given ongoing intermittent agitation, paranoia, physical deconditioning, and poor insight into condition, pt requires continued inpatient hospitalization for stabilization and safety.  1/26: Pt remains labile, irritable, and paranoid toward staff. Although pt makes provocative statements (e.g. "she controls the robots," "the Jews will hate me"), these statements appear to represent intense feeling rather than fully formed delusion. Continues to refuse cognitive evaluation or consent to contact family. Given ongoing intermittent agitation, paranoia, physical deconditioning, and poor insight into condition, pt requires continued inpatient hospitalization for stabilization and safety.  1/27 Patient agitated, markedly irritable,paranoid  with themes of perrvasive mistreatment, being singled out but w/u well formed , fixed delusional ideas.Cont enocurage med compliance scheduled for court hearing for TOO  1/28: Currently on CO due to hospital bed requirement. Reports fair sleep and appetite. Seen at bedside during rounds. Continues to refuse most medications, took senna and tylenol last night. Reporting back pain "15/10" and requesting more heat packs "I will die without these", however under no visible distress. Primary team pursuing TOO. Renewed CO.   1/29: Renewed CO. Last night took Depakote, Haldol and Senna. Med compliance has been partial.   1/30: Refused all meds yd, this AM accepted metoprolol. Making accusations that people are attempting to harm her and want her to die. Impulse control is impaired.  1/31: Continues with intermittent agitation, hostile and accusatory twd staff, refusing meds (except metoprolol), refusing consent to speak to daughter. Given ongoing agitation, impulsivity, disinhibition, and deconditioning 2/2 refusal to ambulate (also attempted to climb out of bed last night), pt poses a threat of harm to herself and requires ongoing inpatient hospitalization for stabilization and safety. TOO & retention court date next week.  2/1: Continues irritable, paranoid, refusing medications and engagement in interviews.   2/2: Minimally engaged in interview, refusing to answer most questions; labile, hostile, required IM Haldol 1mg overnight for agitation; refusing to ambulate or engage in PT; refusing pain control interventions other than hot packs and lidocaine patch. TOO in process.   2/3:     Plan:  1. Legal: continue 9.27 involuntary admission. TOO application in progress. Pt submitted 9.31.  2. Safety:   - Requires 1:1 CO for fall risk and monitoring of hospital bed   - Olanzapine 2.5 mg PO or IM q6h PRN agitation  3. Psychiatric:  - Valproic acid sprinkle 375mg PO BID for mood stabilization  - Haloperidol 1 mg PO BID for agitation  - Melatonin 3 mg PRN for insomnia  4. Group/Milieu therapy: encourage participation as able  5. Medical:   - Hx of vertebral fractures with ambulatory dysfunction: no acute surgical intervention, patient requires hospital bed, PRN tylenol, lidocaine patch, hot packs  - HTN: metoprolol XL 50 mg daily  - Constipation: miralax and senna daily, PRN bisacodyl q12h  - Newly noted mole: consistent with sebhorreic keratosis  - Dry eyes: Artificial tears 1 drop both eyes BID  6. Collateral/Dispo: refuses consent to speak to family at this time.  2/4 Cont agitated poor sleep, this Am had vairable O2 sat, patient reported she feels she needs an inhaler for asthma. Patient to be seen by medicine, labs and RVP ordered. Will change haldol to olanzapine standing as resposne to prns of haldol and occ po's have had poor response.

## 2023-02-21 DIAGNOSIS — E66.01 OBESITY, CLASS III, BMI 40-49.9 (MORBID OBESITY) (HCC): ICD-10-CM

## 2023-02-21 DIAGNOSIS — Z78.0 POST-MENOPAUSAL: ICD-10-CM

## 2023-02-21 DIAGNOSIS — N95.1 HOT FLASH, MENOPAUSAL: ICD-10-CM

## 2023-02-21 DIAGNOSIS — I10 PRIMARY HYPERTENSION: ICD-10-CM

## 2023-02-21 DIAGNOSIS — I73.9 CLAUDICATION IN PERIPHERAL VASCULAR DISEASE (HCC): ICD-10-CM

## 2023-02-21 DIAGNOSIS — I10 ESSENTIAL HYPERTENSION: ICD-10-CM

## 2023-02-21 DIAGNOSIS — I25.10 CORONARY ARTERY CALCIFICATION SEEN ON CT SCAN: ICD-10-CM

## 2023-02-21 DIAGNOSIS — E11.9 CONTROLLED TYPE 2 DIABETES MELLITUS WITHOUT COMPLICATION, WITHOUT LONG-TERM CURRENT USE OF INSULIN (HCC): ICD-10-CM

## 2023-02-21 DIAGNOSIS — Z12.31 ENCOUNTER FOR SCREENING MAMMOGRAM FOR MALIGNANT NEOPLASM OF BREAST: ICD-10-CM

## 2023-02-21 DIAGNOSIS — E78.5 DYSLIPIDEMIA ASSOCIATED WITH TYPE 2 DIABETES MELLITUS (HCC): ICD-10-CM

## 2023-02-21 DIAGNOSIS — E11.69 DYSLIPIDEMIA ASSOCIATED WITH TYPE 2 DIABETES MELLITUS (HCC): ICD-10-CM

## 2023-02-21 LAB
ANION GAP SERPL CALCULATED.3IONS-SCNC: 14 MMOL/L (ref 3–16)
BUN BLDV-MCNC: 15 MG/DL (ref 7–20)
CALCIUM SERPL-MCNC: 9.5 MG/DL (ref 8.3–10.6)
CHLORIDE BLD-SCNC: 101 MMOL/L (ref 99–110)
CHOLESTEROL, FASTING: 156 MG/DL (ref 0–199)
CO2: 24 MMOL/L (ref 21–32)
CREAT SERPL-MCNC: 0.8 MG/DL (ref 0.6–1.2)
CREATININE URINE: 169.3 MG/DL (ref 28–259)
GFR SERPL CREATININE-BSD FRML MDRD: >60 ML/MIN/{1.73_M2}
GLUCOSE BLD-MCNC: 133 MG/DL (ref 70–99)
HDLC SERPL-MCNC: 58 MG/DL (ref 40–60)
LDL CHOLESTEROL CALCULATED: 79 MG/DL
MICROALBUMIN UR-MCNC: <1.2 MG/DL
MICROALBUMIN/CREAT UR-RTO: NORMAL MG/G (ref 0–30)
POTASSIUM SERPL-SCNC: 4.2 MMOL/L (ref 3.5–5.1)
SODIUM BLD-SCNC: 139 MMOL/L (ref 136–145)
TRIGLYCERIDE, FASTING: 96 MG/DL (ref 0–150)
TSH SERPL DL<=0.05 MIU/L-ACNC: 2.53 UIU/ML (ref 0.27–4.2)
VLDLC SERPL CALC-MCNC: 19 MG/DL

## 2023-02-22 LAB
ESTIMATED AVERAGE GLUCOSE: 119.8 MG/DL
HBA1C MFR BLD: 5.8 %

## 2023-02-23 ENCOUNTER — OFFICE VISIT (OUTPATIENT)
Dept: ENDOCRINOLOGY | Age: 68
End: 2023-02-23
Payer: MEDICARE

## 2023-02-23 VITALS
WEIGHT: 250.6 LBS | OXYGEN SATURATION: 97 % | BODY MASS INDEX: 41.75 KG/M2 | HEIGHT: 65 IN | HEART RATE: 78 BPM | DIASTOLIC BLOOD PRESSURE: 90 MMHG | SYSTOLIC BLOOD PRESSURE: 171 MMHG

## 2023-02-23 DIAGNOSIS — E78.5 DYSLIPIDEMIA ASSOCIATED WITH TYPE 2 DIABETES MELLITUS (HCC): ICD-10-CM

## 2023-02-23 DIAGNOSIS — E11.69 DYSLIPIDEMIA ASSOCIATED WITH TYPE 2 DIABETES MELLITUS (HCC): ICD-10-CM

## 2023-02-23 DIAGNOSIS — I10 ESSENTIAL HYPERTENSION: ICD-10-CM

## 2023-02-23 DIAGNOSIS — E11.9 CONTROLLED TYPE 2 DIABETES MELLITUS WITHOUT COMPLICATION, WITHOUT LONG-TERM CURRENT USE OF INSULIN (HCC): Primary | ICD-10-CM

## 2023-02-23 PROCEDURE — 1036F TOBACCO NON-USER: CPT | Performed by: INTERNAL MEDICINE

## 2023-02-23 PROCEDURE — 3017F COLORECTAL CA SCREEN DOC REV: CPT | Performed by: INTERNAL MEDICINE

## 2023-02-23 PROCEDURE — 99214 OFFICE O/P EST MOD 30 MIN: CPT | Performed by: INTERNAL MEDICINE

## 2023-02-23 PROCEDURE — 2022F DILAT RTA XM EVC RTNOPTHY: CPT | Performed by: INTERNAL MEDICINE

## 2023-02-23 PROCEDURE — G8399 PT W/DXA RESULTS DOCUMENT: HCPCS | Performed by: INTERNAL MEDICINE

## 2023-02-23 PROCEDURE — 3044F HG A1C LEVEL LT 7.0%: CPT | Performed by: INTERNAL MEDICINE

## 2023-02-23 PROCEDURE — 3077F SYST BP >= 140 MM HG: CPT | Performed by: INTERNAL MEDICINE

## 2023-02-23 PROCEDURE — G8427 DOCREV CUR MEDS BY ELIG CLIN: HCPCS | Performed by: INTERNAL MEDICINE

## 2023-02-23 PROCEDURE — 3079F DIAST BP 80-89 MM HG: CPT | Performed by: INTERNAL MEDICINE

## 2023-02-23 PROCEDURE — G8484 FLU IMMUNIZE NO ADMIN: HCPCS | Performed by: INTERNAL MEDICINE

## 2023-02-23 PROCEDURE — 1123F ACP DISCUSS/DSCN MKR DOCD: CPT | Performed by: INTERNAL MEDICINE

## 2023-02-23 PROCEDURE — G8417 CALC BMI ABV UP PARAM F/U: HCPCS | Performed by: INTERNAL MEDICINE

## 2023-02-23 PROCEDURE — 1090F PRES/ABSN URINE INCON ASSESS: CPT | Performed by: INTERNAL MEDICINE

## 2023-02-23 RX ORDER — METFORMIN HYDROCHLORIDE 500 MG/1
TABLET, EXTENDED RELEASE ORAL
Qty: 180 TABLET | Refills: 1 | Status: SHIPPED | OUTPATIENT
Start: 2023-02-23

## 2023-02-23 RX ORDER — ATORVASTATIN CALCIUM 40 MG/1
TABLET, FILM COATED ORAL
Qty: 90 TABLET | Refills: 3 | Status: SHIPPED | OUTPATIENT
Start: 2023-02-23

## 2023-02-23 RX ORDER — PREDNISONE 10 MG/1
10 TABLET ORAL PRN
COMMUNITY
Start: 2022-12-20

## 2023-02-23 NOTE — PROGRESS NOTES
Patient ID:   Aureliano Oleary is a 75 y.o. female    Chief Complaint:   Aureliano Oleary presents for an evaluation of Type 2 Diabetes Mellitus , Hyperlipidremia    Subjective:   Type 2 Diabetes Mellitus diagnosed in . Metformin 500mg daily after A1C of 6.5% in Oct 2020     Last steroid for foot inflammation in 2022    Has COPD     Currently on Metformin Er 500mg, two tabs with dinner. Sometimes lose stools and flatulence. Not checking sugars      Checks blood sugars 0 per day. AM:   Lunch:  Supper:   HS:     Hypoglycemias: None     Working on diet   Weight is up 5 lbs    Lost about 40 lbs since start of    Meals: Three meals. Snack: sporadic (cheese, fruits, chips, pretzels)   Exercise: limited due to sciatica and lumbar disc issues     Denies chest pain. Family history of CAD: Mother had CAD in 66's. Maternal GM had CHF in her 63's. Denies smoking.     Taking Aspirin 81 mg some days of the week      The following portions of the patient's history were reviewed and updated as appropriate:         Family History   Problem Relation Age of Onset    Hypertension Mother     Cancer Father         lung    Colon Cancer Father     Breast Cancer Sister     Diabetes Brother     Heart Failure Paternal Grandmother            Social History     Socioeconomic History    Marital status:      Spouse name: Not on file    Number of children: Not on file    Years of education: Not on file    Highest education level: Not on file   Occupational History    Occupation: RN   Tobacco Use    Smoking status: Former     Packs/day: 1.00     Years: 40.00     Pack years: 40.00     Types: Cigarettes     Quit date: 10/1/2018     Years since quittin.4    Smokeless tobacco: Never    Tobacco comments:     smoked for 40 yrs / smoked up to 1 ppd   Vaping Use    Vaping Use: Never used   Substance and Sexual Activity    Alcohol use: Yes     Comment: 3 DRINKS A MONTH    Drug use: Yes     Types: Marijuana Andi Lentz) Comment: medical marijuana     Sexual activity: Yes     Partners: Male   Other Topics Concern    Not on file   Social History Narrative    Not on file     Social Determinants of Health     Financial Resource Strain: Not on file   Food Insecurity: Not on file   Transportation Needs: Not on file   Physical Activity: Insufficiently Active    Days of Exercise per Week: 3 days    Minutes of Exercise per Session: 20 min   Stress: Not on file   Social Connections: Not on file   Intimate Partner Violence: Not on file   Housing Stability: Not on file       Past Medical History:   Diagnosis Date    CAD (coronary artery disease)     mild MULTI VESSELper VO     COPD (chronic obstructive pulmonary disease) ([facility] [address]     Diabetes mellitus ([facility] [address]     HTN (hypertension)     Hyperlipidemia     Osteoarthritis, foot, localized 8/25/2014    Spinal stenosis        Past Surgical History:   Procedure Laterality Date    ANKLE FRACTURE SURGERY      CHOLECYSTECTOMY      COLONOSCOPY N/A 12/11/2019    COLONOSCOPY POLYPECTOMY SNARE/COLD BIOPSY performed by Enoc Lozada MD at [facility] Left 08/24/2022    LUMBAR SPINE SURGERY Bilateral 12/18/2019    BILATERAL L3, L4 AND L5 DORSAL RAMUS MEDIAL BRANCH BLOCK WITH FLUOROSCOPY (83334, 15086) performed by Rigo Long MD at [address] [facility] ARTHROSCOPY Right 06/09/2020    RIGHT SHOULDER ARTHROSCOPE, ROTATOR CUFF AUGMENTATION IMPLANT, SUBACROMIAL DECOMPRESSION, DISTAL CLAVICLE EXCISION AND DEBRIDEMENT performed by Sosa Solorio DO at [address] [facility] CUFF    TOTAL KNEE ARTHROPLASTY Bilateral            No Known Allergies        Current Outpatient Medications:     diclofenac sodium (VOLTAREN) 1 % GEL, APPLY   TOPICALLY TWICE DAILY, Disp: 200 g, Rfl: 11    tiZANidine (ZANAFLEX) 4 MG tablet, TAKE 2 TABLETS BY MOUTH THREE TIMES DAILY, Disp: 540 tablet, Rfl: 3    ondansetron (ZOFRAN) 4 MG tablet, Take 1 tablet by mouth every 8 hours as needed for Nausea or Vomiting, Disp: 30 tablet, Rfl: 5    hydroCHLOROthiazide (HYDRODIURIL) 25 MG tablet, Take 1 tablet by mouth daily Take 25 mg by mouth daily (Patient taking differently: Take 25 mg by mouth Take 25 mg by mouth daily as needed), Disp: 90 tablet, Rfl: 3    pantoprazole (PROTONIX) 20 MG tablet, TAKE 1-2 TABLET BY MOUTH ONCE DAILY IN THE MORNING BEFORE BREAKFAST (Patient taking differently: TAKE 1TABLET BY MOUTH ONCE DAILY IN THE MORNING BEFORE BREAKFAST), Disp: 180 tablet, Rfl: 3    losartan (COZAAR) 100 MG tablet, Take 1 tablet by mouth once daily, Disp: 90 tablet, Rfl: 3    amLODIPine (NORVASC) 5 MG tablet, Take 1 tablet by mouth nightly, Disp: 90 tablet, Rfl: 3    triamcinolone (KENALOG) 0.025 % cream, Apply Topically as needed up to twice daily, not to exceed 4 weeks. , Disp: 454 g, Rfl: 0    progesterone (PROMETRIUM) 100 MG CAPS capsule, TAKE 1 CAPSULE BY MOUTH ONCE DAILY, Disp: , Rfl:     metFORMIN (GLUCOPHAGE-XR) 500 MG extended release tablet, TAKE 2 TABLETS BY MOUTH ONCE DAILY WITH SUPPER, Disp: 180 tablet, Rfl: 1    estradiol (ESTRACE) 1 MG tablet, Take 1 mg by mouth daily, Disp: , Rfl:     clobetasol (TEMOVATE) 0.05 % cream, , Disp: , Rfl:     SYMBICORT 160-4.5 MCG/ACT AERO, Inhale 2 puffs into the lungs 2 times daily, Disp: 30.6 g, Rfl: 11    tretinoin (RETIN-A) 0.025 % cream, Apply topically nightly., Disp: 45 g, Rfl: 5    Cyanocobalamin (VITAMIN B-12 PO), Take by mouth, Disp: , Rfl:     atorvastatin (LIPITOR) 40 MG tablet, Take 1 tablet by mouth once daily, Disp: 90 tablet, Rfl: 3    doxycycline hyclate (VIBRA-TABS) 100 MG tablet, Only with  URI, Disp: , Rfl:     FOLIC ACID PO, Take by mouth, Disp: , Rfl:     Blood Glucose Monitoring Suppl (BLOOD GLUCOSE MONITOR SYSTEM) w/Device KIT, To check blood sugar 4 times daily.  Diagnosis Code: E 11.9, Disp: 1 kit, Rfl: 0    blood glucose monitor strips, Use 4 times daily to check blood sugar Diagnosis Code; E 11.9, Disp: 100 strip, Rfl: 11    Lancets 30G MISC, 4 each by Does not apply route daily Use 4 times daily to check blood sugar Diagnosis Code: E 11.9, Disp: 200 each, Rfl: 3    medical marijuana, Take by mouth as needed. , Disp: , Rfl:     albuterol (PROVENTIL) (2.5 MG/3ML) 0.083% nebulizer solution, Take 2.5 mg by nebulization every 6 hours as needed for Wheezing, Disp: , Rfl:     Nebulizers (COMPRESSOR/NEBULIZER) MISC, 1 Device by Does not apply route 2 times daily, Disp: 1 each, Rfl: 3    cetirizine (ZYRTEC) 10 MG tablet, Take 10 mg by mouth as needed , Disp: , Rfl:     predniSONE (DELTASONE) 10 MG tablet, Take 10 mg by mouth as needed, Disp: , Rfl:     albuterol sulfate  (90 Base) MCG/ACT inhaler, Inhale 2 puffs into the lungs every 6 hours as needed for Wheezing, Disp: 1 Inhaler, Rfl: 11      Review of Systems:    Constitutional: Negative for fever, chills, and unexpected weight change. HENT: Negative for congestion, ear pain, rhinorrhea,  sore throat and trouble swallowing. Eyes: Negative for photophobia, redness, itching. Respiratory: Negative for cough, shortness of breath and sputum. Cardiovascular: Negative for chest pain, palpitations and leg swelling. Gastrointestinal: Negative for nausea, vomiting, abdominal pain, diarrhea, constipation. Endocrine: Negative for cold intolerance, heat intolerance, polydipsia, polyphagia and polyuria. Genitourinary: Negative for dysuria, urgency, frequency, hematuria and flank pain. Musculoskeletal: Negative for myalgias, back pain, arthralgias and neck pain. Skin/Nail: Negative for rash, itching. Normal nails. Neurological: Negative for seizures, weakness, light-headedness, numbness and headaches. Hematological/ Lymph nodes: Negative for adenopathy. Does not bruise/bleed easily. Psychiatric/Behavioral: Negative for suicidal ideas, depression, anxiety, sleep disturbance and decreased concentration.           Objective: Physical Exam:  BP (!) 179/88 (Site: Right Upper Arm, Position: Sitting, Cuff Size: Large Adult)   Pulse 78   Ht 5' 5\" (1.651 m)   Wt 250 lb 9.6 oz (113.7 kg)   SpO2 97%   BMI 41.70 kg/m²     Constitutional: Patient is oriented to person, place, and time. Patient appears well-developed and well-nourished. HENT:               Head: Normocephalic and atraumatic. Eyes: Conjunctivae and EOM are normal.                Neck: Normal range of motion. Cardiovascular: Normal rate, regular rhythm and normal heart sounds. Pulmonary/Chest: Effort normal and breath sounds normal.   Abdominal: Soft. Bowel sounds are normal.   Musculoskeletal: Normal range of motion. Neurological: Patient is alert and oriented to person, place, and time. Patient has normal reflexes. Skin: Skin is warm and dry. Psychiatric: Patient has a normal mood and affect.  Patient behavior is normal.     Lab Review:    Orders Only on 02/21/2023   Component Date Value Ref Range Status    TSH 02/21/2023 2.53  0.27 - 4.20 uIU/mL Final    Sodium 02/21/2023 139  136 - 145 mmol/L Final    Potassium 02/21/2023 4.2  3.5 - 5.1 mmol/L Final    Chloride 02/21/2023 101  99 - 110 mmol/L Final    CO2 02/21/2023 24  21 - 32 mmol/L Final    Anion Gap 02/21/2023 14  3 - 16 Final    Glucose 02/21/2023 133 (A)  70 - 99 mg/dL Final    BUN 02/21/2023 15  7 - 20 mg/dL Final    Creatinine 02/21/2023 0.8  0.6 - 1.2 mg/dL Final    Est, Glom Filt Rate 02/21/2023 >60  >60 Final    Calcium 02/21/2023 9.5  8.3 - 10.6 mg/dL Final    Hemoglobin A1C 02/21/2023 5.8  See comment % Final    eAG 02/21/2023 119.8  mg/dL Final    Cholesterol, Fasting 02/21/2023 156  0 - 199 mg/dL Final    Triglyceride, Fasting 02/21/2023 96  0 - 150 mg/dL Final    HDL 02/21/2023 58  40 - 60 mg/dL Final    LDL Calculated 02/21/2023 79  <100 mg/dL Final    VLDL Cholesterol Calculated 02/21/2023 19  Not Established mg/dL Final    Microalbumin, Random Urine 02/21/2023 <1.20  <2.0 mg/dL Final    Creatinine, Ur 02/21/2023 169.3  28.0 - 259.0 mg/dL Final    Microalbumin Creatinine Ratio 02/21/2023 see below  0.0 - 30.0 mg/g Final   Orders Only on 08/08/2022   Component Date Value Ref Range Status    Vit D, 25-Hydroxy 08/08/2022 66.0  >=30 ng/mL Final    TSH Reflex FT4 08/08/2022 1.29  0.27 - 4.20 uIU/mL Final   Orders Only on 08/08/2022   Component Date Value Ref Range Status    aPTT 08/08/2022 27.3  23.0 - 34.3 sec Final    Protime 08/08/2022 13.5  11.7 - 14.5 sec Final    INR 08/08/2022 1.03  0.87 - 1.14 Final    Hemoglobin A1C 08/08/2022 6.2  See comment % Final    eAG 08/08/2022 131.2  mg/dL Final    Sodium 08/08/2022 142  136 - 145 mmol/L Final    Potassium 08/08/2022 4.0  3.5 - 5.1 mmol/L Final    Chloride 08/08/2022 104  99 - 110 mmol/L Final    CO2 08/08/2022 24  21 - 32 mmol/L Final    Anion Gap 08/08/2022 14  3 - 16 Final    Glucose 08/08/2022 110 (A)  70 - 99 mg/dL Final    BUN 08/08/2022 15  7 - 20 mg/dL Final    Creatinine 08/08/2022 0.7  0.6 - 1.2 mg/dL Final    GFR Non- 08/08/2022 >60  >60 Final    GFR  08/08/2022 >60  >60 Final    Calcium 08/08/2022 9.4  8.3 - 10.6 mg/dL Final    Color, UA 08/08/2022 DARK YELLOW (A)  Straw/Yellow Final    Clarity, UA 08/08/2022 Clear  Clear Final    Glucose, Ur 08/08/2022 Negative  Negative mg/dL Final    Bilirubin Urine 08/08/2022 Negative  Negative Final    Ketones, Urine 08/08/2022 TRACE (A)  Negative mg/dL Final    Specific Gravity, UA 08/08/2022 1.030  1.005 - 1.030 Final    Blood, Urine 08/08/2022 Negative  Negative Final    pH, UA 08/08/2022 5.5  5.0 - 8.0 Final    Protein, UA 08/08/2022 TRACE (A)  Negative mg/dL Final    Urobilinogen, Urine 08/08/2022 1.0  <2.0 E.U./dL Final    Nitrite, Urine 08/08/2022 Negative  Negative Final    Leukocyte Esterase, Urine 08/08/2022 TRACE (A)  Negative Final    Microscopic Examination 08/08/2022 YES   Final    Urine Type 08/08/2022 Cleancatch   Final    Bacteria, UA 08/08/2022 Rare (A)  None Seen /HPF Final    Hyaline Casts, UA 08/08/2022 0  0 - 8 /LPF Final    WBC, UA 08/08/2022 1  0 - 5 /HPF Final    RBC, UA 08/08/2022 1  0 - 4 /HPF Final    Epithelial Cells, UA 08/08/2022 12 (A)  0 - 5 /HPF Final    WBC 08/08/2022 7.4  4.0 - 11.0 K/uL Final    RBC 08/08/2022 4.34  4.00 - 5.20 M/uL Final    Hemoglobin 08/08/2022 13.8  12.0 - 16.0 g/dL Final    Hematocrit 08/08/2022 39.2  36.0 - 48.0 % Final    MCV 08/08/2022 90.4  80.0 - 100.0 fL Final    MCH 08/08/2022 31.7  26.0 - 34.0 pg Final    MCHC 08/08/2022 35.1  31.0 - 36.0 g/dL Final    RDW 08/08/2022 13.9  12.4 - 15.4 % Final    Platelets 34/50/2961 291  135 - 450 K/uL Final    MPV 08/08/2022 7.4  5.0 - 10.5 fL Final    Neutrophils % 08/08/2022 66.1  % Final    Lymphocytes % 08/08/2022 23.3  % Final    Monocytes % 08/08/2022 9.0  % Final    Eosinophils % 08/08/2022 1.1  % Final    Basophils % 08/08/2022 0.5  % Final    Neutrophils Absolute 08/08/2022 4.9  1.7 - 7.7 K/uL Final    Lymphocytes Absolute 08/08/2022 1.7  1.0 - 5.1 K/uL Final    Monocytes Absolute 08/08/2022 0.7  0.0 - 1.3 K/uL Final    Eosinophils Absolute 08/08/2022 0.1  0.0 - 0.6 K/uL Final    Basophils Absolute 08/08/2022 0.0  0.0 - 0.2 K/uL Final           Assessment and Plan     Claudette Alonso was seen today for follow-up and diabetes.     Diagnoses and all orders for this visit:    Controlled type 2 diabetes mellitus without complication, without long-term current use of insulin (HCC)  -     Hemoglobin A1C; Future  -     HM DIABETES FOOT EXAM    Dyslipidemia associated with type 2 diabetes mellitus (HCC)  -     Hemoglobin A1C; Future  -      DIABETES FOOT EXAM    Essential hypertension  -     Hemoglobin A1C; Future  -      DIABETES FOOT EXAM          1: Type 2 DM uncomplicated   Controlled A1C 5.8% <  6.2% < 5.9% < 6.2% < 6% < 6.5%      A1C is good but we need to keep checking some sugars      C/w metformin ER 500mg, two tabs with dinner     All instructions provided in written. Check Blood sugars 1 times per day. Log them along with insulin and send them every 2 weeks. Call for blood sugars less than 60 or more than 400. Eye exam: Last exam Feb 2023, No Dr. For cataracts seeing [facility]. Foot exam:  Feb 2023  Deformity/amputation: absent  Skin lesions/pre-ulcerative calluses: absent  Edema: right- negative, left- negative  Sensory exam: Monofilament sensation: normal  Pulses: normal, Vibration (128 Hz): Intact    Renal screen: normal Feb 2023      TSH screen: Feb 2023  Saw MAGDA Tony Santos     2: HTN   High today she attributes it to pain   BP normal recently with pcp     3: Hyperlipidemia   LDL: 79 , HDL: 58, TGs: 96 - Feb 2023      C/w Atorvastatin 40 mg daily   She has cramps due to arthritis. Vit D and TSH normal Feb 2022. The 10-year ASCVD risk score (Chavo WEATHERS, et al., 2019) is: 30.7%    Values used to calculate the score:      Age: 75 years      Sex: Female      Is Non- : No      Diabetic: Yes      Tobacco smoker: No      Systolic Blood Pressure: 206 mmHg      Is BP treated: Yes      HDL Cholesterol: 58 mg/dL      Total Cholesterol: 156 mg/dL    4: Morbid obesity   Discussed weight loss options of exercise (150 minutes per week) plus diet plans   Diet plans may include intermittent fasting, low carb diet, weight watchers, mediterranean diet or caloric restriction. Work on comorbid conditions and improved sleep      RTC in 6 months with A1C      EDUCATION:   Greater than 50% of this visit was spent in general counseling regarding obesity, diet, exercise, importance of adherence to insulin regime, recognition and treatment of hypo and hyperglycemia,  glucose logging, proper diabetes management, diabetic complications with poor management and the importance of glycemic control in order to avoid the complications of diabetes. Risks and potential complications of diabetes were reviewed with the patient.   Diabetes health maintenance plan and follow-up were discussed and understood by the patient. We reviewed the importance of medication compliance and regular follow-up. Aggressive lifestyle modification was encouraged. Exercise Counselling: This patient is a candidate for regular physical exercise. Instructions to perform the following types of exercise:  Swimming or water aerobic exercise  Brisk walking  Playing tennis  Stationary bicycle or elliptical indoor  Low impact aerobic exercise    Instructions given to exercise for the following duration:  30 minutes a day for five-seven days per week.     Following instructions for being active throughout the day in addition to formal exercise:  Walk instead of drive whenever possible  Take the stairs instead of the elevator  Work in the garden  Park to the far end of the parking lot to add more walking steps to destination      Electronically signed by Sadiq Hernandez MD on 2/23/2023 at 12:02 PM

## 2023-03-09 ENCOUNTER — TELEPHONE (OUTPATIENT)
Dept: PULMONOLOGY | Age: 68
End: 2023-03-09

## 2023-03-09 ENCOUNTER — TELEPHONE (OUTPATIENT)
Dept: FAMILY MEDICINE CLINIC | Age: 68
End: 2023-03-09

## 2023-03-09 ENCOUNTER — OFFICE VISIT (OUTPATIENT)
Dept: FAMILY MEDICINE CLINIC | Age: 68
End: 2023-03-09
Payer: MEDICARE

## 2023-03-09 VITALS
BODY MASS INDEX: 40.65 KG/M2 | OXYGEN SATURATION: 98 % | HEART RATE: 77 BPM | HEIGHT: 65 IN | WEIGHT: 244 LBS | DIASTOLIC BLOOD PRESSURE: 82 MMHG | SYSTOLIC BLOOD PRESSURE: 128 MMHG

## 2023-03-09 DIAGNOSIS — J44.9 COPD, MODERATE (HCC): Primary | ICD-10-CM

## 2023-03-09 DIAGNOSIS — E11.69 DYSLIPIDEMIA ASSOCIATED WITH TYPE 2 DIABETES MELLITUS (HCC): ICD-10-CM

## 2023-03-09 DIAGNOSIS — E11.9 CONTROLLED TYPE 2 DIABETES MELLITUS WITHOUT COMPLICATION, WITHOUT LONG-TERM CURRENT USE OF INSULIN (HCC): ICD-10-CM

## 2023-03-09 DIAGNOSIS — E78.5 HYPERLIPIDEMIA, UNSPECIFIED HYPERLIPIDEMIA TYPE: ICD-10-CM

## 2023-03-09 DIAGNOSIS — Z01.818 PREOPERATIVE GENERAL PHYSICAL EXAMINATION: Primary | ICD-10-CM

## 2023-03-09 DIAGNOSIS — J44.9 COPD, MODERATE (HCC): ICD-10-CM

## 2023-03-09 DIAGNOSIS — J45.40 MODERATE PERSISTENT ASTHMA, UNCOMPLICATED: ICD-10-CM

## 2023-03-09 DIAGNOSIS — I10 PRIMARY HYPERTENSION: ICD-10-CM

## 2023-03-09 DIAGNOSIS — R06.02 SOB (SHORTNESS OF BREATH): ICD-10-CM

## 2023-03-09 DIAGNOSIS — I25.10 CORONARY ARTERY CALCIFICATION SEEN ON CT SCAN: ICD-10-CM

## 2023-03-09 DIAGNOSIS — E78.5 DYSLIPIDEMIA ASSOCIATED WITH TYPE 2 DIABETES MELLITUS (HCC): ICD-10-CM

## 2023-03-09 PROCEDURE — G8427 DOCREV CUR MEDS BY ELIG CLIN: HCPCS | Performed by: FAMILY MEDICINE

## 2023-03-09 PROCEDURE — 3074F SYST BP LT 130 MM HG: CPT | Performed by: FAMILY MEDICINE

## 2023-03-09 PROCEDURE — 2022F DILAT RTA XM EVC RTNOPTHY: CPT | Performed by: FAMILY MEDICINE

## 2023-03-09 PROCEDURE — 99213 OFFICE O/P EST LOW 20 MIN: CPT | Performed by: FAMILY MEDICINE

## 2023-03-09 PROCEDURE — G8399 PT W/DXA RESULTS DOCUMENT: HCPCS | Performed by: FAMILY MEDICINE

## 2023-03-09 PROCEDURE — 3079F DIAST BP 80-89 MM HG: CPT | Performed by: FAMILY MEDICINE

## 2023-03-09 PROCEDURE — 3017F COLORECTAL CA SCREEN DOC REV: CPT | Performed by: FAMILY MEDICINE

## 2023-03-09 PROCEDURE — 1123F ACP DISCUSS/DSCN MKR DOCD: CPT | Performed by: FAMILY MEDICINE

## 2023-03-09 PROCEDURE — 3023F SPIROM DOC REV: CPT | Performed by: FAMILY MEDICINE

## 2023-03-09 PROCEDURE — 1036F TOBACCO NON-USER: CPT | Performed by: FAMILY MEDICINE

## 2023-03-09 PROCEDURE — 1090F PRES/ABSN URINE INCON ASSESS: CPT | Performed by: FAMILY MEDICINE

## 2023-03-09 PROCEDURE — G8484 FLU IMMUNIZE NO ADMIN: HCPCS | Performed by: FAMILY MEDICINE

## 2023-03-09 PROCEDURE — G8417 CALC BMI ABV UP PARAM F/U: HCPCS | Performed by: FAMILY MEDICINE

## 2023-03-09 PROCEDURE — 3044F HG A1C LEVEL LT 7.0%: CPT | Performed by: FAMILY MEDICINE

## 2023-03-09 NOTE — TELEPHONE ENCOUNTER
----- Message from Nicole Rodriguez MD sent at 3/9/2023 11:12 AM EST -----  I put in cardiology referral for pre op eval.    After calling cards clinic, please give pt scheduling info as well    [address] [facility], [address]  [phone]

## 2023-03-09 NOTE — TELEPHONE ENCOUNTER
Pt called and said she is having neck infusion surgery at [facility] on 4/6/23. Last pft done 2019.     Ph # [phone]

## 2023-03-09 NOTE — PROGRESS NOTES
Chief complaint: Pre-op Exam (Eye surgery 03/15/2023 and 2023/Neck surgery 2023 need paper stating clear for surgery)      SUBJECTIVE:  HPI  Britt Cho (:  1955) is a 67 y.o. female with a past medical history of DM2 on metformin, HLD, COPD who presents with a chief complaint of:    Eye surgery- b/l cataract surgery 3/15 (left eye) and 3/29 (rt eye)  Neck surgery- cervical surgery with [place]; she has documentation: C4-7 anterior cervical decompression and fusion on 23 at 7:30 am. 3hrs of general anesthesia. She will be in hospital for 2-3 days.  Surgery will be at [facility] on [address]. She has pre op on 3/27 with labs, etc.  Hx of mild CAD. Cards eval in 2019 with normal stress test. Hasn't seen cardiology since. No issues with chest pain or issues with walking up stairs.  Had left foot surgery last august and pre op by PCP only at that time.     Never had heart cath. In media, the event on 19 was a trial abnlation w/ ortho and there was contrasted imaging done. Labeled wrong.    Hx of Rt ankle surgery, b/l knee replacements. Gallbladder removed.    Review of Systems:  General: No F/C/NS/fatigue/wt loss   Cardiovascular: No CP  Respiratory: No SOB  GI: No N/V/D/C/abd pain/blood in stool  Neuro: No HA/weakness  Psych: No depressed mood/anxiety  Musculoskeletal: No myalgias    Past Medical History:   Diagnosis Date    CAD (coronary artery disease)     mild MULTI VESSELper VO     COPD (chronic obstructive pulmonary disease) (HCC)     Diabetes mellitus (HCC)     HTN (hypertension)     Hyperlipidemia     Osteoarthritis, foot, localized 2014    Spinal stenosis      Current Outpatient Medications   Medication Sig Dispense Refill    predniSONE (DELTASONE) 10 MG tablet Take 10 mg by mouth as needed      atorvastatin (LIPITOR) 40 MG tablet Take 1 tablet by mouth once daily 90 tablet 3    metFORMIN (GLUCOPHAGE-XR) 500 MG extended release tablet TAKE 2 TABLETS BY MOUTH  ONCE DAILY WITH SUPPER 180 tablet 1    diclofenac sodium (VOLTAREN) 1 % GEL APPLY   TOPICALLY TWICE DAILY 200 g 11    tiZANidine (ZANAFLEX) 4 MG tablet TAKE 2 TABLETS BY MOUTH THREE TIMES DAILY 540 tablet 3    ondansetron (ZOFRAN) 4 MG tablet Take 1 tablet by mouth every 8 hours as needed for Nausea or Vomiting 30 tablet 5    hydroCHLOROthiazide (HYDRODIURIL) 25 MG tablet Take 1 tablet by mouth daily Take 25 mg by mouth daily (Patient taking differently: Take 25 mg by mouth Take 25 mg by mouth daily as needed) 90 tablet 3    pantoprazole (PROTONIX) 20 MG tablet TAKE 1-2 TABLET BY MOUTH ONCE DAILY IN THE MORNING BEFORE BREAKFAST (Patient taking differently: TAKE 1TABLET BY MOUTH ONCE DAILY IN THE MORNING BEFORE BREAKFAST) 180 tablet 3    losartan (COZAAR) 100 MG tablet Take 1 tablet by mouth once daily 90 tablet 3    amLODIPine (NORVASC) 5 MG tablet Take 1 tablet by mouth nightly 90 tablet 3    triamcinolone (KENALOG) 0.025 % cream Apply Topically as needed up to twice daily, not to exceed 4 weeks. 454 g 0    progesterone (PROMETRIUM) 100 MG CAPS capsule TAKE 1 CAPSULE BY MOUTH ONCE DAILY      estradiol (ESTRACE) 1 MG tablet Take 1 mg by mouth daily      clobetasol (TEMOVATE) 0.05 % cream       SYMBICORT 160-4.5 MCG/ACT AERO Inhale 2 puffs into the lungs 2 times daily 30.6 g 11    tretinoin (RETIN-A) 0.025 % cream Apply topically nightly. 45 g 5    Cyanocobalamin (VITAMIN B-12 PO) Take by mouth      doxycycline hyclate (VIBRA-TABS) 100 MG tablet Only with  URI      FOLIC ACID PO Take by mouth      Blood Glucose Monitoring Suppl (BLOOD GLUCOSE MONITOR SYSTEM) w/Device KIT To check blood sugar 4 times daily. Diagnosis Code: E 11.9 1 kit 0    blood glucose monitor strips Use 4 times daily to check blood sugar Diagnosis Code; E 11.9 100 strip 11    Lancets 30G MISC 4 each by Does not apply route daily Use 4 times daily to check blood sugar Diagnosis Code: E 11.9 200 each 3    medical marijuana Take by mouth as needed. albuterol (PROVENTIL) (2.5 MG/3ML) 0.083% nebulizer solution Take 2.5 mg by nebulization every 6 hours as needed for Wheezing      Nebulizers (COMPRESSOR/NEBULIZER) MISC 1 Device by Does not apply route 2 times daily 1 each 3    cetirizine (ZYRTEC) 10 MG tablet Take 10 mg by mouth as needed       albuterol sulfate  (90 Base) MCG/ACT inhaler Inhale 2 puffs into the lungs every 6 hours as needed for Wheezing 1 Inhaler 11     No current facility-administered medications for this visit.      No Known Allergies  Family History   Problem Relation Age of Onset    Hypertension Mother     Cancer Father         lung    Colon Cancer Father     Breast Cancer Sister     Diabetes Brother     Heart Failure Paternal Grandmother      Past Surgical History:   Procedure Laterality Date    ANKLE FRACTURE SURGERY      CHOLECYSTECTOMY      COLONOSCOPY N/A 12/11/2019    COLONOSCOPY POLYPECTOMY SNARE/COLD BIOPSY performed by Tanya Becerra MD at [address] [place] [address] Left 08/24/2022    LUMBAR SPINE SURGERY Bilateral 12/18/2019    BILATERAL L3, L4 AND L5 DORSAL RAMUS MEDIAL BRANCH BLOCK WITH FLUOROSCOPY (67716, 16977) performed by Suki Wilkerson MD at [address] ARTHROSCOPY Right 06/09/2020    RIGHT SHOULDER ARTHROSCOPE, ROTATOR CUFF AUGMENTATION IMPLANT, SUBACROMIAL DECOMPRESSION, DISTAL CLAVICLE EXCISION AND DEBRIDEMENT performed by Griselda Garcia DO at [address] CUFF    TOTAL KNEE ARTHROPLASTY Bilateral      Social History     Socioeconomic History    Marital status:      Spouse name: Not on file    Number of children: Not on file    Years of education: Not on file    Highest education level: Not on file   Occupational History    Occupation: RN   Tobacco Use    Smoking status: Former     Packs/day: 1.00     Years: 40.00     Pack years: 40.00     Types: Cigarettes     Quit date: 10/1/2018     Years since quittin.4    Smokeless tobacco: Never    Tobacco comments:     smoked for 40 yrs / smoked up to 1 ppd   Vaping Use    Vaping Use: Never used   Substance and Sexual Activity    Alcohol use: Yes     Comment: 3 DRINKS A MONTH    Drug use: Yes     Types: Marijuana [employer])     Comment: medical marijuana     Sexual activity: Yes     Partners: Male   Other Topics Concern    Not on file   Social History Narrative    Not on file     Social Determinants of Health     Financial Resource Strain: Not on file   Food Insecurity: Not on file   Transportation Needs: Not on file   Physical Activity: Insufficiently Active    Days of Exercise per Week: 3 days    Minutes of Exercise per Session: 20 min   Stress: Not on file   Social Connections: Not on file   Intimate Partner Violence: Not on file   Housing Stability: Not on file       OBJECTIVE:  /82 (Site: Right Upper Arm, Position: Sitting, Cuff Size: Medium Adult)   Pulse 77   Ht 5' 5\" (1.651 m)   Wt 244 lb (110.7 kg)   SpO2 98%   BMI 40.60 kg/m²      Physical exam:  afebrile, vitals reviewed  Gen:  WD, WN, NAD, A&Ox3, pleasant  Eyes:  Sclerae clear  Neck:  Supple, No cervical or submandibular LAD. No obvious thyromegaly. Heart:  RRR, no murmur, rubs, gallops  Lungs:  CTAB, no W/R/R  Abd:  soft, NT/ND  Skin: No obvious rashes    ASSESSMENT/PLAN:  1. Preoperative general physical examination  Low risk cataract surgery with topical anesthesia. Medically optimized for cataract surgery. Intermediate risk procedure of spinal fusion with hx of CAD in 2019. Recommend perioperative evaluation from cardiology to determine need for stress testing prior to procedure  Will try to coordinate urgent eval   Pt agrees  -      37    2. Dyslipidemia associated with type 2 diabetes mellitus (HCC)  Est, stable. Continue current management  -      37    3.  Controlled type 2 diabetes mellitus without complication, without long-term current use of insulin (HCC)  Est, stable. Continue current management  -     [employer]    4. COPD, moderate (HCC)  Est, stable. Continue current management  -     [employer]    5. Coronary artery calcification seen on CT scan  Est, stable. Continue current management  -     [employer]    6. Primary hypertension  Est, stable. Continue current management  -     [employer]    7. Hyperlipidemia, unspecified hyperlipidemia type  Est, stable. Continue current management  -     [employer]      Electronically signed by Amanuel Keith MD on 3/9/2023 at 10:28 AM.     Please note, portions of this note were completed with a voice recognition program.  Although every effort was made to ensure the accuracy of this automated transcription, some errors in transcription may have occurred.

## 2023-03-09 NOTE — TELEPHONE ENCOUNTER
I have tried to call the Card at [facility] but was on hold for over 5 minutes, will try to call back later

## 2023-03-09 NOTE — TELEPHONE ENCOUNTER
PFT order placed. Pt informed to get this scheduled and then call us back with the date so we can make her Pre-op appt.  She stated she would do this and call us back

## 2023-03-10 ENCOUNTER — TELEPHONE (OUTPATIENT)
Dept: CARDIOLOGY CLINIC | Age: 68
End: 2023-03-10

## 2023-03-10 NOTE — TELEPHONE ENCOUNTER
Patient was [facility] by radha zaldivar  [facility] with Dr. Catalino Whalen. Patient has been scheduled for 3/31/23 at 11am.  Patient verbalized understanding of appointment instructions. Call complete.

## 2023-03-13 NOTE — TELEPHONE ENCOUNTER
Medication:   Requested Prescriptions     Pending Prescriptions Disp Refills    celecoxib (CELEBREX) 200 MG capsule [Pharmacy Med Name: Celecoxib 200 MG Oral Capsule] 180 capsule 0     Sig: Take 1 capsule by mouth twice daily        Last Filled:  discontinued 1/13/2023    Patient Phone Number: [phone] (home)     Last appt: 3/9/2023   Next appt: Visit date not found    Last OARRS: No flowsheet data found.

## 2023-03-14 RX ORDER — CELECOXIB 200 MG/1
CAPSULE ORAL
Qty: 180 CAPSULE | Refills: 0 | Status: SHIPPED | OUTPATIENT
Start: 2023-03-14

## 2023-03-17 ENCOUNTER — HOSPITAL ENCOUNTER (OUTPATIENT)
Dept: PULMONOLOGY | Age: 68
Discharge: HOME | End: 2023-03-17
Payer: MEDICARE

## 2023-03-17 VITALS — HEART RATE: 78 BPM | RESPIRATION RATE: 16 BRPM | OXYGEN SATURATION: 98 %

## 2023-03-17 DIAGNOSIS — R06.02 SOB (SHORTNESS OF BREATH): ICD-10-CM

## 2023-03-17 DIAGNOSIS — J45.40 MODERATE PERSISTENT ASTHMA, UNCOMPLICATED: ICD-10-CM

## 2023-03-17 DIAGNOSIS — J44.9 COPD, MODERATE (HCC): ICD-10-CM

## 2023-03-17 LAB
DLCO %PRED: 78 %
DLCO PRED: NORMAL
DLCO/VA %PRED: NORMAL
DLCO/VA PRED: NORMAL
DLCO/VA: NORMAL
DLCO: NORMAL
EXPIRATORY TIME-POST: NORMAL
EXPIRATORY TIME: NORMAL
FEF 25-75% %CHNG: NORMAL
FEF 25-75% %PRED-POST: NORMAL
FEF 25-75% %PRED-PRE: NORMAL
FEF 25-75% PRED: NORMAL
FEF 25-75%-POST: NORMAL
FEF 25-75%-PRE: NORMAL
FEV1 %PRED-POST: 75 %
FEV1 %PRED-PRE: 75 %
FEV1 PRED: NORMAL
FEV1-POST: NORMAL
FEV1-PRE: NORMAL
FEV1/FVC %PRED-POST: NORMAL
FEV1/FVC %PRED-PRE: NORMAL
FEV1/FVC PRED: NORMAL
FEV1/FVC-POST: 102 %
FEV1/FVC-PRE: 100 %
FVC %PRED-POST: NORMAL
FVC %PRED-PRE: NORMAL
FVC PRED: NORMAL
FVC-POST: NORMAL
FVC-PRE: NORMAL
GAW %PRED: NORMAL
GAW PRED: NORMAL
GAW: NORMAL
IC %PRED: NORMAL
IC PRED: NORMAL
IC: NORMAL
MEP: NORMAL
MIP: NORMAL
MVV %PRED-PRE: NORMAL
MVV PRED: NORMAL
MVV-PRE: NORMAL
PEF %PRED-POST: NORMAL
PEF %PRED-PRE: NORMAL
PEF PRED: NORMAL
PEF%CHNG: NORMAL
PEF-POST: NORMAL
PEF-PRE: NORMAL
RAW %PRED: NORMAL
RAW PRED: NORMAL
RAW: NORMAL
RV %PRED: NORMAL
RV PRED: NORMAL
RV: NORMAL
SVC %PRED: NORMAL
SVC PRED: NORMAL
SVC: NORMAL
TLC %PRED: 97 %
TLC PRED: NORMAL
TLC: NORMAL
VA %PRED: NORMAL
VA PRED: NORMAL
VA: NORMAL
VTG %PRED: NORMAL
VTG PRED: NORMAL
VTG: NORMAL

## 2023-03-17 PROCEDURE — 94729 DIFFUSING CAPACITY: CPT

## 2023-03-17 PROCEDURE — 6370000000 HC RX 637 (ALT 250 FOR IP): Performed by: INTERNAL MEDICINE

## 2023-03-17 PROCEDURE — 94760 N-INVAS EAR/PLS OXIMETRY 1: CPT

## 2023-03-17 PROCEDURE — 94060 EVALUATION OF WHEEZING: CPT

## 2023-03-17 PROCEDURE — 94726 PLETHYSMOGRAPHY LUNG VOLUMES: CPT

## 2023-03-17 RX ORDER — ALBUTEROL SULFATE 90 UG/1
4 AEROSOL, METERED RESPIRATORY (INHALATION) ONCE
Status: CANCELLED | OUTPATIENT
Start: 2023-03-17

## 2023-03-17 RX ORDER — ALBUTEROL SULFATE 90 UG/1
4 AEROSOL, METERED RESPIRATORY (INHALATION) ONCE
Status: COMPLETED | OUTPATIENT
Start: 2023-03-17 | End: 2023-03-17

## 2023-03-17 RX ADMIN — Medication 4 PUFF: at 13:37

## 2023-03-17 ASSESSMENT — PULMONARY FUNCTION TESTS
FEV1_PERCENT_PREDICTED_PRE: 75
FEV1/FVC_PRE: 100
FEV1/FVC_POST: 102
FEV1_PERCENT_PREDICTED_POST: 75

## 2023-03-21 NOTE — PROGRESS NOTES
modification, diet, and exercise. Follow Up: Return if symptoms worsen or fail to improve. Teressa Sacks, MD  Cardiologist [facility]    Physician Attestation  The scribe wrote this note in the presence of me Teressa Sacks, MD). The scribe may have prepopulated components of this note with my historical  intellectual property under my direct supervision. Any additions to this intellectual property were performed in my presence and at my direction. Furthermore, the content and accuracy of this note have been reviewed by me with edits by me as needed.   Teressa Sacks, MD 3/31/2023 12:00 PM

## 2023-03-27 ENCOUNTER — HOSPITAL ENCOUNTER (OUTPATIENT)
Dept: GENERAL RADIOLOGY | Age: 68
Discharge: HOME | End: 2023-03-27
Payer: MEDICARE

## 2023-03-27 ENCOUNTER — TELEPHONE (OUTPATIENT)
Dept: CARDIOLOGY CLINIC | Age: 68
End: 2023-03-27

## 2023-03-27 DIAGNOSIS — Z78.0 POST-MENOPAUSAL: ICD-10-CM

## 2023-03-27 PROBLEM — M85.80 OSTEOPENIA AFTER MENOPAUSE: Status: ACTIVE | Noted: 2023-03-27

## 2023-03-27 PROCEDURE — 77080 DXA BONE DENSITY AXIAL: CPT

## 2023-03-27 NOTE — TELEPHONE ENCOUNTER
Libby Webber called to ask if the Pt can get a EKG  in office on her next ov on 03/31. If not they will need to get one. If possible Libby Webber is requesting a copy of the 03/31 tracing to be fax to:  [phone].   Please advise

## 2023-03-28 NOTE — TELEPHONE ENCOUNTER
Put in appt notes that EKG needed and should be faxed to :    [address] [facility] - Fax:  [phone]

## 2023-03-29 PROBLEM — Z01.810 PREOP CARDIOVASCULAR EXAM: Status: ACTIVE | Noted: 2023-03-29

## 2023-03-30 ENCOUNTER — OFFICE VISIT (OUTPATIENT)
Dept: PULMONOLOGY | Age: 68
End: 2023-03-30
Payer: MEDICARE

## 2023-03-30 VITALS
BODY MASS INDEX: 41.6 KG/M2 | HEART RATE: 81 BPM | WEIGHT: 250 LBS | OXYGEN SATURATION: 98 % | SYSTOLIC BLOOD PRESSURE: 130 MMHG | DIASTOLIC BLOOD PRESSURE: 85 MMHG

## 2023-03-30 DIAGNOSIS — J44.9 COPD, MODERATE (HCC): Primary | ICD-10-CM

## 2023-03-30 DIAGNOSIS — J45.40 MODERATE PERSISTENT ASTHMA, UNCOMPLICATED: ICD-10-CM

## 2023-03-30 DIAGNOSIS — Z01.818 PRE-OP EXAM: ICD-10-CM

## 2023-03-30 DIAGNOSIS — R91.1 PULMONARY NODULE: ICD-10-CM

## 2023-03-30 PROCEDURE — 3079F DIAST BP 80-89 MM HG: CPT | Performed by: NURSE PRACTITIONER

## 2023-03-30 PROCEDURE — G8427 DOCREV CUR MEDS BY ELIG CLIN: HCPCS | Performed by: NURSE PRACTITIONER

## 2023-03-30 PROCEDURE — 3017F COLORECTAL CA SCREEN DOC REV: CPT | Performed by: NURSE PRACTITIONER

## 2023-03-30 PROCEDURE — G8417 CALC BMI ABV UP PARAM F/U: HCPCS | Performed by: NURSE PRACTITIONER

## 2023-03-30 PROCEDURE — 1036F TOBACCO NON-USER: CPT | Performed by: NURSE PRACTITIONER

## 2023-03-30 PROCEDURE — 1090F PRES/ABSN URINE INCON ASSESS: CPT | Performed by: NURSE PRACTITIONER

## 2023-03-30 PROCEDURE — 1123F ACP DISCUSS/DSCN MKR DOCD: CPT | Performed by: NURSE PRACTITIONER

## 2023-03-30 PROCEDURE — G8484 FLU IMMUNIZE NO ADMIN: HCPCS | Performed by: NURSE PRACTITIONER

## 2023-03-30 PROCEDURE — G8399 PT W/DXA RESULTS DOCUMENT: HCPCS | Performed by: NURSE PRACTITIONER

## 2023-03-30 PROCEDURE — 3023F SPIROM DOC REV: CPT | Performed by: NURSE PRACTITIONER

## 2023-03-30 PROCEDURE — 3075F SYST BP GE 130 - 139MM HG: CPT | Performed by: NURSE PRACTITIONER

## 2023-03-30 PROCEDURE — 99214 OFFICE O/P EST MOD 30 MIN: CPT | Performed by: NURSE PRACTITIONER

## 2023-03-30 ASSESSMENT — ENCOUNTER SYMPTOMS
COUGH: 0
ABDOMINAL PAIN: 0
CONSTIPATION: 0
SHORTNESS OF BREATH: 1
COLOR CHANGE: 0

## 2023-03-30 NOTE — PROGRESS NOTES
Interpretation:   Evidence of air trapping. No obstruction noted. Mild reduction   in diffusion capacity. Clinical correlation recommended. Assessment / Plan:   1. COPD, moderate ([address] / asthma  - BOSCH worse over time which may relate to deconditioning / obesity  - PFTs done earlier this month actually improved in the interval  - She continues to benefit from Symbicort and PRN Albuterol    3. Pulmonary nodule  - Stable on last CT imaging   - Plan repeat LDCT December of this year    4. Pre-op exam  - She has moderate risk of pulmonary complications related to general anesthesia  - These risks include but are not limited to: prolonged mechanical ventilation, respiratory failure and pulmonary infection  - These risks were explained to the patient and she agrees to proceed  - Given her stability, these risks are not prohibitive  - Recommend surgery be conducted at a hospital  - She plans to have overnight stay to monitor breathing which is appropriate given anterior approach   - Encouraged early ambulation when OK with surgeon as well as CDBE  - Monitor end tidal CO and oxygen saturations in the 24 hours following procedure  - Note to Dr. Evon Young    Return in about 3 months (around 6/30/2023). RTC sooner if symptoms worsen.     Amrik Mayfield MSN APRN-ACNP CCRN

## 2023-03-31 ENCOUNTER — OFFICE VISIT (OUTPATIENT)
Dept: CARDIOLOGY CLINIC | Age: 68
End: 2023-03-31
Payer: MEDICARE

## 2023-03-31 VITALS
SYSTOLIC BLOOD PRESSURE: 118 MMHG | DIASTOLIC BLOOD PRESSURE: 68 MMHG | BODY MASS INDEX: 41.69 KG/M2 | HEIGHT: 65 IN | HEART RATE: 85 BPM | OXYGEN SATURATION: 96 % | WEIGHT: 250.2 LBS

## 2023-03-31 DIAGNOSIS — Z01.810 PREOP CARDIOVASCULAR EXAM: Primary | ICD-10-CM

## 2023-03-31 DIAGNOSIS — I10 PRIMARY HYPERTENSION: ICD-10-CM

## 2023-03-31 DIAGNOSIS — I25.10 CORONARY ARTERY CALCIFICATION SEEN ON CT SCAN: ICD-10-CM

## 2023-03-31 DIAGNOSIS — R06.09 DOE (DYSPNEA ON EXERTION): ICD-10-CM

## 2023-03-31 DIAGNOSIS — E66.01 MORBID OBESITY DUE TO EXCESS CALORIES (HCC): ICD-10-CM

## 2023-03-31 DIAGNOSIS — J44.9 COPD, MODERATE (HCC): ICD-10-CM

## 2023-03-31 DIAGNOSIS — E78.5 HYPERLIPIDEMIA, UNSPECIFIED HYPERLIPIDEMIA TYPE: ICD-10-CM

## 2023-03-31 PROCEDURE — 3074F SYST BP LT 130 MM HG: CPT | Performed by: INTERNAL MEDICINE

## 2023-03-31 PROCEDURE — G8399 PT W/DXA RESULTS DOCUMENT: HCPCS | Performed by: INTERNAL MEDICINE

## 2023-03-31 PROCEDURE — 93000 ELECTROCARDIOGRAM COMPLETE: CPT | Performed by: INTERNAL MEDICINE

## 2023-03-31 PROCEDURE — 3078F DIAST BP <80 MM HG: CPT | Performed by: INTERNAL MEDICINE

## 2023-03-31 PROCEDURE — 3023F SPIROM DOC REV: CPT | Performed by: INTERNAL MEDICINE

## 2023-03-31 PROCEDURE — 1036F TOBACCO NON-USER: CPT | Performed by: INTERNAL MEDICINE

## 2023-03-31 PROCEDURE — 3017F COLORECTAL CA SCREEN DOC REV: CPT | Performed by: INTERNAL MEDICINE

## 2023-03-31 PROCEDURE — G8417 CALC BMI ABV UP PARAM F/U: HCPCS | Performed by: INTERNAL MEDICINE

## 2023-03-31 PROCEDURE — G8427 DOCREV CUR MEDS BY ELIG CLIN: HCPCS | Performed by: INTERNAL MEDICINE

## 2023-03-31 PROCEDURE — 1090F PRES/ABSN URINE INCON ASSESS: CPT | Performed by: INTERNAL MEDICINE

## 2023-03-31 PROCEDURE — 99204 OFFICE O/P NEW MOD 45 MIN: CPT | Performed by: INTERNAL MEDICINE

## 2023-03-31 PROCEDURE — G8484 FLU IMMUNIZE NO ADMIN: HCPCS | Performed by: INTERNAL MEDICINE

## 2023-03-31 PROCEDURE — 1123F ACP DISCUSS/DSCN MKR DOCD: CPT | Performed by: INTERNAL MEDICINE

## 2023-03-31 NOTE — PATIENT INSTRUCTIONS
We will get you a Lexiscan to determine if you can safely proceed to have your neck fusion surgery    F/u to be determined pending results of this test.

## 2023-04-05 ENCOUNTER — OFFICE VISIT (OUTPATIENT)
Dept: FAMILY MEDICINE CLINIC | Age: 68
End: 2023-04-05

## 2023-04-05 VITALS
BODY MASS INDEX: 41.32 KG/M2 | DIASTOLIC BLOOD PRESSURE: 78 MMHG | HEIGHT: 65 IN | TEMPERATURE: 97.8 F | HEART RATE: 72 BPM | WEIGHT: 248 LBS | RESPIRATION RATE: 15 BRPM | OXYGEN SATURATION: 96 % | SYSTOLIC BLOOD PRESSURE: 132 MMHG

## 2023-04-05 DIAGNOSIS — I25.10 CORONARY ARTERY DISEASE INVOLVING NATIVE CORONARY ARTERY OF NATIVE HEART WITHOUT ANGINA PECTORIS: ICD-10-CM

## 2023-04-05 DIAGNOSIS — I10 PRIMARY HYPERTENSION: ICD-10-CM

## 2023-04-05 DIAGNOSIS — J41.0 SIMPLE CHRONIC BRONCHITIS (HCC): ICD-10-CM

## 2023-04-05 DIAGNOSIS — Z01.818 PRE-OP EVALUATION: Primary | ICD-10-CM

## 2023-04-05 DIAGNOSIS — E11.9 CONTROLLED TYPE 2 DIABETES MELLITUS WITHOUT COMPLICATION, WITHOUT LONG-TERM CURRENT USE OF INSULIN (HCC): ICD-10-CM

## 2023-04-05 RX ORDER — VIT C/B6/B5/MAGNESIUM/HERB 173 50-5-6-5MG
500 CAPSULE ORAL DAILY
COMMUNITY

## 2023-04-05 RX ORDER — OFLOXACIN 3 MG/ML
SOLUTION/ DROPS OPHTHALMIC
COMMUNITY
Start: 2023-03-06 | End: 2023-04-30

## 2023-04-05 RX ORDER — PREDNISOLONE ACETATE 10 MG/ML
SUSPENSION/ DROPS OPHTHALMIC
COMMUNITY
Start: 2023-03-06 | End: 2023-04-30

## 2023-04-05 RX ORDER — TRAMADOL HYDROCHLORIDE 50 MG/1
50 TABLET ORAL EVERY 6 HOURS
COMMUNITY
Start: 2023-03-31

## 2023-04-05 RX ORDER — ASPIRIN 81 MG/1
81 TABLET ORAL DAILY
COMMUNITY

## 2023-04-05 RX ORDER — KETOROLAC TROMETHAMINE 5 MG/ML
SOLUTION OPHTHALMIC
COMMUNITY
Start: 2023-04-02 | End: 2023-04-30

## 2023-04-05 SDOH — ECONOMIC STABILITY: FOOD INSECURITY: WITHIN THE PAST 12 MONTHS, THE FOOD YOU BOUGHT JUST DIDN'T LAST AND YOU DIDN'T HAVE MONEY TO GET MORE.: NEVER TRUE

## 2023-04-05 SDOH — ECONOMIC STABILITY: INCOME INSECURITY: HOW HARD IS IT FOR YOU TO PAY FOR THE VERY BASICS LIKE FOOD, HOUSING, MEDICAL CARE, AND HEATING?: NOT VERY HARD

## 2023-04-05 SDOH — ECONOMIC STABILITY: FOOD INSECURITY: WITHIN THE PAST 12 MONTHS, YOU WORRIED THAT YOUR FOOD WOULD RUN OUT BEFORE YOU GOT MONEY TO BUY MORE.: NEVER TRUE

## 2023-04-05 SDOH — ECONOMIC STABILITY: HOUSING INSECURITY
IN THE LAST 12 MONTHS, WAS THERE A TIME WHEN YOU DID NOT HAVE A STEADY PLACE TO SLEEP OR SLEPT IN A SHELTER (INCLUDING NOW)?: NO

## 2023-04-05 NOTE — PROGRESS NOTES
DEBRIDEMENT performed by Donna Joseph DO at [address] Bilateral      Family History   Problem Relation Age of Onset    Hypertension Mother     Cancer Father         lung    Colon Cancer Father     Arthritis Father     Diabetes Sister     Breast Cancer Sister     Diabetes Brother     Heart Failure Paternal Grandmother     Arthritis Maternal Grandmother     Kidney Disease Sister          Current Outpatient Medications   Medication Sig Dispense Refill    ketorolac (ACULAR) 0.5 % ophthalmic solution START DAY BEFORE SURGERY: INSTILL 1 DROP 4 TIMES DAILY IN OPERATIVE EYE. CONTINUE AFTER SURGERY FOR 4 WEEKS.      ofloxacin (OCUFLOX) 0.3 % solution INSTILL 1 DROP 4 TIMES DAILY IN OPERATIVE EYE. START DAY BEFORE SURGERY. CONTINUE AFTER SURGERY FOR 1 WEEK. prednisoLONE acetate (PRED FORTE) 1 % ophthalmic suspension START AFTER SURGERY; INSTILL 1 DROP 4 TIMES DAILY IN OPERATIVE EYE.      traMADol (ULTRAM) 50 MG tablet Take 1 tablet by mouth in the morning and 1 tablet at noon and 1 tablet in the evening and 1 tablet before bedtime.       aspirin 81 MG EC tablet Take 1 tablet by mouth daily      turmeric 500 MG CAPS Take 1 capsule by mouth daily      celecoxib (CELEBREX) 200 MG capsule Take 1 capsule by mouth twice daily 180 capsule 0    atorvastatin (LIPITOR) 40 MG tablet Take 1 tablet by mouth once daily 90 tablet 3    metFORMIN (GLUCOPHAGE-XR) 500 MG extended release tablet TAKE 2 TABLETS BY MOUTH ONCE DAILY WITH SUPPER 180 tablet 1    diclofenac sodium (VOLTAREN) 1 % GEL APPLY   TOPICALLY TWICE DAILY 200 g 11    tiZANidine (ZANAFLEX) 4 MG tablet TAKE 2 TABLETS BY MOUTH THREE TIMES DAILY 540 tablet 3    ondansetron (ZOFRAN) 4 MG tablet Take 1 tablet by mouth every 8 hours as needed for Nausea or Vomiting 30 tablet 5    hydroCHLOROthiazide (HYDRODIURIL) 25 MG tablet Take 1 tablet by mouth daily Take 25 mg by mouth daily (Patient taking differently: Take 1 tablet by mouth Take 25 mg by mouth

## 2023-04-09 DIAGNOSIS — R11.2 INTRACTABLE VOMITING WITH NAUSEA: Primary | ICD-10-CM

## 2023-04-09 DIAGNOSIS — S13.9XXS: ICD-10-CM

## 2023-04-09 DIAGNOSIS — R11.2 INTRACTABLE VOMITING WITH NAUSEA: ICD-10-CM

## 2023-04-09 RX ORDER — ONDANSETRON 4 MG/1
4 TABLET, ORALLY DISINTEGRATING ORAL 3 TIMES DAILY PRN
Qty: 21 TABLET | Refills: 0 | Status: SHIPPED | OUTPATIENT
Start: 2023-04-09 | End: 2023-04-09 | Stop reason: SDUPTHER

## 2023-04-09 RX ORDER — METHOCARBAMOL 500 MG/1
500 TABLET, FILM COATED ORAL 3 TIMES DAILY
Qty: 15 TABLET | Refills: 0 | Status: SHIPPED | OUTPATIENT
Start: 2023-04-09 | End: 2023-04-14

## 2023-04-09 RX ORDER — METHOCARBAMOL 500 MG/1
500 TABLET, FILM COATED ORAL 3 TIMES DAILY
Qty: 15 TABLET | Refills: 0 | Status: SHIPPED | OUTPATIENT
Start: 2023-04-09 | End: 2023-04-09 | Stop reason: SDUPTHER

## 2023-04-09 RX ORDER — ONDANSETRON 4 MG/1
4 TABLET, ORALLY DISINTEGRATING ORAL 3 TIMES DAILY PRN
Qty: 21 TABLET | Refills: 0 | Status: SHIPPED | OUTPATIENT
Start: 2023-04-09

## 2023-04-28 PROBLEM — Z01.810 PREOP CARDIOVASCULAR EXAM: Status: RESOLVED | Noted: 2023-03-29 | Resolved: 2023-04-28

## 2023-07-17 RX ORDER — CELECOXIB 200 MG/1
CAPSULE ORAL
Qty: 180 CAPSULE | Refills: 0 | Status: SHIPPED | OUTPATIENT
Start: 2023-07-17

## 2023-07-17 NOTE — TELEPHONE ENCOUNTER
Medication:   Requested Prescriptions     Pending Prescriptions Disp Refills    celecoxib (CELEBREX) 200 MG capsule [Pharmacy Med Name: Celecoxib 200 MG Oral Capsule] 180 capsule 0     Sig: Take 1 capsule by mouth twice daily        Last Filled:  03/14/2023 #180 0rf    Patient Phone Number: [phone] (home)     Last appt: 4/5/2023   Next appt: Visit date not found    Last OARRS: No flowsheet data found.

## 2023-08-01 RX ORDER — ALBUTEROL SULFATE 90 UG/1
AEROSOL, METERED RESPIRATORY (INHALATION)
Qty: 18 G | Refills: 5 | Status: SHIPPED | OUTPATIENT
Start: 2023-08-01

## 2023-08-22 DIAGNOSIS — E78.5 DYSLIPIDEMIA ASSOCIATED WITH TYPE 2 DIABETES MELLITUS (HCC): ICD-10-CM

## 2023-08-22 DIAGNOSIS — E11.69 DYSLIPIDEMIA ASSOCIATED WITH TYPE 2 DIABETES MELLITUS (HCC): ICD-10-CM

## 2023-08-22 DIAGNOSIS — I10 ESSENTIAL HYPERTENSION: ICD-10-CM

## 2023-08-22 DIAGNOSIS — E11.9 CONTROLLED TYPE 2 DIABETES MELLITUS WITHOUT COMPLICATION, WITHOUT LONG-TERM CURRENT USE OF INSULIN (HCC): ICD-10-CM

## 2023-08-23 LAB
EST. AVERAGE GLUCOSE BLD GHB EST-MCNC: 119.8 MG/DL
HBA1C MFR BLD: 5.8 %

## 2023-08-24 ENCOUNTER — OFFICE VISIT (OUTPATIENT)
Dept: ENDOCRINOLOGY | Age: 68
End: 2023-08-24
Payer: MEDICARE

## 2023-08-24 VITALS
HEART RATE: 77 BPM | DIASTOLIC BLOOD PRESSURE: 71 MMHG | BODY MASS INDEX: 38.82 KG/M2 | OXYGEN SATURATION: 97 % | WEIGHT: 233 LBS | HEIGHT: 65 IN | SYSTOLIC BLOOD PRESSURE: 118 MMHG

## 2023-08-24 DIAGNOSIS — E11.69 DYSLIPIDEMIA ASSOCIATED WITH TYPE 2 DIABETES MELLITUS (HCC): ICD-10-CM

## 2023-08-24 DIAGNOSIS — E11.9 CONTROLLED TYPE 2 DIABETES MELLITUS WITHOUT COMPLICATION, WITHOUT LONG-TERM CURRENT USE OF INSULIN (HCC): Primary | ICD-10-CM

## 2023-08-24 DIAGNOSIS — E78.5 DYSLIPIDEMIA ASSOCIATED WITH TYPE 2 DIABETES MELLITUS (HCC): ICD-10-CM

## 2023-08-24 PROCEDURE — 3017F COLORECTAL CA SCREEN DOC REV: CPT | Performed by: INTERNAL MEDICINE

## 2023-08-24 PROCEDURE — 2022F DILAT RTA XM EVC RTNOPTHY: CPT | Performed by: INTERNAL MEDICINE

## 2023-08-24 PROCEDURE — 3078F DIAST BP <80 MM HG: CPT | Performed by: INTERNAL MEDICINE

## 2023-08-24 PROCEDURE — 99214 OFFICE O/P EST MOD 30 MIN: CPT | Performed by: INTERNAL MEDICINE

## 2023-08-24 PROCEDURE — 3044F HG A1C LEVEL LT 7.0%: CPT | Performed by: INTERNAL MEDICINE

## 2023-08-24 PROCEDURE — 3074F SYST BP LT 130 MM HG: CPT | Performed by: INTERNAL MEDICINE

## 2023-08-24 PROCEDURE — G8417 CALC BMI ABV UP PARAM F/U: HCPCS | Performed by: INTERNAL MEDICINE

## 2023-08-24 PROCEDURE — G8427 DOCREV CUR MEDS BY ELIG CLIN: HCPCS | Performed by: INTERNAL MEDICINE

## 2023-08-24 PROCEDURE — 1090F PRES/ABSN URINE INCON ASSESS: CPT | Performed by: INTERNAL MEDICINE

## 2023-08-24 PROCEDURE — 1036F TOBACCO NON-USER: CPT | Performed by: INTERNAL MEDICINE

## 2023-08-24 PROCEDURE — G8399 PT W/DXA RESULTS DOCUMENT: HCPCS | Performed by: INTERNAL MEDICINE

## 2023-08-24 PROCEDURE — 1123F ACP DISCUSS/DSCN MKR DOCD: CPT | Performed by: INTERNAL MEDICINE

## 2023-08-24 RX ORDER — METFORMIN HYDROCHLORIDE 500 MG/1
TABLET, EXTENDED RELEASE ORAL
Qty: 180 TABLET | Refills: 1 | Status: SHIPPED | OUTPATIENT
Start: 2023-08-24

## 2023-08-24 RX ORDER — MINOXIDIL 2.5 MG/1
TABLET ORAL
COMMUNITY
Start: 2023-08-14

## 2023-08-24 RX ORDER — SPIRONOLACTONE 50 MG/1
50 TABLET, FILM COATED ORAL 2 TIMES DAILY
COMMUNITY
Start: 2023-08-14

## 2023-08-24 NOTE — PROGRESS NOTES
indoor  Low impact aerobic exercise    Instructions given to exercise for the following duration:  30 minutes a day for five-seven days per week.     Following instructions for being active throughout the day in addition to formal exercise:  Walk instead of drive whenever possible  Take the stairs instead of the elevator  Work in the garden  Park to the far end of the parking lot to add more walking steps to destination      Electronically signed by Tatianna Westfall MD on 8/24/2023 at 11:34 AM

## 2023-09-12 ENCOUNTER — OFFICE VISIT (OUTPATIENT)
Dept: PULMONOLOGY | Age: 68
End: 2023-09-12
Payer: MEDICARE

## 2023-09-12 VITALS
WEIGHT: 233.8 LBS | DIASTOLIC BLOOD PRESSURE: 72 MMHG | HEIGHT: 66 IN | OXYGEN SATURATION: 97 % | HEART RATE: 91 BPM | SYSTOLIC BLOOD PRESSURE: 118 MMHG | BODY MASS INDEX: 37.57 KG/M2

## 2023-09-12 DIAGNOSIS — K21.9 GASTROESOPHAGEAL REFLUX DISEASE WITHOUT ESOPHAGITIS: ICD-10-CM

## 2023-09-12 DIAGNOSIS — Z87.891 PERSONAL HISTORY OF TOBACCO USE: ICD-10-CM

## 2023-09-12 DIAGNOSIS — J44.9 COPD WITH ASTHMA (HCC): Primary | ICD-10-CM

## 2023-09-12 PROBLEM — J44.89 COPD WITH ASTHMA: Status: ACTIVE | Noted: 2019-01-23

## 2023-09-12 PROCEDURE — G8427 DOCREV CUR MEDS BY ELIG CLIN: HCPCS | Performed by: INTERNAL MEDICINE

## 2023-09-12 PROCEDURE — 3074F SYST BP LT 130 MM HG: CPT | Performed by: INTERNAL MEDICINE

## 2023-09-12 PROCEDURE — 1090F PRES/ABSN URINE INCON ASSESS: CPT | Performed by: INTERNAL MEDICINE

## 2023-09-12 PROCEDURE — 3017F COLORECTAL CA SCREEN DOC REV: CPT | Performed by: INTERNAL MEDICINE

## 2023-09-12 PROCEDURE — G0296 VISIT TO DETERM LDCT ELIG: HCPCS | Performed by: INTERNAL MEDICINE

## 2023-09-12 PROCEDURE — 3023F SPIROM DOC REV: CPT | Performed by: INTERNAL MEDICINE

## 2023-09-12 PROCEDURE — 1123F ACP DISCUSS/DSCN MKR DOCD: CPT | Performed by: INTERNAL MEDICINE

## 2023-09-12 PROCEDURE — G8399 PT W/DXA RESULTS DOCUMENT: HCPCS | Performed by: INTERNAL MEDICINE

## 2023-09-12 PROCEDURE — 99214 OFFICE O/P EST MOD 30 MIN: CPT | Performed by: INTERNAL MEDICINE

## 2023-09-12 PROCEDURE — G8417 CALC BMI ABV UP PARAM F/U: HCPCS | Performed by: INTERNAL MEDICINE

## 2023-09-12 PROCEDURE — 1036F TOBACCO NON-USER: CPT | Performed by: INTERNAL MEDICINE

## 2023-09-12 PROCEDURE — 3078F DIAST BP <80 MM HG: CPT | Performed by: INTERNAL MEDICINE

## 2023-09-12 NOTE — PROGRESS NOTES
DAILY Yes Willam Hess MD   ondansetron (ZOFRAN) 4 MG tablet Take 1 tablet by mouth every 8 hours as needed for Nausea or Vomiting Yes Willam Hess MD   hydroCHLOROthiazide (HYDRODIURIL) 25 MG tablet Take 1 tablet by mouth daily Take 25 mg by mouth daily  Patient taking differently: Take 0.5 tablets by mouth daily Yes Willam Hess MD   pantoprazole (PROTONIX) 20 MG tablet TAKE 1-2 TABLET BY MOUTH ONCE DAILY IN THE MORNING BEFORE BREAKFAST  Patient taking differently: TAKE 1TABLET BY MOUTH ONCE DAILY IN THE MORNING BEFORE BREAKFAST Yes Willam Hess MD   losartan (COZAAR) 100 MG tablet Take 1 tablet by mouth once daily Yes Willam Hess MD   amLODIPine (NORVASC) 5 MG tablet Take 1 tablet by mouth nightly Yes Willam Hess MD   triamcinolone (KENALOG) 0.025 % cream Apply Topically as needed up to twice daily, not to exceed 4 weeks. Yes Willam Hess MD   progesterone (PROMETRIUM) 100 MG CAPS capsule TAKE 1 CAPSULE BY MOUTH ONCE DAILY Yes Historical Provider, MD   estradiol (ESTRACE) 1 MG tablet Take 1 tablet by mouth daily Yes Historical Provider, MD   clobetasol (TEMOVATE) 0.05 % cream  Yes Historical Provider, MD   SYMBICORT 160-4.5 MCG/ACT AERO Inhale 2 puffs into the lungs 2 times daily Yes Olivia Gross MD   tretinoin (RETIN-A) 0.025 % cream Apply topically nightly. Yes Willam Hess MD   Cyanocobalamin (VITAMIN B-12 PO) Take by mouth Yes Historical Provider, MD   FOLIC ACID PO Take by mouth Yes Historical Provider, MD   Blood Glucose Monitoring Suppl (BLOOD GLUCOSE MONITOR SYSTEM) w/Device KIT To check blood sugar 4 times daily.  Diagnosis Code: E 11.9 Yes Maximo Gallagher MD   blood glucose monitor strips Use 4 times daily to check blood sugar Diagnosis Code; E 11.9 Yes Maximo Gallagher MD   Lancets 30G MISC 4 each by Does not apply route daily Use 4 times daily to check blood sugar Diagnosis Code: E 11.9 Yes Maximo Gallagher MD   medical marijuana

## 2023-09-19 ENCOUNTER — HOSPITAL ENCOUNTER (OUTPATIENT)
Dept: MAMMOGRAPHY | Age: 68
Discharge: HOME | End: 2023-09-19
Payer: MEDICARE

## 2023-09-19 VITALS — WEIGHT: 233 LBS | BODY MASS INDEX: 38.82 KG/M2 | HEIGHT: 65 IN

## 2023-09-19 DIAGNOSIS — Z12.31 ENCOUNTER FOR SCREENING MAMMOGRAM FOR MALIGNANT NEOPLASM OF BREAST: Primary | ICD-10-CM

## 2023-09-19 DIAGNOSIS — Z12.31 ENCOUNTER FOR SCREENING MAMMOGRAM FOR MALIGNANT NEOPLASM OF BREAST: ICD-10-CM

## 2023-09-19 PROCEDURE — 77067 SCR MAMMO BI INCL CAD: CPT

## 2023-10-02 ENCOUNTER — TELEPHONE (OUTPATIENT)
Dept: PULMONOLOGY | Age: 68
End: 2023-10-02

## 2023-10-02 RX ORDER — BUDESONIDE AND FORMOTEROL FUMARATE DIHYDRATE 160; 4.5 UG/1; UG/1
2 AEROSOL RESPIRATORY (INHALATION) 2 TIMES DAILY
Qty: 11 G | Refills: 5 | Status: SHIPPED | OUTPATIENT
Start: 2023-10-02

## 2023-10-02 NOTE — TELEPHONE ENCOUNTER
Patient called and needs a refill on her medication.      SYMBICORT 160-4.5 MCG/ACT AERO [[id number]]     [address] [place] [address] [address] [facility] - P [phone] Moses Dodson [phone] [id number] [address]: [phone]

## 2023-10-05 DIAGNOSIS — R11.2 INTRACTABLE VOMITING WITH NAUSEA: ICD-10-CM

## 2023-10-06 RX ORDER — ONDANSETRON 4 MG/1
4 TABLET, ORALLY DISINTEGRATING ORAL 3 TIMES DAILY PRN
Qty: 21 TABLET | Refills: 0 | Status: SHIPPED | OUTPATIENT
Start: 2023-10-06

## 2023-10-06 NOTE — TELEPHONE ENCOUNTER
Medication:   Requested Prescriptions     Pending Prescriptions Disp Refills    ondansetron (ZOFRAN-ODT) 4 MG disintegrating tablet 21 tablet 0     Sig: Take 1 tablet by mouth 3 times daily as needed for Nausea or Vomiting        Last Filled:  4/9/2023    Patient Phone Number: [phone] (home)     Last appt: Visit date not found   Next appt: Visit date not found    Last OARRS:        No data to display

## 2023-10-09 RX ORDER — CELECOXIB 200 MG/1
CAPSULE ORAL
Qty: 180 CAPSULE | Refills: 0 | Status: SHIPPED | OUTPATIENT
Start: 2023-10-09

## 2023-10-09 NOTE — TELEPHONE ENCOUNTER
Medication:   Requested Prescriptions     Pending Prescriptions Disp Refills    celecoxib (CELEBREX) 200 MG capsule [Pharmacy Med Name: Celecoxib 200 MG Oral Capsule] 180 capsule 0     Sig: Take 1 capsule by mouth twice daily        Last Filled:  07/17/2023 #180 0rf     Patient Phone Number: [phone] (home)     Last appt: 4/5/2023   Next appt: Visit date not found    Last OARRS:        No data to display

## 2023-10-13 RX ORDER — CELECOXIB 200 MG/1
200 CAPSULE ORAL 2 TIMES DAILY
Qty: 180 CAPSULE | Refills: 0 | OUTPATIENT
Start: 2023-10-13

## 2023-10-13 NOTE — TELEPHONE ENCOUNTER
Medication:   Requested Prescriptions     Pending Prescriptions Disp Refills    celecoxib (CELEBREX) 200 MG capsule 180 capsule 0     Sig: Take 1 capsule by mouth 2 times daily        Last Filled:      Patient Phone Number: [phone] (home)     Last appt: 4/5/2023   Next appt: Visit date not found    Last OARRS:        No data to display

## 2023-10-23 ENCOUNTER — PATIENT MESSAGE (OUTPATIENT)
Dept: PULMONOLOGY | Age: 68
End: 2023-10-23

## 2023-10-23 DIAGNOSIS — J44.89 COPD WITH ASTHMA: Primary | ICD-10-CM

## 2023-10-23 NOTE — TELEPHONE ENCOUNTER
From: Robbin Viveros  To: Dr. Kassidy Quinonez: 10/23/2023 12:22 PM EDT  Subject: [facility] no longer covers symbicort inhaler    I received a letter from [facility] that they will no longer cover my Symbicort 160-4.5 mcg inhaler. The option given is Brel Ellipta powder for inhalation, Dulera Hfa Aerosol inhaler, Fluticasone Prop-SalmeterolBreath Activated Powder Inhaler. Will any of these be an alternative to the Symbicort? If not I will need to look for an alternative medication plan.  Thank you, Chong Julian

## 2023-10-23 NOTE — TELEPHONE ENCOUNTER
Called in med per physician. Left on pharmacy Voicemail. Called and LVM for patient also sent [employer] message.

## 2023-10-26 NOTE — TELEPHONE ENCOUNTER
Attempted to complete PA for [facility]. PA states Symbicort is a covered formulary medication. Spoke with patient. Informed of above. She states the formulary change will not take effect until 01/01/24. Advised patient that we will have to wait until after 01/01/24 to make the change because the PA criteria is based on the current formulary. Patient voiced understanding.

## 2023-11-02 ENCOUNTER — TELEPHONE (OUTPATIENT)
Dept: FAMILY MEDICINE CLINIC | Age: 68
End: 2023-11-02

## 2023-11-02 NOTE — TELEPHONE ENCOUNTER
Patient is asking for a medication refill for Robaxin 750mg 1 tab every 6 hours as needed. , may  was prescribing it Dr. Keiko Esteves. Patient is asking if  [facility] can send the medication in for her. She has been on this medication since April.  Please advise

## 2023-11-03 NOTE — TELEPHONE ENCOUNTER
Please have her continue to get from Dr. Deedee Sultana as per his notes it looks like she will be seeing him on an ongoing basis.

## 2023-11-17 ENCOUNTER — TELEMEDICINE (OUTPATIENT)
Dept: FAMILY MEDICINE CLINIC | Age: 68
End: 2023-11-17

## 2023-11-17 DIAGNOSIS — J44.89 COPD WITH ASTHMA: ICD-10-CM

## 2023-11-17 DIAGNOSIS — R53.1 EPISODE OF GENERALIZED WEAKNESS: Primary | ICD-10-CM

## 2023-11-17 DIAGNOSIS — I25.10 CORONARY ARTERY CALCIFICATION SEEN ON CT SCAN: ICD-10-CM

## 2023-11-17 DIAGNOSIS — E11.9 CONTROLLED TYPE 2 DIABETES MELLITUS WITHOUT COMPLICATION, WITHOUT LONG-TERM CURRENT USE OF INSULIN (HCC): ICD-10-CM

## 2023-11-17 DIAGNOSIS — M54.2 PAIN IN NECK: ICD-10-CM

## 2023-11-17 PROBLEM — M25.511 ACUTE PAIN OF RIGHT SHOULDER: Status: RESOLVED | Noted: 2020-05-21 | Resolved: 2023-11-17

## 2023-11-17 PROBLEM — R06.02 SOB (SHORTNESS OF BREATH): Status: RESOLVED | Noted: 2019-01-23 | Resolved: 2023-11-17

## 2023-11-17 PROBLEM — M48.02 SPINAL STENOSIS OF CERVICAL REGION: Status: ACTIVE | Noted: 2023-03-21

## 2023-11-17 RX ORDER — METHOCARBAMOL 750 MG/1
750 TABLET, FILM COATED ORAL 4 TIMES DAILY
Qty: 360 TABLET | Refills: 1 | Status: SHIPPED | OUTPATIENT
Start: 2023-11-17 | End: 2024-05-15

## 2023-11-17 NOTE — PROGRESS NOTES
TELEHEALTH EVALUATION -- Audio/Visual     Anisa Barrett   YOB: 1955    Date of Visit:  11/17/2023    No Known Allergies  Current Outpatient Medications on File Prior to Visit   Medication Sig Dispense Refill    celecoxib (CELEBREX) 200 MG capsule Take 1 capsule by mouth twice daily 180 capsule 0    ondansetron (ZOFRAN-ODT) 4 MG disintegrating tablet Take 1 tablet by mouth 3 times daily as needed for Nausea or Vomiting 21 tablet 0    SYMBICORT 160-4.5 MCG/ACT AERO Inhale 2 puffs by mouth twice daily 11 g 5    minoxidil (LONITEN) 2.5 MG tablet TAKE 1/2 (ONE-HALF) TABLET BY MOUTH ONCE DAILY UNTIL FOLLOW UP IN 6 MONTHS      spironolactone (ALDACTONE) 50 MG tablet Take 1 tablet by mouth 2 times daily      metFORMIN (GLUCOPHAGE-XR) 500 MG extended release tablet TAKE 2 TABLETS BY MOUTH ONCE DAILY WITH SUPPER 180 tablet 1    albuterol sulfate HFA (PROVENTIL;VENTOLIN;PROAIR) 108 (90 Base) MCG/ACT inhaler INHALE 2 PUFFS BY MOUTH EVERY 6 HOURS AS NEEDED FOR WHEEZING 18 g 5    aspirin 81 MG EC tablet Take 1 tablet by mouth daily      turmeric 500 MG CAPS Take 1 capsule by mouth daily      atorvastatin (LIPITOR) 40 MG tablet Take 1 tablet by mouth once daily 90 tablet 3    diclofenac sodium (VOLTAREN) 1 % GEL APPLY   TOPICALLY TWICE DAILY 200 g 11    ondansetron (ZOFRAN) 4 MG tablet Take 1 tablet by mouth every 8 hours as needed for Nausea or Vomiting 30 tablet 5    hydroCHLOROthiazide (HYDRODIURIL) 25 MG tablet Take 1 tablet by mouth daily Take 25 mg by mouth daily (Patient taking differently: Take 0.5 tablets by mouth daily) 90 tablet 3    pantoprazole (PROTONIX) 20 MG tablet TAKE 1-2 TABLET BY MOUTH ONCE DAILY IN THE MORNING BEFORE BREAKFAST (Patient taking differently: TAKE 1TABLET BY MOUTH ONCE DAILY IN THE MORNING BEFORE BREAKFAST) 180 tablet 3    losartan (COZAAR) 100 MG tablet Take 1 tablet by mouth once daily 90 tablet 3    amLODIPine (NORVASC) 5 MG tablet Take 1 tablet by mouth nightly 90 tablet 3

## 2023-12-28 DIAGNOSIS — J44.89 COPD WITH ASTHMA: Primary | ICD-10-CM

## 2023-12-28 NOTE — TELEPHONE ENCOUNTER
Medication:   Requested Prescriptions     Pending Prescriptions Disp Refills    diclofenac sodium (VOLTAREN) 1 %  g 11     Sig: APPLY   TOPICALLY TWICE DAILY        Last Filled:  01/13/2023 #200g 11rf    Patient Phone Number: [phone] (home)     Last appt: 11/17/2023   Next appt: Visit date not found    Last OARRS:        No data to display

## 2024-01-02 RX ORDER — BUDESONIDE AND FORMOTEROL FUMARATE DIHYDRATE 160; 4.5 UG/1; UG/1
2 AEROSOL RESPIRATORY (INHALATION) 2 TIMES DAILY
Qty: 10.2 G | Refills: 3 | Status: SHIPPED | OUTPATIENT
Start: 2024-01-02

## 2024-01-14 ENCOUNTER — TELEPHONE (OUTPATIENT)
Dept: CASE MANAGEMENT | Age: 69
End: 2024-01-14

## 2024-01-15 RX ORDER — CELECOXIB 200 MG/1
CAPSULE ORAL
Qty: 180 CAPSULE | Refills: 0 | Status: SHIPPED | OUTPATIENT
Start: 2024-01-15

## 2024-01-15 NOTE — TELEPHONE ENCOUNTER
Medication:   Requested Prescriptions     Pending Prescriptions Disp Refills    celecoxib (CELEBREX) 200 MG capsule [Pharmacy Med Name: Celecoxib 200 MG Oral Capsule] 180 capsule 0     Sig: Take 1 capsule by mouth twice daily        Last Filled:  10/09/2023 #180 0rf     Patient Phone Number: [phone] (home)     Last appt: 11/17/2023   Next appt: Visit date not found    Last OARRS:        No data to display

## 2024-01-24 ENCOUNTER — TELEPHONE (OUTPATIENT)
Dept: PULMONOLOGY | Age: 69
End: 2024-01-24

## 2024-01-24 DIAGNOSIS — J44.89 COPD WITH ASTHMA: Primary | ICD-10-CM

## 2024-01-24 RX ORDER — FLUTICASONE PROPIONATE 110 UG/1
2 AEROSOL, METERED RESPIRATORY (INHALATION) 2 TIMES DAILY
Qty: 24 G | Refills: 3 | Status: SHIPPED | OUTPATIENT
Start: 2024-01-24 | End: 2025-01-23

## 2024-01-24 NOTE — TELEPHONE ENCOUNTER
Patient insurance would like flovent per patient.    Please review pended med below and send if you approve.

## 2024-01-24 NOTE — TELEPHONE ENCOUNTER
Could we initiate prior auth below for patient:    fluticasone (FLOVENT HFA) 110 MCG/ACT inhaler [[id number]]    Order Details  Dose: 2 puff Route: Inhalation Frequency: 2 TIMES DAILY   Dispense Quantity: 24 g Refills: 3          Sig: Inhale 2 puffs into the lungs 2 times daily         Start Date: 01/24/24       Thanks for your help!

## 2024-01-25 DIAGNOSIS — J44.89 COPD WITH ASTHMA: Primary | ICD-10-CM

## 2024-01-25 NOTE — TELEPHONE ENCOUNTER
Submitted PA for FLUTICASONE  Via [facility] Key: [id number] STATUS: PENDING.    Follow up done daily; if no decision with in three days we will refax.  If another three days goes by with no decision will call the insurance for status.

## 2024-01-30 DIAGNOSIS — J44.89 COPD WITH ASTHMA: Primary | ICD-10-CM

## 2024-01-30 RX ORDER — BUDESONIDE AND FORMOTEROL FUMARATE DIHYDRATE 160; 4.5 UG/1; UG/1
2 AEROSOL RESPIRATORY (INHALATION) 2 TIMES DAILY
Qty: 10.2 G | Refills: 4 | Status: SHIPPED | OUTPATIENT
Start: 2024-01-30

## 2024-02-12 DIAGNOSIS — E78.5 DYSLIPIDEMIA ASSOCIATED WITH TYPE 2 DIABETES MELLITUS (HCC): ICD-10-CM

## 2024-02-12 DIAGNOSIS — E11.69 DYSLIPIDEMIA ASSOCIATED WITH TYPE 2 DIABETES MELLITUS (HCC): ICD-10-CM

## 2024-02-12 DIAGNOSIS — E11.9 CONTROLLED TYPE 2 DIABETES MELLITUS WITHOUT COMPLICATION, WITHOUT LONG-TERM CURRENT USE OF INSULIN (HCC): ICD-10-CM

## 2024-02-12 LAB
ALBUMIN SERPL-MCNC: 4.4 G/DL (ref 3.4–5)
ALBUMIN/GLOB SERPL: 1.8 {RATIO} (ref 1.1–2.2)
ALP SERPL-CCNC: 68 U/L (ref 40–129)
ALT SERPL-CCNC: 9 U/L (ref 10–40)
ANION GAP SERPL CALCULATED.3IONS-SCNC: 7 MMOL/L (ref 3–16)
AST SERPL-CCNC: 12 U/L (ref 15–37)
BILIRUB SERPL-MCNC: 0.3 MG/DL (ref 0–1)
BUN SERPL-MCNC: 27 MG/DL (ref 7–20)
CALCIUM SERPL-MCNC: 8.8 MG/DL (ref 8.3–10.6)
CHLORIDE SERPL-SCNC: 103 MMOL/L (ref 99–110)
CHOLEST SERPL-MCNC: 164 MG/DL (ref 0–199)
CO2 SERPL-SCNC: 29 MMOL/L (ref 21–32)
CREAT SERPL-MCNC: 1 MG/DL (ref 0.6–1.2)
CREAT UR-MCNC: 126 MG/DL (ref 28–259)
GFR SERPLBLD CREATININE-BSD FMLA CKD-EPI: >60 ML/MIN/{1.73_M2}
GLUCOSE SERPL-MCNC: 110 MG/DL (ref 70–99)
HDLC SERPL-MCNC: 58 MG/DL (ref 40–60)
LDL CHOLESTEROL CALCULATED: 88 MG/DL
MICROALBUMIN UR DL<=1MG/L-MCNC: <1.2 MG/DL
MICROALBUMIN/CREAT UR: NORMAL MG/G (ref 0–30)
POTASSIUM SERPL-SCNC: 4 MMOL/L (ref 3.5–5.1)
PROT SERPL-MCNC: 6.8 G/DL (ref 6.4–8.2)
SODIUM SERPL-SCNC: 139 MMOL/L (ref 136–145)
TRIGL SERPL-MCNC: 88 MG/DL (ref 0–150)
VLDLC SERPL CALC-MCNC: 18 MG/DL

## 2024-02-13 ENCOUNTER — TELEPHONE (OUTPATIENT)
Dept: PULMONOLOGY | Age: 69
End: 2024-02-13

## 2024-02-13 DIAGNOSIS — J44.89 COPD WITH ASTHMA: ICD-10-CM

## 2024-02-13 LAB
EST. AVERAGE GLUCOSE BLD GHB EST-MCNC: 108.3 MG/DL
HBA1C MFR BLD: 5.4 %

## 2024-02-13 RX ORDER — BUDESONIDE AND FORMOTEROL FUMARATE DIHYDRATE 160; 4.5 UG/1; UG/1
2 AEROSOL RESPIRATORY (INHALATION) 2 TIMES DAILY
Qty: 10.2 G | Refills: 0 | Status: SHIPPED | OUTPATIENT
Start: 2024-02-13

## 2024-02-13 NOTE — TELEPHONE ENCOUNTER
Pt left voice mail regarding her inhaler, her insurance isn't going to cover symbicort anymore, she wants some names of replacements she can call her insurance company to find out the prices.    [phone]

## 2024-02-13 NOTE — TELEPHONE ENCOUNTER
Could we please initiate another prior Auth on med below:    Patient states as long as she has a prior Auth she can get the med for 105.00.    budesonide-formoterol (SYMBICORT) 160-4.5 MCG/ACT AERO [[id number]]    Order Details  Dose: 2 puff Route: Inhalation Frequency: 2 TIMES DAILY   Dispense Quantity: 10.2 g Refills: 3          Sig: Inhale 2 puffs into the lungs 2 times daily         Start Date: 01/02/24

## 2024-02-13 NOTE — TELEPHONE ENCOUNTER
Patient states she needs a refill on symbicort sent to local pharmacy.    States she just needs one to get with good RX coupon to hold her over until prior Auth is done.

## 2024-02-13 NOTE — TELEPHONE ENCOUNTER
Submitted PA for SYMBICORT  Via [facility] Key: [id number] STATUS: PENDING.    Follow up done daily; if no decision with in three days we will refax.  If another three days goes by with no decision will call the insurance for status.

## 2024-02-13 NOTE — TELEPHONE ENCOUNTER
Called and spoke with patient.    She is aware that prior auth has been submitted and that she is looking at the charge of 105.00 dollars for the cost of med.

## 2024-02-21 ENCOUNTER — OFFICE VISIT (OUTPATIENT)
Dept: ENDOCRINOLOGY | Age: 69
End: 2024-02-21

## 2024-02-21 VITALS
WEIGHT: 231 LBS | HEIGHT: 65 IN | SYSTOLIC BLOOD PRESSURE: 112 MMHG | OXYGEN SATURATION: 98 % | BODY MASS INDEX: 38.49 KG/M2 | DIASTOLIC BLOOD PRESSURE: 70 MMHG | HEART RATE: 79 BPM

## 2024-02-21 DIAGNOSIS — E11.69 DYSLIPIDEMIA ASSOCIATED WITH TYPE 2 DIABETES MELLITUS (HCC): ICD-10-CM

## 2024-02-21 DIAGNOSIS — E78.5 DYSLIPIDEMIA ASSOCIATED WITH TYPE 2 DIABETES MELLITUS (HCC): ICD-10-CM

## 2024-02-21 DIAGNOSIS — E11.9 CONTROLLED TYPE 2 DIABETES MELLITUS WITHOUT COMPLICATION, WITHOUT LONG-TERM CURRENT USE OF INSULIN (HCC): Primary | ICD-10-CM

## 2024-02-21 DIAGNOSIS — I10 ESSENTIAL HYPERTENSION: ICD-10-CM

## 2024-02-21 RX ORDER — DULAGLUTIDE 0.75 MG/.5ML
0.75 INJECTION, SOLUTION SUBCUTANEOUS WEEKLY
Qty: 6 ML | Refills: 1 | Status: SHIPPED | OUTPATIENT
Start: 2024-02-21

## 2024-02-21 NOTE — PROGRESS NOTES
HENT: Negative for congestion, ear pain, rhinorrhea,  sore throat and trouble swallowing.   Eyes: Negative for photophobia, redness, itching.   Respiratory: Negative for cough, shortness of breath and sputum.   Cardiovascular: Negative for chest pain, palpitations and leg swelling.   Gastrointestinal: Negative for nausea, vomiting, abdominal pain, diarrhea, constipation.   Endocrine: Negative for cold intolerance, heat intolerance, polydipsia, polyphagia and polyuria.   Genitourinary: Negative for dysuria, urgency, frequency, hematuria and flank pain.   Musculoskeletal: Negative for myalgias, back pain, arthralgias and neck pain.   Skin/Nail: Negative for rash, itching. Normal nails.   Neurological: Negative for seizures, weakness, light-headedness, numbness and headaches.   Hematological/ Lymph nodes: Negative for adenopathy. Does not bruise/bleed easily.   Psychiatric/Behavioral: Negative for suicidal ideas, depression, anxiety, sleep disturbance and decreased concentration.          Objective:   Physical Exam:  /70   Pulse 79   Ht 1.651 m (5' 5\")   Wt 104.8 kg (231 lb)   SpO2 98%   BMI 38.44 kg/m²     Constitutional: Patient is oriented to person, place, and time. Patient appears well-developed and well-nourished.   HENT:               Head: Normocephalic and atraumatic.                Eyes: Conjunctivae and EOM are normal.                Neck: Normal range of motion.   Cardiovascular: Normal rate, regular rhythm and normal heart sounds.    Pulmonary/Chest: Effort normal and breath sounds normal.   Abdominal: Soft. Bowel sounds are normal.   Musculoskeletal: Normal range of motion.   Neurological: Patient is alert and oriented to person, place, and time. Patient has normal reflexes.   Skin: Skin is warm and dry.   Psychiatric: Patient has a normal mood and affect. Patient behavior is normal.     Lab Review:    Orders Only on 02/12/2024   Component Date Value Ref Range Status    Microalbumin, Random

## 2024-02-22 ENCOUNTER — HOSPITAL ENCOUNTER (OUTPATIENT)
Dept: CT IMAGING | Age: 69
Discharge: HOME | End: 2024-02-22
Attending: INTERNAL MEDICINE
Payer: MEDICARE

## 2024-02-22 DIAGNOSIS — Z87.891 PERSONAL HISTORY OF TOBACCO USE: ICD-10-CM

## 2024-02-22 PROCEDURE — 71271 CT THORAX LUNG CANCER SCR C-: CPT

## 2024-02-22 RX ORDER — SEMAGLUTIDE 1.34 MG/ML
0.25 INJECTION, SOLUTION SUBCUTANEOUS WEEKLY
Qty: 1.5 ML | Refills: 2 | Status: SHIPPED | OUTPATIENT
Start: 2024-02-22

## 2024-02-22 NOTE — TELEPHONE ENCOUNTER
Call from pt stating that Trulicity copay is too expensive and she cannot afford to the copay     Pt stated that Ozempic would but a lot cheaper for her and she is wanting to know if Dr. Escobar could send in a script to her pharmacy for Ozempic     Please advise

## 2024-03-12 ENCOUNTER — OFFICE VISIT (OUTPATIENT)
Dept: PULMONOLOGY | Age: 69
End: 2024-03-12
Payer: MEDICARE

## 2024-03-12 VITALS
OXYGEN SATURATION: 98 % | WEIGHT: 234 LBS | SYSTOLIC BLOOD PRESSURE: 132 MMHG | HEART RATE: 79 BPM | HEIGHT: 65 IN | BODY MASS INDEX: 38.99 KG/M2 | DIASTOLIC BLOOD PRESSURE: 70 MMHG

## 2024-03-12 DIAGNOSIS — E66.01 SEVERE OBESITY (BMI 35.0-39.9) WITH COMORBIDITY (HCC): ICD-10-CM

## 2024-03-12 DIAGNOSIS — R91.1 PULMONARY NODULE: ICD-10-CM

## 2024-03-12 DIAGNOSIS — J44.89 COPD WITH ASTHMA: Primary | ICD-10-CM

## 2024-03-12 DIAGNOSIS — J43.2 CENTRILOBULAR EMPHYSEMA (HCC): ICD-10-CM

## 2024-03-12 DIAGNOSIS — K21.9 GASTROESOPHAGEAL REFLUX DISEASE WITHOUT ESOPHAGITIS: ICD-10-CM

## 2024-03-12 PROBLEM — J44.9 COPD, MODERATE (HCC): Status: RESOLVED | Noted: 2019-01-23 | Resolved: 2024-03-12

## 2024-03-12 PROCEDURE — 3023F SPIROM DOC REV: CPT | Performed by: INTERNAL MEDICINE

## 2024-03-12 PROCEDURE — 3075F SYST BP GE 130 - 139MM HG: CPT | Performed by: INTERNAL MEDICINE

## 2024-03-12 PROCEDURE — 99214 OFFICE O/P EST MOD 30 MIN: CPT | Performed by: INTERNAL MEDICINE

## 2024-03-12 PROCEDURE — G8399 PT W/DXA RESULTS DOCUMENT: HCPCS | Performed by: INTERNAL MEDICINE

## 2024-03-12 PROCEDURE — G8417 CALC BMI ABV UP PARAM F/U: HCPCS | Performed by: INTERNAL MEDICINE

## 2024-03-12 PROCEDURE — 3078F DIAST BP <80 MM HG: CPT | Performed by: INTERNAL MEDICINE

## 2024-03-12 PROCEDURE — G8484 FLU IMMUNIZE NO ADMIN: HCPCS | Performed by: INTERNAL MEDICINE

## 2024-03-12 PROCEDURE — G8427 DOCREV CUR MEDS BY ELIG CLIN: HCPCS | Performed by: INTERNAL MEDICINE

## 2024-03-12 PROCEDURE — 1036F TOBACCO NON-USER: CPT | Performed by: INTERNAL MEDICINE

## 2024-03-12 PROCEDURE — 1123F ACP DISCUSS/DSCN MKR DOCD: CPT | Performed by: INTERNAL MEDICINE

## 2024-03-12 PROCEDURE — 1090F PRES/ABSN URINE INCON ASSESS: CPT | Performed by: INTERNAL MEDICINE

## 2024-03-12 PROCEDURE — 3017F COLORECTAL CA SCREEN DOC REV: CPT | Performed by: INTERNAL MEDICINE

## 2024-03-12 RX ORDER — BUDESONIDE AND FORMOTEROL FUMARATE DIHYDRATE 160; 4.5 UG/1; UG/1
2 AEROSOL RESPIRATORY (INHALATION) 2 TIMES DAILY
Qty: 10.2 G | Refills: 3 | Status: SHIPPED | OUTPATIENT
Start: 2024-03-12

## 2024-03-12 RX ORDER — PREDNISONE 10 MG/1
TABLET ORAL
Qty: 30 TABLET | Refills: 0 | Status: SHIPPED | OUTPATIENT
Start: 2024-03-12 | End: 2024-03-22

## 2024-03-12 RX ORDER — DOXYCYCLINE HYCLATE 100 MG/1
100 CAPSULE ORAL 2 TIMES DAILY
Qty: 20 CAPSULE | Refills: 0 | Status: SHIPPED | OUTPATIENT
Start: 2024-03-12 | End: 2024-03-22

## 2024-03-12 NOTE — PROGRESS NOTES
[facility]  Consult Note  Fabrizio Carvalho MD       Britt Cho   YOB: 1955    Date of Visit:  3/12/2024    Assessment/Plan:  1. SOB (shortness of breath)  Multifactorial  COPD  Asthma  Obesity  Deconditioning    She has sore throat and congestion  Will have her test for COVID  Gave her a script for Doxy and Pred to hold onto    2. COPD, moderate with asthma  PFT 3/23:  Spirometry:   Flow volume loops were normal. The FEV-1/FVC ratio was normal.   The  post-bronchodilator FEV-1 was 1.82 liters (75% of   predicted), which was mildly decreased. The FVC was 2.34 liters   (74% of predicted), which was decreased. Response to inhaled   bronchodilators (albuterol) was not significant.     Lung volumes:   Lung volumes were tested by plethysmography. The total lung   capacity was 4.86 liters (97% of predicted), which was normal.   The residual volume was 2.43 liters (122% of predicted), which   was increased. The ratio of residual volume to total lung   capacity (RV/TLC) was 126, which was increased. Specific airway   resistance was increased.     Diffusion capacity was found to be decreased.       Interpretation:   Evidence of air trapping.  No obstruction noted.  Mild reduction   in diffusion capacity.  Clinical correlation recommended    I have independently reviewed pulmonary function testing.  PFT is improved compared to previous    Medication Management:  The patient benefits from the medical regimen and reports no complications.    Symbicort  Albuterol  HHN    3. Centrilobular Emphysema/PN  CT finding of moderate emphysema  Also has small PN in the LEVI  Continue annual screening CT chest    4. GERD  Medication Management:  The patient benefits from the medical regimen and reports no complications.    Prilsoec    Also elevates the head of her bead and both help    5. COVID infection  recovered.    6. Immunization  COVID UTD  Flu shot given    6

## 2024-03-14 RX ORDER — AMLODIPINE BESYLATE 5 MG/1
5 TABLET ORAL NIGHTLY
Qty: 90 TABLET | Refills: 3 | Status: SHIPPED | OUTPATIENT
Start: 2024-03-14

## 2024-03-14 RX ORDER — AMLODIPINE BESYLATE 5 MG/1
5 TABLET ORAL NIGHTLY
Qty: 90 TABLET | Refills: 0 | OUTPATIENT
Start: 2024-03-14

## 2024-03-14 RX ORDER — LOSARTAN POTASSIUM 100 MG/1
TABLET ORAL
Qty: 90 TABLET | Refills: 3 | Status: SHIPPED | OUTPATIENT
Start: 2024-03-14

## 2024-03-14 RX ORDER — LOSARTAN POTASSIUM 100 MG/1
TABLET ORAL
Qty: 90 TABLET | Refills: 0 | OUTPATIENT
Start: 2024-03-14

## 2024-03-14 NOTE — TELEPHONE ENCOUNTER
Medication:   Requested Prescriptions     Pending Prescriptions Disp Refills    losartan (COZAAR) 100 MG tablet 90 tablet 3     Sig: Take 1 tablet by mouth once daily    amLODIPine (NORVASC) 5 MG tablet 90 tablet 3     Sig: Take 1 tablet by mouth nightly       Last Filled:  01/13/2023 #90 3rf    Patient Phone Number: [phone] (home)     Last appt: 11/17/2023   Next appt: Visit date not found    Lab Results   Component Value Date     02/12/2024    K 4.0 02/12/2024     02/12/2024    CO2 29 02/12/2024    BUN 27 (H) 02/12/2024    CREATININE 1.0 02/12/2024    GLUCOSE 110 (H) 02/12/2024    CALCIUM 8.8 02/12/2024    PROT 6.8 02/12/2024    LABALBU 4.4 02/12/2024    BILITOT 0.3 02/12/2024    ALKPHOS 68 02/12/2024    AST 12 (L) 02/12/2024    ALT 9 (L) 02/12/2024    LABGLOM >60 02/12/2024    GFRAA >60 08/08/2022    AGRATIO 1.8 02/12/2024    GLOB 2.5 01/11/2021

## 2024-03-14 NOTE — TELEPHONE ENCOUNTER
Medication:   Requested Prescriptions     Pending Prescriptions Disp Refills    losartan (COZAAR) 100 MG tablet [Pharmacy Med Name: Losartan Potassium 100 MG Oral Tablet] 90 tablet 0     Sig: Take 1 tablet by mouth once daily    amLODIPine (NORVASC) 5 MG tablet [Pharmacy Med Name: amLODIPine Besylate 5 MG Oral Tablet] 90 tablet 0     Sig: Take 1 tablet by mouth nightly       Last Filled:  03/14/2024     Duplicate request

## 2024-03-18 ENCOUNTER — HOSPITAL ENCOUNTER (OUTPATIENT)
Dept: CT IMAGING | Age: 69
Discharge: HOME | End: 2024-03-18
Attending: STUDENT IN AN ORGANIZED HEALTH CARE EDUCATION/TRAINING PROGRAM
Payer: MEDICARE

## 2024-03-18 DIAGNOSIS — Z98.1 ARTHRODESIS STATUS: ICD-10-CM

## 2024-03-18 PROCEDURE — 72125 CT NECK SPINE W/O DYE: CPT

## 2024-04-12 RX ORDER — CELECOXIB 200 MG/1
CAPSULE ORAL
Qty: 180 CAPSULE | Refills: 0 | Status: SHIPPED | OUTPATIENT
Start: 2024-04-12

## 2024-04-12 RX ORDER — CELECOXIB 200 MG/1
200 CAPSULE ORAL 2 TIMES DAILY
Qty: 180 CAPSULE | Refills: 0 | OUTPATIENT
Start: 2024-04-12

## 2024-04-12 NOTE — TELEPHONE ENCOUNTER
Medication:   Requested Prescriptions     Pending Prescriptions Disp Refills    celecoxib (CELEBREX) 200 MG capsule [Pharmacy Med Name: Celecoxib 200 MG Oral Capsule] 180 capsule 0     Sig: Take 1 capsule by mouth twice daily        Last Filled:  01/152024 #180 0rf     Patient Phone Number: [phone] (home)     Last appt: 11/17/2023   Next appt: none    Last OARRS:        No data to display

## 2024-04-25 ENCOUNTER — OFFICE VISIT (OUTPATIENT)
Dept: FAMILY MEDICINE CLINIC | Age: 69
End: 2024-04-25

## 2024-04-25 VITALS — BODY MASS INDEX: 35.03 KG/M2 | OXYGEN SATURATION: 97 % | HEIGHT: 66 IN | WEIGHT: 218 LBS | HEART RATE: 80 BPM

## 2024-04-25 DIAGNOSIS — Z00.00 MEDICARE ANNUAL WELLNESS VISIT, SUBSEQUENT: Primary | ICD-10-CM

## 2024-04-25 DIAGNOSIS — I73.9 CLAUDICATION IN PERIPHERAL VASCULAR DISEASE (HCC): ICD-10-CM

## 2024-04-25 DIAGNOSIS — K21.9 GASTROESOPHAGEAL REFLUX DISEASE WITHOUT ESOPHAGITIS: ICD-10-CM

## 2024-04-25 DIAGNOSIS — M54.2 PAIN IN NECK: ICD-10-CM

## 2024-04-25 DIAGNOSIS — L29.0 PERIANAL IRRITATION: ICD-10-CM

## 2024-04-25 DIAGNOSIS — I10 PRIMARY HYPERTENSION: ICD-10-CM

## 2024-04-25 RX ORDER — METHOCARBAMOL 750 MG/1
750 TABLET, FILM COATED ORAL 4 TIMES DAILY
Qty: 360 TABLET | Refills: 0 | Status: SHIPPED | OUTPATIENT
Start: 2024-04-25 | End: 2024-10-22

## 2024-04-25 RX ORDER — TRIAMCINOLONE ACETONIDE 0.25 MG/G
CREAM TOPICAL
Qty: 454 G | Refills: 5 | Status: SHIPPED | OUTPATIENT
Start: 2024-04-25

## 2024-04-25 RX ORDER — PANTOPRAZOLE SODIUM 20 MG/1
TABLET, DELAYED RELEASE ORAL
Qty: 90 TABLET | Refills: 3 | Status: SHIPPED | OUTPATIENT
Start: 2024-04-25

## 2024-04-25 RX ORDER — BACLOFEN 10 MG/1
10 TABLET ORAL 3 TIMES DAILY PRN
Qty: 30 TABLET | Refills: 0 | Status: SHIPPED | OUTPATIENT
Start: 2024-04-25

## 2024-04-25 RX ORDER — AMLODIPINE BESYLATE 5 MG/1
5 TABLET ORAL NIGHTLY
Qty: 90 TABLET | Refills: 3 | Status: SHIPPED | OUTPATIENT
Start: 2024-04-25

## 2024-04-25 RX ORDER — HYDROCHLOROTHIAZIDE 25 MG/1
12.5 TABLET ORAL DAILY
Qty: 45 TABLET | Refills: 3 | Status: SHIPPED | OUTPATIENT
Start: 2024-04-25

## 2024-04-25 RX ORDER — LOSARTAN POTASSIUM 100 MG/1
TABLET ORAL
Qty: 90 TABLET | Refills: 3 | Status: SHIPPED | OUTPATIENT
Start: 2024-04-25

## 2024-04-25 RX ORDER — CELECOXIB 200 MG/1
200 CAPSULE ORAL 2 TIMES DAILY
Qty: 180 CAPSULE | Refills: 3 | Status: SHIPPED | OUTPATIENT
Start: 2024-04-25

## 2024-04-25 SDOH — ECONOMIC STABILITY: INCOME INSECURITY: HOW HARD IS IT FOR YOU TO PAY FOR THE VERY BASICS LIKE FOOD, HOUSING, MEDICAL CARE, AND HEATING?: NOT VERY HARD

## 2024-04-25 SDOH — ECONOMIC STABILITY: FOOD INSECURITY: WITHIN THE PAST 12 MONTHS, YOU WORRIED THAT YOUR FOOD WOULD RUN OUT BEFORE YOU GOT MONEY TO BUY MORE.: NEVER TRUE

## 2024-04-25 SDOH — ECONOMIC STABILITY: FOOD INSECURITY: WITHIN THE PAST 12 MONTHS, THE FOOD YOU BOUGHT JUST DIDN'T LAST AND YOU DIDN'T HAVE MONEY TO GET MORE.: NEVER TRUE

## 2024-04-25 ASSESSMENT — PATIENT HEALTH QUESTIONNAIRE - PHQ9
SUM OF ALL RESPONSES TO PHQ QUESTIONS 1-9: 0
2. FEELING DOWN, DEPRESSED OR HOPELESS: NOT AT ALL
1. LITTLE INTEREST OR PLEASURE IN DOING THINGS: NOT AT ALL
SUM OF ALL RESPONSES TO PHQ QUESTIONS 1-9: 0
SUM OF ALL RESPONSES TO PHQ9 QUESTIONS 1 & 2: 0
SUM OF ALL RESPONSES TO PHQ QUESTIONS 1-9: 0
SUM OF ALL RESPONSES TO PHQ QUESTIONS 1-9: 0

## 2024-04-25 NOTE — PROGRESS NOTES
evaluation or treatment                 Advanced Directives:  Do you have a Living Will?: (!) No               Objective   Vitals:    04/25/24 1055   Pulse: 80   SpO2: 97%   Weight: 98.9 kg (218 lb)   Height: 1.664 m (5' 5.5\")      Body mass index is 35.73 kg/m².     Physical Exam  Constitutional:       General: She is not in acute distress.     Appearance: She is well-developed.   HENT:      Head: Normocephalic and atraumatic.   Cardiovascular:      Rate and Rhythm: Normal rate and regular rhythm.      Heart sounds: Normal heart sounds. No murmur heard.  Pulmonary:      Effort: Pulmonary effort is normal. No respiratory distress.      Breath sounds: Normal breath sounds.   Skin:     General: Skin is warm and dry.   Neurological:      Mental Status: She is alert.   Psychiatric:         Behavior: Behavior normal.                   No Known Allergies  Prior to Visit Medications    Medication Sig Taking? Authorizing Provider   Calcium Carb-Cholecalciferol (CALCIUM 500 + D3 PO) Take by mouth Yes Sheldon Gomez MD   Probiotic Product (PROBIOTIC DAILY PO) Take by mouth Yes Sheldon Gomez MD   pantoprazole (PROTONIX) 20 MG tablet TAKE 1TABLET BY MOUTH ONCE DAILY IN THE MORNING BEFORE BREAKFAST Yes Viridiana Beltran MD   methocarbamol (ROBAXIN-750) 750 MG tablet Take 1 tablet by mouth 4 times daily Yes Viridiana Beltran MD   baclofen (LIORESAL) 10 MG tablet Take 1 tablet by mouth 3 times daily as needed (muscle pain) Yes Viridiana Beltran MD   hydroCHLOROthiazide (HYDRODIURIL) 25 MG tablet Take 0.5 tablets by mouth daily Yes Viridiana Beltran MD   losartan (COZAAR) 100 MG tablet Take 1 tablet by mouth once daily Yes Viridiana Beltran MD   celecoxib (CELEBREX) 200 MG capsule Take 1 capsule by mouth 2 times daily Yes Viridiana Beltran MD   amLODIPine (NORVASC) 5 MG tablet Take 1 tablet by mouth nightly Yes Viridiana Beltran MD   triamcinolone (KENALOG) 0.025 % cream Apply Topically as needed up to twice

## 2024-04-25 NOTE — PATIENT INSTRUCTIONS

## 2024-05-15 DIAGNOSIS — J44.89 COPD WITH ASTHMA (HCC): ICD-10-CM

## 2024-05-15 RX ORDER — BUDESONIDE AND FORMOTEROL FUMARATE 160; 4.5 UG/1; UG/1
2 AEROSOL, METERED RESPIRATORY (INHALATION) 2 TIMES DAILY
Qty: 11 G | Refills: 5 | Status: SHIPPED | OUTPATIENT
Start: 2024-05-15

## 2024-05-16 ENCOUNTER — TELEPHONE (OUTPATIENT)
Dept: ADMINISTRATIVE | Age: 69
End: 2024-05-16

## 2024-05-16 NOTE — TELEPHONE ENCOUNTER
Received a denial for PA for Symbicort/ Breyna.  The covered alternatives Spiriva. Stoiolto or Bevespi.     Dr. Carvalho, please advise

## 2024-05-16 NOTE — TELEPHONE ENCOUNTER
Submitted PA for Shorty 160-4.5MCG/ACT aerosol   Via [facility] Key: [id number] STATUS: PENDING.    Follow up done daily; if no decision with in three days we will refax.  If another three days goes by with no decision will call the insurance for status.

## 2024-05-17 NOTE — TELEPHONE ENCOUNTER
Symbicort also denied.  Will send an appeal to [facility].

## 2024-06-10 DIAGNOSIS — E11.69 DYSLIPIDEMIA ASSOCIATED WITH TYPE 2 DIABETES MELLITUS (HCC): ICD-10-CM

## 2024-06-10 DIAGNOSIS — E78.5 DYSLIPIDEMIA ASSOCIATED WITH TYPE 2 DIABETES MELLITUS (HCC): ICD-10-CM

## 2024-06-10 RX ORDER — ATORVASTATIN CALCIUM 40 MG/1
TABLET, FILM COATED ORAL
Qty: 90 TABLET | Refills: 3 | Status: SHIPPED | OUTPATIENT
Start: 2024-06-10

## 2024-06-10 RX ORDER — ATORVASTATIN CALCIUM 40 MG/1
TABLET, FILM COATED ORAL
Qty: 90 TABLET | Refills: 0 | OUTPATIENT
Start: 2024-06-10

## 2024-06-21 ENCOUNTER — OFFICE VISIT (OUTPATIENT)
Dept: FAMILY MEDICINE CLINIC | Age: 69
End: 2024-06-21

## 2024-06-21 VITALS
DIASTOLIC BLOOD PRESSURE: 58 MMHG | HEIGHT: 66 IN | HEART RATE: 76 BPM | OXYGEN SATURATION: 96 % | BODY MASS INDEX: 34.55 KG/M2 | SYSTOLIC BLOOD PRESSURE: 108 MMHG | WEIGHT: 215 LBS

## 2024-06-21 DIAGNOSIS — I10 PRIMARY HYPERTENSION: Primary | ICD-10-CM

## 2024-06-21 DIAGNOSIS — E78.5 DYSLIPIDEMIA ASSOCIATED WITH TYPE 2 DIABETES MELLITUS (HCC): ICD-10-CM

## 2024-06-21 DIAGNOSIS — E66.01 MORBID OBESITY DUE TO EXCESS CALORIES (HCC): ICD-10-CM

## 2024-06-21 DIAGNOSIS — E78.5 HYPERLIPIDEMIA, UNSPECIFIED HYPERLIPIDEMIA TYPE: ICD-10-CM

## 2024-06-21 DIAGNOSIS — J43.2 CENTRILOBULAR EMPHYSEMA (HCC): ICD-10-CM

## 2024-06-21 DIAGNOSIS — E11.69 DYSLIPIDEMIA ASSOCIATED WITH TYPE 2 DIABETES MELLITUS (HCC): ICD-10-CM

## 2024-06-21 RX ORDER — BLOOD PRESSURE TEST KIT
KIT MISCELLANEOUS
Qty: 1 KIT | Refills: 0 | Status: SHIPPED | OUTPATIENT
Start: 2024-06-21

## 2024-06-21 NOTE — PATIENT INSTRUCTIONS
Blood pressure should be around 130/80  Keep taking spironolactone and hydrochlorothiazide in the morning.    Stop amlodipine  If still low tonight, 6/21, do not take losartan tonight either.  Can stay off both this weekend to see how the blood pressure does.    If anything, you  may only need losartan 25mg every day with the spironolactone and the hydroch

## 2024-06-21 NOTE — PROGRESS NOTES
Chief complaint: Hypotension and Fatigue (Worsening yesterday, but feeling bad the last few weeks. )      SUBJECTIVE:  HPI  Britt Cho (:  1955) is a 68 y.o. female with a past medical history of HTN, PAD, GERD, DM2 who presents with a chief complaint of: not feeling well. Feels her bp is too low. Has lost weight, on lila for hair loss, still on HCTZ, amlodipine 5mg, losartan 100mg.   Takes lila and HCTZ in the morning; was put on HCTZ  Amlod and losartan at night  Down 19lbs since march. Trying to lose weight  Requesting automatic cuff. She has manual at home and it's difficult to do on herself and doesn't have help around her.     Patient Active Problem List   Diagnosis    Peroneal tendinitis    Osteoarthritis, foot, localized    Moderate persistent asthma, uncomplicated    Environmental allergies    Coronary artery calcification seen on CT scan    HTN (hypertension)    Hyperlipidemia    Gastroesophageal reflux disease without esophagitis    Traumatic incomplete tear of right rotator cuff    Chronic right shoulder pain    Controlled type 2 diabetes mellitus without complication, without long-term current use of insulin (HCC)    Dyslipidemia associated with type 2 diabetes mellitus (HCC)    Morbid obesity due to excess calories (HCC)    Claudication in peripheral vascular disease (HCC)    Former smoker    COVID-19 virus infection    Adult body mass index 40 and over    Allergic rhinitis due to pollen    Atherosclerosis of coronary artery    Atopic dermatitis    Lumbosacral spondylosis without myelopathy    Vitamin D deficiency    Pulmonary nodule    Osteopenia after menopause    Pain in neck    Spinal stenosis of cervical region    Essential hypertension    COPD with asthma (HCC)    Centrilobular emphysema (HCC)     Past Medical History:   Diagnosis Date    CAD (coronary artery disease)     mild MULTI VESSELper VO     COPD (chronic obstructive pulmonary disease) (HCC)     Diabetes mellitus

## 2024-06-24 ENCOUNTER — TELEPHONE (OUTPATIENT)
Dept: FAMILY MEDICINE CLINIC | Age: 69
End: 2024-06-24

## 2024-06-24 DIAGNOSIS — I10 PRIMARY HYPERTENSION: ICD-10-CM

## 2024-06-24 RX ORDER — BLOOD PRESSURE TEST KIT
KIT MISCELLANEOUS
Qty: 1 KIT | Refills: 0 | Status: SHIPPED | OUTPATIENT
Start: 2024-06-24

## 2024-06-24 NOTE — TELEPHONE ENCOUNTER
Pt stated that [facility] is telling her that they did not get the prescription for her blood pressure kit. I advised pt that they confirmed a receipt that they did get the prescription on 6/21/24 at 11:05 AM. Pt stated that they keep telling her and her  that they do not have it an wants to request another prescription be sent over to [facility] in [place] on [address]

## 2024-06-27 NOTE — TELEPHONE ENCOUNTER
[facility] still says they don't have the script. Pt is wanting to  a script paper signed by the provider to get her automatic bp cuff.     Please fill out and let me know when completed so I can let the patient know.

## 2024-07-01 ENCOUNTER — TELEPHONE (OUTPATIENT)
Dept: FAMILY MEDICINE CLINIC | Age: 69
End: 2024-07-01

## 2024-07-01 DIAGNOSIS — I10 PRIMARY HYPERTENSION: Primary | ICD-10-CM

## 2024-07-01 NOTE — TELEPHONE ENCOUNTER
Pt needs a written prescription for her blood pressure cuff and would like to pick it up. Could someone give her a call and let her know if she can pick that up at [phone].

## 2024-07-02 RX ORDER — ADHESIVE BANDAGE 3/4"
1 BANDAGE TOPICAL DAILY
Qty: 1 EACH | Refills: 0 | Status: SHIPPED | OUTPATIENT
Start: 2024-07-02

## 2024-07-09 ENCOUNTER — TELEMEDICINE (OUTPATIENT)
Dept: FAMILY MEDICINE CLINIC | Age: 69
End: 2024-07-09
Payer: MEDICARE

## 2024-07-09 DIAGNOSIS — U07.1 COVID-19 VIRUS INFECTION: Primary | ICD-10-CM

## 2024-07-09 DIAGNOSIS — E11.9 CONTROLLED TYPE 2 DIABETES MELLITUS WITHOUT COMPLICATION, WITHOUT LONG-TERM CURRENT USE OF INSULIN (HCC): ICD-10-CM

## 2024-07-09 DIAGNOSIS — J43.2 CENTRILOBULAR EMPHYSEMA (HCC): ICD-10-CM

## 2024-07-09 DIAGNOSIS — J44.89 COPD WITH ASTHMA (HCC): ICD-10-CM

## 2024-07-09 DIAGNOSIS — E11.69 DYSLIPIDEMIA ASSOCIATED WITH TYPE 2 DIABETES MELLITUS (HCC): ICD-10-CM

## 2024-07-09 DIAGNOSIS — E78.5 DYSLIPIDEMIA ASSOCIATED WITH TYPE 2 DIABETES MELLITUS (HCC): ICD-10-CM

## 2024-07-09 DIAGNOSIS — I10 PRIMARY HYPERTENSION: ICD-10-CM

## 2024-07-09 PROCEDURE — G8399 PT W/DXA RESULTS DOCUMENT: HCPCS | Performed by: FAMILY MEDICINE

## 2024-07-09 PROCEDURE — 1090F PRES/ABSN URINE INCON ASSESS: CPT | Performed by: FAMILY MEDICINE

## 2024-07-09 PROCEDURE — 99214 OFFICE O/P EST MOD 30 MIN: CPT | Performed by: FAMILY MEDICINE

## 2024-07-09 PROCEDURE — 3017F COLORECTAL CA SCREEN DOC REV: CPT | Performed by: FAMILY MEDICINE

## 2024-07-09 PROCEDURE — G8427 DOCREV CUR MEDS BY ELIG CLIN: HCPCS | Performed by: FAMILY MEDICINE

## 2024-07-09 PROCEDURE — 1123F ACP DISCUSS/DSCN MKR DOCD: CPT | Performed by: FAMILY MEDICINE

## 2024-07-09 PROCEDURE — 2022F DILAT RTA XM EVC RTNOPTHY: CPT | Performed by: FAMILY MEDICINE

## 2024-07-09 PROCEDURE — 3044F HG A1C LEVEL LT 7.0%: CPT | Performed by: FAMILY MEDICINE

## 2024-07-09 RX ORDER — SPIRONOLACTONE 100 MG/1
100 TABLET, FILM COATED ORAL DAILY
COMMUNITY

## 2024-07-09 NOTE — PROGRESS NOTES
Chief complaint: Blood Pressure Check ([phone]) and Positive For Covid-19      SUBJECTIVE:  HPI  Britt Cho (:  1955) is a 68 y.o. female with a past medical history of HTN, PAD, GERD, DM2 who presents with a chief complaint of: f/u blood pressure follow up. Tested positive for covid infection at home yesterday. Wants to talk about this. She has runny nose and headache; asking about what she can take for the congestion; already on celebrex and aspirin daily  Had covid last year sometime - did ok with paxlovid at that time.   Fouzia, her daughter, who is my patient, had covid last week. She got it from her.   Currently taking losartan 50mg; stopped HCTZ; taking spironolactone 100mg daily  Bps right now are 126/75;129/70 - all in that range    Patient Active Problem List   Diagnosis    Peroneal tendinitis    Osteoarthritis, foot, localized    Moderate persistent asthma, uncomplicated    Environmental allergies    Coronary artery calcification seen on CT scan    HTN (hypertension)    Hyperlipidemia    Gastroesophageal reflux disease without esophagitis    Traumatic incomplete tear of right rotator cuff    Chronic right shoulder pain    Controlled type 2 diabetes mellitus without complication, without long-term current use of insulin (HCC)    Dyslipidemia associated with type 2 diabetes mellitus (HCC)    Morbid obesity due to excess calories (HCC)    Claudication in peripheral vascular disease (HCC)    Former smoker    COVID-19 virus infection    Adult body mass index 40 and over    Allergic rhinitis due to pollen    Atherosclerosis of coronary artery    Atopic dermatitis    Lumbosacral spondylosis without myelopathy    Vitamin D deficiency    Pulmonary nodule    Osteopenia after menopause    Pain in neck    Spinal stenosis of cervical region    Essential hypertension    COPD with asthma (HCC)    Centrilobular emphysema (HCC)     Past Medical History:   Diagnosis Date    CAD (coronary artery

## 2024-07-22 RX ORDER — SEMAGLUTIDE 1.34 MG/ML
0.25 INJECTION, SOLUTION SUBCUTANEOUS WEEKLY
Qty: 1.5 ML | Refills: 2 | OUTPATIENT
Start: 2024-07-22

## 2024-07-22 RX ORDER — SEMAGLUTIDE 0.68 MG/ML
INJECTION, SOLUTION SUBCUTANEOUS
Qty: 3 ML | Refills: 0 | Status: SHIPPED | OUTPATIENT
Start: 2024-07-22

## 2024-07-29 RX ORDER — SEMAGLUTIDE 0.68 MG/ML
INJECTION, SOLUTION SUBCUTANEOUS
Qty: 3 ML | Refills: 0 | OUTPATIENT
Start: 2024-07-29

## 2024-08-16 ENCOUNTER — HOSPITAL ENCOUNTER (OUTPATIENT)
Dept: CT IMAGING | Age: 69
Discharge: HOME | End: 2024-08-16
Attending: STUDENT IN AN ORGANIZED HEALTH CARE EDUCATION/TRAINING PROGRAM
Payer: MEDICARE

## 2024-08-16 DIAGNOSIS — I10 ESSENTIAL HYPERTENSION: ICD-10-CM

## 2024-08-16 DIAGNOSIS — I10 PRIMARY HYPERTENSION: ICD-10-CM

## 2024-08-16 DIAGNOSIS — E11.69 DYSLIPIDEMIA ASSOCIATED WITH TYPE 2 DIABETES MELLITUS (HCC): ICD-10-CM

## 2024-08-16 DIAGNOSIS — E11.9 CONTROLLED TYPE 2 DIABETES MELLITUS WITHOUT COMPLICATION, WITHOUT LONG-TERM CURRENT USE OF INSULIN (HCC): ICD-10-CM

## 2024-08-16 DIAGNOSIS — E78.5 DYSLIPIDEMIA ASSOCIATED WITH TYPE 2 DIABETES MELLITUS (HCC): ICD-10-CM

## 2024-08-16 DIAGNOSIS — M48.02 CERVICAL SPINAL STENOSIS: ICD-10-CM

## 2024-08-16 DIAGNOSIS — M96.0 PSEUDARTHROSIS AFTER FUSION OR ARTHRODESIS: ICD-10-CM

## 2024-08-16 LAB
ANION GAP SERPL CALCULATED.3IONS-SCNC: 10 MMOL/L (ref 3–16)
BUN SERPL-MCNC: 24 MG/DL (ref 7–20)
CALCIUM SERPL-MCNC: 9.5 MG/DL (ref 8.3–10.6)
CHLORIDE SERPL-SCNC: 103 MMOL/L (ref 99–110)
CO2 SERPL-SCNC: 26 MMOL/L (ref 21–32)
CREAT SERPL-MCNC: 1 MG/DL (ref 0.6–1.2)
GFR SERPLBLD CREATININE-BSD FMLA CKD-EPI: 61 ML/MIN/{1.73_M2}
GLUCOSE SERPL-MCNC: 96 MG/DL (ref 70–99)
POTASSIUM SERPL-SCNC: 4.4 MMOL/L (ref 3.5–5.1)
SODIUM SERPL-SCNC: 139 MMOL/L (ref 136–145)

## 2024-08-16 PROCEDURE — 72125 CT NECK SPINE W/O DYE: CPT

## 2024-08-17 LAB
EST. AVERAGE GLUCOSE BLD GHB EST-MCNC: 91.1 MG/DL
HBA1C MFR BLD: 4.8 %

## 2024-08-21 ENCOUNTER — OFFICE VISIT (OUTPATIENT)
Dept: ENDOCRINOLOGY | Age: 69
End: 2024-08-21
Payer: MEDICARE

## 2024-08-21 VITALS
BODY MASS INDEX: 36.92 KG/M2 | WEIGHT: 221.6 LBS | OXYGEN SATURATION: 97 % | SYSTOLIC BLOOD PRESSURE: 120 MMHG | HEART RATE: 78 BPM | DIASTOLIC BLOOD PRESSURE: 68 MMHG | HEIGHT: 65 IN

## 2024-08-21 DIAGNOSIS — E11.9 CONTROLLED TYPE 2 DIABETES MELLITUS WITHOUT COMPLICATION, WITHOUT LONG-TERM CURRENT USE OF INSULIN (HCC): Primary | ICD-10-CM

## 2024-08-21 DIAGNOSIS — I10 ESSENTIAL HYPERTENSION: ICD-10-CM

## 2024-08-21 DIAGNOSIS — E11.69 DYSLIPIDEMIA ASSOCIATED WITH TYPE 2 DIABETES MELLITUS (HCC): ICD-10-CM

## 2024-08-21 DIAGNOSIS — E78.5 DYSLIPIDEMIA ASSOCIATED WITH TYPE 2 DIABETES MELLITUS (HCC): ICD-10-CM

## 2024-08-21 PROCEDURE — 99214 OFFICE O/P EST MOD 30 MIN: CPT | Performed by: INTERNAL MEDICINE

## 2024-08-21 PROCEDURE — 1036F TOBACCO NON-USER: CPT | Performed by: INTERNAL MEDICINE

## 2024-08-21 PROCEDURE — 3078F DIAST BP <80 MM HG: CPT | Performed by: INTERNAL MEDICINE

## 2024-08-21 PROCEDURE — G2211 COMPLEX E/M VISIT ADD ON: HCPCS | Performed by: INTERNAL MEDICINE

## 2024-08-21 PROCEDURE — G8399 PT W/DXA RESULTS DOCUMENT: HCPCS | Performed by: INTERNAL MEDICINE

## 2024-08-21 PROCEDURE — 3074F SYST BP LT 130 MM HG: CPT | Performed by: INTERNAL MEDICINE

## 2024-08-21 PROCEDURE — 1090F PRES/ABSN URINE INCON ASSESS: CPT | Performed by: INTERNAL MEDICINE

## 2024-08-21 PROCEDURE — 1123F ACP DISCUSS/DSCN MKR DOCD: CPT | Performed by: INTERNAL MEDICINE

## 2024-08-21 PROCEDURE — G8427 DOCREV CUR MEDS BY ELIG CLIN: HCPCS | Performed by: INTERNAL MEDICINE

## 2024-08-21 PROCEDURE — G8417 CALC BMI ABV UP PARAM F/U: HCPCS | Performed by: INTERNAL MEDICINE

## 2024-08-21 PROCEDURE — 2022F DILAT RTA XM EVC RTNOPTHY: CPT | Performed by: INTERNAL MEDICINE

## 2024-08-21 PROCEDURE — 3044F HG A1C LEVEL LT 7.0%: CPT | Performed by: INTERNAL MEDICINE

## 2024-08-21 PROCEDURE — 3017F COLORECTAL CA SCREEN DOC REV: CPT | Performed by: INTERNAL MEDICINE

## 2024-08-21 RX ORDER — SEMAGLUTIDE 0.68 MG/ML
INJECTION, SOLUTION SUBCUTANEOUS
Qty: 3 ML | Refills: 5 | Status: SHIPPED | OUTPATIENT
Start: 2024-08-21

## 2024-08-21 NOTE — PROGRESS NOTES
importance of glycemic control in order to avoid the complications of diabetes.    Risks and potential complications of diabetes were reviewed with the patient.  Diabetes health maintenance plan and follow-up were discussed and understood by the patient.  We reviewed the importance of medication compliance and regular follow-up.   Aggressive lifestyle modification was encouraged.     Exercise Counselling:  This patient is a candidate for regular physical exercise. Instructions to perform the following types of exercise:  Swimming or water aerobic exercise  Brisk walking  Playing tennis  Stationary bicycle or elliptical indoor  Low impact aerobic exercise    Instructions given to exercise for the following duration:  30 minutes a day for five-seven days per week.    Following instructions for being active throughout the day in addition to formal exercise:  Walk instead of drive whenever possible  Take the stairs instead of the elevator  Work in the garden  Park to the far end of the parking lot to add more walking steps to destination      Electronically signed by MERT RAND MD on 8/21/2024 at 11:38 AM

## 2024-09-09 DIAGNOSIS — M54.2 PAIN IN NECK: ICD-10-CM

## 2024-09-10 ENCOUNTER — OFFICE VISIT (OUTPATIENT)
Dept: PULMONOLOGY | Age: 69
End: 2024-09-10
Payer: MEDICARE

## 2024-09-10 VITALS
HEIGHT: 65 IN | WEIGHT: 221.2 LBS | OXYGEN SATURATION: 97 % | HEART RATE: 93 BPM | DIASTOLIC BLOOD PRESSURE: 80 MMHG | SYSTOLIC BLOOD PRESSURE: 124 MMHG | BODY MASS INDEX: 36.85 KG/M2

## 2024-09-10 DIAGNOSIS — J43.2 CENTRILOBULAR EMPHYSEMA (HCC): Primary | ICD-10-CM

## 2024-09-10 DIAGNOSIS — K21.9 GASTROESOPHAGEAL REFLUX DISEASE WITHOUT ESOPHAGITIS: ICD-10-CM

## 2024-09-10 DIAGNOSIS — E66.01 MORBID OBESITY DUE TO EXCESS CALORIES (HCC): ICD-10-CM

## 2024-09-10 DIAGNOSIS — J44.89 COPD WITH ASTHMA (HCC): ICD-10-CM

## 2024-09-10 DIAGNOSIS — Z91.09 ENVIRONMENTAL ALLERGIES: ICD-10-CM

## 2024-09-10 PROCEDURE — 1090F PRES/ABSN URINE INCON ASSESS: CPT | Performed by: INTERNAL MEDICINE

## 2024-09-10 PROCEDURE — 3074F SYST BP LT 130 MM HG: CPT | Performed by: INTERNAL MEDICINE

## 2024-09-10 PROCEDURE — 3017F COLORECTAL CA SCREEN DOC REV: CPT | Performed by: INTERNAL MEDICINE

## 2024-09-10 PROCEDURE — 1123F ACP DISCUSS/DSCN MKR DOCD: CPT | Performed by: INTERNAL MEDICINE

## 2024-09-10 PROCEDURE — G8417 CALC BMI ABV UP PARAM F/U: HCPCS | Performed by: INTERNAL MEDICINE

## 2024-09-10 PROCEDURE — 3023F SPIROM DOC REV: CPT | Performed by: INTERNAL MEDICINE

## 2024-09-10 PROCEDURE — G8399 PT W/DXA RESULTS DOCUMENT: HCPCS | Performed by: INTERNAL MEDICINE

## 2024-09-10 PROCEDURE — G8427 DOCREV CUR MEDS BY ELIG CLIN: HCPCS | Performed by: INTERNAL MEDICINE

## 2024-09-10 PROCEDURE — 1036F TOBACCO NON-USER: CPT | Performed by: INTERNAL MEDICINE

## 2024-09-10 PROCEDURE — 99214 OFFICE O/P EST MOD 30 MIN: CPT | Performed by: INTERNAL MEDICINE

## 2024-09-10 PROCEDURE — 3079F DIAST BP 80-89 MM HG: CPT | Performed by: INTERNAL MEDICINE

## 2024-09-10 RX ORDER — DOXYCYCLINE 100 MG/1
100 CAPSULE ORAL 2 TIMES DAILY
Qty: 20 CAPSULE | Refills: 0 | Status: SHIPPED | OUTPATIENT
Start: 2024-09-10 | End: 2024-09-20

## 2024-09-10 RX ORDER — BUDESONIDE AND FORMOTEROL FUMARATE DIHYDRATE 160; 4.5 UG/1; UG/1
2 AEROSOL RESPIRATORY (INHALATION) 2 TIMES DAILY
Qty: 11 G | Refills: 5 | Status: SHIPPED | OUTPATIENT
Start: 2024-09-10

## 2024-09-10 RX ORDER — METHOCARBAMOL 750 MG/1
750 TABLET, FILM COATED ORAL 4 TIMES DAILY
Qty: 360 TABLET | Refills: 0 | Status: SHIPPED | OUTPATIENT
Start: 2024-09-10 | End: 2025-03-09

## 2024-09-10 RX ORDER — PREDNISONE 10 MG/1
TABLET ORAL
Qty: 30 TABLET | Refills: 0 | Status: SHIPPED | OUTPATIENT
Start: 2024-09-10 | End: 2024-09-20

## 2024-09-10 RX ORDER — ALBUTEROL SULFATE 90 UG/1
2 AEROSOL, METERED RESPIRATORY (INHALATION) EVERY 6 HOURS PRN
Qty: 18 G | Refills: 5 | Status: SHIPPED | OUTPATIENT
Start: 2024-09-10

## 2024-09-10 RX ORDER — BENZONATATE 100 MG/1
100 CAPSULE ORAL 3 TIMES DAILY PRN
Qty: 90 CAPSULE | Refills: 1 | Status: SHIPPED | OUTPATIENT
Start: 2024-09-10 | End: 2024-11-09

## 2024-09-23 ENCOUNTER — HOSPITAL ENCOUNTER (OUTPATIENT)
Dept: MAMMOGRAPHY | Age: 69
Discharge: HOME | End: 2024-09-23
Payer: MEDICARE

## 2024-09-23 DIAGNOSIS — Z12.31 ENCOUNTER FOR SCREENING MAMMOGRAM FOR BREAST CANCER: ICD-10-CM

## 2024-09-23 PROCEDURE — 77063 BREAST TOMOSYNTHESIS BI: CPT

## 2024-10-07 RX ORDER — ONDANSETRON 4 MG/1
4 TABLET, FILM COATED ORAL EVERY 8 HOURS PRN
Qty: 30 TABLET | Refills: 5 | Status: SHIPPED | OUTPATIENT
Start: 2024-10-07

## 2024-10-07 NOTE — TELEPHONE ENCOUNTER
Medication:   Requested Prescriptions     Pending Prescriptions Disp Refills    ondansetron (ZOFRAN) 4 MG tablet 30 tablet 5     Sig: Take 1 tablet by mouth every 8 hours as needed for Nausea or Vomiting        Last Filled:      Patient Phone Number: [phone] (home)     Last appt: 4/25/2024   Next appt: Visit date not found    Last OARRS:        No data to display

## 2024-10-11 ENCOUNTER — HOSPITAL ENCOUNTER (OUTPATIENT)
Dept: MRI IMAGING | Age: 69
Discharge: HOME | End: 2024-10-11
Attending: STUDENT IN AN ORGANIZED HEALTH CARE EDUCATION/TRAINING PROGRAM
Payer: MEDICARE

## 2024-10-11 DIAGNOSIS — M50.00 CERVICAL DISC DISORDER WITH MYELOPATHY: ICD-10-CM

## 2024-10-11 PROCEDURE — 72141 MRI NECK SPINE W/O DYE: CPT

## 2024-11-18 ENCOUNTER — TELEPHONE (OUTPATIENT)
Dept: ENDOCRINOLOGY | Age: 69
End: 2024-11-18

## 2024-11-18 DIAGNOSIS — E11.9 CONTROLLED TYPE 2 DIABETES MELLITUS WITHOUT COMPLICATION, WITHOUT LONG-TERM CURRENT USE OF INSULIN (HCC): Primary | ICD-10-CM

## 2024-11-19 NOTE — TELEPHONE ENCOUNTER
Submitted PA for Ozempic  Via [facility] Key: [id number]   STATUS: Message from [facility] [employer]: Drug is not covered by plan     If this requires a response please respond to the pool ( P MHCX PSC MEDICATION PRE-AUTH).      Thank you please advise patient.

## 2024-11-20 RX ORDER — DULAGLUTIDE 1.5 MG/.5ML
1.5 INJECTION, SOLUTION SUBCUTANEOUS WEEKLY
Qty: 6 ML | Refills: 1 | Status: SHIPPED | OUTPATIENT
Start: 2024-11-20

## 2024-11-20 NOTE — TELEPHONE ENCOUNTER
Mrs. Cho informed RX for Trulicity sent to her pharmacy as her planned denied coverage for  Ozempic.

## 2024-12-09 DIAGNOSIS — I10 PRIMARY HYPERTENSION: ICD-10-CM

## 2024-12-09 DIAGNOSIS — K21.9 GASTROESOPHAGEAL REFLUX DISEASE WITHOUT ESOPHAGITIS: ICD-10-CM

## 2024-12-09 RX ORDER — LOSARTAN POTASSIUM 100 MG/1
TABLET ORAL
Qty: 90 TABLET | Refills: 1 | Status: SHIPPED | OUTPATIENT
Start: 2024-12-09 | End: 2024-12-10

## 2024-12-09 RX ORDER — PANTOPRAZOLE SODIUM 20 MG/1
TABLET, DELAYED RELEASE ORAL
Qty: 90 TABLET | Refills: 3 | Status: CANCELLED | OUTPATIENT
Start: 2024-12-09

## 2024-12-09 RX ORDER — PANTOPRAZOLE SODIUM 20 MG/1
TABLET, DELAYED RELEASE ORAL
Qty: 90 TABLET | Refills: 3 | Status: SHIPPED | OUTPATIENT
Start: 2024-12-09

## 2024-12-09 NOTE — TELEPHONE ENCOUNTER
Patient requesting refill of...  losartan (COZAAR) 50 MG tablet [[id number]]     Order Details  Dose: 50 mg Route: Oral Frequency: DAILY   Dispense Quantity: -- Refills: --          Sig: Take 50 mg by mouth daily       Last visit date: 4/25/2024    Pharmacy...[facility] [address] [place] [address] [address] [facility] [phone] -  [phone]

## 2024-12-09 NOTE — TELEPHONE ENCOUNTER
Medication:   Requested Prescriptions     Pending Prescriptions Disp Refills    pantoprazole (PROTONIX) 20 MG tablet 90 tablet 3     Sig: TAKE 1TABLET BY MOUTH ONCE DAILY IN THE MORNING BEFORE BREAKFAST    losartan (COZAAR) 100 MG tablet 90 tablet 3     Sig: Take 1 tablet by mouth once daily        Last Filled:  04/25/2024    Patient Phone Number: [phone] (home)     Last appt: 7/9/2024   Next appt: Visit date not found    Last OARRS:        No data to display

## 2024-12-09 NOTE — TELEPHONE ENCOUNTER
Medication:   Requested Prescriptions     Pending Prescriptions Disp Refills    pantoprazole (PROTONIX) 20 MG tablet 90 tablet 3     Sig: TAKE 1TABLET BY MOUTH ONCE DAILY IN THE MORNING BEFORE BREAKFAST        Last Filled:  04/25/2024 #90 3rf    Patient Phone Number: [phone] (home)     Last appt: 7/9/2024   Next appt: Visit date not found    Last OARRS:        No data to display

## 2024-12-10 DIAGNOSIS — M54.2 PAIN IN NECK: ICD-10-CM

## 2024-12-10 RX ORDER — LOSARTAN POTASSIUM 50 MG/1
TABLET ORAL
Qty: 90 TABLET | Refills: 1 | Status: SHIPPED | OUTPATIENT
Start: 2024-12-10 | End: 2024-12-12 | Stop reason: SDUPTHER

## 2024-12-10 RX ORDER — METHOCARBAMOL 750 MG/1
750 TABLET, FILM COATED ORAL 4 TIMES DAILY
Qty: 360 TABLET | Refills: 0 | Status: SHIPPED | OUTPATIENT
Start: 2024-12-10 | End: 2025-06-08

## 2024-12-10 NOTE — TELEPHONE ENCOUNTER
Medication:   Requested Prescriptions     Pending Prescriptions Disp Refills    methocarbamol (ROBAXIN-750) 750 MG tablet 360 tablet 0     Sig: Take 1 tablet by mouth 4 times daily        Last Filled:  09/10/2024  #360 0rf     Patient Phone Number: [phone] (home)     Last appt: 7/9/2024   Next appt: Visit date not found    Last OARRS:        No data to display

## 2024-12-12 DIAGNOSIS — I10 PRIMARY HYPERTENSION: ICD-10-CM

## 2024-12-12 RX ORDER — LOSARTAN POTASSIUM 50 MG/1
TABLET ORAL
Qty: 90 TABLET | Refills: 1 | Status: SHIPPED | OUTPATIENT
Start: 2024-12-12

## 2025-02-03 ENCOUNTER — TELEPHONE (OUTPATIENT)
Dept: CASE MANAGEMENT | Age: 70
End: 2025-02-03

## 2025-02-03 DIAGNOSIS — Z87.891 PERSONAL HISTORY OF TOBACCO USE, PRESENTING HAZARDS TO HEALTH: Primary | ICD-10-CM

## 2025-02-03 NOTE — TELEPHONE ENCOUNTER
Eleanor Carvalho    This is a friendly reminder that your patient is due for their annual lung screen on 2/22/2025.  For your convenience, I have pended the order for the scan.  If you do not agree with the need for the test, please cancel the order and let me know.      Patient will be mailed reminder letter to schedule.      Thank you,     Gayle Galo  Lung Navigator  [facility]  [phone]    Future Appointments   Date Time Provider Department Center   2/18/2025 11:00 AM Becca Escobar MD [facility]   3/10/2025 11:45 AM Fabrizio Carvalho MD PULM & CC [facility]   5/5/2025 11:20 AM Viridiana Beltran MD [facility] DEP       Last Pulm Appt: 9/10/24     Lung Screen Criteria  Age 50-80  Current smoker or quit within the last 15 years  Has => 20 pack year history.

## 2025-02-10 DIAGNOSIS — E78.5 DYSLIPIDEMIA ASSOCIATED WITH TYPE 2 DIABETES MELLITUS (HCC): ICD-10-CM

## 2025-02-10 DIAGNOSIS — I10 ESSENTIAL HYPERTENSION: ICD-10-CM

## 2025-02-10 DIAGNOSIS — E11.9 CONTROLLED TYPE 2 DIABETES MELLITUS WITHOUT COMPLICATION, WITHOUT LONG-TERM CURRENT USE OF INSULIN (HCC): ICD-10-CM

## 2025-02-10 DIAGNOSIS — E11.69 DYSLIPIDEMIA ASSOCIATED WITH TYPE 2 DIABETES MELLITUS (HCC): ICD-10-CM

## 2025-02-11 LAB
ALBUMIN SERPL-MCNC: 4.5 G/DL (ref 3.4–5)
ALBUMIN/GLOB SERPL: 1.9 {RATIO} (ref 1.1–2.2)
ALP SERPL-CCNC: 69 U/L (ref 40–129)
ALT SERPL-CCNC: 18 U/L (ref 10–40)
ANION GAP SERPL CALCULATED.3IONS-SCNC: 8 MMOL/L (ref 3–16)
AST SERPL-CCNC: 22 U/L (ref 15–37)
BILIRUB SERPL-MCNC: 0.4 MG/DL (ref 0–1)
BUN SERPL-MCNC: 29 MG/DL (ref 7–20)
CALCIUM SERPL-MCNC: 9.8 MG/DL (ref 8.3–10.6)
CHLORIDE SERPL-SCNC: 104 MMOL/L (ref 99–110)
CHOLEST SERPL-MCNC: 152 MG/DL (ref 0–199)
CO2 SERPL-SCNC: 27 MMOL/L (ref 21–32)
CREAT SERPL-MCNC: 1.1 MG/DL (ref 0.6–1.2)
EST. AVERAGE GLUCOSE BLD GHB EST-MCNC: 102.5 MG/DL
GFR SERPLBLD CREATININE-BSD FMLA CKD-EPI: 54 ML/MIN/{1.73_M2}
GLUCOSE SERPL-MCNC: 94 MG/DL (ref 70–99)
HBA1C MFR BLD: 5.2 %
HDLC SERPL-MCNC: 56 MG/DL (ref 40–60)
LDL CHOLESTEROL: 78 MG/DL
POTASSIUM SERPL-SCNC: 4.5 MMOL/L (ref 3.5–5.1)
PROT SERPL-MCNC: 6.9 G/DL (ref 6.4–8.2)
SODIUM SERPL-SCNC: 139 MMOL/L (ref 136–145)
TRIGL SERPL-MCNC: 88 MG/DL (ref 0–150)
VLDLC SERPL CALC-MCNC: 18 MG/DL

## 2025-02-18 ENCOUNTER — TELEPHONE (OUTPATIENT)
Dept: ADMINISTRATIVE | Age: 70
End: 2025-02-18

## 2025-02-18 ENCOUNTER — OFFICE VISIT (OUTPATIENT)
Dept: ENDOCRINOLOGY | Age: 70
End: 2025-02-18
Payer: MEDICARE

## 2025-02-18 VITALS
OXYGEN SATURATION: 96 % | WEIGHT: 228 LBS | HEIGHT: 65 IN | DIASTOLIC BLOOD PRESSURE: 62 MMHG | SYSTOLIC BLOOD PRESSURE: 115 MMHG | HEART RATE: 78 BPM | BODY MASS INDEX: 37.99 KG/M2

## 2025-02-18 DIAGNOSIS — E11.9 CONTROLLED TYPE 2 DIABETES MELLITUS WITHOUT COMPLICATION, WITHOUT LONG-TERM CURRENT USE OF INSULIN (HCC): Primary | ICD-10-CM

## 2025-02-18 DIAGNOSIS — E78.5 DYSLIPIDEMIA ASSOCIATED WITH TYPE 2 DIABETES MELLITUS (HCC): ICD-10-CM

## 2025-02-18 DIAGNOSIS — E11.69 DYSLIPIDEMIA ASSOCIATED WITH TYPE 2 DIABETES MELLITUS (HCC): ICD-10-CM

## 2025-02-18 DIAGNOSIS — I10 ESSENTIAL HYPERTENSION: ICD-10-CM

## 2025-02-18 DIAGNOSIS — E66.01 MORBID (SEVERE) OBESITY DUE TO EXCESS CALORIES: ICD-10-CM

## 2025-02-18 PROCEDURE — G8399 PT W/DXA RESULTS DOCUMENT: HCPCS | Performed by: INTERNAL MEDICINE

## 2025-02-18 PROCEDURE — G8417 CALC BMI ABV UP PARAM F/U: HCPCS | Performed by: INTERNAL MEDICINE

## 2025-02-18 PROCEDURE — 3074F SYST BP LT 130 MM HG: CPT | Performed by: INTERNAL MEDICINE

## 2025-02-18 PROCEDURE — 1159F MED LIST DOCD IN RCRD: CPT | Performed by: INTERNAL MEDICINE

## 2025-02-18 PROCEDURE — 3078F DIAST BP <80 MM HG: CPT | Performed by: INTERNAL MEDICINE

## 2025-02-18 PROCEDURE — 2022F DILAT RTA XM EVC RTNOPTHY: CPT | Performed by: INTERNAL MEDICINE

## 2025-02-18 PROCEDURE — 99214 OFFICE O/P EST MOD 30 MIN: CPT | Performed by: INTERNAL MEDICINE

## 2025-02-18 PROCEDURE — 3044F HG A1C LEVEL LT 7.0%: CPT | Performed by: INTERNAL MEDICINE

## 2025-02-18 PROCEDURE — 1090F PRES/ABSN URINE INCON ASSESS: CPT | Performed by: INTERNAL MEDICINE

## 2025-02-18 PROCEDURE — G2211 COMPLEX E/M VISIT ADD ON: HCPCS | Performed by: INTERNAL MEDICINE

## 2025-02-18 PROCEDURE — 1160F RVW MEDS BY RX/DR IN RCRD: CPT | Performed by: INTERNAL MEDICINE

## 2025-02-18 PROCEDURE — 1036F TOBACCO NON-USER: CPT | Performed by: INTERNAL MEDICINE

## 2025-02-18 PROCEDURE — 3017F COLORECTAL CA SCREEN DOC REV: CPT | Performed by: INTERNAL MEDICINE

## 2025-02-18 PROCEDURE — G8427 DOCREV CUR MEDS BY ELIG CLIN: HCPCS | Performed by: INTERNAL MEDICINE

## 2025-02-18 PROCEDURE — 1123F ACP DISCUSS/DSCN MKR DOCD: CPT | Performed by: INTERNAL MEDICINE

## 2025-02-18 RX ORDER — SEMAGLUTIDE 0.68 MG/ML
INJECTION, SOLUTION SUBCUTANEOUS
Qty: 3 ML | Refills: 5 | Status: SHIPPED | OUTPATIENT
Start: 2025-02-18

## 2025-02-18 RX ORDER — ATORVASTATIN CALCIUM 40 MG/1
TABLET, FILM COATED ORAL
Qty: 90 TABLET | Refills: 3 | Status: SHIPPED | OUTPATIENT
Start: 2025-02-18

## 2025-02-18 RX ORDER — PREGABALIN 50 MG/1
50 CAPSULE ORAL 2 TIMES DAILY
COMMUNITY

## 2025-02-18 RX ORDER — PREDNISONE 10 MG/1
10 TABLET ORAL 2 TIMES DAILY PRN
COMMUNITY
Start: 2024-12-10

## 2025-02-18 RX ORDER — TRAMADOL HYDROCHLORIDE 50 MG/1
TABLET ORAL
COMMUNITY
Start: 2025-02-03

## 2025-02-18 NOTE — TELEPHONE ENCOUNTER
Submitted PA for Ozempic Via [facility] Key: [id number] STATUS: APPROVED 1/19/2025-2/18/2026.    Authorization Expiration Date: 2/18/2026.    If this requires a response please respond to the pool (P MHCX PSC MEDICATION PRE-AUTH).      Thank you please advise patient.

## 2025-02-18 NOTE — PROGRESS NOTES
Patient ID:   Britt Cho is a 69 y.o. female    Chief Complaint:   Britt Cho presents for an evaluation of Type 2 Diabetes Mellitus , Hyperlipidremia    Subjective:   Type 2 Diabetes Mellitus diagnosed in 2020.   Metformin 500mg daily after A1C of 6.5% in Oct 2020     Last steroid for foot inflammation in July 2022    Has COPD     Metormin stopped due to diarrhea , loose stools, flatulence     Ozempic 0.25 mg weekly on Tuesday. Some days she has more gas and bloating.     Gained 7 lbs after losing 27 lbs in 12 months.       Left foot arthritis. Podiatrist is working on making her a brace     Not checking sugars. its okay not to check sugars unless she is dizzy lightheaded or feeling tired, fatigue , different than usual     Checks blood sugars 0 per day.   AM:   Lunch:  Supper:   HS:     Hypoglycemias: None     Working on diet    Meals: Three meals. Snack: sporadic (cheese, fruits, chips, pretzels)   Exercise: limited due to sciatica and lumbar disc issues . Does back exercises     Denies chest pain.     Family history of CAD: Mother had CAD in 70's. Maternal GM had CHF in her 60's.   Denies smoking.    Taking Aspirin 81 mg some days of the week      The following portions of the patient's history were reviewed and updated as appropriate:         Family History   Problem Relation Age of Onset    Hypertension Mother     Cancer Father         lung    Colon Cancer Father     Arthritis Father     Diabetes Sister     Breast Cancer Sister 68    Kidney Disease Sister     Diabetes Brother     Arthritis Maternal Grandmother     Heart Failure Paternal Grandmother            Social History     Socioeconomic History    Marital status:      Spouse name: Not on file    Number of children: Not on file    Years of education: Not on file    Highest education level: Not on file   Occupational History    Occupation: RN   Tobacco Use    Smoking status: Former     Current packs/day: 0.00     Average packs/day: 1

## 2025-02-26 ENCOUNTER — HOSPITAL ENCOUNTER (OUTPATIENT)
Dept: CT IMAGING | Age: 70
Discharge: HOME | End: 2025-02-26
Attending: INTERNAL MEDICINE
Payer: MEDICARE

## 2025-02-26 DIAGNOSIS — Z87.891 PERSONAL HISTORY OF TOBACCO USE, PRESENTING HAZARDS TO HEALTH: ICD-10-CM

## 2025-02-26 PROCEDURE — 71271 CT THORAX LUNG CANCER SCR C-: CPT

## 2025-02-27 ENCOUNTER — TELEPHONE (OUTPATIENT)
Dept: PULMONOLOGY | Age: 70
End: 2025-02-27

## 2025-02-27 NOTE — TELEPHONE ENCOUNTER
Patient called stating that she read the impression of her CT, and it stated that she has pulmonary malignancy. She looked this up to see what it is, and now has concerns. Pt would like a call back to go over these results and the impression of CT.    Patient says its okay to leave detailed voice message of results if she doesn't answer the phone.    PH:[phone]

## 2025-02-28 NOTE — TELEPHONE ENCOUNTER
Patient would like a call to go over her CT.  She read the impression and she states it says pulmonary malignancy.

## 2025-03-04 ENCOUNTER — TELEMEDICINE (OUTPATIENT)
Dept: FAMILY MEDICINE CLINIC | Age: 70
End: 2025-03-04

## 2025-03-04 DIAGNOSIS — M48.02 SPINAL STENOSIS OF CERVICAL REGION: ICD-10-CM

## 2025-03-04 DIAGNOSIS — M19.90 ARTHRITIS: ICD-10-CM

## 2025-03-04 DIAGNOSIS — J44.89 COPD WITH ASTHMA (HCC): ICD-10-CM

## 2025-03-04 DIAGNOSIS — M19.079 OSTEOARTHRITIS OF FOOT, UNSPECIFIED LATERALITY, UNSPECIFIED OSTEOARTHRITIS TYPE: ICD-10-CM

## 2025-03-04 DIAGNOSIS — E11.9 CONTROLLED TYPE 2 DIABETES MELLITUS WITHOUT COMPLICATION, WITHOUT LONG-TERM CURRENT USE OF INSULIN (HCC): Primary | ICD-10-CM

## 2025-03-04 DIAGNOSIS — J43.2 CENTRILOBULAR EMPHYSEMA (HCC): ICD-10-CM

## 2025-03-04 DIAGNOSIS — I10 PRIMARY HYPERTENSION: ICD-10-CM

## 2025-03-04 DIAGNOSIS — E78.5 HYPERLIPIDEMIA, UNSPECIFIED HYPERLIPIDEMIA TYPE: ICD-10-CM

## 2025-03-04 DIAGNOSIS — K21.9 GASTROESOPHAGEAL REFLUX DISEASE WITHOUT ESOPHAGITIS: ICD-10-CM

## 2025-03-04 RX ORDER — BACLOFEN 10 MG/1
10 TABLET ORAL 3 TIMES DAILY
Qty: 270 TABLET | Refills: 1 | Status: SHIPPED | OUTPATIENT
Start: 2025-03-04

## 2025-03-04 SDOH — ECONOMIC STABILITY: FOOD INSECURITY: WITHIN THE PAST 12 MONTHS, THE FOOD YOU BOUGHT JUST DIDN'T LAST AND YOU DIDN'T HAVE MONEY TO GET MORE.: NEVER TRUE

## 2025-03-04 SDOH — ECONOMIC STABILITY: FOOD INSECURITY: WITHIN THE PAST 12 MONTHS, YOU WORRIED THAT YOUR FOOD WOULD RUN OUT BEFORE YOU GOT MONEY TO BUY MORE.: NEVER TRUE

## 2025-03-04 ASSESSMENT — PATIENT HEALTH QUESTIONNAIRE - PHQ9
SUM OF ALL RESPONSES TO PHQ QUESTIONS 1-9: 0
2. FEELING DOWN, DEPRESSED OR HOPELESS: NOT AT ALL
SUM OF ALL RESPONSES TO PHQ QUESTIONS 1-9: 0
1. LITTLE INTEREST OR PLEASURE IN DOING THINGS: NOT AT ALL

## 2025-03-04 NOTE — PROGRESS NOTES
tablet by mouth once daily 90 tablet 3    losartan (COZAAR) 50 MG tablet Take 1 tablet by mouth once daily 90 tablet 1    pantoprazole (PROTONIX) 20 MG tablet TAKE 1TABLET BY MOUTH ONCE DAILY IN THE MORNING BEFORE BREAKFAST 90 tablet 3    ondansetron (ZOFRAN) 4 MG tablet Take 1 tablet by mouth every 8 hours as needed for Nausea or Vomiting 30 tablet 5    albuterol sulfate HFA (PROVENTIL;VENTOLIN;PROAIR) 108 (90 Base) MCG/ACT inhaler Inhale 2 puffs into the lungs every 6 hours as needed for Wheezing 18 g 5    budesonide-formoterol (BREYNA) 160-4.5 MCG/ACT AERO Inhale 2 puffs into the lungs 2 times daily 11 g 5    spironolactone (ALDACTONE) 100 MG tablet Take 1 tablet by mouth daily      Calcium Carb-Cholecalciferol (CALCIUM 500 + D3 PO) Take by mouth      Probiotic Product (PROBIOTIC DAILY PO) Take by mouth      celecoxib (CELEBREX) 200 MG capsule Take 1 capsule by mouth 2 times daily 180 capsule 3    triamcinolone (KENALOG) 0.025 % cream Apply Topically as needed up to twice daily, not to exceed 4 weeks. (Patient taking differently: Apply Topically as needed up to twice daily as needed , not to exceed 4 weeks.) 454 g 5    tretinoin (RETIN-A) 0.025 % cream Apply topically nightly. 45 g 5    diclofenac sodium (VOLTAREN) 1 % GEL APPLY   TOPICALLY TWICE DAILY 200 g 11    ondansetron (ZOFRAN-ODT) 4 MG disintegrating tablet Take 1 tablet by mouth 3 times daily as needed for Nausea or Vomiting 21 tablet 0    minoxidil (LONITEN) 2.5 MG tablet Take 1 tablet by mouth daily      aspirin 81 MG EC tablet Take 1 tablet by mouth daily      turmeric 500 MG CAPS Take 1 capsule by mouth daily      progesterone (PROMETRIUM) 100 MG CAPS capsule TAKE 1 CAPSULE BY MOUTH ONCE DAILY      estradiol (ESTRACE) 1 MG tablet Take 1 tablet by mouth daily      clobetasol (TEMOVATE) 0.05 % cream       Cyanocobalamin (VITAMIN B-12 PO) Take by mouth      FOLIC ACID PO Take by mouth      Lancets 30G MISC 4 each by Does not apply route daily Use 4

## 2025-03-10 ENCOUNTER — OFFICE VISIT (OUTPATIENT)
Dept: PULMONOLOGY | Age: 70
End: 2025-03-10
Payer: MEDICARE

## 2025-03-10 VITALS
SYSTOLIC BLOOD PRESSURE: 124 MMHG | HEART RATE: 62 BPM | DIASTOLIC BLOOD PRESSURE: 74 MMHG | WEIGHT: 223.8 LBS | OXYGEN SATURATION: 93 % | BODY MASS INDEX: 37.29 KG/M2 | HEIGHT: 65 IN

## 2025-03-10 DIAGNOSIS — Z87.891 FORMER SMOKER: ICD-10-CM

## 2025-03-10 DIAGNOSIS — J44.89 COPD WITH ASTHMA (HCC): ICD-10-CM

## 2025-03-10 DIAGNOSIS — J43.2 CENTRILOBULAR EMPHYSEMA (HCC): Primary | ICD-10-CM

## 2025-03-10 PROCEDURE — 3078F DIAST BP <80 MM HG: CPT | Performed by: INTERNAL MEDICINE

## 2025-03-10 PROCEDURE — 1159F MED LIST DOCD IN RCRD: CPT | Performed by: INTERNAL MEDICINE

## 2025-03-10 PROCEDURE — G8399 PT W/DXA RESULTS DOCUMENT: HCPCS | Performed by: INTERNAL MEDICINE

## 2025-03-10 PROCEDURE — 3074F SYST BP LT 130 MM HG: CPT | Performed by: INTERNAL MEDICINE

## 2025-03-10 PROCEDURE — G8417 CALC BMI ABV UP PARAM F/U: HCPCS | Performed by: INTERNAL MEDICINE

## 2025-03-10 PROCEDURE — 1123F ACP DISCUSS/DSCN MKR DOCD: CPT | Performed by: INTERNAL MEDICINE

## 2025-03-10 PROCEDURE — 3017F COLORECTAL CA SCREEN DOC REV: CPT | Performed by: INTERNAL MEDICINE

## 2025-03-10 PROCEDURE — 1036F TOBACCO NON-USER: CPT | Performed by: INTERNAL MEDICINE

## 2025-03-10 PROCEDURE — 3023F SPIROM DOC REV: CPT | Performed by: INTERNAL MEDICINE

## 2025-03-10 PROCEDURE — 1090F PRES/ABSN URINE INCON ASSESS: CPT | Performed by: INTERNAL MEDICINE

## 2025-03-10 PROCEDURE — 99214 OFFICE O/P EST MOD 30 MIN: CPT | Performed by: INTERNAL MEDICINE

## 2025-03-10 PROCEDURE — G8427 DOCREV CUR MEDS BY ELIG CLIN: HCPCS | Performed by: INTERNAL MEDICINE

## 2025-03-10 PROCEDURE — 1160F RVW MEDS BY RX/DR IN RCRD: CPT | Performed by: INTERNAL MEDICINE

## 2025-03-10 RX ORDER — BUDESONIDE AND FORMOTEROL FUMARATE DIHYDRATE 160; 4.5 UG/1; UG/1
2 AEROSOL RESPIRATORY (INHALATION) 2 TIMES DAILY
Qty: 11 G | Refills: 5 | Status: SHIPPED | OUTPATIENT
Start: 2025-03-10

## 2025-03-10 RX ORDER — PREDNISONE 10 MG/1
10 TABLET ORAL 2 TIMES DAILY PRN
Qty: 60 TABLET | Refills: 3 | Status: CANCELLED | OUTPATIENT
Start: 2025-03-10

## 2025-03-10 RX ORDER — PREDNISONE 10 MG/1
TABLET ORAL
Qty: 30 TABLET | Refills: 3 | Status: SHIPPED | OUTPATIENT
Start: 2025-03-10 | End: 2025-03-20

## 2025-03-10 RX ORDER — DOXYCYCLINE 100 MG/1
100 CAPSULE ORAL DAILY
Qty: 10 CAPSULE | Refills: 3 | Status: SHIPPED | OUTPATIENT
Start: 2025-03-10 | End: 2025-04-19

## 2025-03-10 NOTE — PROGRESS NOTES
[facility]  Consult Note  Fabrizio Carvalho MD       Britt Cho   YOB: 1955    Date of Visit:  3/10/2025    Assessment/Plan:  1. SOB (shortness of breath)  Multifactorial  COPD  Asthma  Obesity  Deconditioning    Gave her Pred and Doxy to hold onto at home in case she has flare up      2. COPD, moderate with asthma  PFT 3/23:  Spirometry:   Flow volume loops were normal. The FEV-1/FVC ratio was normal.   The  post-bronchodilator FEV-1 was 1.82 liters (75% of   predicted), which was mildly decreased. The FVC was 2.34 liters   (74% of predicted), which was decreased. Response to inhaled   bronchodilators (albuterol) was not significant.     Lung volumes:   Lung volumes were tested by plethysmography. The total lung   capacity was 4.86 liters (97% of predicted), which was normal.   The residual volume was 2.43 liters (122% of predicted), which   was increased. The ratio of residual volume to total lung   capacity (RV/TLC) was 126, which was increased. Specific airway   resistance was increased.     Diffusion capacity was found to be decreased.       Interpretation:   Evidence of air trapping.  No obstruction noted.  Mild reduction   in diffusion capacity.  Clinical correlation recommended    I have independently reviewed pulmonary function testing.  PFT is improved compared to previous    Medication Management:  The patient benefits from the medical regimen and reports no complications.    Symbicort  Albuterol  HHN    3. Centrilobular Emphysema/PN  CT finding of moderate emphysema  Also has small PN in the LEVI  Continue annual screening CT chest    4. GERD  Medication Management:  The patient benefits from the medical regimen and reports no complications.    Prilsoec    Also elevates the head of her bead and both help    5. COVID infection  recovered.    6. Immunization  COVID UTD  Flu shot given    6 months      Chief Complaint   Patient presents with

## 2025-03-31 ENCOUNTER — RESULTS FOLLOW-UP (OUTPATIENT)
Dept: PULMONOLOGY | Age: 70
End: 2025-03-31

## 2025-04-03 ENCOUNTER — PATIENT MESSAGE (OUTPATIENT)
Dept: ENDOCRINOLOGY | Age: 70
End: 2025-04-03

## 2025-04-03 ENCOUNTER — TELEPHONE (OUTPATIENT)
Dept: ENDOCRINOLOGY | Age: 70
End: 2025-04-03

## 2025-04-03 NOTE — TELEPHONE ENCOUNTER
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 Marks is a 60-year-old  female with Hepatocellular carcinoma, alcoholic cirrhosis( dx 2015), portal HTN, ascites, hx of variceal bleeding in January 2018 s/p banding,B/l pulmonary embolism,  hx of ETOH abuse (quit Jan 2018) who presents to the ED with back pain and b/l lower extremity weakness. Patient states that symptoms have been on going for the past week and half and have progressed to the point where she cannot lift her leg against gravity or walk. She decribes "electric shocklike" sensations down her legs bilaterally. She denies any falls or trauma. She complains of back pain and LUQ & LLQ abdominal pain that radiates to her back. She denies any bladder/bowel incontinence. She denies fever, chills, vomiting. She    In ED, MRI with Acute compression fracture at T11 with almost 25% height loss and retropulsion of the posterior cortex into the spinal canal.  This results in severe canal stenosis and posterior cord displacement.     Overview/Hospital Course:               Interval History: no acute events overnight. Pain is controlled with pain meds.   Review of Systems   Constitutional: Negative for activity change, appetite change, chills, fatigue and fever.   HENT: Negative for congestion, facial swelling, sore throat and trouble swallowing.    Respiratory: Negative for apnea, cough, chest tightness, shortness of breath and wheezing.    Cardiovascular: Negative for chest pain, palpitations and leg swelling.   Gastrointestinal: " Pt called back, read message verbatim pt said thanks    Positive for abdominal distention and abdominal pain. Negative for blood in stool, diarrhea, nausea and vomiting.   Genitourinary: Negative for difficulty urinating, dysuria, flank pain and hematuria.   Musculoskeletal: Positive for back pain. Negative for arthralgias, myalgias and neck pain.   Skin: Negative for color change and rash.   Neurological: Positive for weakness. Negative for dizziness, light-headedness, numbness and headaches.   Hematological: Negative for adenopathy.   Psychiatric/Behavioral: Negative for agitation and behavioral problems.     Objective:     Vital Signs (Most Recent):  Temp: 98.3 °F (36.8 °C) (11/27/19 1610)  Pulse: 64 (11/27/19 1610)  Resp: 18 (11/27/19 1610)  BP: (!) 98/56 (11/27/19 1610)  SpO2: 96 % (11/27/19 1610) Vital Signs (24h Range):  Temp:  [97.9 °F (36.6 °C)-99 °F (37.2 °C)] 98.3 °F (36.8 °C)  Pulse:  [64-78] 64  Resp:  [18-20] 18  SpO2:  [95 %-100 %] 96 %  BP: ()/(56-66) 98/56     Weight: 44 kg (97 lb)  Body mass index is 22.45 kg/m².    Intake/Output Summary (Last 24 hours) at 11/27/2019 1748  Last data filed at 11/26/2019 1900  Gross per 24 hour   Intake --   Output 900 ml   Net -900 ml      Physical Exam  Review of Systems   Constitutional: Positive for activity change, fatigue and unexpected weight change. Negative for chills and fever.   HENT: Negative for congestion and sore throat.    Eyes: Negative for photophobia and visual disturbance.   Respiratory: Negative for cough, chest tightness and shortness of breath.    Cardiovascular: Negative for chest pain, palpitations and leg swelling.   Gastrointestinal: Positive for abdominal distention and abdominal pain. Negative for blood in stool, nausea and vomiting.   Genitourinary: Negative for difficulty urinating and dysuria.   Musculoskeletal: Positive for back pain. Negative for arthralgias and joint swelling.   Skin: Negative for color change and rash.   Neurological: Positive for weakness. Negative for dizziness,  numbness and headaches.   Psychiatric/Behavioral: Negative for behavioral problems and confusion. The patient is nervous/anxious.  Significant Labs:   CBC:   Recent Labs   Lab 11/26/19 0428 11/27/19 0416   WBC 5.40 4.70   HGB 9.4* 9.4*   HCT 30.3* 29.6*    308     CMP:   Recent Labs   Lab 11/26/19 0428 11/27/19 0416   * 131*   K 3.9 4.0    100   CO2 21* 23   GLU 88 206*   BUN 11 14   CREATININE 0.9 0.9   CALCIUM 8.6* 8.2*   PROT 6.7 6.4   ALBUMIN 2.6* 2.4*   BILITOT 1.1* 0.9   ALKPHOS 121 119   AST 70* 64*   ALT 42 44   ANIONGAP 10 8   EGFRNONAA >60.0 >60.0       Significant Imaging: I have reviewed and interpreted all pertinent imaging results/findings within the past 24 hours.      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 Found to have compression fracture of T11 with canal stenosis and posterior cord displacement. Patient with sensation intact but 0/5 muscle strength in b/l lower ext.    Plan  - Consult to neurosurgery, reccs appreciated  - Continue Dexamethasone 4mg IV q6h   - Per neurosurgery patient will need surgery, but will round on patient in the a.m for further reccs. Medical clearance requested.  - NPO  - Dilaudid 0.5mg prn pain, Oxy IR q6h prn pain  - MRI cervical, thoracic spine  - NSGY recommend CT C/T/L  - per NSGY, pt is fot surgery today.     Ascites due to alcoholic cirrhosis  Last paracentesis was 2 weeks ago. Patient with distended abdomen states no significant increase since last paracentesis  Supposed to be on Lasix 40mg, but currenlty held by heme/onc due to recent PRACHI. Denies SOB.    Plan  - Continue Lasix 40mg daily    Chronic pulmonary embolism  - Patient on Eliquis 5mg BID  - Hold pending reccs from neurosurgery      HCC (hepatocellular carcinoma)  Follows with  heme/onc not a transplant candidate. Previously on Nivolumab immunotherapy but currently held due to recent PRACHI.    Plan  - F/u outpatient with heme/onc      Esophageal varices in alcoholic cirrhosis  Denies hematemesis  Plan  - Continue home propanolol 10mg BID      Alcoholic cirrhosis  Hepatocellular carcinoma, alcoholic cirrhosis( dx 2015), portal HTN, ascites, hx of variceal bleeding in January 2018 s/p banding,B/l pulmonary embolism,  hx of ETOH abuse (quit Jan 2018)    - she has mild abdominal destination.  - MELD  Na is 17 today.  - lasix 40 PO.  - f/u volume status and MELD score daily.      VTE Risk Mitigation (From admission, onward)         Ordered     Place sequential compression device  Until discontinued      11/25/19 2243     Reason for No Pharmacological VTE Prophylaxis  Once     Question:  Reasons:  Answer:  Already adequately anticoagulated on oral Anticoagulants    11/25/19 2243     IP VTE HIGH RISK PATIENT  Once      11/25/19 2243                      Saúl Noguera MD  Department of Hospital Medicine   [facility]

## 2025-04-03 NOTE — TELEPHONE ENCOUNTER
Call from pt stating that she had a colonoscopy procedure on Monday 3/31/25    She stated that she was experiencing constipation before her procedure and she believes its from her Ozempic     She stated that her constipation has become worse after her colonoscopy     Pt is wanting to know if Dr. Escobar would like to make some changes to her Ozempic dose?    Please advise   CB# [phone]

## 2025-04-03 NOTE — TELEPHONE ENCOUNTER
Since constipation is worse after colonoscopy take miralax and metamucil, increased hydration   If she wants to have more more discussion, schedule a visit

## 2025-05-02 DIAGNOSIS — I10 PRIMARY HYPERTENSION: ICD-10-CM

## 2025-05-02 DIAGNOSIS — K21.9 GASTROESOPHAGEAL REFLUX DISEASE WITHOUT ESOPHAGITIS: ICD-10-CM

## 2025-05-02 LAB
ANION GAP SERPL CALCULATED.3IONS-SCNC: 9 MMOL/L (ref 3–16)
BUN SERPL-MCNC: 35 MG/DL (ref 7–20)
CALCIUM SERPL-MCNC: 9.8 MG/DL (ref 8.3–10.6)
CHLORIDE SERPL-SCNC: 102 MMOL/L (ref 99–110)
CO2 SERPL-SCNC: 27 MMOL/L (ref 21–32)
CREAT SERPL-MCNC: 1.3 MG/DL (ref 0.6–1.2)
GFR SERPLBLD CREATININE-BSD FMLA CKD-EPI: 44 ML/MIN/{1.73_M2}
GLUCOSE SERPL-MCNC: 95 MG/DL (ref 70–99)
MAGNESIUM SERPL-MCNC: 2 MG/DL (ref 1.8–2.4)
POTASSIUM SERPL-SCNC: 4.4 MMOL/L (ref 3.5–5.1)
SODIUM SERPL-SCNC: 138 MMOL/L (ref 136–145)
VIT B12 SERPL-MCNC: 2703 PG/ML (ref 211–911)

## 2025-05-02 RX ORDER — SIMETHICONE 125 MG
TABLET,CHEWABLE ORAL
COMMUNITY
Start: 2025-03-25

## 2025-05-02 RX ORDER — CYCLOBENZAPRINE HCL 10 MG
10 TABLET ORAL EVERY 8 HOURS PRN
COMMUNITY
Start: 2025-03-18

## 2025-05-02 RX ORDER — PREGABALIN 75 MG/1
CAPSULE ORAL
COMMUNITY
Start: 2025-03-19

## 2025-05-02 RX ORDER — SOD SULF/POT CHLORIDE/MAG SULF 1.479 G
TABLET ORAL
COMMUNITY
Start: 2025-03-25

## 2025-05-05 ENCOUNTER — OFFICE VISIT (OUTPATIENT)
Dept: FAMILY MEDICINE CLINIC | Age: 70
End: 2025-05-05
Payer: MEDICARE

## 2025-05-05 ENCOUNTER — RESULTS FOLLOW-UP (OUTPATIENT)
Dept: FAMILY MEDICINE CLINIC | Age: 70
End: 2025-05-05

## 2025-05-05 VITALS
HEIGHT: 65 IN | BODY MASS INDEX: 37.49 KG/M2 | WEIGHT: 225 LBS | DIASTOLIC BLOOD PRESSURE: 74 MMHG | SYSTOLIC BLOOD PRESSURE: 116 MMHG | HEART RATE: 95 BPM | OXYGEN SATURATION: 96 %

## 2025-05-05 DIAGNOSIS — E11.9 CONTROLLED TYPE 2 DIABETES MELLITUS WITHOUT COMPLICATION, WITHOUT LONG-TERM CURRENT USE OF INSULIN (HCC): ICD-10-CM

## 2025-05-05 DIAGNOSIS — E66.01 MORBID (SEVERE) OBESITY DUE TO EXCESS CALORIES (HCC): ICD-10-CM

## 2025-05-05 DIAGNOSIS — I10 ESSENTIAL HYPERTENSION: ICD-10-CM

## 2025-05-05 DIAGNOSIS — M19.90 ARTHRITIS: ICD-10-CM

## 2025-05-05 DIAGNOSIS — G89.29 CHRONIC BILATERAL LOW BACK PAIN WITH LEFT-SIDED SCIATICA: ICD-10-CM

## 2025-05-05 DIAGNOSIS — K21.9 GASTROESOPHAGEAL REFLUX DISEASE WITHOUT ESOPHAGITIS: ICD-10-CM

## 2025-05-05 DIAGNOSIS — R26.89 IMBALANCE: ICD-10-CM

## 2025-05-05 DIAGNOSIS — E11.69 DYSLIPIDEMIA ASSOCIATED WITH TYPE 2 DIABETES MELLITUS (HCC): ICD-10-CM

## 2025-05-05 DIAGNOSIS — M54.42 CHRONIC BILATERAL LOW BACK PAIN WITH LEFT-SIDED SCIATICA: ICD-10-CM

## 2025-05-05 DIAGNOSIS — M54.32 LEFT SIDED SCIATICA: ICD-10-CM

## 2025-05-05 DIAGNOSIS — E78.5 DYSLIPIDEMIA ASSOCIATED WITH TYPE 2 DIABETES MELLITUS (HCC): ICD-10-CM

## 2025-05-05 DIAGNOSIS — Z00.00 MEDICARE ANNUAL WELLNESS VISIT, SUBSEQUENT: Primary | ICD-10-CM

## 2025-05-05 DIAGNOSIS — R79.89 ELEVATED VITAMIN B12 LEVEL: ICD-10-CM

## 2025-05-05 DIAGNOSIS — I10 PRIMARY HYPERTENSION: ICD-10-CM

## 2025-05-05 DIAGNOSIS — E78.5 HYPERLIPIDEMIA, UNSPECIFIED HYPERLIPIDEMIA TYPE: ICD-10-CM

## 2025-05-05 DIAGNOSIS — E55.9 VITAMIN D DEFICIENCY: ICD-10-CM

## 2025-05-05 PROCEDURE — 3074F SYST BP LT 130 MM HG: CPT | Performed by: FAMILY MEDICINE

## 2025-05-05 PROCEDURE — 1159F MED LIST DOCD IN RCRD: CPT | Performed by: FAMILY MEDICINE

## 2025-05-05 PROCEDURE — 1036F TOBACCO NON-USER: CPT | Performed by: FAMILY MEDICINE

## 2025-05-05 PROCEDURE — G0439 PPPS, SUBSEQ VISIT: HCPCS | Performed by: FAMILY MEDICINE

## 2025-05-05 PROCEDURE — 3044F HG A1C LEVEL LT 7.0%: CPT | Performed by: FAMILY MEDICINE

## 2025-05-05 PROCEDURE — G8417 CALC BMI ABV UP PARAM F/U: HCPCS | Performed by: FAMILY MEDICINE

## 2025-05-05 PROCEDURE — 1123F ACP DISCUSS/DSCN MKR DOCD: CPT | Performed by: FAMILY MEDICINE

## 2025-05-05 PROCEDURE — G8399 PT W/DXA RESULTS DOCUMENT: HCPCS | Performed by: FAMILY MEDICINE

## 2025-05-05 PROCEDURE — G8427 DOCREV CUR MEDS BY ELIG CLIN: HCPCS | Performed by: FAMILY MEDICINE

## 2025-05-05 PROCEDURE — 99214 OFFICE O/P EST MOD 30 MIN: CPT | Performed by: FAMILY MEDICINE

## 2025-05-05 PROCEDURE — 1090F PRES/ABSN URINE INCON ASSESS: CPT | Performed by: FAMILY MEDICINE

## 2025-05-05 PROCEDURE — 3078F DIAST BP <80 MM HG: CPT | Performed by: FAMILY MEDICINE

## 2025-05-05 PROCEDURE — 2022F DILAT RTA XM EVC RTNOPTHY: CPT | Performed by: FAMILY MEDICINE

## 2025-05-05 PROCEDURE — 3017F COLORECTAL CA SCREEN DOC REV: CPT | Performed by: FAMILY MEDICINE

## 2025-05-05 ASSESSMENT — PATIENT HEALTH QUESTIONNAIRE - PHQ9
SUM OF ALL RESPONSES TO PHQ QUESTIONS 1-9: 0
SUM OF ALL RESPONSES TO PHQ QUESTIONS 1-9: 0
1. LITTLE INTEREST OR PLEASURE IN DOING THINGS: NOT AT ALL
SUM OF ALL RESPONSES TO PHQ QUESTIONS 1-9: 0
2. FEELING DOWN, DEPRESSED OR HOPELESS: NOT AT ALL
SUM OF ALL RESPONSES TO PHQ QUESTIONS 1-9: 0

## 2025-05-05 ASSESSMENT — LIFESTYLE VARIABLES
HOW MANY STANDARD DRINKS CONTAINING ALCOHOL DO YOU HAVE ON A TYPICAL DAY: PATIENT DOES NOT DRINK
HOW OFTEN DO YOU HAVE A DRINK CONTAINING ALCOHOL: 2-4 TIMES A MONTH

## 2025-05-05 NOTE — PATIENT INSTRUCTIONS
Heart-healthy lifestyle    If your doctor recommends it, get more exercise. For many people, walking is a good choice. Or you may want to swim, bike, or do other activities. Bit by bit, increase the time you're active every day. Try for at least 30 minutes on most days of the week.     Try to quit or cut back on using tobacco and other nicotine products. This includes smoking and vaping. If you need help quitting, talk to your doctor about stop-smoking programs and medicines. These can increase your chances of quitting for good. Quitting is one of the most important things you can do to protect your heart. It is never too late to quit. Try to avoid secondhand smoke too.     Stay at a weight that's healthy for you. Talk to your doctor if you need help losing weight.     Try to get 7 to 9 hours of sleep each night.     Limit alcohol to 2 drinks a day for men and 1 drink a day for women. Too much alcohol can cause health problems.     Manage other health problems such as diabetes, high blood pressure, and high cholesterol. If you think you may have a problem with alcohol or drug use, talk to your doctor.   Medicines    Take your medicines exactly as prescribed. Call your doctor if you think you are having a problem with your medicine.     If your doctor recommends aspirin, take the amount directed each day. Make sure you take aspirin and not another kind of pain reliever, such as acetaminophen (Tylenol).   When should you call for help?   Call 911 if you have symptoms of a heart attack. These may include:    Chest pain or pressure, or a strange feeling in the chest.     Sweating.     Shortness of breath.     Pain, pressure, or a strange feeling in the back, neck, jaw, or upper belly or in one or both shoulders or arms.     Lightheadedness or sudden weakness.     A fast or irregular heartbeat.   After you call 911, the  may tell you to chew 1 adult-strength or 2 to 4 low-dose aspirin. Wait for an ambulance. Do

## 2025-05-05 NOTE — PROGRESS NOTES
Medicare Annual Wellness Visit    Britt Cho is here for Medicare AWV    Assessment & Plan  Health maintenance  - Counseled on preventative care and wellness  - Annual wellness visit done today  - Recommended to receive Tdap vaccine at another pharmacy    Arthritis  - Persistent  - Planning to see rheumatology  - Advised to discontinue Celebrex usage due to kidney disease  - Patient reports increased pain if Celebrex is discontinued  - Currently on Lyrica and Flexeril per neurosurgeon  - Patient asked if primary care can take over Lyrica prescriptions  - Discussed, would plan to see her every 3 months if primary care takes over Lyrica prescriptions  - For now, continue getting Lyrica from neurosurgeon  - On THC for pain management from Dr. Pacheco  - Plan to recheck kidney function and reassess Celebrex usage  - If kidney function does not improve, referral to nephrology will be made if she cannot stop the NSAID use.   - PT    Diabetes  - Stable on Ozempic  - Continue follow-up with endocrinologist    Hypertension  - Stable on losartan and Aldactone  - Plans to continue current medications    Dyslipidemia  - Stable on Lipitor  - Will continue current medication    COPD and asthma  - Continue follow-up with pulmonology    GERD and hiatal hernia  - Continue Zofran as needed  - Increased nausea since reducing Protonix usage  - Can increase Protonix dose again to see if it helps  - See GI if persists.     High B12  - was on B12 and has since stopped  - recheck.    Follow-up  - Patient will follow up in 6 months    Medicare annual wellness visit, subsequent  Primary hypertension  Hyperlipidemia, unspecified hyperlipidemia type  Gastroesophageal reflux disease without esophagitis  Controlled type 2 diabetes mellitus without complication, without long-term current use of insulin (HCC)  Dyslipidemia associated with type 2 diabetes mellitus (HCC)  Vitamin D deficiency  Essential hypertension  -     Basic Metabolic

## 2025-05-07 LAB — METHYLMALONATE SERPL-SCNC: 0.11 UMOL/L (ref 0–0.4)

## 2025-05-29 ENCOUNTER — HOSPITAL ENCOUNTER (OUTPATIENT)
Dept: PHYSICAL THERAPY | Age: 70
Setting detail: THERAPIES SERIES
Discharge: HOME | End: 2025-05-29
Attending: FAMILY MEDICINE
Payer: MEDICARE

## 2025-05-29 DIAGNOSIS — M54.42 CHRONIC MIDLINE LOW BACK PAIN WITH BILATERAL SCIATICA: Primary | ICD-10-CM

## 2025-05-29 DIAGNOSIS — G89.29 CHRONIC MIDLINE LOW BACK PAIN WITH BILATERAL SCIATICA: Primary | ICD-10-CM

## 2025-05-29 DIAGNOSIS — M25.60 LIMITED JOINT RANGE OF MOTION (ROM): ICD-10-CM

## 2025-05-29 DIAGNOSIS — M54.41 CHRONIC MIDLINE LOW BACK PAIN WITH BILATERAL SCIATICA: Primary | ICD-10-CM

## 2025-05-29 DIAGNOSIS — R26.9 IMPAIRED GAIT: ICD-10-CM

## 2025-05-29 DIAGNOSIS — R29.898 WEAKNESS OF BOTH LOWER EXTREMITIES: ICD-10-CM

## 2025-05-29 PROCEDURE — 97161 PT EVAL LOW COMPLEX 20 MIN: CPT

## 2025-05-29 PROCEDURE — 97530 THERAPEUTIC ACTIVITIES: CPT

## 2025-05-29 PROCEDURE — 97110 THERAPEUTIC EXERCISES: CPT

## 2025-05-29 NOTE — PLAN OF CARE
[facility]: [address], [place] [address] office: [phone] fax: [phone]     Physical Therapy Initial Evaluation Certification      Dear Viridiana Beltran MD ,    We had the pleasure of evaluating the following patient for physical therapy services at [facility].  A summary of our findings can be found in the initial assessment below.  This includes our plan of care.  If you have any questions or concerns regarding these findings, please do not hesitate to contact me at the office phone number listed above.  Thank you for the referral.     Physician Signature:_______________________________Date:__________________  By signing above (or electronic signature), therapist’s plan is approved by physician       Physical Therapy: TREATMENT/PROGRESS NOTE   Patient: Britt Cho (69 y.o. female)   Examination Date: 2025   :  1955 MRN: [id number]   Visit #: 1   Insurance Allowable Auth Needed   BMN []Yes    [x]No    Insurance: Payor: MEDICARE / Plan: MEDICARE PART A AND B / Product Type: *No Product type* /   Insurance ID: [id number] - (Medicare)  Secondary Insurance (if applicable): [facility]   Treatment Diagnosis:     ICD-10-CM    1. Chronic midline low back pain with bilateral sciatica  M54.41     M54.42     G89.29       2. Limited joint range of motion (ROM)  M25.60       3. Weakness of both lower extremities  R29.898       4. Impaired gait  R26.9          Medical Diagnosis:  Left sided sciatica [M54.32]  Chronic bilateral low back pain with left-sided sciatica [G89.29, M54.42]  Imbalance [R26.89]   Referring Physician: Viridiana Beltran MD  PCP: Viridiana Beltran MD     Plan of care signed (Y/N):     Date of Patient follow up with Physician: 11/3/2025     Plan of Care Report: EVAL today  POC update due: (10 visits /OR AUTH LIMITS, whichever is less)  2025

## 2025-06-02 ENCOUNTER — HOSPITAL ENCOUNTER (OUTPATIENT)
Dept: PHYSICAL THERAPY | Age: 70
Setting detail: THERAPIES SERIES
End: 2025-06-02
Attending: FAMILY MEDICINE
Payer: MEDICARE

## 2025-06-04 ENCOUNTER — HOSPITAL ENCOUNTER (OUTPATIENT)
Dept: PHYSICAL THERAPY | Age: 70
Setting detail: THERAPIES SERIES
Discharge: HOME | End: 2025-06-04
Attending: FAMILY MEDICINE
Payer: MEDICARE

## 2025-06-04 PROCEDURE — 97110 THERAPEUTIC EXERCISES: CPT

## 2025-06-04 PROCEDURE — 97112 NEUROMUSCULAR REEDUCATION: CPT

## 2025-06-04 PROCEDURE — 97530 THERAPEUTIC ACTIVITIES: CPT

## 2025-06-04 NOTE — FLOWSHEET NOTE
Repositioning (52112)     Physical Performance Test (83373)    Custom orthotic ()     Other:    Other:    Total Timed Code Tx Minutes 40 3       Total Treatment Minutes 40        Charge Justification:  (37910) THERAPEUTIC EXERCISE - Provided verbal/tactile cueing for HEP and/or activities related to strengthening, flexibility, endurance, ROM performed to prevent loss of range of motion, maintain or improve muscular strength or increase flexibility, following either an injury or surgery.   (87088) NEUROMUSCULAR RE-EDUCATION - Provided therapeutic procedure on activities related to neuromuscular reeducation of movement, balance, coordination, kinesthetic sense, posture, and/or proprioception for sitting and/or standing activities. Provided HEP review and/or progression.  (09483) THERAPEUTIC ACTIVITY - use of dynamic activities to improve functional performance. (Ex include squatting, ascending/descending stairs, walking, bending, lifting, catching, throwing, pushing, pulling, jumping.)  Direct, one on one contact, billed in 15-minute increments.    GOALS     Patient stated goal: Patient wants to increase mobility and walking abilities.  [] Progressing: [] Met: [] Not Met: [] Adjusted    Therapist goals for Patient:   Short Term Goals: To be achieved in: 2 weeks  Independent in HEP and progression per patient tolerance, in order to prevent re-injury.   [] Progressing: [] Met: [] Not Met: [] Adjusted  Patient will have a decrease in pain to <2/10 to facilitate improvement in movement, function, and ADLs as indicated by Functional Deficits.  [] Progressing: [] Met: [] Not Met: [] Adjusted    Long Term Goals: To be achieved in: 6-8 weeks  Disability index score of 20% or less for the Modified Oswestry to assist with reaching prior level of function with activities such as standing for at least 30 minutes when at the grocery store.  [] Progressing: [] Met: [] Not Met: [] Adjusted  Patient will demonstrate increased

## 2025-06-09 ENCOUNTER — HOSPITAL ENCOUNTER (OUTPATIENT)
Dept: PHYSICAL THERAPY | Age: 70
Setting detail: THERAPIES SERIES
Discharge: HOME | End: 2025-06-09
Attending: FAMILY MEDICINE
Payer: MEDICARE

## 2025-06-09 PROCEDURE — 97110 THERAPEUTIC EXERCISES: CPT

## 2025-06-09 PROCEDURE — 97112 NEUROMUSCULAR REEDUCATION: CPT

## 2025-06-09 NOTE — FLOWSHEET NOTE
not progressing as expected and requires additional follow up with physician  [] Other:     TREATMENT PLAN     Frequency/Duration: 2x/week for  6-8  weeks for the following treatment interventions:    Interventions:  Therapeutic Exercise (60103) including: strength training, ROM, and functional mobility  Therapeutic Activities (61770) including: functional mobility training and education.  Neuromuscular Re-education (41423) activation and proprioception, including postural re-education.    Gait Training (14305) for normalization of ambulation patterns and AD training.   Manual Therapy (70552) as indicated to include: Passive Range of Motion, Gr I-IV mobilizations, Soft Tissue Mobilization, and Trigger Point Release  Modalities as needed that may include: Cryotherapy, Thermal Agents, and Traction  Patient education on joint protection, postural re-education, activity modification, and progression of HEP    Plan:  Progress toward standing functional exercises if patient can tolerate. Otherwise, progress resistance and/or repetitions for table exercises.    Electronically Signed by Cooper Graves PT  Date: 06/09/2025   Note: Portions of this note have been templated and/or copied from initial evaluation, reassessments and prior notes for documentation efficiency.  Note: If patient does not return for scheduled/recommended follow up visits, this note will serve as a discharge from care along with the most recent update on progress.    Ortho Evaluation

## 2025-06-11 ENCOUNTER — APPOINTMENT (OUTPATIENT)
Dept: PHYSICAL THERAPY | Age: 70
End: 2025-06-11
Attending: FAMILY MEDICINE
Payer: MEDICARE

## 2025-06-12 ENCOUNTER — HOSPITAL ENCOUNTER (OUTPATIENT)
Dept: PHYSICAL THERAPY | Age: 70
Setting detail: THERAPIES SERIES
Discharge: HOME | End: 2025-06-12
Attending: FAMILY MEDICINE
Payer: MEDICARE

## 2025-06-12 PROCEDURE — 97530 THERAPEUTIC ACTIVITIES: CPT

## 2025-06-12 PROCEDURE — 97110 THERAPEUTIC EXERCISES: CPT

## 2025-06-12 NOTE — FLOWSHEET NOTE
just implemented, too soon (<30days) to assess goals progression   [] Goals require adjustment due to lack of progress  [] Patient is not progressing as expected and requires additional follow up with physician  [] Other:     TREATMENT PLAN     Frequency/Duration: 2x/week for  6-8  weeks for the following treatment interventions:    Interventions:  Therapeutic Exercise (92293) including: strength training, ROM, and functional mobility  Therapeutic Activities (12826) including: functional mobility training and education.  Neuromuscular Re-education (24382) activation and proprioception, including postural re-education.    Gait Training (52201) for normalization of ambulation patterns and AD training.   Manual Therapy (56758) as indicated to include: Passive Range of Motion, Gr I-IV mobilizations, Soft Tissue Mobilization, and Trigger Point Release  Modalities as needed that may include: Cryotherapy, Thermal Agents, and Traction  Patient education on joint protection, postural re-education, activity modification, and progression of HEP    Plan:  Continue current phase of strengthening at this time.    Electronically Signed by Cooper Graves PT  Date: 06/12/2025   Note: Portions of this note have been templated and/or copied from initial evaluation, reassessments and prior notes for documentation efficiency.  Note: If patient does not return for scheduled/recommended follow up visits, this note will serve as a discharge from care along with the most recent update on progress.    Ortho Evaluation

## 2025-06-19 ENCOUNTER — HOSPITAL ENCOUNTER (OUTPATIENT)
Dept: PHYSICAL THERAPY | Age: 70
Setting detail: THERAPIES SERIES
Discharge: HOME | End: 2025-06-19
Attending: FAMILY MEDICINE
Payer: MEDICARE

## 2025-06-19 PROCEDURE — 97110 THERAPEUTIC EXERCISES: CPT

## 2025-06-19 PROCEDURE — 97140 MANUAL THERAPY 1/> REGIONS: CPT

## 2025-06-19 NOTE — FLOWSHEET NOTE
[facility] - Outpatient Rehabilitation and Therapy: [address], [place] [address] office: [phone] fax: [phone]    Physical Therapy: TREATMENT/PROGRESS NOTE   Patient: Britt Cho (69 y.o. female)   Examination Date: 2025   :  1955 MRN: [id number]   Visit #: 5   Insurance Allowable Auth Needed   BMN []Yes    [x]No    Insurance: Payor: MEDICARE / Plan: MEDICARE PART A AND B / Product Type: *No Product type* /   Insurance ID: [id number] - (Medicare)  Secondary Insurance (if applicable): [facility]   Treatment Diagnosis:     ICD-10-CM    1. Chronic midline low back pain with bilateral sciatica  M54.41     M54.42     G89.29       2. Limited joint range of motion (ROM)  M25.60       3. Weakness of both lower extremities  R29.898       4. Impaired gait  R26.9          Medical Diagnosis:  Left sided sciatica [M54.32]  Chronic bilateral low back pain with left-sided sciatica [G89.29, M54.42]  Imbalance [R26.89]   Referring Physician: Viridiana Beltran MD  PCP: Viridiana Beltran MD     Plan of care signed (Y/N): Y    Date of Patient follow up with Physician: 11/3/2025     Plan of Care Report: NO  POC update due: (10 visits /OR AUTH LIMITS, whichever is less)  2025                                             Medical History:  Comorbidities:  Diabetes (Type I or II)  Hypertension  Osteoarthritis  Relevant Medical History: GERD, Vit D deficiency, hyperlipidemia, COPD, CAD, cervical fusion, bilateral knee replacements 10+ years ago                                         Precautions/ Contra-indications:           Latex allergy:  NO  Pacemaker:    NO  Contraindications for Manipulation: osteoporosis  and unhealthy/ multiple comorbidities   Date of Surgery: NA  Other:    Red Flags:  None    Suicide Screening:   The patient did not verbalize a primary behavioral concern, suicidal ideation, suicidal intent, or demonstrate suicidal

## 2025-06-23 ENCOUNTER — HOSPITAL ENCOUNTER (OUTPATIENT)
Dept: PHYSICAL THERAPY | Age: 70
Setting detail: THERAPIES SERIES
Discharge: HOME | End: 2025-06-23
Attending: FAMILY MEDICINE
Payer: MEDICARE

## 2025-06-23 PROCEDURE — 97110 THERAPEUTIC EXERCISES: CPT

## 2025-06-23 PROCEDURE — 97140 MANUAL THERAPY 1/> REGIONS: CPT

## 2025-06-23 NOTE — FLOWSHEET NOTE
[facility] - Outpatient Rehabilitation and Therapy: [address], [place] [address] office: [phone] fax: [phone]    Physical Therapy: TREATMENT/PROGRESS NOTE   Patient: Britt Cho (69 y.o. female)   Examination Date: 2025   :  1955 MRN: [id number]   Visit #: 6   Insurance Allowable Auth Needed   BMN []Yes    [x]No    Insurance: Payor: MEDICARE / Plan: MEDICARE PART A AND B / Product Type: *No Product type* /   Insurance ID: [id number] - (Medicare)  Secondary Insurance (if applicable): [facility]   Treatment Diagnosis:     ICD-10-CM    1. Chronic midline low back pain with bilateral sciatica  M54.41     M54.42     G89.29       2. Limited joint range of motion (ROM)  M25.60       3. Weakness of both lower extremities  R29.898       4. Impaired gait  R26.9          Medical Diagnosis:  Left sided sciatica [M54.32]  Chronic bilateral low back pain with left-sided sciatica [G89.29, M54.42]  Imbalance [R26.89]   Referring Physician: Viridiana Beltran MD  PCP: Viridiana Beltran MD     Plan of care signed (Y/N): Y    Date of Patient follow up with Physician: 11/3/2025     Plan of Care Report: NO  POC update due: (10 visits /OR AUTH LIMITS, whichever is less)  2025                                             Medical History:  Comorbidities:  Diabetes (Type I or II)  Hypertension  Osteoarthritis  Relevant Medical History: GERD, Vit D deficiency, hyperlipidemia, COPD, CAD, cervical fusion, bilateral knee replacements 10+ years ago                                         Precautions/ Contra-indications:           Latex allergy:  NO  Pacemaker:    NO  Contraindications for Manipulation: osteoporosis  and unhealthy/ multiple comorbidities   Date of Surgery: NA  Other:    Red Flags:  None    Suicide Screening:   The patient did not verbalize a primary behavioral concern, suicidal ideation, suicidal intent, or demonstrate suicidal

## 2025-06-25 ENCOUNTER — HOSPITAL ENCOUNTER (OUTPATIENT)
Dept: PHYSICAL THERAPY | Age: 70
Setting detail: THERAPIES SERIES
Discharge: HOME | End: 2025-06-25
Attending: FAMILY MEDICINE
Payer: MEDICARE

## 2025-06-25 PROCEDURE — 97530 THERAPEUTIC ACTIVITIES: CPT

## 2025-06-25 PROCEDURE — 97110 THERAPEUTIC EXERCISES: CPT

## 2025-06-25 PROCEDURE — 97140 MANUAL THERAPY 1/> REGIONS: CPT

## 2025-06-25 NOTE — FLOWSHEET NOTE
[facility] - Outpatient Rehabilitation and Therapy: [address], [place] [address] office: [phone] fax: [phone]    Physical Therapy: TREATMENT/PROGRESS NOTE   Patient: Britt Cho (69 y.o. female)   Examination Date: 2025   :  1955 MRN: [id number]   Visit #: 7   Insurance Allowable Auth Needed   BMN []Yes    [x]No    Insurance: Payor: MEDICARE / Plan: MEDICARE PART A AND B / Product Type: *No Product type* /   Insurance ID: [id number] - (Medicare)  Secondary Insurance (if applicable): [facility]   Treatment Diagnosis:     ICD-10-CM    1. Chronic midline low back pain with bilateral sciatica  M54.41     M54.42     G89.29       2. Limited joint range of motion (ROM)  M25.60       3. Weakness of both lower extremities  R29.898       4. Impaired gait  R26.9          Medical Diagnosis:  Left sided sciatica [M54.32]  Chronic bilateral low back pain with left-sided sciatica [G89.29, M54.42]  Imbalance [R26.89]   Referring Physician: Viridiana Beltran MD  PCP: Viridiana Beltran MD     Plan of care signed (Y/N): Y    Date of Patient follow up with Physician: 11/3/2025     Plan of Care Report: NO  POC update due: (10 visits /OR AUTH LIMITS, whichever is less)  2025                                             Medical History:  Comorbidities:  Diabetes (Type I or II)  Hypertension  Osteoarthritis  Relevant Medical History: GERD, Vit D deficiency, hyperlipidemia, COPD, CAD, cervical fusion, bilateral knee replacements 10+ years ago                                         Precautions/ Contra-indications:           Latex allergy:  NO  Pacemaker:    NO  Contraindications for Manipulation: osteoporosis  and unhealthy/ multiple comorbidities   Date of Surgery: NA  Other:    Red Flags:  None    Suicide Screening:   The patient did not verbalize a primary behavioral concern, suicidal ideation, suicidal intent, or demonstrate suicidal

## 2025-07-01 ENCOUNTER — HOSPITAL ENCOUNTER (OUTPATIENT)
Dept: PHYSICAL THERAPY | Age: 70
Setting detail: THERAPIES SERIES
Discharge: HOME | End: 2025-07-01
Attending: FAMILY MEDICINE
Payer: MEDICARE

## 2025-07-01 PROCEDURE — 97140 MANUAL THERAPY 1/> REGIONS: CPT

## 2025-07-01 PROCEDURE — 97110 THERAPEUTIC EXERCISES: CPT

## 2025-07-01 PROCEDURE — 97530 THERAPEUTIC ACTIVITIES: CPT

## 2025-07-01 NOTE — FLOWSHEET NOTE
[facility] - Outpatient Rehabilitation and Therapy: [address], [place] [address] office: [phone] fax: [phone]    Physical Therapy: TREATMENT/PROGRESS NOTE   Patient: Britt Cho (69 y.o. female)   Examination Date: 2025   :  1955 MRN: [id number]   Visit #: 8   Insurance Allowable Auth Needed   BMN []Yes    [x]No    Insurance: Payor: MEDICARE / Plan: MEDICARE PART A AND B / Product Type: *No Product type* /   Insurance ID: [id number] - (Medicare)  Secondary Insurance (if applicable): [facility]   Treatment Diagnosis:     ICD-10-CM    1. Chronic midline low back pain with bilateral sciatica  M54.41     M54.42     G89.29       2. Limited joint range of motion (ROM)  M25.60       3. Weakness of both lower extremities  R29.898       4. Impaired gait  R26.9          Medical Diagnosis:  Left sided sciatica [M54.32]  Chronic bilateral low back pain with left-sided sciatica [G89.29, M54.42]  Imbalance [R26.89]   Referring Physician: Viridiana Beltran MD  PCP: Viridiana Beltran MD     Plan of care signed (Y/N): Y    Date of Patient follow up with Physician: 11/3/2025     Plan of Care Report: NO  POC update due: (10 visits /OR AUTH LIMITS, whichever is less)  2025                                             Medical History:  Comorbidities:  Diabetes (Type I or II)  Hypertension  Osteoarthritis  Relevant Medical History: GERD, Vit D deficiency, hyperlipidemia, COPD, CAD, cervical fusion, bilateral knee replacements 10+ years ago                                         Precautions/ Contra-indications:           Latex allergy:  NO  Pacemaker:    NO  Contraindications for Manipulation: osteoporosis  and unhealthy/ multiple comorbidities   Date of Surgery: NA  Other:    Red Flags:  None    Suicide Screening:   The patient did not verbalize a primary behavioral concern, suicidal ideation, suicidal intent, or demonstrate suicidal

## 2025-07-09 ENCOUNTER — HOSPITAL ENCOUNTER (OUTPATIENT)
Dept: PHYSICAL THERAPY | Age: 70
Setting detail: THERAPIES SERIES
Discharge: HOME | End: 2025-07-09
Attending: FAMILY MEDICINE
Payer: MEDICARE

## 2025-07-09 PROCEDURE — 97530 THERAPEUTIC ACTIVITIES: CPT

## 2025-07-09 PROCEDURE — 97140 MANUAL THERAPY 1/> REGIONS: CPT

## 2025-07-09 NOTE — PLAN OF CARE
[facility] - Outpatient Rehabilitation and Therapy: [address], [place] [address] office: [phone] fax: [phone]  Physical Therapy Re-Certification Plan of Care    Dear Viridiana Beltran MD  ,    We had the pleasure of treating the following patient for physical therapy services at [facility]. A summary of our findings can be found in the updated assessment below.  This includes our plan of care.  If you have any questions or concerns regarding these findings, please do not hesitate to contact me at the office phone number checked above.  Thank you for the referral.     Physician Signature:________________________________Date:__________________  By signing above (or electronic signature), therapist's plan is approved by physician      Total Visits: 9     Overall Response to Treatment:  Patient is responding well to treatment and improvement is noted with regards to goals. She has improved majority of objective measures compared to initial evaluation. She still presents with a trendelenburg gait pattern which provokes hip pain during walking and poor HHD hip abduction strength. Weak hip abduction and hypertonicity within gluteal muscular impairs patient from prolonged walking, stair negotiation, and transfers. Skilled therapy necessary to address deficits in hip strength and hypertonicity to return patient to ADLs.     Recommendation:    [x] Continue PT 2x / wk for 4-6 weeks.   [] Hold PT, pending MD visit   [] Discharge to [facility]. Follow up with PT or MD PRN.    Physical Therapy: TREATMENT/PROGRESS NOTE   Patient: Britt Cho (69 y.o. female)   Examination Date: 2025   :  1955 MRN: [id number]   Visit #: 9   Insurance Allowable Auth Needed   BMN []Yes    [x]No    Insurance: Payor: MEDICARE / Plan: MEDICARE PART A AND B / Product Type: *No Product type* /   Insurance ID: [id number] - (Medicare)  Secondary Insurance (if

## 2025-07-11 ENCOUNTER — HOSPITAL ENCOUNTER (OUTPATIENT)
Dept: PHYSICAL THERAPY | Age: 70
Setting detail: THERAPIES SERIES
Discharge: HOME | End: 2025-07-11
Attending: FAMILY MEDICINE
Payer: MEDICARE

## 2025-07-11 PROCEDURE — 97530 THERAPEUTIC ACTIVITIES: CPT

## 2025-07-11 PROCEDURE — 97110 THERAPEUTIC EXERCISES: CPT

## 2025-07-11 PROCEDURE — 97112 NEUROMUSCULAR REEDUCATION: CPT

## 2025-07-11 PROCEDURE — 97140 MANUAL THERAPY 1/> REGIONS: CPT

## 2025-07-11 NOTE — FLOWSHEET NOTE
Adjusted  Patient will have a decrease in pain to <2/10 to facilitate improvement in movement, function, and ADLs as indicated by Functional Deficits.  [] Progressing: [] Met: [x] Not Met: [] Adjusted    Long Term Goals: To be achieved in: 4-6 weeks (Updated 7/9/2025)  Disability index score of 20% or less for the Modified Oswestry to assist with reaching prior level of function with activities such as standing for at least 30 minutes when at the grocery store.  [x] Progressing: [] Met: [] Not Met: [] Adjusted  Patient will demonstrate increased AROM of L hip ER to within 5 deg of contralateral limb without pain to allow for proper joint functioning to enable patient to squat and pick objects up off the floor.   [] Progressing: [x] Met: [] Not Met: [] Adjusted  Patient will demonstrate increased Strength of bilateral hip abd to at least 10lbs of force with HHD to allow for proper functional mobility to enable patient to return to stair negotiation.   [x] Progressing: [] Met: [] Not Met: [] Adjusted  Patient will return to walking at least 1/4 mile without increased symptoms or restrictions to walk with .   [x] Progressing: [] Met: [] Not Met: [x] Adjusted (Updated 7/9/2025)  Patient will demonstrate ability to perform 10 STS in 30-sec to show improved muscular endurance for sit to stand transfers when at home.   [] Progressing: [x] Met: [] Not Met: [] Adjusted   Patient will improve 6 MWT distance to 133.14 meters to be able to walk prolonged distances when she goes to the [facility] with friends.  [x] Progressing: [] Met: [] Not Met: [] Adjusted (Updated 7/9/2025)      Overall Progression Towards Functional goals/ Treatment Progress Update:  [] Patient is progressing as expected towards functional goals listed.    [x] Progression is slowed due to complexities/Impairments listed.  [x] Progression has been slowed due to co-morbidities.  [] Plan just implemented, too soon (<30days) to assess goals progression

## 2025-07-14 ENCOUNTER — HOSPITAL ENCOUNTER (OUTPATIENT)
Dept: PHYSICAL THERAPY | Age: 70
Setting detail: THERAPIES SERIES
Discharge: HOME | End: 2025-07-14
Attending: FAMILY MEDICINE
Payer: MEDICARE

## 2025-07-14 PROCEDURE — 97112 NEUROMUSCULAR REEDUCATION: CPT

## 2025-07-14 PROCEDURE — 97140 MANUAL THERAPY 1/> REGIONS: CPT

## 2025-07-14 PROCEDURE — 97530 THERAPEUTIC ACTIVITIES: CPT

## 2025-07-14 NOTE — FLOWSHEET NOTE
[facility] - Outpatient Rehabilitation and Therapy: [address], [place] [address] office: [phone] fax: [phone]    Physical Therapy: TREATMENT/PROGRESS NOTE   Patient: Britt Cho (69 y.o. female)   Examination Date: 2025   :  1955 MRN: [id number]   Visit #: 11   Insurance Allowable Auth Needed   BMN []Yes    [x]No    Insurance: Payor: MEDICARE / Plan: MEDICARE PART A AND B / Product Type: *No Product type* /   Insurance ID: [id number] - (Medicare)  Secondary Insurance (if applicable): [facility]   Treatment Diagnosis:     ICD-10-CM    1. Chronic midline low back pain with bilateral sciatica  M54.41     M54.42     G89.29       2. Limited joint range of motion (ROM)  M25.60       3. Weakness of both lower extremities  R29.898       4. Impaired gait  R26.9          Medical Diagnosis:  Left sided sciatica [M54.32]  Chronic bilateral low back pain with left-sided sciatica [G89.29, M54.42]  Imbalance [R26.89]   Referring Physician: Viridiana Beltran MD  PCP: Viridiana Beltran MD     Plan of care signed (Y/N): Y    Date of Patient follow up with Physician: 11/3/2025     Plan of Care Report: NO  POC update due: (10 visits /OR AUTH LIMITS, whichever is less)  2025                                             Medical History:  Comorbidities:  Diabetes (Type I or II)  Hypertension  Osteoarthritis  Relevant Medical History: GERD, Vit D deficiency, hyperlipidemia, COPD, CAD, cervical fusion, bilateral knee replacements 10+ years ago                                         Precautions/ Contra-indications:           Latex allergy:  NO  Pacemaker:    NO  Contraindications for Manipulation: osteoporosis  and unhealthy/ multiple comorbidities   Date of Surgery: NA  Other:    Red Flags:  None    Suicide Screening:   The patient did not verbalize a primary behavioral concern, suicidal ideation, suicidal intent, or demonstrate suicidal

## 2025-07-16 ENCOUNTER — HOSPITAL ENCOUNTER (OUTPATIENT)
Dept: PHYSICAL THERAPY | Age: 70
Setting detail: THERAPIES SERIES
Discharge: HOME | End: 2025-07-16
Attending: FAMILY MEDICINE
Payer: MEDICARE

## 2025-07-16 DIAGNOSIS — I10 PRIMARY HYPERTENSION: ICD-10-CM

## 2025-07-16 PROCEDURE — 97140 MANUAL THERAPY 1/> REGIONS: CPT

## 2025-07-16 PROCEDURE — 97110 THERAPEUTIC EXERCISES: CPT

## 2025-07-16 PROCEDURE — 97530 THERAPEUTIC ACTIVITIES: CPT

## 2025-07-16 RX ORDER — LOSARTAN POTASSIUM 50 MG/1
50 TABLET ORAL DAILY
Qty: 90 TABLET | Refills: 0 | Status: SHIPPED | OUTPATIENT
Start: 2025-07-16

## 2025-07-16 NOTE — TELEPHONE ENCOUNTER
Medication:   Requested Prescriptions     Pending Prescriptions Disp Refills    losartan (COZAAR) 50 MG tablet [Pharmacy Med Name: Losartan Potassium 50 MG Oral Tablet] 90 tablet 0     Sig: Take 1 tablet by mouth once daily        Last Filled:  12/12/2024 #90 1rf     Patient Phone Number: [phone] (home)     Last appt: 5/5/2025   Next appt: 11/3/2025    Last OARRS:        No data to display

## 2025-07-16 NOTE — FLOWSHEET NOTE
[facility] - Outpatient Rehabilitation and Therapy: [address], [place] [address] office: [phone] fax: [phone]    Physical Therapy: TREATMENT/PROGRESS NOTE   Patient: rBitt Cho (69 y.o. female)   Examination Date: 2025   :  1955 MRN: [id number]   Visit #: 12   Insurance Allowable Auth Needed   BMN []Yes    [x]No    Insurance: Payor: MEDICARE / Plan: MEDICARE PART A AND B / Product Type: *No Product type* /   Insurance ID: [id number] - (Medicare)  Secondary Insurance (if applicable): [facility]   Treatment Diagnosis:     ICD-10-CM    1. Chronic midline low back pain with bilateral sciatica  M54.41     M54.42     G89.29       2. Limited joint range of motion (ROM)  M25.60       3. Weakness of both lower extremities  R29.898       4. Impaired gait  R26.9          Medical Diagnosis:  Left sided sciatica [M54.32]  Chronic bilateral low back pain with left-sided sciatica [G89.29, M54.42]  Imbalance [R26.89]   Referring Physician: Viridiana Beltran MD  PCP: Viridiana Beltran MD     Plan of care signed (Y/N): Y    Date of Patient follow up with Physician: 11/3/2025     Plan of Care Report: NO  POC update due: (10 visits /OR AUTH LIMITS, whichever is less)  2025                                             Medical History:  Comorbidities:  Diabetes (Type I or II)  Hypertension  Osteoarthritis  Relevant Medical History: GERD, Vit D deficiency, hyperlipidemia, COPD, CAD, cervical fusion, bilateral knee replacements 10+ years ago                                         Precautions/ Contra-indications:           Latex allergy:  NO  Pacemaker:    NO  Contraindications for Manipulation: osteoporosis  and unhealthy/ multiple comorbidities   Date of Surgery: NA  Other:    Red Flags:  None    Suicide Screening:   The patient did not verbalize a primary behavioral concern, suicidal ideation, suicidal intent, or demonstrate suicidal

## 2025-07-21 ENCOUNTER — HOSPITAL ENCOUNTER (OUTPATIENT)
Dept: PHYSICAL THERAPY | Age: 70
Setting detail: THERAPIES SERIES
Discharge: HOME | End: 2025-07-21
Attending: FAMILY MEDICINE
Payer: MEDICARE

## 2025-07-21 PROCEDURE — 97110 THERAPEUTIC EXERCISES: CPT

## 2025-07-21 PROCEDURE — 97530 THERAPEUTIC ACTIVITIES: CPT

## 2025-07-21 PROCEDURE — 97140 MANUAL THERAPY 1/> REGIONS: CPT

## 2025-07-21 NOTE — FLOWSHEET NOTE
[facility] - Outpatient Rehabilitation and Therapy: [address], [place] [address] office: [phone] fax: [phone]    Physical Therapy: TREATMENT/PROGRESS NOTE   Patient: Britt Cho (69 y.o. female)   Examination Date: 2025   :  1955 MRN: [id number]   Visit #: 13   Insurance Allowable Auth Needed   BMN []Yes    [x]No    Insurance: Payor: MEDICARE / Plan: MEDICARE PART A AND B / Product Type: *No Product type* /   Insurance ID: [id number] - (Medicare)  Secondary Insurance (if applicable): [facility]   Treatment Diagnosis:     ICD-10-CM    1. Chronic midline low back pain with bilateral sciatica  M54.41     M54.42     G89.29       2. Limited joint range of motion (ROM)  M25.60       3. Weakness of both lower extremities  R29.898       4. Impaired gait  R26.9          Medical Diagnosis:  Left sided sciatica [M54.32]  Chronic bilateral low back pain with left-sided sciatica [G89.29, M54.42]  Imbalance [R26.89]   Referring Physician: Viridiana Beltran MD  PCP: Viridiana Beltran MD     Plan of care signed (Y/N): Y    Date of Patient follow up with Physician: 11/3/2025     Plan of Care Report: NO  POC update due: (10 visits /OR AUTH LIMITS, whichever is less)  2025                                             Medical History:  Comorbidities:  Diabetes (Type I or II)  Hypertension  Osteoarthritis  Relevant Medical History: GERD, Vit D deficiency, hyperlipidemia, COPD, CAD, cervical fusion, bilateral knee replacements 10+ years ago                                         Precautions/ Contra-indications:           Latex allergy:  NO  Pacemaker:    NO  Contraindications for Manipulation: osteoporosis  and unhealthy/ multiple comorbidities   Date of Surgery: NA  Other:    Red Flags:  None    Suicide Screening:   The patient did not verbalize a primary behavioral concern, suicidal ideation, suicidal intent, or demonstrate suicidal

## 2025-07-23 ENCOUNTER — APPOINTMENT (OUTPATIENT)
Dept: PHYSICAL THERAPY | Age: 70
End: 2025-07-23
Attending: FAMILY MEDICINE
Payer: MEDICARE

## 2025-07-28 ENCOUNTER — HOSPITAL ENCOUNTER (OUTPATIENT)
Dept: PHYSICAL THERAPY | Age: 70
Setting detail: THERAPIES SERIES
Discharge: HOME | End: 2025-07-28
Attending: FAMILY MEDICINE
Payer: MEDICARE

## 2025-07-28 PROCEDURE — 97140 MANUAL THERAPY 1/> REGIONS: CPT

## 2025-07-28 PROCEDURE — 97530 THERAPEUTIC ACTIVITIES: CPT

## 2025-07-28 PROCEDURE — 97110 THERAPEUTIC EXERCISES: CPT

## 2025-07-28 NOTE — FLOWSHEET NOTE
[facility] - Outpatient Rehabilitation and Therapy: [address], [place] [address] office: [phone] fax: [phone]    Physical Therapy: TREATMENT/PROGRESS NOTE   Patient: Britt Cho (69 y.o. female)   Examination Date: 2025   :  1955 MRN: [id number]   Visit #: 14   Insurance Allowable Auth Needed   BMN []Yes    [x]No    Insurance: Payor: MEDICARE / Plan: MEDICARE PART A AND B / Product Type: *No Product type* /   Insurance ID: [id number] - (Medicare)  Secondary Insurance (if applicable): [facility]   Treatment Diagnosis:     ICD-10-CM    1. Chronic midline low back pain with bilateral sciatica  M54.41     M54.42     G89.29       2. Limited joint range of motion (ROM)  M25.60       3. Weakness of both lower extremities  R29.898       4. Impaired gait  R26.9          Medical Diagnosis:  Left sided sciatica [M54.32]  Chronic bilateral low back pain with left-sided sciatica [G89.29, M54.42]  Imbalance [R26.89]   Referring Physician: Viridiana Beltran MD  PCP: Viridiana Beltran MD     Plan of care signed (Y/N): Y    Date of Patient follow up with Physician: 11/3/2025     Plan of Care Report: NO  POC update due: (10 visits /OR AUTH LIMITS, whichever is less)  2025                                             Medical History:  Comorbidities:  Diabetes (Type I or II)  Hypertension  Osteoarthritis  Relevant Medical History: GERD, Vit D deficiency, hyperlipidemia, COPD, CAD, cervical fusion, bilateral knee replacements 10+ years ago                                         Precautions/ Contra-indications:           Latex allergy:  NO  Pacemaker:    NO  Contraindications for Manipulation: osteoporosis  and unhealthy/ multiple comorbidities   Date of Surgery: NA  Other:    Red Flags:  None    Suicide Screening:   The patient did not verbalize a primary behavioral concern, suicidal ideation, suicidal intent, or demonstrate suicidal

## 2025-07-30 DIAGNOSIS — M54.2 PAIN IN NECK: ICD-10-CM

## 2025-07-30 DIAGNOSIS — I10 PRIMARY HYPERTENSION: ICD-10-CM

## 2025-07-30 RX ORDER — CELECOXIB 200 MG/1
200 CAPSULE ORAL 2 TIMES DAILY
Qty: 180 CAPSULE | Refills: 3 | OUTPATIENT
Start: 2025-07-30

## 2025-07-30 RX ORDER — CELECOXIB 200 MG/1
200 CAPSULE ORAL 2 TIMES DAILY
Qty: 180 CAPSULE | Refills: 0 | Status: SHIPPED | OUTPATIENT
Start: 2025-07-30

## 2025-07-30 RX ORDER — LOSARTAN POTASSIUM 50 MG/1
50 TABLET ORAL DAILY
Qty: 90 TABLET | Refills: 0 | Status: SHIPPED | OUTPATIENT
Start: 2025-07-30

## 2025-07-30 NOTE — TELEPHONE ENCOUNTER
Medication:   Requested Prescriptions     Pending Prescriptions Disp Refills    celecoxib (CELEBREX) 200 MG capsule 180 capsule 3     Sig: Take 1 capsule by mouth 2 times daily        Last Filled:  07/30/2025    Duplicate request     Patient Phone Number: [phone] (home)     Last appt: 5/5/2025   Next appt: 11/3/2025    Last OARRS:        No data to display

## 2025-07-30 NOTE — TELEPHONE ENCOUNTER
Medication:   Requested Prescriptions     Pending Prescriptions Disp Refills    losartan (COZAAR) 50 MG tablet [Pharmacy Med Name: Losartan Potassium 50 MG Oral Tablet] 90 tablet 0     Sig: Take 1 tablet by mouth once daily        Last Filled:  07/16/2025 #90 0rf    Patient Phone Number: [phone] (home)     Last appt: 5/5/2025   Next appt: 11/3/2025    Last OARRS:        No data to display

## 2025-07-30 NOTE — TELEPHONE ENCOUNTER
Medication:   Requested Prescriptions     Pending Prescriptions Disp Refills    celecoxib (CELEBREX) 200 MG capsule [Pharmacy Med Name: Celecoxib 200 MG Oral Capsule] 180 capsule 0     Sig: Take 1 capsule by mouth twice daily        Last Filled:  04/25/2024 #180 3rf     Patient Phone Number: [phone] (home)     Last appt: 5/5/2025   Next appt: 05/11/2026    Last OARRS:        No data to display

## 2025-07-31 ENCOUNTER — APPOINTMENT (OUTPATIENT)
Dept: PHYSICAL THERAPY | Age: 70
End: 2025-07-31
Attending: FAMILY MEDICINE
Payer: MEDICARE

## 2025-08-01 ENCOUNTER — HOSPITAL ENCOUNTER (OUTPATIENT)
Dept: PHYSICAL THERAPY | Age: 70
Setting detail: THERAPIES SERIES
Discharge: HOME | End: 2025-08-01
Attending: FAMILY MEDICINE
Payer: MEDICARE

## 2025-08-01 PROCEDURE — 97110 THERAPEUTIC EXERCISES: CPT

## 2025-08-01 PROCEDURE — 97530 THERAPEUTIC ACTIVITIES: CPT

## 2025-08-01 PROCEDURE — 97140 MANUAL THERAPY 1/> REGIONS: CPT

## 2025-08-01 NOTE — FLOWSHEET NOTE
[facility] - Outpatient Rehabilitation and Therapy: [address], [place] [address] office: [phone] fax: [phone]    Physical Therapy: TREATMENT/PROGRESS NOTE   Patient: Britt Cho (69 y.o. female)   Examination Date: 2025   :  1955 MRN: [id number]   Visit #: 15   Insurance Allowable Auth Needed   BMN []Yes    [x]No    Insurance: Payor: MEDICARE / Plan: MEDICARE PART A AND B / Product Type: *No Product type* /   Insurance ID: [id number] - (Medicare)  Secondary Insurance (if applicable): [facility]   Treatment Diagnosis:     ICD-10-CM    1. Chronic midline low back pain with bilateral sciatica  M54.41     M54.42     G89.29       2. Limited joint range of motion (ROM)  M25.60       3. Weakness of both lower extremities  R29.898       4. Impaired gait  R26.9          Medical Diagnosis:  Left sided sciatica [M54.32]  Chronic bilateral low back pain with left-sided sciatica [G89.29, M54.42]  Imbalance [R26.89]   Referring Physician: Viridiana Beltran MD  PCP: Viridiana Beltran MD     Plan of care signed (Y/N): Y    Date of Patient follow up with Physician: 11/3/2025     Plan of Care Report: NO  POC update due: (10 visits /OR AUTH LIMITS, whichever is less)  2025                                             Medical History:  Comorbidities:  Diabetes (Type I or II)  Hypertension  Osteoarthritis  Relevant Medical History: GERD, Vit D deficiency, hyperlipidemia, COPD, CAD, cervical fusion, bilateral knee replacements 10+ years ago                                         Precautions/ Contra-indications:           Latex allergy:  NO  Pacemaker:    NO  Contraindications for Manipulation: osteoporosis  and unhealthy/ multiple comorbidities   Date of Surgery: NA  Other:    Red Flags:  None    Suicide Screening:   The patient did not verbalize a primary behavioral concern, suicidal ideation, suicidal intent, or demonstrate suicidal

## 2025-08-04 ENCOUNTER — HOSPITAL ENCOUNTER (OUTPATIENT)
Dept: PHYSICAL THERAPY | Age: 70
Setting detail: THERAPIES SERIES
Discharge: HOME | End: 2025-08-04
Attending: FAMILY MEDICINE
Payer: MEDICARE

## 2025-08-04 PROCEDURE — 97530 THERAPEUTIC ACTIVITIES: CPT

## 2025-08-04 PROCEDURE — 97140 MANUAL THERAPY 1/> REGIONS: CPT

## 2025-08-04 PROCEDURE — 97110 THERAPEUTIC EXERCISES: CPT

## 2025-08-08 ENCOUNTER — APPOINTMENT (OUTPATIENT)
Dept: PHYSICAL THERAPY | Age: 70
End: 2025-08-08
Attending: FAMILY MEDICINE
Payer: MEDICARE

## 2025-08-11 ENCOUNTER — OFFICE VISIT (OUTPATIENT)
Dept: FAMILY MEDICINE CLINIC | Age: 70
End: 2025-08-11

## 2025-08-11 ENCOUNTER — TELEPHONE (OUTPATIENT)
Dept: FAMILY MEDICINE CLINIC | Age: 70
End: 2025-08-11

## 2025-08-11 VITALS
SYSTOLIC BLOOD PRESSURE: 116 MMHG | HEIGHT: 65 IN | HEART RATE: 74 BPM | BODY MASS INDEX: 38.49 KG/M2 | WEIGHT: 231 LBS | DIASTOLIC BLOOD PRESSURE: 76 MMHG | OXYGEN SATURATION: 94 %

## 2025-08-11 DIAGNOSIS — I10 ESSENTIAL HYPERTENSION: ICD-10-CM

## 2025-08-11 DIAGNOSIS — L03.114 CELLULITIS OF LEFT UPPER EXTREMITY: Primary | ICD-10-CM

## 2025-08-11 DIAGNOSIS — E11.9 CONTROLLED TYPE 2 DIABETES MELLITUS WITHOUT COMPLICATION, WITHOUT LONG-TERM CURRENT USE OF INSULIN (HCC): ICD-10-CM

## 2025-08-11 DIAGNOSIS — M19.90 ARTHRITIS: ICD-10-CM

## 2025-08-11 DIAGNOSIS — N28.9 RENAL INSUFFICIENCY: ICD-10-CM

## 2025-08-11 RX ORDER — CEPHALEXIN 500 MG/1
500 CAPSULE ORAL 3 TIMES DAILY
Qty: 30 CAPSULE | Refills: 0 | Status: SHIPPED | OUTPATIENT
Start: 2025-08-11 | End: 2025-08-21

## 2025-08-13 ENCOUNTER — HOSPITAL ENCOUNTER (OUTPATIENT)
Dept: PHYSICAL THERAPY | Age: 70
Setting detail: THERAPIES SERIES
Discharge: HOME | End: 2025-08-13
Attending: FAMILY MEDICINE
Payer: MEDICARE

## 2025-08-13 PROCEDURE — 97110 THERAPEUTIC EXERCISES: CPT

## 2025-08-13 PROCEDURE — 97140 MANUAL THERAPY 1/> REGIONS: CPT

## 2025-08-15 ENCOUNTER — HOSPITAL ENCOUNTER (OUTPATIENT)
Dept: PHYSICAL THERAPY | Age: 70
Setting detail: THERAPIES SERIES
Discharge: HOME | End: 2025-08-15
Attending: FAMILY MEDICINE
Payer: MEDICARE

## 2025-08-15 PROCEDURE — 97110 THERAPEUTIC EXERCISES: CPT

## 2025-08-15 PROCEDURE — 97530 THERAPEUTIC ACTIVITIES: CPT

## 2025-08-15 PROCEDURE — 97140 MANUAL THERAPY 1/> REGIONS: CPT

## 2025-08-18 ENCOUNTER — HOSPITAL ENCOUNTER (OUTPATIENT)
Dept: PHYSICAL THERAPY | Age: 70
Setting detail: THERAPIES SERIES
Discharge: HOME | End: 2025-08-18
Attending: FAMILY MEDICINE
Payer: MEDICARE

## 2025-08-18 DIAGNOSIS — I10 ESSENTIAL HYPERTENSION: ICD-10-CM

## 2025-08-18 DIAGNOSIS — E11.9 CONTROLLED TYPE 2 DIABETES MELLITUS WITHOUT COMPLICATION, WITHOUT LONG-TERM CURRENT USE OF INSULIN (HCC): ICD-10-CM

## 2025-08-18 DIAGNOSIS — E11.69 DYSLIPIDEMIA ASSOCIATED WITH TYPE 2 DIABETES MELLITUS (HCC): ICD-10-CM

## 2025-08-18 DIAGNOSIS — R79.89 ELEVATED VITAMIN B12 LEVEL: ICD-10-CM

## 2025-08-18 DIAGNOSIS — E78.5 DYSLIPIDEMIA ASSOCIATED WITH TYPE 2 DIABETES MELLITUS (HCC): ICD-10-CM

## 2025-08-18 LAB
ANION GAP SERPL CALCULATED.3IONS-SCNC: 11 MMOL/L (ref 3–16)
BUN SERPL-MCNC: 30 MG/DL (ref 7–20)
CALCIUM SERPL-MCNC: 9.6 MG/DL (ref 8.3–10.6)
CHLORIDE SERPL-SCNC: 103 MMOL/L (ref 99–110)
CO2 SERPL-SCNC: 27 MMOL/L (ref 21–32)
CREAT SERPL-MCNC: 1 MG/DL (ref 0.6–1.2)
CREAT UR-MCNC: 90.5 MG/DL (ref 28–259)
EST. AVERAGE GLUCOSE BLD GHB EST-MCNC: 102.5 MG/DL
GFR SERPLBLD CREATININE-BSD FMLA CKD-EPI: 60 ML/MIN/{1.73_M2}
GLUCOSE SERPL-MCNC: 90 MG/DL (ref 70–99)
HBA1C MFR BLD: 5.2 %
MICROALBUMIN UR DL<=1MG/L-MCNC: <1.2 MG/DL
MICROALBUMIN/CREAT UR: NORMAL MG/G (ref 0–30)
POTASSIUM SERPL-SCNC: 4.8 MMOL/L (ref 3.5–5.1)
SODIUM SERPL-SCNC: 141 MMOL/L (ref 136–145)
VIT B12 SERPL-MCNC: 707 PG/ML (ref 211–911)

## 2025-08-18 PROCEDURE — 97530 THERAPEUTIC ACTIVITIES: CPT

## 2025-08-18 PROCEDURE — 97140 MANUAL THERAPY 1/> REGIONS: CPT

## 2025-08-18 PROCEDURE — 97110 THERAPEUTIC EXERCISES: CPT

## 2025-08-19 ENCOUNTER — OFFICE VISIT (OUTPATIENT)
Dept: ENDOCRINOLOGY | Age: 70
End: 2025-08-19
Payer: MEDICARE

## 2025-08-19 VITALS
OXYGEN SATURATION: 97 % | SYSTOLIC BLOOD PRESSURE: 118 MMHG | HEART RATE: 82 BPM | DIASTOLIC BLOOD PRESSURE: 64 MMHG | WEIGHT: 229.4 LBS | HEIGHT: 65 IN | BODY MASS INDEX: 38.22 KG/M2

## 2025-08-19 DIAGNOSIS — I10 PRIMARY HYPERTENSION: ICD-10-CM

## 2025-08-19 DIAGNOSIS — E11.9 CONTROLLED TYPE 2 DIABETES MELLITUS WITHOUT COMPLICATION, WITHOUT LONG-TERM CURRENT USE OF INSULIN (HCC): Primary | ICD-10-CM

## 2025-08-19 DIAGNOSIS — E11.69 DYSLIPIDEMIA ASSOCIATED WITH TYPE 2 DIABETES MELLITUS (HCC): ICD-10-CM

## 2025-08-19 DIAGNOSIS — E78.5 DYSLIPIDEMIA ASSOCIATED WITH TYPE 2 DIABETES MELLITUS (HCC): ICD-10-CM

## 2025-08-19 DIAGNOSIS — E66.01 MORBID (SEVERE) OBESITY DUE TO EXCESS CALORIES (HCC): ICD-10-CM

## 2025-08-19 DIAGNOSIS — I10 ESSENTIAL HYPERTENSION: ICD-10-CM

## 2025-08-19 PROCEDURE — 2022F DILAT RTA XM EVC RTNOPTHY: CPT | Performed by: INTERNAL MEDICINE

## 2025-08-19 PROCEDURE — 99214 OFFICE O/P EST MOD 30 MIN: CPT | Performed by: INTERNAL MEDICINE

## 2025-08-19 PROCEDURE — 3078F DIAST BP <80 MM HG: CPT | Performed by: INTERNAL MEDICINE

## 2025-08-19 PROCEDURE — 3044F HG A1C LEVEL LT 7.0%: CPT | Performed by: INTERNAL MEDICINE

## 2025-08-19 PROCEDURE — 1036F TOBACCO NON-USER: CPT | Performed by: INTERNAL MEDICINE

## 2025-08-19 PROCEDURE — G8427 DOCREV CUR MEDS BY ELIG CLIN: HCPCS | Performed by: INTERNAL MEDICINE

## 2025-08-19 PROCEDURE — 1090F PRES/ABSN URINE INCON ASSESS: CPT | Performed by: INTERNAL MEDICINE

## 2025-08-19 PROCEDURE — G8399 PT W/DXA RESULTS DOCUMENT: HCPCS | Performed by: INTERNAL MEDICINE

## 2025-08-19 PROCEDURE — 3017F COLORECTAL CA SCREEN DOC REV: CPT | Performed by: INTERNAL MEDICINE

## 2025-08-19 PROCEDURE — 1123F ACP DISCUSS/DSCN MKR DOCD: CPT | Performed by: INTERNAL MEDICINE

## 2025-08-19 PROCEDURE — G2211 COMPLEX E/M VISIT ADD ON: HCPCS | Performed by: INTERNAL MEDICINE

## 2025-08-19 PROCEDURE — 1160F RVW MEDS BY RX/DR IN RCRD: CPT | Performed by: INTERNAL MEDICINE

## 2025-08-19 PROCEDURE — 1159F MED LIST DOCD IN RCRD: CPT | Performed by: INTERNAL MEDICINE

## 2025-08-19 PROCEDURE — 3074F SYST BP LT 130 MM HG: CPT | Performed by: INTERNAL MEDICINE

## 2025-08-19 PROCEDURE — G8417 CALC BMI ABV UP PARAM F/U: HCPCS | Performed by: INTERNAL MEDICINE

## 2025-08-19 RX ORDER — EZETIMIBE 10 MG/1
10 TABLET ORAL DAILY
Qty: 90 TABLET | Refills: 3 | Status: SHIPPED | OUTPATIENT
Start: 2025-08-19

## 2025-08-19 RX ORDER — LOSARTAN POTASSIUM 25 MG/1
25 TABLET ORAL DAILY
Qty: 90 TABLET | Refills: 1 | Status: SHIPPED | OUTPATIENT
Start: 2025-08-19

## 2025-08-22 ENCOUNTER — HOSPITAL ENCOUNTER (OUTPATIENT)
Dept: PHYSICAL THERAPY | Age: 70
Setting detail: THERAPIES SERIES
Discharge: HOME | End: 2025-08-22
Attending: FAMILY MEDICINE
Payer: MEDICARE

## 2025-08-22 PROCEDURE — 97110 THERAPEUTIC EXERCISES: CPT

## 2025-08-22 PROCEDURE — 97530 THERAPEUTIC ACTIVITIES: CPT

## 2025-08-25 ENCOUNTER — HOSPITAL ENCOUNTER (OUTPATIENT)
Dept: PHYSICAL THERAPY | Age: 70
Setting detail: THERAPIES SERIES
Discharge: HOME | End: 2025-08-25
Attending: FAMILY MEDICINE
Payer: MEDICARE

## 2025-08-25 PROCEDURE — 97110 THERAPEUTIC EXERCISES: CPT

## 2025-08-25 PROCEDURE — 97530 THERAPEUTIC ACTIVITIES: CPT

## 2025-08-25 PROCEDURE — 97140 MANUAL THERAPY 1/> REGIONS: CPT

## 2025-08-29 ENCOUNTER — HOSPITAL ENCOUNTER (OUTPATIENT)
Dept: PHYSICAL THERAPY | Age: 70
Setting detail: THERAPIES SERIES
Discharge: HOME | End: 2025-08-29
Attending: FAMILY MEDICINE
Payer: MEDICARE

## 2025-08-29 PROCEDURE — 97110 THERAPEUTIC EXERCISES: CPT

## 2025-08-29 PROCEDURE — 97530 THERAPEUTIC ACTIVITIES: CPT

## 2025-09-03 ENCOUNTER — HOSPITAL ENCOUNTER (OUTPATIENT)
Dept: PHYSICAL THERAPY | Age: 70
Setting detail: THERAPIES SERIES
Discharge: HOME | End: 2025-09-03
Attending: FAMILY MEDICINE
Payer: MEDICARE

## 2025-09-03 PROCEDURE — 97112 NEUROMUSCULAR REEDUCATION: CPT

## 2025-09-03 PROCEDURE — 97110 THERAPEUTIC EXERCISES: CPT

## 2025-09-03 PROCEDURE — 97530 THERAPEUTIC ACTIVITIES: CPT

## 2025-09-05 ENCOUNTER — HOSPITAL ENCOUNTER (OUTPATIENT)
Dept: PHYSICAL THERAPY | Age: 70
Setting detail: THERAPIES SERIES
Discharge: HOME | End: 2025-09-05
Attending: FAMILY MEDICINE
Payer: MEDICARE

## 2025-09-05 PROCEDURE — 97110 THERAPEUTIC EXERCISES: CPT

## 2025-09-05 PROCEDURE — 97112 NEUROMUSCULAR REEDUCATION: CPT

## 2025-09-05 PROCEDURE — 97530 THERAPEUTIC ACTIVITIES: CPT

## (undated) DEVICE — 5.5 MM ELITE ACROMIOBLASTER                                    STRAIGHT DISPOSABLE BURRS, BRICK                                    RED, 10000 MAXIMUM RPM, PACKAGED 6                                    PER BOX, STERILE

## (undated) DEVICE — SET VLV 3 PC AWS DISPOSABLE GRDIAN SCOPEVALET

## (undated) DEVICE — BW-412T DISP COMBO CLEANING BRUSH: Brand: SINGLE USE COMBINATION CLEANING BRUSH

## (undated) DEVICE — MAT FLR ABS DISP

## (undated) DEVICE — BLANKET WRM W40.2XL55.9IN IORT LO BODY + MISTRAL AIR

## (undated) DEVICE — SYRINGE, LUER LOCK, 60ML: Brand: MEDLINE

## (undated) DEVICE — BANDAGE ADH W1XL3IN NAT FAB WVN FLX DURABLE N ADH PD SEAL

## (undated) DEVICE — GLOVE SURG SZ 9 THK91MIL LTX FREE SYN POLYISOPRENE ANTI

## (undated) DEVICE — SUTURE NONABSORBABLE MONOFILAMENT 4-0 PS-2 18 IN BLK ETHILON 1667G

## (undated) DEVICE — INCISOR PLUS PLATINUM 4.5 MM BLADE: Brand: DYONICS INCISOR

## (undated) DEVICE — DYONICS 25 PATIENT TUBE SET MUST                                    BE USED WITH 7211007, 12 PER BOX

## (undated) DEVICE — 3M™ STERI-DRAPE™ U-DRAPE 1015: Brand: STERI-DRAPE™

## (undated) DEVICE — GAUZE,SPONGE,4"X4",8PLY,STRL,LF,10/TRAY: Brand: MEDLINE

## (undated) DEVICE — SHOULDER STABILIZATION KIT,                                    DISPOSABLE 12 PER BOX

## (undated) DEVICE — T-MAX DISPOSABLE FACE MASK 8 PER BOX

## (undated) DEVICE — HYPODERMIC SAFETY NEEDLE: Brand: MAGELLAN

## (undated) DEVICE — GLOVE ORANGE PI 8 1/2   MSG9085

## (undated) DEVICE — BOWL MED L 32OZ PLAS W/ MOLD GRAD EZ OPN PEEL PCH

## (undated) DEVICE — SOLUTION IV IRRIG WATER 500ML POUR BRL ST 2F7113

## (undated) DEVICE — Device

## (undated) DEVICE — TOWEL,OR,DSP,ST,BLUE,STD,4/PK,20PK/CS: Brand: MEDLINE

## (undated) DEVICE — GOWN AURORA NONREINF LG: Brand: MEDLINE INDUSTRIES, INC.

## (undated) DEVICE — TRAP SPEC RETRV CLR PLAS POLYP IN LN SUCT QUIK CTCH

## (undated) DEVICE — MEDIA CONTRAST RX ISOVUE-300 61% 30ML VIALS

## (undated) DEVICE — CHLORAPREP 26ML ORANGE

## (undated) DEVICE — STANDARD HYPODERMIC NEEDLE,POLYPROPYLENE HUB: Brand: MONOJECT

## (undated) DEVICE — WEREWOLF FLOW 90 COBLATION WAND: Brand: COBLATION

## (undated) DEVICE — Device: Brand: DISPOSABLE ELECTROSURGICAL SNARE

## (undated) DEVICE — 3M™ TEGADERM™ +PAD FILM DRESSING WITH NON-ADHERENT PAD, 3588, 6 IN X 6 IN (15 CM X 15 CM), 25/CAR, 4 CAR/CS: Brand: 3M™ TEGADERM™

## (undated) DEVICE — STERILE POLYISOPRENE POWDER-FREE SURGICAL GLOVES: Brand: PROTEXIS

## (undated) DEVICE — PROCEDURE KIT ENDOSCP CUST

## (undated) DEVICE — NEEDLE SPNL 22GA L5IN BLK HUB S STL W/ QNCKE PNT W/OUT

## (undated) DEVICE — UNIVERSAL BLOCK TRAY: Brand: AVANOS*

## (undated) DEVICE — MASC TURNOVER KIT: Brand: MEDLINE INDUSTRIES, INC.

## (undated) DEVICE — 60 ML SYRINGE,REGULAR TIP: Brand: MONOJECT

## (undated) DEVICE — PACK PROCEDURE SURG SHLDR MFFOP CUST

## (undated) DEVICE — PEN: MARKING STD 100/CS: Brand: MEDICAL ACTION INDUSTRIES